# Patient Record
Sex: MALE | Race: WHITE | NOT HISPANIC OR LATINO | Employment: UNEMPLOYED | URBAN - METROPOLITAN AREA
[De-identification: names, ages, dates, MRNs, and addresses within clinical notes are randomized per-mention and may not be internally consistent; named-entity substitution may affect disease eponyms.]

---

## 2018-08-15 ENCOUNTER — OFFICE VISIT (OUTPATIENT)
Dept: FAMILY MEDICINE | Facility: CLINIC | Age: 38
End: 2018-08-15
Payer: MEDICAID

## 2018-08-15 VITALS
RESPIRATION RATE: 16 BRPM | SYSTOLIC BLOOD PRESSURE: 110 MMHG | BODY MASS INDEX: 21.13 KG/M2 | DIASTOLIC BLOOD PRESSURE: 78 MMHG | OXYGEN SATURATION: 97 % | HEIGHT: 72 IN | WEIGHT: 156 LBS | HEART RATE: 88 BPM | TEMPERATURE: 98 F

## 2018-08-15 DIAGNOSIS — Z79.4 TYPE 2 DIABETES MELLITUS WITH DIABETIC POLYNEUROPATHY, WITH LONG-TERM CURRENT USE OF INSULIN: ICD-10-CM

## 2018-08-15 DIAGNOSIS — K21.9 GASTROESOPHAGEAL REFLUX DISEASE, ESOPHAGITIS PRESENCE NOT SPECIFIED: ICD-10-CM

## 2018-08-15 DIAGNOSIS — E11.42 TYPE 2 DIABETES MELLITUS WITH DIABETIC POLYNEUROPATHY, WITH LONG-TERM CURRENT USE OF INSULIN: ICD-10-CM

## 2018-08-15 PROBLEM — E11.9 DIABETES MELLITUS, TYPE 2: Status: ACTIVE | Noted: 2018-08-15

## 2018-08-15 PROCEDURE — 99214 OFFICE O/P EST MOD 30 MIN: CPT | Mod: ,,, | Performed by: FAMILY MEDICINE

## 2018-08-15 RX ORDER — HYDROGEN PEROXIDE 3 %
20 SOLUTION, NON-ORAL MISCELLANEOUS
COMMUNITY
End: 2018-09-26

## 2018-08-15 RX ORDER — PANTOPRAZOLE SODIUM 40 MG/1
40 TABLET, DELAYED RELEASE ORAL DAILY
Qty: 30 TABLET | Refills: 11 | Status: SHIPPED | OUTPATIENT
Start: 2018-08-15 | End: 2019-09-16 | Stop reason: SDUPTHER

## 2018-08-15 RX ORDER — INSULIN PUMP SYRINGE, 3 ML
EACH MISCELLANEOUS
Qty: 1 EACH | Refills: 0 | Status: SHIPPED | OUTPATIENT
Start: 2018-08-15 | End: 2021-10-04

## 2018-08-15 RX ORDER — LANCETS
EACH MISCELLANEOUS
Qty: 200 EACH | Refills: 3 | Status: SHIPPED | OUTPATIENT
Start: 2018-08-15 | End: 2020-12-09 | Stop reason: SDUPTHER

## 2018-08-15 RX ORDER — INSULIN ASPART 100 [IU]/ML
10 INJECTION, SOLUTION INTRAVENOUS; SUBCUTANEOUS 4 TIMES DAILY
Qty: 12 ML | Refills: 11 | Status: SHIPPED | OUTPATIENT
Start: 2018-08-15 | End: 2023-02-13 | Stop reason: SDUPTHER

## 2018-08-15 RX ORDER — GABAPENTIN 600 MG/1
600 TABLET ORAL 3 TIMES DAILY
Qty: 90 TABLET | Refills: 11 | Status: SHIPPED | OUTPATIENT
Start: 2018-08-15 | End: 2019-09-16

## 2018-08-15 NOTE — PATIENT INSTRUCTIONS
Nathaly sliding scale:  Check blood sugar 4 times a day.  If blood sugar is 0-80 take sugar or glucose tablets.  If blood sugar  take 0 units.  If blood sugar 161-200 take 2 units.  If blood sugar 201-250 take 4 units.  If blood sugar 251-300 take 6 units.  If blood sugar greater than 300 take 8 units.

## 2018-08-15 NOTE — PROGRESS NOTES
Subjective:       Patient ID: Philip Alberts is a 37 y.o. male.    Chief Complaint: Diabetes      Patient is here to get established. Has a seven-year history of diabetes and needs follow-up on it. As been on insulin since 2016      Diabetes   He presents for his follow-up diabetic visit. He has type 2 diabetes mellitus. No MedicAlert identification noted. The initial diagnosis of diabetes was made 7 years ago. Hypoglycemia symptoms include dizziness and nervousness/anxiousness. Pertinent negatives for hypoglycemia include no hunger. Associated symptoms include blurred vision, foot paresthesias, polydipsia, polyuria and weight loss. Pertinent negatives for diabetes include no foot ulcerations, no polyphagia and no visual change. Hypoglycemia complications include hospitalization (at dx.). Pertinent negatives for hypoglycemia complications include no blackouts and no nocturnal hypoglycemia. Risk factors for coronary artery disease include diabetes mellitus. Current diabetic treatment includes diet, insulin injections and intensive insulin program. He is compliant with treatment some of the time. His weight is stable. He is following a diabetic diet. Meal planning includes avoidance of concentrated sweets. He participates in exercise intermittently. His breakfast blood glucose range is generally >200 mg/dl. His lunch blood glucose range is generally >200 mg/dl. His dinner blood glucose range is generally >200 mg/dl. His bedtime blood glucose range is generally >200 mg/dl. His overall blood glucose range is >200 mg/dl. He does not see a podiatrist.Eye exam is not current (2 years).       Allergies and Medications:   Review of patient's allergies indicates:   Allergen Reactions    Pcn [penicillins] Anaphylaxis     Current Outpatient Medications   Medication Sig Dispense Refill    esomeprazole (NEXIUM) 20 MG capsule Take 20 mg by mouth before breakfast.      insulin detemir U-100 (LEVEMIR) 100 unit/mL injection  Inject 35 Units into the skin once daily. 1050 Units 11    blood sugar diagnostic Strp To check BG qid times daily, to use with insurance preferred meter 200 strip 3    blood-glucose meter kit To check BG qid times daily, to use with insurance preferred meter 1 each 0    gabapentin (NEURONTIN) 600 MG tablet Take 1 tablet (600 mg total) by mouth 3 (three) times daily. 90 tablet 11    insulin aspart U-100 (NOVOLOG FLEXPEN U-100 INSULIN) 100 unit/mL InPn pen Inject 10 Units into the skin 4 (four) times daily. Per sliding scale as instructed 12 mL 11    lancets Misc To check BGqid times daily, to use with insurance preferred meter 200 each 3    pantoprazole (PROTONIX) 40 MG tablet Take 1 tablet (40 mg total) by mouth once daily. 30 tablet 11     No current facility-administered medications for this visit.        Family History:   Family History   Problem Relation Age of Onset    Diabetes Mother     Cancer Father        Social History:   Social History     Socioeconomic History    Marital status: Single     Spouse name: Not on file    Number of children: Not on file    Years of education: Not on file    Highest education level: Not on file   Social Needs    Financial resource strain: Not on file    Food insecurity - worry: Not on file    Food insecurity - inability: Not on file    Transportation needs - medical: Not on file    Transportation needs - non-medical: Not on file   Occupational History    Not on file   Tobacco Use    Smoking status: Current Every Day Smoker    Smokeless tobacco: Never Used   Substance and Sexual Activity    Alcohol use: No     Frequency: Never    Drug use: No    Sexual activity: Not on file   Other Topics Concern    Not on file   Social History Narrative    Not on file       Review of Systems   Constitutional: Positive for weight loss.   Eyes: Positive for blurred vision.   Endocrine: Positive for polydipsia and polyuria. Negative for polyphagia.   Neurological:  Positive for dizziness.   Psychiatric/Behavioral: The patient is nervous/anxious.        Objective:     Vitals:    08/15/18 1057   BP: 110/78   Pulse: 88   Resp: 16   Temp: 97.7 °F (36.5 °C)        Physical Exam   Constitutional: He appears well-developed and well-nourished.   HENT:   Head: Normocephalic.   Cardiovascular: Normal rate, regular rhythm, normal heart sounds and intact distal pulses. Exam reveals no gallop and no friction rub.   No murmur heard.  Nursing note and vitals reviewed.      Assessment:       1. Type 2 diabetes mellitus with diabetic polyneuropathy, with long-term current use of insulin    2. Gastroesophageal reflux disease, esophagitis presence not specified        Plan:       Philip was seen today for diabetes.    Diagnoses and all orders for this visit:    Type 2 diabetes mellitus with diabetic polyneuropathy, with long-term current use of insulin  -     Ambulatory consult to Ophthalmology  -     insulin detemir U-100 (LEVEMIR) 100 unit/mL injection; Inject 35 Units into the skin once daily.  -     insulin aspart U-100 (NOVOLOG FLEXPEN U-100 INSULIN) 100 unit/mL InPn pen; Inject 10 Units into the skin 4 (four) times daily. Per sliding scale as instructed  -     blood-glucose meter kit; To check BG qid times daily, to use with insurance preferred meter  -     lancets Misc; To check BGqid times daily, to use with insurance preferred meter  -     blood sugar diagnostic Strp; To check BG qid times daily, to use with insurance preferred meter  -     gabapentin (NEURONTIN) 600 MG tablet; Take 1 tablet (600 mg total) by mouth 3 (three) times daily.    Gastroesophageal reflux disease, esophagitis presence not specified  -     pantoprazole (PROTONIX) 40 MG tablet; Take 1 tablet (40 mg total) by mouth once daily.         Follow-up in about 2 weeks (around 8/29/2018), or For follow-up on blood sugars, for annual.

## 2018-08-29 ENCOUNTER — OFFICE VISIT (OUTPATIENT)
Dept: FAMILY MEDICINE | Facility: CLINIC | Age: 38
End: 2018-08-29
Payer: MEDICAID

## 2018-08-29 VITALS
HEART RATE: 88 BPM | WEIGHT: 171.5 LBS | TEMPERATURE: 99 F | OXYGEN SATURATION: 97 % | RESPIRATION RATE: 20 BRPM | BODY MASS INDEX: 23.23 KG/M2 | DIASTOLIC BLOOD PRESSURE: 60 MMHG | SYSTOLIC BLOOD PRESSURE: 110 MMHG | HEIGHT: 72 IN

## 2018-08-29 DIAGNOSIS — Z79.4 TYPE 2 DIABETES MELLITUS WITH DIABETIC POLYNEUROPATHY, WITH LONG-TERM CURRENT USE OF INSULIN: ICD-10-CM

## 2018-08-29 DIAGNOSIS — K59.1 FUNCTIONAL DIARRHEA: Primary | ICD-10-CM

## 2018-08-29 DIAGNOSIS — E11.42 TYPE 2 DIABETES MELLITUS WITH DIABETIC POLYNEUROPATHY, WITH LONG-TERM CURRENT USE OF INSULIN: ICD-10-CM

## 2018-08-29 LAB
GLUCOSE SERPL-MCNC: ABNORMAL MG/DL (ref 70–110)
HBA1C MFR BLD: 13.9 %
POC CHOLESTEROL, HDL: 71
POC CHOLESTEROL, LDL: 128
POC CHOLESTEROL, TOTAL: 215 MG/DL
POC GLUCOSE FASTING: 384
POC TOTAL CHOLESTEROL / HDL RATIO: 3
POC TRIGLYCERIDES: 84

## 2018-08-29 PROCEDURE — 80061 LIPID PANEL: CPT | Mod: QW,,, | Performed by: FAMILY MEDICINE

## 2018-08-29 PROCEDURE — 99213 OFFICE O/P EST LOW 20 MIN: CPT | Mod: ,,, | Performed by: FAMILY MEDICINE

## 2018-08-29 PROCEDURE — 83036 HEMOGLOBIN GLYCOSYLATED A1C: CPT | Mod: QW,,, | Performed by: FAMILY MEDICINE

## 2018-08-29 PROCEDURE — 82947 ASSAY GLUCOSE BLOOD QUANT: CPT | Mod: QW,,, | Performed by: FAMILY MEDICINE

## 2018-08-29 RX ORDER — CHOLESTYRAMINE 4 G/9G
4 POWDER, FOR SUSPENSION ORAL
Qty: 270 PACKET | Refills: 3 | Status: SHIPPED | OUTPATIENT
Start: 2018-08-29 | End: 2023-02-13

## 2018-08-29 RX ORDER — PRAVASTATIN SODIUM 20 MG/1
20 TABLET ORAL DAILY
Qty: 90 TABLET | Refills: 3 | Status: SHIPPED | OUTPATIENT
Start: 2018-08-29 | End: 2019-09-16 | Stop reason: SDUPTHER

## 2018-08-29 RX ORDER — INSULIN GLARGINE 100 [IU]/ML
60 INJECTION, SOLUTION SUBCUTANEOUS DAILY
Qty: 18 ML | Refills: 11 | Status: SHIPPED | OUTPATIENT
Start: 2018-08-29 | End: 2019-08-21 | Stop reason: CLARIF

## 2018-08-29 NOTE — PROGRESS NOTES
Subjective:       Patient ID: Philip Alberts is a 38 y.o. male.    Chief Complaint: Diabetes (follow up )      Patient is here today as a follow-up on his diabetes.  The blood work done on last visit .  On Levimir 35/d skips , Novalog 30u/day avg.    Also c/o daily diarrhea      Diabetes   He presents for his follow-up diabetic visit. He has type 2 diabetes mellitus. There are no hypoglycemic associated symptoms. Pertinent negatives for diabetes include no blurred vision, no chest pain, no fatigue, no foot paresthesias, no foot ulcerations, no polydipsia, no polyphagia, no polyuria, no visual change, no weakness and no weight loss. There are no hypoglycemic complications. Diabetic complications include autonomic neuropathy. Pertinent negatives for diabetic complications include no retinopathy ( appt pending.). (No blood work. Not completely none.) There are no known risk factors for coronary artery disease. Current diabetic treatment includes diet, insulin injections and intensive insulin program. His breakfast blood glucose range is generally 130-140 mg/dl. His lunch blood glucose range is generally >200 mg/dl. His dinner blood glucose range is generally >200 mg/dl. His bedtime blood glucose range is generally >200 mg/dl. Sees podiatrist: pending.Eye exam current: pending.       Allergies and Medications:   Review of patient's allergies indicates:   Allergen Reactions    Pcn [penicillins] Anaphylaxis     Current Outpatient Medications   Medication Sig Dispense Refill    blood sugar diagnostic Strp To check BG qid times daily, to use with insurance preferred meter 200 strip 3    blood-glucose meter kit To check BG qid times daily, to use with insurance preferred meter 1 each 0    esomeprazole (NEXIUM) 20 MG capsule Take 20 mg by mouth before breakfast.      gabapentin (NEURONTIN) 600 MG tablet Take 1 tablet (600 mg total) by mouth 3 (three) times daily. 90 tablet 11    insulin aspart U-100 (NOVOLOG FLEXPEN  U-100 INSULIN) 100 unit/mL InPn pen Inject 10 Units into the skin 4 (four) times daily. Per sliding scale as instructed 12 mL 11    lancets Misc To check BGqid times daily, to use with insurance preferred meter 200 each 3    pantoprazole (PROTONIX) 40 MG tablet Take 1 tablet (40 mg total) by mouth once daily. 30 tablet 11    cholestyramine (QUESTRAN) 4 gram packet Take 1 packet (4 g total) by mouth 3 (three) times daily with meals. 270 packet 3    insulin glargine (BASAGLAR KWIKPEN U-100 INSULIN) 100 unit/mL (3 mL) InPn pen Inject 60 Units into the skin once daily. 18 mL 11    pravastatin (PRAVACHOL) 20 MG tablet Take 1 tablet (20 mg total) by mouth once daily. 90 tablet 3     No current facility-administered medications for this visit.        Family History:   Family History   Problem Relation Age of Onset    Diabetes Mother     Cancer Father        Social History:   Social History     Socioeconomic History    Marital status: Single     Spouse name: Not on file    Number of children: Not on file    Years of education: Not on file    Highest education level: Not on file   Social Needs    Financial resource strain: Not on file    Food insecurity - worry: Not on file    Food insecurity - inability: Not on file    Transportation needs - medical: Not on file    Transportation needs - non-medical: Not on file   Occupational History    Not on file   Tobacco Use    Smoking status: Current Every Day Smoker    Smokeless tobacco: Never Used   Substance and Sexual Activity    Alcohol use: No     Frequency: Never    Drug use: No    Sexual activity: Not on file   Other Topics Concern    Not on file   Social History Narrative    Not on file       Review of Systems   Constitutional: Negative for fatigue and weight loss.   Eyes: Negative for blurred vision.   Cardiovascular: Negative for chest pain.   Endocrine: Negative for polydipsia, polyphagia and polyuria.   Neurological: Negative for weakness.        Objective:     Vitals:    08/29/18 0856   BP: 110/60   Pulse: 88   Resp: 20   Temp: 98.7 °F (37.1 °C)        Physical Exam    Assessment:       1. Functional diarrhea    2. Type 2 diabetes mellitus with diabetic polyneuropathy, with long-term current use of insulin        Plan:       Philip was seen today for diabetes.    Diagnoses and all orders for this visit:    Functional diarrhea  -     cholestyramine (QUESTRAN) 4 gram packet; Take 1 packet (4 g total) by mouth 3 (three) times daily with meals.    Type 2 diabetes mellitus with diabetic polyneuropathy, with long-term current use of insulin  -     Ambulatory consult to Podiatry  -     insulin glargine (BASAGLAR KWIKPEN U-100 INSULIN) 100 unit/mL (3 mL) InPn pen; Inject 60 Units into the skin once daily.  -     cholestyramine (QUESTRAN) 4 gram packet; Take 1 packet (4 g total) by mouth 3 (three) times daily with meals.  -     pravastatin (PRAVACHOL) 20 MG tablet; Take 1 tablet (20 mg total) by mouth once daily.  -     Hemoglobin A1C, POCT  -     POCT Lipid Profile with Glucose         Follow-up in about 4 weeks (around 9/26/2018), or if symptoms worsen or fail to improve.

## 2018-09-26 ENCOUNTER — TELEPHONE (OUTPATIENT)
Dept: FAMILY MEDICINE | Facility: CLINIC | Age: 38
End: 2018-09-26

## 2018-09-26 ENCOUNTER — OFFICE VISIT (OUTPATIENT)
Dept: FAMILY MEDICINE | Facility: CLINIC | Age: 38
End: 2018-09-26
Payer: MEDICAID

## 2018-09-26 VITALS
OXYGEN SATURATION: 98 % | HEIGHT: 72 IN | SYSTOLIC BLOOD PRESSURE: 134 MMHG | WEIGHT: 169 LBS | DIASTOLIC BLOOD PRESSURE: 70 MMHG | HEART RATE: 97 BPM | BODY MASS INDEX: 22.89 KG/M2

## 2018-09-26 DIAGNOSIS — M54.50 CHRONIC MIDLINE LOW BACK PAIN WITHOUT SCIATICA: ICD-10-CM

## 2018-09-26 DIAGNOSIS — G89.29 CHRONIC MIDLINE LOW BACK PAIN WITHOUT SCIATICA: ICD-10-CM

## 2018-09-26 DIAGNOSIS — F17.200 SMOKING: ICD-10-CM

## 2018-09-26 DIAGNOSIS — E11.42 TYPE 2 DIABETES MELLITUS WITH DIABETIC POLYNEUROPATHY, WITH LONG-TERM CURRENT USE OF INSULIN: Primary | ICD-10-CM

## 2018-09-26 DIAGNOSIS — Z79.4 TYPE 2 DIABETES MELLITUS WITH DIABETIC POLYNEUROPATHY, WITH LONG-TERM CURRENT USE OF INSULIN: Primary | ICD-10-CM

## 2018-09-26 DIAGNOSIS — M15.3 POST-TRAUMATIC OSTEOARTHRITIS OF MULTIPLE JOINTS: ICD-10-CM

## 2018-09-26 PROCEDURE — 99214 OFFICE O/P EST MOD 30 MIN: CPT | Mod: ,,, | Performed by: FAMILY MEDICINE

## 2018-09-26 RX ORDER — DULOXETIN HYDROCHLORIDE 30 MG/1
30 CAPSULE, DELAYED RELEASE ORAL 2 TIMES DAILY
Qty: 60 CAPSULE | Refills: 11 | Status: SHIPPED | OUTPATIENT
Start: 2018-09-26 | End: 2018-12-26 | Stop reason: ALTCHOICE

## 2018-09-26 NOTE — TELEPHONE ENCOUNTER
Spoke with the patient about the prescription and how to get it filled. He said that he was not going to fill the medication.

## 2018-09-26 NOTE — TELEPHONE ENCOUNTER
----- Message from Santos Hua MD sent at 9/26/2018  1:20 PM CDT -----  I don't do prior authorizations for meds not covered on the formulary, however if we can find one that is covered is a substitute, I'll be glad to write for it.    ----- Message -----  From: Dinah Thurston  Sent: 9/26/2018  10:11 AM  To: [facility] Staff    PRIOR AUTHORIZATION, 9/26/18

## 2018-09-26 NOTE — PATIENT INSTRUCTIONS
Patient will increase his to 8 units per day hopefully to decrease his need for supplemental daytime insulin.

## 2018-09-26 NOTE — PROGRESS NOTES
Subjective:       Patient ID: Philip Alberts is a 38 y.o. male.    Chief Complaint: Diabetes and Generalized Body Aches      She is on 60 units of basilar per day and is having to take 10 units of the 4 times a day on sliding scale most days.      Diabetes   He presents for his follow-up diabetic visit. He has type 2 diabetes mellitus. Associated symptoms include fatigue. His breakfast blood glucose range is generally <70 mg/dl.   Muscle Pain   This is a chronic (Since January of this year) problem. The current episode started more than 1 month ago. The problem occurs daily. The problem has been gradually worsening. Associated symptoms include fatigue, myalgias and neck pain. Pertinent negatives include no anorexia, arthralgias, change in bowel habit, chills, congestion, coughing, diaphoresis, fever, nausea, numbness, swollen glands, urinary symptoms, vertigo or vomiting. Associated symptoms comments: Gets occasional sharp shooting pains in the right temple. He has tried NSAIDs and acetaminophen for the symptoms. The treatment provided mild relief.       Allergies and Medications:   Review of patient's allergies indicates:   Allergen Reactions    Pcn [penicillins] Anaphylaxis     Current Outpatient Medications   Medication Sig Dispense Refill    blood sugar diagnostic Strp To check BG qid times daily, to use with insurance preferred meter 200 strip 3    blood-glucose meter kit To check BG qid times daily, to use with insurance preferred meter 1 each 0    cholestyramine (QUESTRAN) 4 gram packet Take 1 packet (4 g total) by mouth 3 (three) times daily with meals. 270 packet 3    gabapentin (NEURONTIN) 600 MG tablet Take 1 tablet (600 mg total) by mouth 3 (three) times daily. 90 tablet 11    insulin aspart U-100 (NOVOLOG FLEXPEN U-100 INSULIN) 100 unit/mL InPn pen Inject 10 Units into the skin 4 (four) times daily. Per sliding scale as instructed 12 mL 11    insulin glargine (BASAGLAR KWIKPEN U-100 INSULIN)  100 unit/mL (3 mL) InPn pen Inject 60 Units into the skin once daily. 18 mL 11    lancets Misc To check BGqid times daily, to use with insurance preferred meter 200 each 3    pantoprazole (PROTONIX) 40 MG tablet Take 1 tablet (40 mg total) by mouth once daily. 30 tablet 11    pravastatin (PRAVACHOL) 20 MG tablet Take 1 tablet (20 mg total) by mouth once daily. 90 tablet 3    DULoxetine (CYMBALTA) 30 MG capsule Take 1 capsule (30 mg total) by mouth 2 (two) times daily. 60 capsule 11     No current facility-administered medications for this visit.        Family History:   Family History   Problem Relation Age of Onset    Diabetes Mother     Cancer Father        Social History:   Social History     Socioeconomic History    Marital status: Single     Spouse name: Not on file    Number of children: Not on file    Years of education: Not on file    Highest education level: Not on file   Social Needs    Financial resource strain: Not on file    Food insecurity - worry: Not on file    Food insecurity - inability: Not on file    Transportation needs - medical: Not on file    Transportation needs - non-medical: Not on file   Occupational History    Not on file   Tobacco Use    Smoking status: Current Every Day Smoker    Smokeless tobacco: Never Used   Substance and Sexual Activity    Alcohol use: No     Frequency: Never    Drug use: No    Sexual activity: Yes   Other Topics Concern    Not on file   Social History Narrative    Not on file       Review of Systems   Constitutional: Positive for fatigue. Negative for chills, diaphoresis and fever.   HENT: Negative for congestion.    Respiratory: Negative for cough.    Gastrointestinal: Negative for anorexia, change in bowel habit, nausea and vomiting.   Musculoskeletal: Positive for myalgias and neck pain. Negative for arthralgias.   Neurological: Negative for vertigo and numbness.       Objective:     Vitals:    09/26/18 0857   BP: 134/70   Pulse: 97         Physical Exam   Musculoskeletal:        Back:        Assessment:       1. Type 2 diabetes mellitus with diabetic polyneuropathy, with long-term current use of insulin patient better control now but still running over 300 at times    2. 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    3. Chronic midline low back pain without sciatica as above.      smoking-patient is not ready to quit at this time.  Plan:       Philip was seen today for diabetes and generalized body aches.    Diagnoses and all orders for this visit:    Type 2 diabetes mellitus with diabetic polyneuropathy, with long-term current use of insulin  -     Hemoglobin A1C, POCT  -     C-peptide; Future  -     C-peptide    Post-traumatic osteoarthritis of multiple joints  -     X-Ray Lumbar Spine Ap And Lateral; Future  -     C-reactive protein; Future  -     CBC auto differential; Future  -     Rheumatoid factor; Future  -     C-reactive protein  -     CBC auto differential  -     Rheumatoid factor    Chronic midline low back pain without sciatica  -     DULoxetine (CYMBALTA) 30 MG capsule; Take 1 capsule (30 mg total) by mouth 2 (two) times daily.  -     C-reactive protein; Future  -     MARINE; Future  -     C-reactive protein  -     MARINE    Smoking         Follow-up in about 3 months (around 12/26/2018) for Follow-up on chronic pain.

## 2018-10-05 ENCOUNTER — TELEPHONE (OUTPATIENT)
Dept: FAMILY MEDICINE | Facility: CLINIC | Age: 38
End: 2018-10-05

## 2018-10-05 LAB
BASOPHILS NFR BLD: 0.1 K/UL (ref 0–0.2)
BASOPHILS NFR BLD: 1.1 %
CRP SERPL-MCNC: 0.14 MG/DL (ref 0–1.4)
EOSINOPHIL NFR BLD: 0.2 K/UL (ref 0–0.7)
EOSINOPHIL NFR BLD: 1.5 %
ERYTHROCYTE [DISTWIDTH] IN BLOOD BY AUTOMATED COUNT: 12.2 % (ref 11.7–14.9)
GRAN #: 6.2 K/UL (ref 1.4–6.5)
GRAN%: 57.4 %
HBA1C MFR BLD: 10.5 % (ref 3.1–6.5)
HCT VFR BLD AUTO: 47.7 % (ref 39–55)
HGB BLD-MCNC: 16.5 G/DL (ref 14–16)
IMMATURE GRANS (ABS): 0 K/UL (ref 0–1)
IMMATURE GRANULOCYTES: 0.3 %
LYMPH #: 3.7 K/UL (ref 1.2–3.4)
LYMPH%: 34.3 %
MCH RBC QN AUTO: 30.8 PG (ref 25–35)
MCHC RBC AUTO-ENTMCNC: 34.6 G/DL (ref 31–36)
MCV RBC AUTO: 89.2 FL (ref 80–100)
MONO #: 0.6 K/UL (ref 0.1–0.6)
MONO%: 5.4 %
NUCLEATED RBCS: 0 %
PLATELET # BLD AUTO: 161 K/UL (ref 140–440)
PMV BLD AUTO: 15.4 FL (ref 8.8–12.7)
RBC # BLD AUTO: 5.35 M/UL (ref 4.3–5.9)
WBC # BLD AUTO: 10.8 K/UL (ref 5–10)

## 2018-10-05 NOTE — TELEPHONE ENCOUNTER
----- Message from Santos Hua MD sent at 10/5/2018 11:40 AM CDT -----  X-rays of the low back reveals some mild degenerative changes and osteophytes suggesting degenerative arthritis continue present medicines and recheck in 3 months.

## 2018-10-05 NOTE — PROGRESS NOTES
X-rays of the low back reveals some mild degenerative changes and osteophytes suggesting degenerative arthritis continue present medicines and recheck in 3 months.

## 2018-10-05 NOTE — TELEPHONE ENCOUNTER
This is the wrong chart the patient the patient has two charts and the correct chart is David Alberts MRN [id number]. Please us only the above listed chart this has his all of his information in it. Patient has been seen in the office since Epic his last office visit was on 9/26/2018.

## 2018-10-05 NOTE — TELEPHONE ENCOUNTER
----- Message from Santos Hua MD sent at 10/5/2018 11:33 AM CDT -----  Slightly high hemoglobin patient has not been seen since [facility]. Return to clinic

## 2018-10-05 NOTE — PROGRESS NOTES
Slightly high hemoglobin patient has not been seen since [facility]. Return to clinic

## 2018-10-06 LAB — RHEUMATOID FACT SERPL-ACNC: <10 IU/ML (ref 0–13.9)

## 2018-10-08 ENCOUNTER — TELEPHONE (OUTPATIENT)
Dept: FAMILY MEDICINE | Facility: CLINIC | Age: 38
End: 2018-10-08

## 2018-10-08 ENCOUNTER — DOCUMENTATION ONLY (OUTPATIENT)
Dept: FAMILY MEDICINE | Facility: CLINIC | Age: 38
End: 2018-10-08

## 2018-10-08 LAB — ANA SER-ACNC: NEGATIVE

## 2018-10-08 NOTE — PROGRESS NOTES
Slightly high hemoglobin. This is apparently a new problem.. Return to clinic with blood sugar diary.for follow-up,as his last A1c was 13.9.

## 2018-10-08 NOTE — TELEPHONE ENCOUNTER
----- Message from Santos Hua MD sent at 10/8/2018 11:47 AM CDT -----  Results Ok, notify patient.

## 2018-12-03 ENCOUNTER — INITIAL CONSULT (OUTPATIENT)
Dept: OPHTHALMOLOGY | Facility: CLINIC | Age: 38
End: 2018-12-03
Payer: MEDICAID

## 2018-12-03 DIAGNOSIS — E11.3293 TYPE 2 DIABETES MELLITUS WITH BOTH EYES AFFECTED BY MILD NONPROLIFERATIVE RETINOPATHY WITHOUT MACULAR EDEMA, WITH LONG-TERM CURRENT USE OF INSULIN: ICD-10-CM

## 2018-12-03 DIAGNOSIS — E11.3293 MILD NONPROLIFERATIVE DIABETIC RETINOPATHY OF BOTH EYES WITHOUT MACULAR EDEMA ASSOCIATED WITH TYPE 2 DIABETES MELLITUS: Primary | ICD-10-CM

## 2018-12-03 DIAGNOSIS — Z79.4 TYPE 2 DIABETES MELLITUS WITH BOTH EYES AFFECTED BY MILD NONPROLIFERATIVE RETINOPATHY WITHOUT MACULAR EDEMA, WITH LONG-TERM CURRENT USE OF INSULIN: ICD-10-CM

## 2018-12-03 PROBLEM — E11.3299 TYPE 2 DIABETES MELLITUS WITH MILD NONPROLIFERATIVE RETINOPATHY: Status: ACTIVE | Noted: 2018-12-03

## 2018-12-03 PROCEDURE — 99203 OFFICE O/P NEW LOW 30 MIN: CPT | Mod: PBBFAC,PO | Performed by: OPHTHALMOLOGY

## 2018-12-03 PROCEDURE — 92004 COMPRE OPH EXAM NEW PT 1/>: CPT | Mod: S$PBB,,, | Performed by: OPHTHALMOLOGY

## 2018-12-03 PROCEDURE — 99999 PR PBB SHADOW E&M-NEW PATIENT-LVL III: CPT | Mod: PBBFAC,,, | Performed by: OPHTHALMOLOGY

## 2018-12-03 NOTE — LETTER
December 3, 2018      Santos Hua MD  [address]  [address]  [facility] [address]           [address] [facility]hthal[facility]  [address] [place] [address] Drive [address]  [facility] [address]  Phone: [phone]          Patient: Philip Alberts   MR Number: [id number]   YOB: 1980   Date of Visit: 12/3/2018       Dear Dr. Santos Hua:    Thank you for referring Philip Alberts to me for evaluation. Attached you will find relevant portions of my assessment and plan of care.    If you have questions, please do not hesitate to call me. I look forward to following Philip Alberts along with you.    Sincerely,    Macy Fischer MD    Enclosure  CC:  No Recipients    If you would like to receive this communication electronically, please contact [email or web address] or [phone] to request more information on [facility] access.    For providers and/or their staff who would like to refer a patient to [facility], please contact us through our one-stop-shop provider referral line, [facility], at [phone].    If you feel you have received this communication in error or would no longer like to receive these types of communications, please e-mail [email or web address]

## 2018-12-03 NOTE — Clinical Note
Good morning - Mr. Alberts presents today for his eye exam. I am reviewing his medications. He states he was previously diagnosed with hypertension, but I do not see him on an ace inhibitor or any other meds. He reports today that his BP tends to be ok at the doctor's office, but he says it's higher at home. I asked him to bring his log and cuff with him next visit.

## 2018-12-03 NOTE — PROGRESS NOTES
"HPI     PCP Dr Hua  Seen at [facility] a few years ago for last eye exam    Here for Diabetic Eye Exam, DX: 7 years IDDM  Last 2 years. Had some   trouble with insurance covering insulin so he actively working on getting   sugars better controlled now that he is on the insulin regularly. No real   problems with eyes vision blurry when BG is running high. Denies eye pain   or eye issues no injury or surgery.      Lab Results       Component                Value               Date                       HGBA1C                   13.9                08/29/2018            No results found for: LABA1C    Last edited by Nuria Cintron on 12/3/2018 10:24 AM. 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   (History)        Assessment /Plan     For exam results, see Encounter Report.    Mild nonproliferative diabetic retinopathy of both eyes without macular edema associated with type 2 diabetes mellitus          Discussed glucose control. F/u 1 year diabetic eye exam.                 "

## 2018-12-26 ENCOUNTER — OFFICE VISIT (OUTPATIENT)
Dept: FAMILY MEDICINE | Facility: CLINIC | Age: 38
End: 2018-12-26
Payer: MEDICAID

## 2018-12-26 VITALS
BODY MASS INDEX: 22.48 KG/M2 | DIASTOLIC BLOOD PRESSURE: 78 MMHG | OXYGEN SATURATION: 98 % | HEIGHT: 72 IN | HEART RATE: 95 BPM | TEMPERATURE: 98 F | WEIGHT: 166 LBS | SYSTOLIC BLOOD PRESSURE: 120 MMHG

## 2018-12-26 DIAGNOSIS — F17.200 SMOKING: ICD-10-CM

## 2018-12-26 DIAGNOSIS — M15.3 POST-TRAUMATIC OSTEOARTHRITIS OF MULTIPLE JOINTS: ICD-10-CM

## 2018-12-26 DIAGNOSIS — E11.65 UNCONTROLLED TYPE 2 DIABETES MELLITUS WITH HYPERGLYCEMIA: ICD-10-CM

## 2018-12-26 DIAGNOSIS — M54.31 SCIATICA OF RIGHT SIDE: ICD-10-CM

## 2018-12-26 DIAGNOSIS — G89.4 CHRONIC PAIN SYNDROME: ICD-10-CM

## 2018-12-26 DIAGNOSIS — E11.42 DIABETIC POLYNEUROPATHY ASSOCIATED WITH TYPE 2 DIABETES MELLITUS: ICD-10-CM

## 2018-12-26 DIAGNOSIS — T78.2XXA ANAPHYLAXIS, INITIAL ENCOUNTER: ICD-10-CM

## 2018-12-26 DIAGNOSIS — N52.9 ERECTILE DYSFUNCTION, UNSPECIFIED ERECTILE DYSFUNCTION TYPE: ICD-10-CM

## 2018-12-26 DIAGNOSIS — Z00.00 WELL ADULT EXAM: Primary | ICD-10-CM

## 2018-12-26 DIAGNOSIS — E78.2 MIXED HYPERLIPIDEMIA: ICD-10-CM

## 2018-12-26 DIAGNOSIS — I10 ESSENTIAL HYPERTENSION: ICD-10-CM

## 2018-12-26 DIAGNOSIS — K21.9 GASTROESOPHAGEAL REFLUX DISEASE, ESOPHAGITIS PRESENCE NOT SPECIFIED: ICD-10-CM

## 2018-12-26 PROBLEM — E78.5 HYPERLIPIDEMIA: Status: ACTIVE | Noted: 2018-12-26

## 2018-12-26 LAB
BILIRUB SERPL-MCNC: NEGATIVE MG/DL
BLOOD, POC UA: NEGATIVE
GLUCOSE UR QL STRIP: ABNORMAL
KETONES UR QL STRIP: NEGATIVE
LEUKOCYTE ESTERASE URINE, POC: ABNORMAL
NITRITE, POC UA: NEGATIVE
PH, POC UA: 5
PROTEIN, POC: NEGATIVE
SPECIFIC GRAVITY, POC UA: 1.01
UROBILINOGEN, POC UA: NORMAL

## 2018-12-26 PROCEDURE — 81003 URINALYSIS AUTO W/O SCOPE: CPT | Mod: QW,,, | Performed by: FAMILY MEDICINE

## 2018-12-26 PROCEDURE — 99395 PREV VISIT EST AGE 18-39: CPT | Mod: 25,,, | Performed by: FAMILY MEDICINE

## 2018-12-26 RX ORDER — SILDENAFIL 100 MG/1
100 TABLET, FILM COATED ORAL DAILY PRN
Qty: 20 TABLET | Refills: 5 | Status: SHIPPED | OUTPATIENT
Start: 2018-12-26 | End: 2019-09-16 | Stop reason: SDUPTHER

## 2018-12-26 RX ORDER — EPINEPHRINE 0.3 MG/.3ML
1 INJECTION SUBCUTANEOUS ONCE
Qty: 0.3 ML | Refills: 2 | Status: SHIPPED | OUTPATIENT
Start: 2018-12-26 | End: 2021-07-06 | Stop reason: SDUPTHER

## 2018-12-26 RX ORDER — PAROXETINE HYDROCHLORIDE 20 MG/1
20 TABLET, FILM COATED ORAL EVERY MORNING
Qty: 30 TABLET | Refills: 11 | Status: SHIPPED | OUTPATIENT
Start: 2018-12-26 | End: 2019-01-28

## 2018-12-26 RX ORDER — METFORMIN HYDROCHLORIDE 1000 MG/1
1000 TABLET ORAL 2 TIMES DAILY WITH MEALS
Qty: 180 TABLET | Refills: 3 | Status: SHIPPED | OUTPATIENT
Start: 2018-12-26 | End: 2019-09-16 | Stop reason: SINTOL

## 2018-12-26 RX ORDER — IBUPROFEN 200 MG
1 TABLET ORAL DAILY
Refills: 0 | COMMUNITY
Start: 2018-12-26 | End: 2020-12-09

## 2018-12-26 NOTE — PROGRESS NOTES
Subjective:       Patient ID: Philip Alberts is a 38 y.o. male.    Chief Complaint: Diabetes      The patient is here for his annual physical today he had labs done in September but the results have not been imported into the apical or in the media we are checking in this at this time. He was also started on Cymbalta last visit because of neuropathy and anxiety.  Lab Results       Component                Value               Date                       CHOL                     215                 08/29/2018                 HGBA1C                   13.9                08/29/2018                  Diabetes   He presents for his follow-up diabetic visit. He has type 2 diabetes mellitus. MedicAlert identification noted. Pertinent negatives for hypoglycemia include no confusion, dizziness, headaches, nervousness/anxiousness, pallor, seizures, speech difficulty or tremors. Associated symptoms include fatigue. Pertinent negatives for diabetes include no chest pain, no polydipsia, no polyphagia, no polyuria and no weakness. His breakfast blood glucose range is generally 130-140 mg/dl. His dinner blood glucose range is generally >200 mg/dl.       Allergies and Medications:   Review of patient's allergies indicates:   Allergen Reactions    Pcn [penicillins] Anaphylaxis     Current Outpatient Medications   Medication Sig Dispense Refill    blood sugar diagnostic Strp To check BG qid times daily, to use with insurance preferred meter 200 strip 3    blood-glucose meter kit To check BG qid times daily, to use with insurance preferred meter 1 each 0    cholestyramine (QUESTRAN) 4 gram packet Take 1 packet (4 g total) by mouth 3 (three) times daily with meals. 270 packet 3    gabapentin (NEURONTIN) 600 MG tablet Take 1 tablet (600 mg total) by mouth 3 (three) times daily. 90 tablet 11    insulin aspart U-100 (NOVOLOG FLEXPEN U-100 INSULIN) 100 unit/mL InPn pen Inject 10 Units into the skin 4 (four) times daily. Per  sliding scale as instructed 12 mL 11    insulin glargine (BASAGLAR KWIKPEN U-100 INSULIN) 100 unit/mL (3 mL) InPn pen Inject 60 Units into the skin once daily. 18 mL 11    lancets Misc To check BGqid times daily, to use with insurance preferred meter 200 each 3    pantoprazole (PROTONIX) 40 MG tablet Take 1 tablet (40 mg total) by mouth once daily. 30 tablet 11    pravastatin (PRAVACHOL) 20 MG tablet Take 1 tablet (20 mg total) by mouth once daily. 90 tablet 3    EPINEPHrine (EPIPEN) 0.3 mg/0.3 mL AtIn Inject 0.3 mLs (0.3 mg total) into the muscle once. for 1 dose 0.3 mL 2    metFORMIN (GLUCOPHAGE) 1000 MG tablet Take 1 tablet (1,000 mg total) by mouth 2 (two) times daily with meals. 180 tablet 3    nicotine (NICODERM CQ) 21 mg/24 hr Place 1 patch onto the skin once daily.  0    paroxetine (PAXIL) 20 MG tablet Take 1 tablet (20 mg total) by mouth every morning. 30 tablet 11    sildenafil (VIAGRA) 100 MG tablet Take 1 tablet (100 mg total) by mouth daily as needed for Erectile Dysfunction. 20 tablet 5     No current facility-administered medications for this visit.        Family History:   Family History   Problem Relation Age of Onset    Diabetes Mother     Cancer Father        Social History:   Social History     Socioeconomic History    Marital status: Single     Spouse name: Not on file    Number of children: Not on file    Years of education: Not on file    Highest education level: Not on file   Social Needs    Financial resource strain: Not on file    Food insecurity - worry: Not on file    Food insecurity - inability: Not on file    Transportation needs - medical: Not on file    Transportation needs - non-medical: Not on file   Occupational History    Not on file   Tobacco Use    Smoking status: Current Every Day Smoker    Smokeless tobacco: Never Used   Substance and Sexual Activity    Alcohol use: No     Frequency: Never    Drug use: No    Sexual activity: Yes   Other Topics Concern     Not on file   Social History Narrative    Not on file       Review of Systems   Constitutional: Positive for fatigue and unexpected weight change ( Lots of weight loss earlier 2018 but has stabilized since being on insulin.). Negative for activity change, appetite change, chills, diaphoresis and fever.   HENT: Positive for congestion, dental problem ( lots of tooth loss), sinus pain and tinnitus. Negative for drooling, ear discharge, ear pain, facial swelling, hearing loss, mouth sores, nosebleeds, postnasal drip, rhinorrhea, sinus pressure, sneezing, sore throat, trouble swallowing and voice change.    Eyes: Positive for photophobia ( sun glare). Negative for pain, discharge, redness, itching and visual disturbance.        Has seen ophthalmologist at [facility] 2 weeks ago. Some retinopathy.   Respiratory: Negative for apnea, cough, choking, chest tightness, shortness of breath, wheezing and stridor.         Still smoking 2 packs per day.   Cardiovascular: Positive for palpitations. Negative for chest pain and leg swelling.   Gastrointestinal: Negative for abdominal distention, abdominal pain, anal bleeding, blood in stool, constipation, diarrhea, nausea, rectal pain and vomiting.        Early satiety, frequent stools   Endocrine: Positive for cold intolerance. Negative for heat intolerance, polydipsia, polyphagia and polyuria.   Genitourinary: Positive for frequency. Negative for decreased urine volume, difficulty urinating, discharge, dysuria, enuresis, flank pain, genital sores, hematuria, penile pain, penile swelling, scrotal swelling, testicular pain and urgency.        Hesitancy positive   Musculoskeletal: Positive for arthralgias, back pain, myalgias and neck stiffness. Negative for gait problem, joint swelling and neck pain.        History fibromyalgia   Skin: Negative for color change, pallor, rash and wound.   Allergic/Immunologic: Positive for environmental allergies ( Anaphylaxis from be and wasp stain  is). Negative for food allergies and immunocompromised state.   Neurological: Positive for numbness ( down right leg x 15 years.). Negative for dizziness, tremors, seizures, syncope, facial asymmetry, speech difficulty, weakness, light-headedness and headaches.   Hematological: Negative for adenopathy. Does not bruise/bleed easily.   Psychiatric/Behavioral: Positive for sleep disturbance. Negative for agitation, behavioral problems, confusion, decreased concentration, dysphoric mood, hallucinations (doesn't sleep well.), self-injury and suicidal ideas. The patient is not nervous/anxious and is not hyperactive.        Objective:     Vitals:    12/26/18 0855   BP: 120/78   Pulse: 95   Temp: 98.2 °F (36.8 °C)        Physical Exam   Constitutional: He is oriented to person, place, and time. He appears well-developed and well-nourished.  Non-toxic appearance. He does not have a sickly appearance. He does not appear ill. No distress.   HENT:   Head: Normocephalic and atraumatic.   Right Ear: External ear normal.   Left Ear: External ear normal.   Nose: Nose normal.   Mouth/Throat: Oropharynx is clear and moist. No oropharyngeal exudate.   Eyes: Conjunctivae and EOM are normal. Pupils are equal, round, and reactive to light. Right eye exhibits no discharge. Left eye exhibits no discharge. No scleral icterus.   Neck: Normal range of motion. Neck supple. No JVD present. No tracheal deviation present. No thyromegaly present.   Cardiovascular: Normal rate, regular rhythm, normal heart sounds and intact distal pulses. Exam reveals no gallop and no friction rub.   No murmur heard.  Pulses:       Dorsalis pedis pulses are 1+ on the right side, and 1+ on the left side.        Posterior tibial pulses are 1+ on the right side, and 1+ on the left side.   Pulmonary/Chest: Effort normal and breath sounds normal. No stridor. No respiratory distress. He has no wheezes. He has no rales. He exhibits no tenderness.   Abdominal: Soft. Bowel  sounds are normal. He exhibits no distension and no mass. There is no tenderness. There is no rebound and no guarding. No hernia.   Genitourinary: Rectum normal, prostate normal, testes normal and penis normal. Rectal exam shows guaiac negative stool. Cremasteric reflex is present. Right testis shows no mass, no swelling and no tenderness. Right testis is descended. Cremasteric reflex is not absent on the right side. Left testis shows no mass, no swelling and no tenderness. Left testis is descended. Cremasteric reflex is not absent on the left side. Circumcised. No phimosis, paraphimosis, hypospadias, penile erythema or penile tenderness. No discharge found.   Musculoskeletal: Normal range of motion. He exhibits no edema, tenderness or deformity.        Right foot: There is normal range of motion and no deformity.        Left foot: There is normal range of motion and no deformity.   Feet:   Right Foot:   Protective Sensation: 4 sites tested. 0 sites sensed.   Skin Integrity: Negative for ulcer, blister, skin breakdown, erythema, warmth, callus or dry skin.   Left Foot:   Protective Sensation: 4 sites tested. 2 sites sensed.   Skin Integrity: Negative for ulcer, blister, skin breakdown, erythema, warmth, callus or dry skin.   Lymphadenopathy:     He has no cervical adenopathy.   Neurological: He is alert and oriented to person, place, and time. He has normal reflexes. He displays normal reflexes. A sensory deficit is present. No cranial nerve deficit. He exhibits normal muscle tone. Coordination normal.   DTRs +1 symmetrical with distraction only.   Skin: Skin is warm and dry. No rash noted. He is not diaphoretic. No erythema. No pallor.   Psychiatric: He has a normal mood and affect. His behavior is normal. Judgment and thought content normal.   Nursing note and vitals reviewed.      Assessment:       1. Well adult exam    2. Uncontrolled type 2 diabetes mellitus with hyperglycemia    3. Gastroesophageal reflux  disease, esophagitis presence not specified    4. Mixed hyperlipidemia Needs to be rechecked    5. Essential hypertension -stable    6. Smoking up to 2 packs a day    7. Post-traumatic osteoarthritis of multiple joints    8. Sciatica of right side - this is subjective and masked by his diffuse lower extremity neuropathy from diabetes.        Plan:       Philip was seen today for diabetes.    Diagnoses and all orders for this visit:    Well adult exam  -     POCT Urinalysis  -     Comprehensive metabolic panel; Future  -     CBC auto differential; Future  -     Lipid panel; Future  -     TSH; Future  -     Comprehensive metabolic panel  -     CBC auto differential  -     Lipid panel  -     TSH    Uncontrolled type 2 diabetes mellitus with hyperglycemia  -     POCT Urinalysis  -     Comprehensive metabolic panel; Future  -     Lipid panel; Future  -     TSH; Future  -     Ambulatory referral to Endocrinology  -     Microalbumin/creatinine urine ratio; Future  -     EPINEPHrine (EPIPEN) 0.3 mg/0.3 mL AtIn; Inject 0.3 mLs (0.3 mg total) into the muscle once. for 1 dose  -     Ambulatory consult to Neurology  -     Hemoglobin A1c; Future  -     metFORMIN (GLUCOPHAGE) 1000 MG tablet; Take 1 tablet (1,000 mg total) by mouth 2 (two) times daily with meals.  -     Comprehensive metabolic panel  -     Lipid panel  -     TSH  -     Microalbumin/creatinine urine ratio  -     Hemoglobin A1c    Gastroesophageal reflux disease, esophagitis presence not specified    Mixed hyperlipidemia    Essential hypertension    Smoking  -     nicotine (NICODERM CQ) 21 mg/24 hr; Place 1 patch onto the skin once daily.    Post-traumatic osteoarthritis of multiple joints    Sciatica of right side  -     Ambulatory consult to Neurology    Chronic pain syndrome  -     paroxetine (PAXIL) 20 MG tablet; Take 1 tablet (20 mg total) by mouth every morning.    Diabetic polyneuropathy associated with type 2 diabetes mellitus  -     Hemoglobin A1c;  Future  -     metFORMIN (GLUCOPHAGE) 1000 MG tablet; Take 1 tablet (1,000 mg total) by mouth 2 (two) times daily with meals.  -     Hemoglobin A1c    Erectile dysfunction, unspecified erectile dysfunction type  -     sildenafil (VIAGRA) 100 MG tablet; Take 1 tablet (100 mg total) by mouth daily as needed for Erectile Dysfunction.    Anaphylaxis, initial encounter  -     EPINEPHrine (EPIPEN) 0.3 mg/0.3 mL AtIn; Inject 0.3 mLs (0.3 mg total) into the muscle once. for 1 dose         Follow-up in about 1 month (around 1/26/2019) for Recheck diabetes..

## 2019-01-21 LAB
ALBUMIN SERPL-MCNC: 4.2 G/DL (ref 3.1–4.7)
ALP SERPL-CCNC: 83 IU/L (ref 40–104)
ALT (SGPT): 27 IU/L (ref 3–33)
AST SERPL-CCNC: 24 IU/L (ref 10–40)
BASOPHILS NFR BLD: 0.1 K/UL (ref 0–0.2)
BASOPHILS NFR BLD: 1 %
BILIRUB SERPL-MCNC: 0.6 MG/DL (ref 0.3–1)
BUN SERPL-MCNC: 15 MG/DL (ref 8–20)
CALCIUM SERPL-MCNC: 9.5 MG/DL (ref 7.7–10.4)
CHLORIDE: 98 MMOL/L (ref 98–110)
CO2 SERPL-SCNC: 29.6 MMOL/L (ref 22.8–31.6)
CREATININE RANDOM URINE: 92 MG/DL
CREATININE: 0.84 MG/DL (ref 0.6–1.4)
EOSINOPHIL NFR BLD: 0.2 K/UL (ref 0–0.7)
EOSINOPHIL NFR BLD: 1.6 %
ERYTHROCYTE [DISTWIDTH] IN BLOOD BY AUTOMATED COUNT: 12.7 % (ref 11.7–14.9)
FOLATE SERPL-MCNC: 8.2 NG/ML (ref 2.2–11.2)
GLUCOSE: 331 MG/DL (ref 70–99)
GRAN #: 8.4 K/UL (ref 1.4–6.5)
GRAN%: 69.5 %
HBA1C MFR BLD: 11.7 % (ref 3.1–6.5)
HCT VFR BLD AUTO: 47.9 % (ref 39–55)
HGB BLD-MCNC: 16 G/DL (ref 14–16)
IMMATURE GRANS (ABS): 0 K/UL (ref 0–1)
IMMATURE GRANULOCYTES: 0.3 %
LYMPH #: 2.7 K/UL (ref 1.2–3.4)
LYMPH%: 22.6 %
MCH RBC QN AUTO: 30.1 PG (ref 25–35)
MCHC RBC AUTO-ENTMCNC: 33.4 G/DL (ref 31–36)
MCV RBC AUTO: 90.2 FL (ref 80–100)
MICROALBUM.,U,RANDOM: <2 MCG/ML (ref 0–19.9)
MICROALBUMIN/CREATININE RATIO: NORMAL (ref 0–30)
MONO #: 0.6 K/UL (ref 0.1–0.6)
MONO%: 5 %
NUCLEATED RBCS: 0 %
PLATELET # BLD AUTO: 174 K/UL (ref 140–440)
PMV BLD AUTO: 14.9 FL (ref 8.8–12.7)
POTASSIUM SERPL-SCNC: 4.5 MMOL/L (ref 3.5–5)
PROT SERPL-MCNC: 7.9 G/DL (ref 6–8.2)
RBC # BLD AUTO: 5.31 M/UL (ref 4.3–5.9)
SODIUM: 137 MMOL/L (ref 134–144)
TSH SERPL DL<=0.005 MIU/L-ACNC: 0.47 ULU/ML (ref 0.3–5.6)
VITAMIN B12: 578 PG/ML (ref 62–940)
WBC # BLD AUTO: 12.1 K/UL (ref 5–10)

## 2019-01-21 NOTE — PROGRESS NOTES
Patient labs show that he is acutely and chronically hyperglycemic needs to return to clinic with his blood sugar diary.

## 2019-01-28 ENCOUNTER — OFFICE VISIT (OUTPATIENT)
Dept: FAMILY MEDICINE | Facility: CLINIC | Age: 39
End: 2019-01-28
Payer: MEDICAID

## 2019-01-28 VITALS
TEMPERATURE: 98 F | WEIGHT: 166 LBS | DIASTOLIC BLOOD PRESSURE: 88 MMHG | HEART RATE: 90 BPM | HEIGHT: 72 IN | SYSTOLIC BLOOD PRESSURE: 136 MMHG | OXYGEN SATURATION: 97 % | BODY MASS INDEX: 22.48 KG/M2

## 2019-01-28 DIAGNOSIS — E11.3293 TYPE 2 DIABETES MELLITUS WITH BOTH EYES AFFECTED BY MILD NONPROLIFERATIVE RETINOPATHY WITHOUT MACULAR EDEMA, WITH LONG-TERM CURRENT USE OF INSULIN: Primary | ICD-10-CM

## 2019-01-28 DIAGNOSIS — Z79.4 TYPE 2 DIABETES MELLITUS WITH BOTH EYES AFFECTED BY MILD NONPROLIFERATIVE RETINOPATHY WITHOUT MACULAR EDEMA, WITH LONG-TERM CURRENT USE OF INSULIN: Primary | ICD-10-CM

## 2019-01-28 DIAGNOSIS — J31.0 CHRONIC RHINITIS: ICD-10-CM

## 2019-01-28 DIAGNOSIS — L72.3 SEBACEOUS CYST: ICD-10-CM

## 2019-01-28 PROCEDURE — 99213 PR OFFICE/OUTPT VISIT, EST, LEVL III, 20-29 MIN: ICD-10-PCS | Mod: ,,, | Performed by: FAMILY MEDICINE

## 2019-01-28 PROCEDURE — 99213 OFFICE O/P EST LOW 20 MIN: CPT | Mod: ,,, | Performed by: FAMILY MEDICINE

## 2019-01-28 RX ORDER — FLUTICASONE PROPIONATE 50 MCG
1 SPRAY, SUSPENSION (ML) NASAL DAILY
Qty: 1 BOTTLE | Refills: 3 | Status: SHIPPED | OUTPATIENT
Start: 2019-01-28 | End: 2019-09-16 | Stop reason: SDUPTHER

## 2019-01-28 RX ORDER — INSULIN GLARGINE 100 [IU]/ML
INJECTION, SOLUTION SUBCUTANEOUS
Qty: 27 ML | Refills: 11 | Status: SHIPPED | OUTPATIENT
Start: 2019-01-28 | End: 2019-08-21 | Stop reason: CLARIF

## 2019-01-28 NOTE — PROGRESS NOTES
Subjective:       Patient ID: Philip Alberts is a 38 y.o. male.    Chief Complaint: Diabetes      Issues here as a follow-up on his diabetes. He is on 60 units of basiglar night sugars are still jumping up above 300. Takes 10 units of NovoLog 4 times a day as is always a sugar or high as the day progresses. Patient reports if he takes too much basic large his sugars condition below 100 and he feels very bad in fact he feels bad if his sugars are below 140.    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 Has not gotten infected in the past  Chronic nasal constriction, for up to 2 years in duration.      Diabetes   He presents for his follow-up diabetic visit. He has type 2 diabetes mellitus. There are no hypoglycemic associated symptoms. There are no hypoglycemic complications. Pertinent negatives for hypoglycemia complications include no blackouts, no hospitalization, no nocturnal hypoglycemia, no required assistance and no required glucagon injection. Symptoms are improving. Risk factors for coronary artery disease include diabetes mellitus. His lunch blood glucose range is generally >200 mg/dl. His dinner blood glucose range is generally >200 mg/dl.   Sinus Problem   This is a chronic problem. The current episode started more than 1 year ago. The problem has been gradually worsening since onset. There has been no fever. His pain is at a severity of 0/10. He is experiencing no pain. Associated symptoms include congestion, sinus pressure and sneezing. Past treatments include oral decongestants (Uses sinex spray constantly). The treatment provided mild relief.       Allergies and Medications:   Review of patient's allergies indicates:   Allergen Reactions    Pcn [penicillins] Anaphylaxis     Current Outpatient Medications   Medication Sig Dispense Refill    blood sugar diagnostic Strp To check BG qid times daily, to use with insurance preferred  meter 200 strip 3    blood-glucose meter kit To check BG qid times daily, to use with insurance preferred meter 1 each 0    cholestyramine (QUESTRAN) 4 gram packet Take 1 packet (4 g total) by mouth 3 (three) times daily with meals. 270 packet 3    EPINEPHrine (EPIPEN) 0.3 mg/0.3 mL AtIn Inject 0.3 mLs (0.3 mg total) into the muscle once. for 1 dose 0.3 mL 2    gabapentin (NEURONTIN) 600 MG tablet Take 1 tablet (600 mg total) by mouth 3 (three) times daily. 90 tablet 11    insulin aspart U-100 (NOVOLOG FLEXPEN U-100 INSULIN) 100 unit/mL InPn pen Inject 10 Units into the skin 4 (four) times daily. Per sliding scale as instructed 12 mL 11    insulin glargine (BASAGLAR KWIKPEN U-100 INSULIN) 100 unit/mL (3 mL) InPn pen Inject 60 Units into the skin once daily. 18 mL 11    lancets Misc To check BGqid times daily, to use with insurance preferred meter 200 each 3    metFORMIN (GLUCOPHAGE) 1000 MG tablet Take 1 tablet (1,000 mg total) by mouth 2 (two) times daily with meals. 180 tablet 3    nicotine (NICODERM CQ) 21 mg/24 hr Place 1 patch onto the skin once daily.  0    pantoprazole (PROTONIX) 40 MG tablet Take 1 tablet (40 mg total) by mouth once daily. 30 tablet 11    pravastatin (PRAVACHOL) 20 MG tablet Take 1 tablet (20 mg total) by mouth once daily. 90 tablet 3    sildenafil (VIAGRA) 100 MG tablet Take 1 tablet (100 mg total) by mouth daily as needed for Erectile Dysfunction. 20 tablet 5    fluticasone (FLONASE) 50 mcg/actuation nasal spray 1 spray (50 mcg total) by Each Nare route once daily. 1 Bottle 3    insulin (BASAGLAR KWIKPEN U-100 INSULIN) glargine 100 units/mL (3mL) SubQ pen Inject 60 Units into the skin every evening AND 30 Units every morning. 27 mL 11     No current facility-administered medications for this visit.        Family History:   Family History   Problem Relation Age of Onset    Diabetes Mother     Cancer Father        Social History:   Social History     Socioeconomic History     Marital status: Single     Spouse name: Not on file    Number of children: Not on file    Years of education: Not on file    Highest education level: Not on file   Social Needs    Financial resource strain: Not on file    Food insecurity - worry: Not on file    Food insecurity - inability: Not on file    Transportation needs - medical: Not on file    Transportation needs - non-medical: Not on file   Occupational History    Not on file   Tobacco Use    Smoking status: Current Every Day Smoker    Smokeless tobacco: Never Used   Substance and Sexual Activity    Alcohol use: No     Frequency: Never    Drug use: No    Sexual activity: Yes   Other Topics Concern    Not on file   Social History Narrative    Not on file       Review of Systems   HENT: Positive for congestion, sinus pressure and sneezing.        Objective:     Vitals:    01/28/19 0920   BP: 136/88   Pulse: 90   Temp: 97.8 °F (36.6 °C)        Physical Exam   Constitutional: He appears well-developed and well-nourished. No distress.   HENT:   Head: Normocephalic and atraumatic.       Right Ear: Hearing, tympanic membrane, external ear and ear canal normal. No drainage, swelling or tenderness. No foreign bodies. Tympanic membrane is not injected, not scarred, not perforated, not erythematous, not retracted and not bulging. Tympanic membrane mobility is normal. No middle ear effusion. No hemotympanum. No decreased hearing is noted.   Left Ear: Hearing, tympanic membrane, external ear and ear canal normal. No drainage, swelling or tenderness. No foreign bodies. Tympanic membrane is not injected, not scarred, not perforated, not erythematous, not retracted and not bulging. Tympanic membrane mobility is normal.  No middle ear effusion. No hemotympanum. No decreased hearing is noted.   Nose: Nose normal. No mucosal edema, rhinorrhea, nose lacerations, sinus tenderness, nasal deformity, septal deviation or nasal septal hematoma. No epistaxis.  No  foreign bodies. Right sinus exhibits no maxillary sinus tenderness and no frontal sinus tenderness. Left sinus exhibits no maxillary sinus tenderness and no frontal sinus tenderness.   Mouth/Throat: Uvula is midline and oropharynx is clear and moist. Normal dentition. No oropharyngeal exudate, posterior oropharyngeal edema, posterior oropharyngeal erythema or tonsillar abscesses.   Eyes: Conjunctivae and EOM are normal. Pupils are equal, round, and reactive to light. Right eye exhibits no discharge. Left eye exhibits no discharge. No scleral icterus.   Neck: Normal range of motion. Neck supple. No thyromegaly present.   Cardiovascular: Normal rate, regular rhythm, normal heart sounds and intact distal pulses. Exam reveals no gallop and no friction rub.   No murmur heard.  Pulmonary/Chest: Effort normal and breath sounds normal. No stridor. No respiratory distress. He has no wheezes. He has no rales. He exhibits no tenderness.   Lymphadenopathy:     He has no cervical adenopathy.   Skin: He is not diaphoretic.   Nursing note and vitals reviewed.      Assessment:       1. Type 2 diabetes mellitus with both eyes affected by mild nonproliferative retinopathy without macular edema, with long-term current use of insulin      he continues to exhibit poor control on single daily dose basically are the only long-acting insulin his insurance will cover. Patient is to switch to a split dose regimen.  Chronic allergic rhinitis- patient has been using a over-the-counter decongestant nasal spray on a regular basis and cannot stop was advised to switch to the Flonase not to use a decongestant spray again.  Plan:       Philip was seen today for diabetes.    Diagnoses and all orders for this visit:    Type 2 diabetes mellitus with both eyes affected by mild nonproliferative retinopathy without macular edema, with long-term current use of insulin  -     Ambulatory referral to Nutrition Services  -     insulin (BASAGLAR KWIKPEN U-100  INSULIN) glargine 100 units/mL (3mL) SubQ pen; Inject 60 Units into the skin every evening AND 30 Units every morning.    Sebaceous cyst  -     Ambulatory referral to Dermatology    Chronic rhinitis  -     Ambulatory referral to ENT  -     fluticasone (FLONASE) 50 mcg/actuation nasal spray; 1 spray (50 mcg total) by Each Nare route once daily.         Follow-up in about 1 month (around 2/28/2019).

## 2019-02-04 ENCOUNTER — TELEPHONE (OUTPATIENT)
Dept: FAMILY MEDICINE | Facility: CLINIC | Age: 39
End: 2019-02-04

## 2019-02-04 DIAGNOSIS — E08.65 DIABETES MELLITUS DUE TO UNDERLYING CONDITION, UNCONTROLLED, WITH HYPERGLYCEMIA: Primary | ICD-10-CM

## 2019-02-04 NOTE — TELEPHONE ENCOUNTER
Nutritionist no sig knowledge to order a STEVO antibody to determine if type 1 diabetes follow-up with me 2 weeks.

## 2019-02-04 NOTE — TELEPHONE ENCOUNTER
----- Message from Debbi Davis LPN sent at 2/4/2019  7:54 AM CST -----  Regarding: [facility] nutrition clinic report      ----- Message -----  From: Candace Orozco  Sent: 2/1/2019   2:24 PM  To: [facility] Staff    Nutrition clinic notes 2/1/19

## 2019-04-01 PROBLEM — Z00.00 WELL ADULT EXAM: Status: RESOLVED | Noted: 2018-12-26 | Resolved: 2019-04-01

## 2019-08-21 ENCOUNTER — TELEPHONE (OUTPATIENT)
Dept: FAMILY MEDICINE | Facility: CLINIC | Age: 39
End: 2019-08-21

## 2019-08-21 DIAGNOSIS — E11.3293 TYPE 2 DIABETES MELLITUS WITH BOTH EYES AFFECTED BY MILD NONPROLIFERATIVE RETINOPATHY WITHOUT MACULAR EDEMA, WITH LONG-TERM CURRENT USE OF INSULIN: Primary | ICD-10-CM

## 2019-08-21 DIAGNOSIS — Z79.4 TYPE 2 DIABETES MELLITUS WITH BOTH EYES AFFECTED BY MILD NONPROLIFERATIVE RETINOPATHY WITHOUT MACULAR EDEMA, WITH LONG-TERM CURRENT USE OF INSULIN: Primary | ICD-10-CM

## 2019-08-21 RX ORDER — INSULIN GLARGINE 100 [IU]/ML
60 INJECTION, SOLUTION SUBCUTANEOUS NIGHTLY
Qty: 2 VIAL | Refills: 0 | Status: SHIPPED | OUTPATIENT
Start: 2019-08-21 | End: 2019-09-16

## 2019-09-16 ENCOUNTER — TELEPHONE (OUTPATIENT)
Dept: FAMILY MEDICINE | Facility: CLINIC | Age: 39
End: 2019-09-16

## 2019-09-16 ENCOUNTER — OFFICE VISIT (OUTPATIENT)
Dept: FAMILY MEDICINE | Facility: CLINIC | Age: 39
End: 2019-09-16
Payer: MEDICAID

## 2019-09-16 VITALS
DIASTOLIC BLOOD PRESSURE: 86 MMHG | OXYGEN SATURATION: 95 % | TEMPERATURE: 99 F | HEART RATE: 85 BPM | BODY MASS INDEX: 21.13 KG/M2 | SYSTOLIC BLOOD PRESSURE: 134 MMHG | WEIGHT: 156 LBS | HEIGHT: 72 IN

## 2019-09-16 DIAGNOSIS — J30.9 CHRONIC ALLERGIC RHINITIS: ICD-10-CM

## 2019-09-16 DIAGNOSIS — Z79.4 TYPE 2 DIABETES MELLITUS WITH DIABETIC POLYNEUROPATHY, WITH LONG-TERM CURRENT USE OF INSULIN: ICD-10-CM

## 2019-09-16 DIAGNOSIS — E08.42: ICD-10-CM

## 2019-09-16 DIAGNOSIS — E11.65 UNCONTROLLED TYPE 2 DIABETES MELLITUS WITH HYPERGLYCEMIA: ICD-10-CM

## 2019-09-16 DIAGNOSIS — Z79.4 TYPE 2 DIABETES MELLITUS WITH BOTH EYES AFFECTED BY MILD NONPROLIFERATIVE RETINOPATHY WITHOUT MACULAR EDEMA, WITH LONG-TERM CURRENT USE OF INSULIN: Primary | ICD-10-CM

## 2019-09-16 DIAGNOSIS — N52.9 ERECTILE DYSFUNCTION, UNSPECIFIED ERECTILE DYSFUNCTION TYPE: ICD-10-CM

## 2019-09-16 DIAGNOSIS — J31.0 CHRONIC RHINITIS: ICD-10-CM

## 2019-09-16 DIAGNOSIS — E11.42 TYPE 2 DIABETES MELLITUS WITH DIABETIC POLYNEUROPATHY, WITH LONG-TERM CURRENT USE OF INSULIN: ICD-10-CM

## 2019-09-16 DIAGNOSIS — Z23 IMMUNIZATION DUE: ICD-10-CM

## 2019-09-16 DIAGNOSIS — E11.3293 TYPE 2 DIABETES MELLITUS WITH BOTH EYES AFFECTED BY MILD NONPROLIFERATIVE RETINOPATHY WITHOUT MACULAR EDEMA, WITH LONG-TERM CURRENT USE OF INSULIN: Primary | ICD-10-CM

## 2019-09-16 DIAGNOSIS — K21.9 GASTROESOPHAGEAL REFLUX DISEASE, ESOPHAGITIS PRESENCE NOT SPECIFIED: ICD-10-CM

## 2019-09-16 PROCEDURE — 99213 PR OFFICE/OUTPT VISIT, EST, LEVL III, 20-29 MIN: ICD-10-PCS | Mod: S$PBB,,, | Performed by: FAMILY MEDICINE

## 2019-09-16 PROCEDURE — 99213 OFFICE O/P EST LOW 20 MIN: CPT | Mod: S$PBB,,, | Performed by: FAMILY MEDICINE

## 2019-09-16 PROCEDURE — 90471 IMMUNIZATION ADMIN: CPT | Mod: PBBFAC | Performed by: FAMILY MEDICINE

## 2019-09-16 PROCEDURE — 99999 PR PBB SHADOW E&M-EST. PATIENT-LVL V: CPT | Mod: PBBFAC,,, | Performed by: FAMILY MEDICINE

## 2019-09-16 PROCEDURE — 99215 OFFICE O/P EST HI 40 MIN: CPT | Mod: PBBFAC | Performed by: FAMILY MEDICINE

## 2019-09-16 PROCEDURE — 99999 PR PBB SHADOW E&M-EST. PATIENT-LVL V: ICD-10-PCS | Mod: PBBFAC,,, | Performed by: FAMILY MEDICINE

## 2019-09-16 RX ORDER — HYDROCODONE BITARTRATE AND ACETAMINOPHEN 5; 325 MG/1; MG/1
TABLET ORAL
COMMUNITY
Start: 2019-09-16 | End: 2020-12-09 | Stop reason: ALTCHOICE

## 2019-09-16 RX ORDER — FLUTICASONE PROPIONATE 50 MCG
1 SPRAY, SUSPENSION (ML) NASAL DAILY
Qty: 1 BOTTLE | Refills: 3 | Status: SHIPPED | OUTPATIENT
Start: 2019-09-16 | End: 2023-02-13

## 2019-09-16 RX ORDER — CLINDAMYCIN HYDROCHLORIDE 300 MG/1
CAPSULE ORAL
COMMUNITY
Start: 2019-09-16 | End: 2020-12-09 | Stop reason: ALTCHOICE

## 2019-09-16 RX ORDER — PANTOPRAZOLE SODIUM 40 MG/1
40 TABLET, DELAYED RELEASE ORAL DAILY
Qty: 30 TABLET | Refills: 11 | Status: SHIPPED | OUTPATIENT
Start: 2019-09-16 | End: 2019-11-21

## 2019-09-16 RX ORDER — INSULIN GLARGINE 100 [IU]/ML
INJECTION, SOLUTION SUBCUTANEOUS
COMMUNITY
End: 2019-09-20 | Stop reason: SDUPTHER

## 2019-09-16 RX ORDER — PRAVASTATIN SODIUM 20 MG/1
20 TABLET ORAL DAILY
Qty: 90 TABLET | Refills: 3 | Status: SHIPPED | OUTPATIENT
Start: 2019-09-16 | End: 2020-12-09 | Stop reason: SDUPTHER

## 2019-09-16 RX ORDER — LANCETS 28 GAUGE
1 EACH MISCELLANEOUS DAILY
Qty: 100 EACH | Refills: 3 | Status: SHIPPED | OUTPATIENT
Start: 2019-09-16 | End: 2019-12-15

## 2019-09-16 RX ORDER — IBUPROFEN 800 MG/1
TABLET ORAL
COMMUNITY
Start: 2019-09-16 | End: 2020-12-09 | Stop reason: ALTCHOICE

## 2019-09-16 RX ORDER — SILDENAFIL 100 MG/1
100 TABLET, FILM COATED ORAL DAILY PRN
Qty: 20 TABLET | Refills: 5 | Status: SHIPPED | OUTPATIENT
Start: 2019-09-16 | End: 2021-07-06 | Stop reason: SDUPTHER

## 2019-09-16 NOTE — PROGRESS NOTES
Subjective:       Patient ID: Philip Alberts is a 39 y.o. male.    Chief Complaint: Diabetes (follow up ) and Hyperlipidemia (follow up )      Patient is here for follow-up on his diabetes needs refills on his meds as well. C/O can't tolerate metformin- GI upset.  Patient is on Lantus and NovoLog for sliding scale. Typical takes 5-6 novolog/day. Watching his diet closely.  Had several teeth pulled and now he is on a liquid diet.  Lab Results       Component                Value               Date                       HGBA1C                   11.7 (H)            01/21/2019            Has any was started Lantus yet because he was given a vial previously.    Diabetes   He presents for his follow-up diabetic visit. He has type 2 diabetes mellitus. No MedicAlert identification noted. His disease course has been stable. Pertinent negatives for diabetes include no blurred vision, no chest pain, no fatigue, no foot paresthesias, no foot ulcerations, no polydipsia, no polyphagia, no polyuria, no visual change, no weakness and no weight loss.   Hyperlipidemia   Pertinent negatives include no chest pain.       Allergies and Medications:   Review of patient's allergies indicates:   Allergen Reactions    Pcn [penicillins] Anaphylaxis     Current Outpatient Medications   Medication Sig Dispense Refill    blood sugar diagnostic Strp To check BG qid times daily, to use with insurance preferred meter 200 strip 3    blood-glucose meter kit To check BG qid times daily, to use with insurance preferred meter 1 each 0    cholestyramine (QUESTRAN) 4 gram packet Take 1 packet (4 g total) by mouth 3 (three) times daily with meals. 270 packet 3    clindamycin (CLEOCIN) 300 MG capsule       fluticasone propionate (FLONASE) 50 mcg/actuation nasal spray 1 spray (50 mcg total) by Each Nostril route once daily. 1 Bottle 3    HYDROcodone-acetaminophen (NORCO) 5-325 mg per tablet       ibuprofen (ADVIL,MOTRIN) 800 MG tablet        insulin (LANTUS SOLOSTAR U-100 INSULIN) glargine 100 units/mL (3mL) SubQ pen Inject into the skin.      lancets Misc To check BGqid times daily, to use with insurance preferred meter 200 each 3    pantoprazole (PROTONIX) 40 MG tablet Take 1 tablet (40 mg total) by mouth once daily. 30 tablet 11    pravastatin (PRAVACHOL) 20 MG tablet Take 1 tablet (20 mg total) by mouth once daily. 90 tablet 3    sildenafil (VIAGRA) 100 MG tablet Take 1 tablet (100 mg total) by mouth daily as needed for Erectile Dysfunction. 20 tablet 5    EPINEPHrine (EPIPEN) 0.3 mg/0.3 mL AtIn Inject 0.3 mLs (0.3 mg total) into the muscle once. for 1 dose 0.3 mL 2    insulin aspart U-100 (NOVOLOG FLEXPEN U-100 INSULIN) 100 unit/mL InPn pen Inject 10 Units into the skin 4 (four) times daily. Per sliding scale as instructed 12 mL 11    lancets (LANCETS,THIN) 28 gauge Misc 1 lancet by Misc.(Non-Drug; Combo Route) route once daily. 100 each 3    nicotine (NICODERM CQ) 21 mg/24 hr Place 1 patch onto the skin once daily.  0     No current facility-administered medications for this visit.        Family History:   Family History   Problem Relation Age of Onset    Diabetes Mother     Cancer Father        Social History:   Social History     Socioeconomic History    Marital status: Single     Spouse name: Not on file    Number of children: Not on file    Years of education: Not on file    Highest education level: Not on file   Occupational History    Not on file   Social Needs    Financial resource strain: Not on file    Food insecurity:     Worry: Not on file     Inability: Not on file    Transportation needs:     Medical: Not on file     Non-medical: Not on file   Tobacco Use    Smoking status: Current Every Day Smoker    Smokeless tobacco: Never Used   Substance and Sexual Activity    Alcohol use: No     Frequency: Never    Drug use: No    Sexual activity: Yes   Lifestyle    Physical activity:     Days per week: Not on file      Minutes per session: Not on file    Stress: Not at all   Relationships    Social connections:     Talks on phone: Not on file     Gets together: Not on file     Attends [employer] service: Not on file     Active member of club or organization: Not on file     Attends meetings of clubs or organizations: Not on file     Relationship status: Not on file   Other Topics Concern    Not on file   Social History Narrative    Not on file       Review of Systems   Constitutional: Negative for fatigue and weight loss.   Eyes: Negative for blurred vision.   Cardiovascular: Negative for chest pain.   Endocrine: Negative for polydipsia, polyphagia and polyuria.   Neurological: Negative for weakness.        Patient has  bilateral diabetic polyneuropathy both feet complicated by some sciatica with radiculopathy.  Followed by Dr. Tashi Borges       Objective:     Vitals:    19 1439   BP: 134/86   Pulse: 85   Temp: 98.5 °F (36.9 °C)        Physical Exam    Assessment:       1. Type 2 diabetes mellitus with both eyes affected by mild nonproliferative retinopathy without macular edema, with long-term current use of insulin    2. Uncontrolled type 2 diabetes mellitus with hyperglycemia    3. Diabetic polyneuropathy associated with secondary diabetes mellitus    4. Chronic allergic rhinitis    5. Type 2 diabetes mellitus with diabetic polyneuropathy, with long-term current use of insulin    6. Chronic rhinitis    7. Erectile dysfunction, unspecified erectile dysfunction type    8. Gastroesophageal reflux disease, esophagitis presence not specified    9. Immunization due        Plan:       Philip was seen today for diabetes and hyperlipidemia.    Diagnoses and all orders for this visit:    Type 2 diabetes mellitus with both eyes affected by mild nonproliferative retinopathy without macular edema, with long-term current use of insulin  -     blood sugar diagnostic Strp; To check BG qid times daily, to use with insurance preferred  meter  -     lancets (LANCETS,THIN) 28 gauge Misc; 1 lancet by Misc.(Non-Drug; Combo Route) route once daily.  -     Hemoglobin A1c; Future  -     Comprehensive metabolic panel; Future  -     Lipid panel; Future    Uncontrolled type 2 diabetes mellitus with hyperglycemia    Diabetic polyneuropathy associated with secondary diabetes mellitus  -     Hemoglobin A1c; Future  -     Comprehensive metabolic panel; Future  -     Lipid panel; Future    Chronic allergic rhinitis  -     Ambulatory consult to ENT    Type 2 diabetes mellitus with diabetic polyneuropathy, with long-term current use of insulin  -     pravastatin (PRAVACHOL) 20 MG tablet; Take 1 tablet (20 mg total) by mouth once daily.  -     blood sugar diagnostic Strp; To check BG qid times daily, to use with insurance preferred meter  -     Hemoglobin A1c; Future  -     Comprehensive metabolic panel; Future  -     Lipid panel; Future    Chronic rhinitis  -     fluticasone propionate (FLONASE) 50 mcg/actuation nasal spray; 1 spray (50 mcg total) by Each Nostril route once daily.    Erectile dysfunction, unspecified erectile dysfunction type  -     sildenafil (VIAGRA) 100 MG tablet; Take 1 tablet (100 mg total) by mouth daily as needed for Erectile Dysfunction.    Gastroesophageal reflux disease, esophagitis presence not specified  -     pantoprazole (PROTONIX) 40 MG tablet; Take 1 tablet (40 mg total) by mouth once daily.    Immunization due  -     Pneumococcal Polysaccharide Vaccine (23 Valent) (SQ/IM)         Follow up in about 3 months (around 12/16/2019) for follow up, with Temi Haynes.

## 2019-09-18 RX ORDER — INSULIN GLARGINE 100 [IU]/ML
INJECTION, SOLUTION SUBCUTANEOUS
OUTPATIENT
Start: 2019-09-18

## 2019-09-18 NOTE — TELEPHONE ENCOUNTER
Please send refill of Lantus solostar to [facility]  Patient was in office yesterday and you called in the vial of lantus last month but patient would like the pen.  Patient does not know his dosage because that was discussed at visit although he was here for Diabletes.

## 2019-09-20 DIAGNOSIS — Z79.4 TYPE 2 DIABETES MELLITUS WITH BOTH EYES AFFECTED BY MILD NONPROLIFERATIVE RETINOPATHY WITHOUT MACULAR EDEMA, WITH LONG-TERM CURRENT USE OF INSULIN: Primary | ICD-10-CM

## 2019-09-20 DIAGNOSIS — E11.3293 TYPE 2 DIABETES MELLITUS WITH BOTH EYES AFFECTED BY MILD NONPROLIFERATIVE RETINOPATHY WITHOUT MACULAR EDEMA, WITH LONG-TERM CURRENT USE OF INSULIN: Primary | ICD-10-CM

## 2019-09-20 RX ORDER — INSULIN GLARGINE 100 [IU]/ML
INJECTION, SOLUTION SUBCUTANEOUS
Qty: 9 SYRINGE | Refills: 2 | Status: SHIPPED | OUTPATIENT
Start: 2019-09-20 | End: 2020-12-09

## 2019-11-21 ENCOUNTER — TELEPHONE (OUTPATIENT)
Dept: FAMILY MEDICINE | Facility: CLINIC | Age: 39
End: 2019-11-21

## 2019-11-21 DIAGNOSIS — K21.9 GASTROESOPHAGEAL REFLUX DISEASE, ESOPHAGITIS PRESENCE NOT SPECIFIED: Primary | ICD-10-CM

## 2019-11-21 RX ORDER — OMEPRAZOLE 40 MG/1
40 CAPSULE, DELAYED RELEASE ORAL DAILY
Qty: 30 CAPSULE | Refills: 11 | Status: SHIPPED | OUTPATIENT
Start: 2019-11-21 | End: 2023-02-13

## 2019-11-21 NOTE — TELEPHONE ENCOUNTER
Insurance denied Pantoprazole for patient.  See denial letter in Media.  Listed on preferred are Lansoprazole and Omeprazole.

## 2020-10-15 ENCOUNTER — TELEPHONE (OUTPATIENT)
Dept: FAMILY MEDICINE | Facility: CLINIC | Age: 40
End: 2020-10-15

## 2020-10-15 NOTE — TELEPHONE ENCOUNTER
Left msg with pt regarding needing an appt and labs with A1C. Encouraged to call back to schedule.

## 2020-10-30 ENCOUNTER — TELEPHONE (OUTPATIENT)
Dept: FAMILY MEDICINE | Facility: CLINIC | Age: 40
End: 2020-10-30

## 2020-10-30 DIAGNOSIS — E11.65 UNCONTROLLED TYPE 2 DIABETES MELLITUS WITH HYPERGLYCEMIA: Primary | ICD-10-CM

## 2020-12-09 ENCOUNTER — OFFICE VISIT (OUTPATIENT)
Dept: FAMILY MEDICINE | Facility: CLINIC | Age: 40
End: 2020-12-09
Payer: MEDICAID

## 2020-12-09 VITALS
HEART RATE: 97 BPM | DIASTOLIC BLOOD PRESSURE: 84 MMHG | SYSTOLIC BLOOD PRESSURE: 130 MMHG | HEIGHT: 72 IN | OXYGEN SATURATION: 98 % | WEIGHT: 151.13 LBS | TEMPERATURE: 98 F | BODY MASS INDEX: 20.47 KG/M2

## 2020-12-09 DIAGNOSIS — E08.42: ICD-10-CM

## 2020-12-09 DIAGNOSIS — E78.2 MIXED HYPERLIPIDEMIA: ICD-10-CM

## 2020-12-09 DIAGNOSIS — Z00.00 ENCOUNTER FOR PREVENTIVE HEALTH EXAMINATION: ICD-10-CM

## 2020-12-09 DIAGNOSIS — E11.42 TYPE 2 DIABETES MELLITUS WITH DIABETIC POLYNEUROPATHY, WITH LONG-TERM CURRENT USE OF INSULIN: Primary | ICD-10-CM

## 2020-12-09 DIAGNOSIS — Z12.5 PROSTATE CANCER SCREENING: ICD-10-CM

## 2020-12-09 DIAGNOSIS — E11.3293 TYPE 2 DIABETES MELLITUS WITH BOTH EYES AFFECTED BY MILD NONPROLIFERATIVE RETINOPATHY WITHOUT MACULAR EDEMA, WITH LONG-TERM CURRENT USE OF INSULIN: ICD-10-CM

## 2020-12-09 DIAGNOSIS — Z00.00 PREVENTATIVE HEALTH CARE: ICD-10-CM

## 2020-12-09 DIAGNOSIS — I10 ESSENTIAL HYPERTENSION: ICD-10-CM

## 2020-12-09 DIAGNOSIS — Z79.4 TYPE 2 DIABETES MELLITUS WITH BOTH EYES AFFECTED BY MILD NONPROLIFERATIVE RETINOPATHY WITHOUT MACULAR EDEMA, WITH LONG-TERM CURRENT USE OF INSULIN: ICD-10-CM

## 2020-12-09 DIAGNOSIS — Z79.4 TYPE 2 DIABETES MELLITUS WITH DIABETIC POLYNEUROPATHY, WITH LONG-TERM CURRENT USE OF INSULIN: Primary | ICD-10-CM

## 2020-12-09 DIAGNOSIS — E11.42 DIABETIC POLYNEUROPATHY ASSOCIATED WITH TYPE 2 DIABETES MELLITUS: ICD-10-CM

## 2020-12-09 PROCEDURE — 99214 OFFICE O/P EST MOD 30 MIN: CPT | Mod: S$PBB,,, | Performed by: FAMILY MEDICINE

## 2020-12-09 PROCEDURE — 99214 OFFICE O/P EST MOD 30 MIN: CPT | Performed by: FAMILY MEDICINE

## 2020-12-09 PROCEDURE — 99214 PR OFFICE/OUTPT VISIT, EST, LEVL IV, 30-39 MIN: ICD-10-PCS | Mod: S$PBB,,, | Performed by: FAMILY MEDICINE

## 2020-12-09 RX ORDER — INSULIN GLARGINE 100 [IU]/ML
50 INJECTION, SOLUTION SUBCUTANEOUS NIGHTLY
Qty: 15 ML | Refills: 5 | Status: SHIPPED | OUTPATIENT
Start: 2020-12-09 | End: 2020-12-14 | Stop reason: SDUPTHER

## 2020-12-09 RX ORDER — GABAPENTIN 300 MG/1
600 CAPSULE ORAL NIGHTLY
Qty: 60 CAPSULE | Refills: 11 | Status: SHIPPED | OUTPATIENT
Start: 2020-12-09 | End: 2021-10-04 | Stop reason: SDUPTHER

## 2020-12-09 RX ORDER — SEMAGLUTIDE 1.34 MG/ML
0.5 INJECTION, SOLUTION SUBCUTANEOUS
Qty: 2 SYRINGE | Refills: 11 | Status: SHIPPED | OUTPATIENT
Start: 2020-12-09 | End: 2021-01-06

## 2020-12-09 RX ORDER — LANCETS
EACH MISCELLANEOUS
Qty: 200 EACH | Refills: 3 | Status: SHIPPED | OUTPATIENT
Start: 2020-12-09

## 2020-12-09 RX ORDER — PRAVASTATIN SODIUM 20 MG/1
20 TABLET ORAL DAILY
Qty: 90 TABLET | Refills: 3 | Status: SHIPPED | OUTPATIENT
Start: 2020-12-09 | End: 2021-10-04 | Stop reason: SDUPTHER

## 2020-12-09 RX ORDER — PEN NEEDLE, DIABETIC 30 GX3/16"
1 NEEDLE, DISPOSABLE MISCELLANEOUS DAILY
Qty: 100 EACH | Refills: 3 | Status: SHIPPED | OUTPATIENT
Start: 2020-12-09 | End: 2021-10-04 | Stop reason: SDUPTHER

## 2020-12-09 NOTE — PROGRESS NOTES
Subjective:       Patient ID: Philip Alberts is a 40 y.o. male.    Chief Complaint: Diabetes (refill medications )      Right patient is here for follow-up on diabetes relates that he has not been in and some time because of a stressful 2020 personal family did illnesses etc..  He did have a knee injury and has had chronic problems since then with that.  Lab Results       Component                Value               Date                       WBC                      12.1 (H)            01/21/2019                 HGB                      16.0                01/21/2019                 HCT                      47.9                01/21/2019                 PLT                      174                 01/21/2019                 CHOL                     215                 08/29/2018                 AST                      24                  01/21/2019                 NA                       137                 01/21/2019                 K                        4.5                 01/21/2019                 CL                       98                  01/21/2019                 CREATININE               0.84                01/21/2019                 BUN                      15                  01/21/2019                 CO2                      29.6                01/21/2019                 TSH                      0.47                01/21/2019                 HGBA1C                   11.7 (H)            01/21/2019                Diabetes  He presents for his follow-up diabetic visit. He has type 2 diabetes mellitus. His disease course has been stable (not known). There are no hypoglycemic associated symptoms. Pertinent negatives for hypoglycemia include no confusion, dizziness, headaches or hunger. There are no diabetic associated symptoms. Pertinent negatives for diabetes include no blurred vision, no chest pain, no fatigue and no foot paresthesias. There are no hypoglycemic complications. Pertinent negatives  for hypoglycemia complications include no blackouts and no hospitalization. Symptoms are stable. Diabetic complications include peripheral neuropathy. His breakfast blood glucose range is generally >200 mg/dl. His dinner blood glucose range is generally >200 mg/dl. (Has been irregular on his insulin dosing.)       Allergies and Medications:   Review of patient's allergies indicates:   Allergen Reactions    Pcn [penicillins] Anaphylaxis     Current Outpatient Medications   Medication Sig Dispense Refill    blood sugar diagnostic Strp To check BG qid times daily, to use with insurance preferred meter 200 strip 3    cholestyramine (QUESTRAN) 4 gram packet Take 1 packet (4 g total) by mouth 3 (three) times daily with meals. 270 packet 3    EPINEPHrine (EPIPEN) 0.3 mg/0.3 mL AtIn Inject 0.3 mLs (0.3 mg total) into the muscle once. for 1 dose 0.3 mL 2    fluticasone propionate (FLONASE) 50 mcg/actuation nasal spray 1 spray (50 mcg total) by Each Nostril route once daily. 1 Bottle 3    insulin (LANTUS SOLOSTAR U-100 INSULIN) glargine 100 units/mL (3mL) SubQ pen Inject 30 units SQ in am daily, then Inject 60 units SQ at bedtime daily 9 Syringe 2    insulin aspart U-100 (NOVOLOG FLEXPEN U-100 INSULIN) 100 unit/mL InPn pen Inject 10 Units into the skin 4 (four) times daily. Per sliding scale as instructed 12 mL 11    lancets Misc To check BGqid times daily, to use with insurance preferred meter 200 each 3    omeprazole (PRILOSEC) 40 MG capsule Take 1 capsule (40 mg total) by mouth once daily. 30 capsule 11    sildenafil (VIAGRA) 100 MG tablet Take 1 tablet (100 mg total) by mouth daily as needed for Erectile Dysfunction. 20 tablet 5    blood-glucose meter kit To check BG qid times daily, to use with insurance preferred meter 1 each 0    pravastatin (PRAVACHOL) 20 MG tablet Take 1 tablet (20 mg total) by mouth once daily. (Patient not taking: Reported on 12/9/2020) 90 tablet 3     No current facility-administered  medications for this visit.        Family History:   Family History   Problem Relation Age of Onset    Diabetes Mother     Cancer Father        Social History:   Social History     Socioeconomic History    Marital status: Single     Spouse name: Not on file    Number of children: Not on file    Years of education: Not on file    Highest education level: Not on file   Occupational History    Not on file   Social Needs    Financial resource strain: Not on file    Food insecurity     Worry: Not on file     Inability: Not on file    Transportation needs     Medical: Not on file     Non-medical: Not on file   Tobacco Use    Smoking status: Current Every Day Smoker    Smokeless tobacco: Never Used   Substance and Sexual Activity    Alcohol use: No     Frequency: Never    Drug use: No    Sexual activity: Yes   Lifestyle    Physical activity     Days per week: Not on file     Minutes per session: Not on file    Stress: Not at all   Relationships    Social connections     Talks on phone: Not on file     Gets together: Not on file     Attends [employer]ism service: Not on file     Active member of club or organization: Not on file     Attends meetings of clubs or organizations: Not on file     Relationship status: Not on file   Other Topics Concern    Not on file   Social History Narrative    Not on file       Review of Systems   Constitutional: Negative for fatigue.   Eyes: Negative for blurred vision.   Cardiovascular: Negative for chest pain.   Neurological: Negative for dizziness and headaches.   Psychiatric/Behavioral: Negative for confusion.       Objective:     Vitals:    12/09/20 0911   BP: 130/84   Pulse: 97   Temp: 98.1 °F (36.7 °C)        Physical Exam  Vitals signs and nursing note reviewed.   Constitutional:       General: He is not in acute distress.     Appearance: He is well-developed. He is not diaphoretic.   HENT:      Head: Normocephalic.      Right Ear: External ear normal.      Left Ear:  External ear normal.      Nose: Nose normal.      Mouth/Throat:      Pharynx: No oropharyngeal exudate.   Eyes:      General: No scleral icterus.        Left eye: No discharge.      Conjunctiva/sclera: Conjunctivae normal.      Pupils: Pupils are equal, round, and reactive to light.   Neck:      Musculoskeletal: Normal range of motion and neck supple.      Thyroid: No thyromegaly.      Vascular: No JVD.      Trachea: No tracheal deviation.   Cardiovascular:      Rate and Rhythm: Normal rate and regular rhythm.      Pulses:           Dorsalis pedis pulses are 1+ on the right side and 1+ on the left side.        Posterior tibial pulses are 1+ on the right side and 1+ on the left side.      Heart sounds: Normal heart sounds. No murmur. No friction rub. No gallop.    Pulmonary:      Effort: Pulmonary effort is normal. No respiratory distress.      Breath sounds: Normal breath sounds. No stridor. No wheezing or rales.   Chest:      Chest wall: No tenderness.   Abdominal:      General: Bowel sounds are normal. There is no distension.      Palpations: Abdomen is soft. There is no mass.      Tenderness: There is no abdominal tenderness. There is no guarding or rebound.      Hernia: No hernia is present.   Genitourinary:     Penis: Normal. No tenderness.       Prostate: Normal.      Rectum: Normal. Guaiac result negative.   Musculoskeletal: Normal range of motion.         General: No tenderness.      Right foot: Normal range of motion. Deformity present. No bunion, Charcot foot, foot drop or prominent metatarsal heads.      Left foot: Normal range of motion. Deformity ( Nails both feet are thickened and deformed.) present. No bunion, Charcot foot, foot drop or prominent metatarsal heads.   Feet:      Right foot:      Protective Sensation: 4 sites tested. 0 sites sensed.      Skin integrity: Skin integrity normal. No ulcer, blister, skin breakdown, erythema, warmth, callus, dry skin or fissure.      Toenail Condition: Right  toenails are abnormally thick and long.      Left foot:      Protective Sensation: 4 sites tested. 0 sites sensed.      Skin integrity: Skin integrity normal. No ulcer, blister, skin breakdown, erythema, warmth, callus, dry skin or fissure.      Toenail Condition: Left toenails are abnormally thick and long.   Lymphadenopathy:      Cervical: No cervical adenopathy.   Skin:     General: Skin is warm and dry.      Coloration: Skin is not pale.      Findings: No erythema or rash.   Neurological:      Mental Status: He is alert and oriented to person, place, and time.      Cranial Nerves: No cranial nerve deficit.      Motor: No abnormal muscle tone.      Coordination: Coordination normal.      Deep Tendon Reflexes: Reflexes are normal and symmetric. Reflexes normal.   Psychiatric:         Behavior: Behavior normal.         Thought Content: Thought content normal.         Judgment: Judgment normal.         Assessment:       No diagnosis found.    Plan:       There are no diagnoses linked to this encounter.     No follow-ups on file.

## 2020-12-14 ENCOUNTER — TELEPHONE (OUTPATIENT)
Dept: FAMILY MEDICINE | Facility: CLINIC | Age: 40
End: 2020-12-14

## 2020-12-14 DIAGNOSIS — E11.3293 TYPE 2 DIABETES MELLITUS WITH BOTH EYES AFFECTED BY MILD NONPROLIFERATIVE RETINOPATHY WITHOUT MACULAR EDEMA, WITH LONG-TERM CURRENT USE OF INSULIN: ICD-10-CM

## 2020-12-14 DIAGNOSIS — Z79.4 TYPE 2 DIABETES MELLITUS WITH BOTH EYES AFFECTED BY MILD NONPROLIFERATIVE RETINOPATHY WITHOUT MACULAR EDEMA, WITH LONG-TERM CURRENT USE OF INSULIN: ICD-10-CM

## 2020-12-14 RX ORDER — INSULIN GLARGINE 100 [IU]/ML
INJECTION, SOLUTION SUBCUTANEOUS
Qty: 15 ML | Refills: 5 | Status: SHIPPED | OUTPATIENT
Start: 2020-12-14 | End: 2021-10-04 | Stop reason: SDUPTHER

## 2020-12-18 ENCOUNTER — TELEPHONE (OUTPATIENT)
Dept: FAMILY MEDICINE | Facility: CLINIC | Age: 40
End: 2020-12-18

## 2020-12-18 NOTE — TELEPHONE ENCOUNTER
----- Message from Kristy Tineo MA sent at 12/15/2020  9:02 AM CST -----  PRIOR AUTHORIZATION REQUEST 12/15/20

## 2021-01-06 ENCOUNTER — LAB VISIT (OUTPATIENT)
Dept: LAB | Facility: HOSPITAL | Age: 41
End: 2021-01-06
Attending: FAMILY MEDICINE
Payer: MEDICAID

## 2021-01-06 DIAGNOSIS — E78.2 MIXED HYPERLIPIDEMIA: ICD-10-CM

## 2021-01-06 DIAGNOSIS — Z12.5 PROSTATE CANCER SCREENING: ICD-10-CM

## 2021-01-06 DIAGNOSIS — Z00.00 ENCOUNTER FOR PREVENTIVE HEALTH EXAMINATION: ICD-10-CM

## 2021-01-06 DIAGNOSIS — E08.42: ICD-10-CM

## 2021-01-06 LAB
ALBUMIN SERPL BCP-MCNC: 3.9 G/DL (ref 3.5–5.2)
ALP SERPL-CCNC: 90 U/L (ref 55–135)
ALT SERPL W/O P-5'-P-CCNC: 18 U/L (ref 10–44)
ANION GAP SERPL CALC-SCNC: 8 MMOL/L (ref 8–16)
AST SERPL-CCNC: 19 U/L (ref 10–40)
BILIRUB SERPL-MCNC: 0.9 MG/DL (ref 0.1–1)
BUN SERPL-MCNC: 12 MG/DL (ref 6–20)
CALCIUM SERPL-MCNC: 9.1 MG/DL (ref 8.7–10.5)
CHLORIDE SERPL-SCNC: 97 MMOL/L (ref 95–110)
CHOLEST SERPL-MCNC: 195 MG/DL (ref 120–199)
CHOLEST/HDLC SERPL: 3.1 {RATIO} (ref 2–5)
CO2 SERPL-SCNC: 29 MMOL/L (ref 23–29)
COMPLEXED PSA SERPL-MCNC: 0.35 NG/ML (ref 0–4)
CREAT SERPL-MCNC: 0.8 MG/DL (ref 0.5–1.4)
EST. GFR  (AFRICAN AMERICAN): >60 ML/MIN/1.73 M^2
EST. GFR  (NON AFRICAN AMERICAN): >60 ML/MIN/1.73 M^2
ESTIMATED AVG GLUCOSE: 329 MG/DL (ref 68–131)
GLUCOSE SERPL-MCNC: 396 MG/DL (ref 70–110)
HBA1C MFR BLD HPLC: 13.1 % (ref 4.5–6.2)
HDLC SERPL-MCNC: 62 MG/DL (ref 40–75)
HDLC SERPL: 31.8 % (ref 20–50)
LDLC SERPL CALC-MCNC: 119.4 MG/DL (ref 63–159)
NONHDLC SERPL-MCNC: 133 MG/DL
POTASSIUM SERPL-SCNC: 4.4 MMOL/L (ref 3.5–5.1)
PROT SERPL-MCNC: 7.5 G/DL (ref 6–8.4)
SODIUM SERPL-SCNC: 134 MMOL/L (ref 136–145)
TRIGL SERPL-MCNC: 68 MG/DL (ref 30–150)

## 2021-01-06 PROCEDURE — 83036 HEMOGLOBIN GLYCOSYLATED A1C: CPT

## 2021-01-06 PROCEDURE — 87389 HIV-1 AG W/HIV-1&-2 AB AG IA: CPT

## 2021-01-06 PROCEDURE — 36415 COLL VENOUS BLD VENIPUNCTURE: CPT

## 2021-01-06 PROCEDURE — 84153 ASSAY OF PSA TOTAL: CPT

## 2021-01-06 PROCEDURE — 80061 LIPID PANEL: CPT

## 2021-01-06 PROCEDURE — 80053 COMPREHEN METABOLIC PANEL: CPT

## 2021-01-06 RX ORDER — DAPAGLIFLOZIN 5 MG/1
5 TABLET, FILM COATED ORAL DAILY
Qty: 30 TABLET | Refills: 11 | Status: SHIPPED | OUTPATIENT
Start: 2021-01-06 | End: 2022-01-06

## 2021-01-06 RX ORDER — SEMAGLUTIDE 1.34 MG/ML
0.75 INJECTION, SOLUTION SUBCUTANEOUS
Qty: 2 SYRINGE | Refills: 11 | Status: SHIPPED | OUTPATIENT
Start: 2021-01-06 | End: 2021-10-04

## 2021-01-07 LAB — HIV 1+2 AB+HIV1 P24 AG SERPL QL IA: NON REACTIVE

## 2021-01-08 ENCOUNTER — TELEPHONE (OUTPATIENT)
Dept: FAMILY MEDICINE | Facility: CLINIC | Age: 41
End: 2021-01-08

## 2021-01-14 ENCOUNTER — OFFICE VISIT (OUTPATIENT)
Dept: FAMILY MEDICINE | Facility: CLINIC | Age: 41
End: 2021-01-14
Payer: MEDICAID

## 2021-01-14 ENCOUNTER — CLINICAL SUPPORT (OUTPATIENT)
Dept: FAMILY MEDICINE | Facility: CLINIC | Age: 41
End: 2021-01-14
Attending: FAMILY MEDICINE
Payer: MEDICAID

## 2021-01-14 VITALS
OXYGEN SATURATION: 98 % | RESPIRATION RATE: 20 BRPM | BODY MASS INDEX: 20.99 KG/M2 | SYSTOLIC BLOOD PRESSURE: 132 MMHG | TEMPERATURE: 98 F | HEIGHT: 72 IN | WEIGHT: 155 LBS | DIASTOLIC BLOOD PRESSURE: 86 MMHG | HEART RATE: 88 BPM

## 2021-01-14 DIAGNOSIS — E11.42 DIABETIC POLYNEUROPATHY ASSOCIATED WITH TYPE 2 DIABETES MELLITUS: Primary | ICD-10-CM

## 2021-01-14 DIAGNOSIS — E11.3293 TYPE 2 DIABETES MELLITUS WITH BOTH EYES AFFECTED BY MILD NONPROLIFERATIVE RETINOPATHY WITHOUT MACULAR EDEMA, WITH LONG-TERM CURRENT USE OF INSULIN: ICD-10-CM

## 2021-01-14 DIAGNOSIS — Z79.4 TYPE 2 DIABETES MELLITUS WITH BOTH EYES AFFECTED BY MILD NONPROLIFERATIVE RETINOPATHY WITHOUT MACULAR EDEMA, WITH LONG-TERM CURRENT USE OF INSULIN: ICD-10-CM

## 2021-01-14 DIAGNOSIS — E11.65 UNCONTROLLED TYPE 2 DIABETES MELLITUS WITH HYPERGLYCEMIA: ICD-10-CM

## 2021-01-14 DIAGNOSIS — E11.3293 MILD NONPROLIFERATIVE DIABETIC RETINOPATHY OF BOTH EYES WITHOUT MACULAR EDEMA ASSOCIATED WITH TYPE 2 DIABETES MELLITUS: Primary | ICD-10-CM

## 2021-01-14 DIAGNOSIS — E11.42 DIABETIC POLYNEUROPATHY ASSOCIATED WITH TYPE 2 DIABETES MELLITUS: ICD-10-CM

## 2021-01-14 PROCEDURE — 99215 OFFICE O/P EST HI 40 MIN: CPT | Performed by: FAMILY MEDICINE

## 2021-01-14 PROCEDURE — 99214 PR OFFICE/OUTPT VISIT, EST, LEVL IV, 30-39 MIN: ICD-10-PCS | Mod: S$PBB,,, | Performed by: FAMILY MEDICINE

## 2021-01-14 PROCEDURE — 92228 IMG RTA DETC/MNTR DS PHY/QHP: CPT | Mod: PBBFAC,59 | Performed by: FAMILY MEDICINE

## 2021-01-14 PROCEDURE — 92228 IMG RTA DETC/MNTR DS PHY/QHP: CPT | Mod: 26,S$PBB,, | Performed by: OPHTHALMOLOGY

## 2021-01-14 PROCEDURE — 99214 OFFICE O/P EST MOD 30 MIN: CPT | Mod: S$PBB,,, | Performed by: FAMILY MEDICINE

## 2021-01-14 PROCEDURE — 92228 DIABETIC EYE SCREENING PHOTO: ICD-10-PCS | Mod: 26,S$PBB,, | Performed by: OPHTHALMOLOGY

## 2021-01-14 RX ORDER — LANCETS 33 GAUGE
EACH MISCELLANEOUS
COMMUNITY
Start: 2020-12-09 | End: 2023-02-13 | Stop reason: SDUPTHER

## 2021-01-15 ENCOUNTER — TELEPHONE (OUTPATIENT)
Dept: FAMILY MEDICINE | Facility: CLINIC | Age: 41
End: 2021-01-15

## 2021-06-07 ENCOUNTER — TELEPHONE (OUTPATIENT)
Dept: FAMILY MEDICINE | Facility: CLINIC | Age: 41
End: 2021-06-07

## 2021-06-28 ENCOUNTER — LAB VISIT (OUTPATIENT)
Dept: LAB | Facility: HOSPITAL | Age: 41
End: 2021-06-28
Attending: FAMILY MEDICINE
Payer: MEDICAID

## 2021-06-28 ENCOUNTER — TELEPHONE (OUTPATIENT)
Dept: FAMILY MEDICINE | Facility: CLINIC | Age: 41
End: 2021-06-28

## 2021-06-28 DIAGNOSIS — E08.42: ICD-10-CM

## 2021-06-28 LAB
ALBUMIN SERPL BCP-MCNC: 3.9 G/DL (ref 3.5–5.2)
ALP SERPL-CCNC: 100 U/L (ref 55–135)
ALT SERPL W/O P-5'-P-CCNC: 27 U/L (ref 10–44)
ANION GAP SERPL CALC-SCNC: 9 MMOL/L (ref 8–16)
AST SERPL-CCNC: 21 U/L (ref 10–40)
BILIRUB SERPL-MCNC: 0.8 MG/DL (ref 0.1–1)
BUN SERPL-MCNC: 13 MG/DL (ref 6–20)
CALCIUM SERPL-MCNC: 9 MG/DL (ref 8.7–10.5)
CHLORIDE SERPL-SCNC: 95 MMOL/L (ref 95–110)
CO2 SERPL-SCNC: 29 MMOL/L (ref 23–29)
CREAT SERPL-MCNC: 0.7 MG/DL (ref 0.5–1.4)
EST. GFR  (AFRICAN AMERICAN): >60 ML/MIN/1.73 M^2
EST. GFR  (NON AFRICAN AMERICAN): >60 ML/MIN/1.73 M^2
ESTIMATED AVG GLUCOSE: 212 MG/DL (ref 68–131)
GLUCOSE SERPL-MCNC: 297 MG/DL (ref 70–110)
HBA1C MFR BLD: 9 % (ref 4.5–6.2)
POTASSIUM SERPL-SCNC: 4.4 MMOL/L (ref 3.5–5.1)
PROT SERPL-MCNC: 7.5 G/DL (ref 6–8.4)
SODIUM SERPL-SCNC: 133 MMOL/L (ref 136–145)

## 2021-06-28 PROCEDURE — 36415 COLL VENOUS BLD VENIPUNCTURE: CPT | Performed by: FAMILY MEDICINE

## 2021-06-28 PROCEDURE — 83036 HEMOGLOBIN GLYCOSYLATED A1C: CPT | Performed by: FAMILY MEDICINE

## 2021-06-28 PROCEDURE — 80053 COMPREHEN METABOLIC PANEL: CPT | Performed by: FAMILY MEDICINE

## 2021-07-06 ENCOUNTER — OFFICE VISIT (OUTPATIENT)
Dept: FAMILY MEDICINE | Facility: CLINIC | Age: 41
End: 2021-07-06
Payer: MEDICAID

## 2021-07-06 VITALS
HEIGHT: 72 IN | SYSTOLIC BLOOD PRESSURE: 112 MMHG | HEART RATE: 92 BPM | RESPIRATION RATE: 16 BRPM | OXYGEN SATURATION: 97 % | TEMPERATURE: 98 F | DIASTOLIC BLOOD PRESSURE: 80 MMHG | BODY MASS INDEX: 21.08 KG/M2 | WEIGHT: 155.63 LBS

## 2021-07-06 DIAGNOSIS — R09.81 NASAL CONGESTION: ICD-10-CM

## 2021-07-06 DIAGNOSIS — E11.3293 MILD NONPROLIFERATIVE DIABETIC RETINOPATHY OF BOTH EYES WITHOUT MACULAR EDEMA ASSOCIATED WITH TYPE 2 DIABETES MELLITUS: ICD-10-CM

## 2021-07-06 DIAGNOSIS — T78.2XXA ANAPHYLAXIS, INITIAL ENCOUNTER: ICD-10-CM

## 2021-07-06 DIAGNOSIS — E11.65 UNCONTROLLED TYPE 2 DIABETES MELLITUS WITH HYPERGLYCEMIA: ICD-10-CM

## 2021-07-06 DIAGNOSIS — E08.42: Primary | ICD-10-CM

## 2021-07-06 DIAGNOSIS — N52.9 ERECTILE DYSFUNCTION, UNSPECIFIED ERECTILE DYSFUNCTION TYPE: ICD-10-CM

## 2021-07-06 PROCEDURE — 99214 OFFICE O/P EST MOD 30 MIN: CPT | Mod: S$PBB,,, | Performed by: FAMILY MEDICINE

## 2021-07-06 PROCEDURE — 99214 PR OFFICE/OUTPT VISIT, EST, LEVL IV, 30-39 MIN: ICD-10-PCS | Mod: S$PBB,,, | Performed by: FAMILY MEDICINE

## 2021-07-06 PROCEDURE — 99214 OFFICE O/P EST MOD 30 MIN: CPT | Performed by: FAMILY MEDICINE

## 2021-07-06 RX ORDER — EPINEPHRINE 0.3 MG/.3ML
1 INJECTION SUBCUTANEOUS ONCE
Qty: 0.3 ML | Refills: 2 | Status: ON HOLD | OUTPATIENT
Start: 2021-07-06 | End: 2023-10-11 | Stop reason: HOSPADM

## 2021-07-06 RX ORDER — SILDENAFIL 100 MG/1
100 TABLET, FILM COATED ORAL DAILY PRN
Qty: 20 TABLET | Refills: 5 | Status: SHIPPED | OUTPATIENT
Start: 2021-07-06 | End: 2024-01-22

## 2021-08-01 ENCOUNTER — HOSPITAL ENCOUNTER (EMERGENCY)
Facility: HOSPITAL | Age: 41
Discharge: HOME | End: 2021-08-01
Attending: EMERGENCY MEDICINE
Payer: MEDICAID

## 2021-08-01 VITALS
HEART RATE: 66 BPM | SYSTOLIC BLOOD PRESSURE: 189 MMHG | TEMPERATURE: 99 F | DIASTOLIC BLOOD PRESSURE: 107 MMHG | WEIGHT: 155 LBS | RESPIRATION RATE: 18 BRPM | HEIGHT: 72 IN | OXYGEN SATURATION: 98 % | BODY MASS INDEX: 20.99 KG/M2

## 2021-08-01 DIAGNOSIS — R10.9 ABDOMINAL PAIN, UNSPECIFIED ABDOMINAL LOCATION: Primary | ICD-10-CM

## 2021-08-01 DIAGNOSIS — M54.9 BACK PAIN, UNSPECIFIED BACK LOCATION, UNSPECIFIED BACK PAIN LATERALITY, UNSPECIFIED CHRONICITY: ICD-10-CM

## 2021-08-01 DIAGNOSIS — E86.0 DEHYDRATION: ICD-10-CM

## 2021-08-01 LAB
ALBUMIN SERPL BCP-MCNC: 4.1 G/DL (ref 3.5–5.2)
ALP SERPL-CCNC: 84 U/L (ref 55–135)
ALT SERPL W/O P-5'-P-CCNC: 16 U/L (ref 10–44)
ANION GAP SERPL CALC-SCNC: 12 MMOL/L (ref 8–16)
AST SERPL-CCNC: 16 U/L (ref 10–40)
BACTERIA #/AREA URNS HPF: NEGATIVE /HPF
BASOPHILS # BLD AUTO: 0.07 K/UL (ref 0–0.2)
BASOPHILS NFR BLD: 1.1 % (ref 0–1.9)
BILIRUB SERPL-MCNC: 0.9 MG/DL (ref 0.1–1)
BILIRUB UR QL STRIP: NEGATIVE
BUN SERPL-MCNC: 18 MG/DL (ref 6–20)
CALCIUM SERPL-MCNC: 9.2 MG/DL (ref 8.7–10.5)
CHLORIDE SERPL-SCNC: 91 MMOL/L (ref 95–110)
CLARITY UR: CLEAR
CO2 SERPL-SCNC: 29 MMOL/L (ref 23–29)
COLOR UR: YELLOW
CREAT SERPL-MCNC: 0.7 MG/DL (ref 0.5–1.4)
DIFFERENTIAL METHOD: ABNORMAL
EOSINOPHIL # BLD AUTO: 0 K/UL (ref 0–0.5)
EOSINOPHIL NFR BLD: 0.3 % (ref 0–8)
ERYTHROCYTE [DISTWIDTH] IN BLOOD BY AUTOMATED COUNT: 12.1 % (ref 11.5–14.5)
EST. GFR  (AFRICAN AMERICAN): >60 ML/MIN/1.73 M^2
EST. GFR  (NON AFRICAN AMERICAN): >60 ML/MIN/1.73 M^2
GLUCOSE SERPL-MCNC: 329 MG/DL (ref 70–110)
GLUCOSE SERPL-MCNC: 355 MG/DL (ref 70–110)
GLUCOSE UR QL STRIP: ABNORMAL
HCT VFR BLD AUTO: 47.2 % (ref 40–54)
HGB BLD-MCNC: 16 G/DL (ref 14–18)
HGB UR QL STRIP: NEGATIVE
HYALINE CASTS #/AREA URNS LPF: 0 /LPF
IMM GRANULOCYTES # BLD AUTO: 0.01 K/UL (ref 0–0.04)
IMM GRANULOCYTES NFR BLD AUTO: 0.2 % (ref 0–0.5)
KETONES UR QL STRIP: ABNORMAL
LEUKOCYTE ESTERASE UR QL STRIP: NEGATIVE
LIPASE SERPL-CCNC: 18 U/L (ref 4–60)
LYMPHOCYTES # BLD AUTO: 2 K/UL (ref 1–4.8)
LYMPHOCYTES NFR BLD: 32.6 % (ref 18–48)
MCH RBC QN AUTO: 29.7 PG (ref 27–31)
MCHC RBC AUTO-ENTMCNC: 33.9 G/DL (ref 32–36)
MCV RBC AUTO: 88 FL (ref 82–98)
MICROSCOPIC COMMENT: NORMAL
MONOCYTES # BLD AUTO: 0.5 K/UL (ref 0.3–1)
MONOCYTES NFR BLD: 8.5 % (ref 4–15)
NEUTROPHILS # BLD AUTO: 3.6 K/UL (ref 1.8–7.7)
NEUTROPHILS NFR BLD: 57.3 % (ref 38–73)
NITRITE UR QL STRIP: NEGATIVE
NRBC BLD-RTO: 0 /100 WBC
PH UR STRIP: 7 [PH] (ref 5–8)
PLATELET # BLD AUTO: 173 K/UL (ref 150–450)
PMV BLD AUTO: 14.1 FL (ref 9.2–12.9)
POTASSIUM SERPL-SCNC: 4.6 MMOL/L (ref 3.5–5.1)
PROT SERPL-MCNC: 8.2 G/DL (ref 6–8.4)
PROT UR QL STRIP: NEGATIVE
RBC # BLD AUTO: 5.39 M/UL (ref 4.6–6.2)
RBC #/AREA URNS HPF: 0 /HPF (ref 0–4)
SARS-COV-2 RDRP RESP QL NAA+PROBE: NEGATIVE
SODIUM SERPL-SCNC: 132 MMOL/L (ref 136–145)
SP GR UR STRIP: 1.02 (ref 1–1.03)
SQUAMOUS #/AREA URNS HPF: 0 /HPF
URN SPEC COLLECT METH UR: ABNORMAL
UROBILINOGEN UR STRIP-ACNC: NEGATIVE EU/DL
WBC # BLD AUTO: 6.23 K/UL (ref 3.9–12.7)
WBC #/AREA URNS HPF: 0 /HPF (ref 0–5)
YEAST URNS QL MICRO: NORMAL

## 2021-08-01 PROCEDURE — 81001 URINALYSIS AUTO W/SCOPE: CPT | Performed by: NURSE PRACTITIONER

## 2021-08-01 PROCEDURE — 96375 TX/PRO/DX INJ NEW DRUG ADDON: CPT

## 2021-08-01 PROCEDURE — 96374 THER/PROPH/DIAG INJ IV PUSH: CPT | Mod: 59

## 2021-08-01 PROCEDURE — 80053 COMPREHEN METABOLIC PANEL: CPT | Performed by: NURSE PRACTITIONER

## 2021-08-01 PROCEDURE — 25000003 PHARM REV CODE 250: Performed by: EMERGENCY MEDICINE

## 2021-08-01 PROCEDURE — 63600175 PHARM REV CODE 636 W HCPCS: Performed by: EMERGENCY MEDICINE

## 2021-08-01 PROCEDURE — 99285 EMERGENCY DEPT VISIT HI MDM: CPT | Mod: 25

## 2021-08-01 PROCEDURE — 82962 GLUCOSE BLOOD TEST: CPT

## 2021-08-01 PROCEDURE — U0002 COVID-19 LAB TEST NON-CDC: HCPCS | Performed by: NURSE PRACTITIONER

## 2021-08-01 PROCEDURE — 85025 COMPLETE CBC W/AUTO DIFF WBC: CPT | Performed by: NURSE PRACTITIONER

## 2021-08-01 PROCEDURE — 25500020 PHARM REV CODE 255: Performed by: EMERGENCY MEDICINE

## 2021-08-01 PROCEDURE — 83690 ASSAY OF LIPASE: CPT | Performed by: NURSE PRACTITIONER

## 2021-08-01 RX ORDER — MORPHINE SULFATE 4 MG/ML
4 INJECTION, SOLUTION INTRAMUSCULAR; INTRAVENOUS
Status: COMPLETED | OUTPATIENT
Start: 2021-08-01 | End: 2021-08-01

## 2021-08-01 RX ORDER — ESOMEPRAZOLE MAGNESIUM 40 MG/1
40 CAPSULE, DELAYED RELEASE ORAL
Status: ON HOLD | COMMUNITY
End: 2021-09-29 | Stop reason: HOSPADM

## 2021-08-01 RX ORDER — ONDANSETRON 2 MG/ML
4 INJECTION INTRAMUSCULAR; INTRAVENOUS
Status: COMPLETED | OUTPATIENT
Start: 2021-08-01 | End: 2021-08-01

## 2021-08-01 RX ADMIN — ONDANSETRON 4 MG: 2 INJECTION INTRAMUSCULAR; INTRAVENOUS at 07:08

## 2021-08-01 RX ADMIN — MORPHINE SULFATE 4 MG: 4 INJECTION, SOLUTION INTRAMUSCULAR; INTRAVENOUS at 07:08

## 2021-08-01 RX ADMIN — IOHEXOL 100 ML: 350 INJECTION, SOLUTION INTRAVENOUS at 07:08

## 2021-08-01 RX ADMIN — SODIUM CHLORIDE 1000 ML: 9 INJECTION, SOLUTION INTRAVENOUS at 09:08

## 2021-09-27 ENCOUNTER — HOSPITAL ENCOUNTER (INPATIENT)
Facility: HOSPITAL | Age: 41
LOS: 2 days | Discharge: HOME | DRG: 603 | End: 2021-09-29
Attending: EMERGENCY MEDICINE | Admitting: INTERNAL MEDICINE
Payer: MEDICAID

## 2021-09-27 ENCOUNTER — OFFICE VISIT (OUTPATIENT)
Dept: URGENT CARE | Facility: CLINIC | Age: 41
End: 2021-09-27
Payer: MEDICAID

## 2021-09-27 VITALS
WEIGHT: 152 LBS | BODY MASS INDEX: 20.59 KG/M2 | TEMPERATURE: 101 F | SYSTOLIC BLOOD PRESSURE: 149 MMHG | HEART RATE: 118 BPM | OXYGEN SATURATION: 99 % | RESPIRATION RATE: 16 BRPM | HEIGHT: 72 IN | DIASTOLIC BLOOD PRESSURE: 98 MMHG

## 2021-09-27 DIAGNOSIS — T14.8XXA INFECTED WOUND: ICD-10-CM

## 2021-09-27 DIAGNOSIS — L03.90 CELLULITIS: Primary | ICD-10-CM

## 2021-09-27 DIAGNOSIS — R07.9 CHEST PAIN: ICD-10-CM

## 2021-09-27 DIAGNOSIS — R50.9 FEVER: ICD-10-CM

## 2021-09-27 DIAGNOSIS — R60.9 SWELLING: ICD-10-CM

## 2021-09-27 DIAGNOSIS — R50.9 FEVER, UNSPECIFIED FEVER CAUSE: Primary | ICD-10-CM

## 2021-09-27 DIAGNOSIS — E78.2 MIXED HYPERLIPIDEMIA: ICD-10-CM

## 2021-09-27 DIAGNOSIS — E08.42: ICD-10-CM

## 2021-09-27 DIAGNOSIS — L08.9 INFECTED WOUND: ICD-10-CM

## 2021-09-27 PROCEDURE — 12000002 HC ACUTE/MED SURGE SEMI-PRIVATE ROOM

## 2021-09-27 PROCEDURE — 96365 THER/PROPH/DIAG IV INF INIT: CPT

## 2021-09-27 PROCEDURE — 80053 COMPREHEN METABOLIC PANEL: CPT | Performed by: NURSE PRACTITIONER

## 2021-09-27 PROCEDURE — 99285 EMERGENCY DEPT VISIT HI MDM: CPT | Mod: 25

## 2021-09-27 PROCEDURE — 87147 CULTURE TYPE IMMUNOLOGIC: CPT | Performed by: NURSE PRACTITIONER

## 2021-09-27 PROCEDURE — 87077 CULTURE AEROBIC IDENTIFY: CPT | Performed by: NURSE PRACTITIONER

## 2021-09-27 PROCEDURE — 85025 COMPLETE CBC W/AUTO DIFF WBC: CPT | Performed by: NURSE PRACTITIONER

## 2021-09-27 PROCEDURE — 85651 RBC SED RATE NONAUTOMATED: CPT | Performed by: NURSE PRACTITIONER

## 2021-09-27 PROCEDURE — 82962 GLUCOSE BLOOD TEST: CPT

## 2021-09-27 PROCEDURE — 93005 ELECTROCARDIOGRAM TRACING: CPT | Performed by: SPECIALIST

## 2021-09-27 PROCEDURE — 83605 ASSAY OF LACTIC ACID: CPT | Performed by: NURSE PRACTITIONER

## 2021-09-27 PROCEDURE — 93010 ELECTROCARDIOGRAM REPORT: CPT | Mod: ,,, | Performed by: SPECIALIST

## 2021-09-27 PROCEDURE — 93010 EKG 12-LEAD: ICD-10-PCS | Mod: ,,, | Performed by: SPECIALIST

## 2021-09-27 PROCEDURE — 86140 C-REACTIVE PROTEIN: CPT | Performed by: NURSE PRACTITIONER

## 2021-09-27 PROCEDURE — 87186 SC STD MICRODIL/AGAR DIL: CPT | Performed by: NURSE PRACTITIONER

## 2021-09-27 PROCEDURE — 25000003 PHARM REV CODE 250: Performed by: EMERGENCY MEDICINE

## 2021-09-27 PROCEDURE — 85610 PROTHROMBIN TIME: CPT | Performed by: NURSE PRACTITIONER

## 2021-09-27 PROCEDURE — 99203 PR OFFICE/OUTPT VISIT, NEW, LEVL III, 30-44 MIN: ICD-10-PCS | Mod: S$GLB,,, | Performed by: NURSE PRACTITIONER

## 2021-09-27 PROCEDURE — 99203 OFFICE O/P NEW LOW 30 MIN: CPT | Mod: S$GLB,,, | Performed by: NURSE PRACTITIONER

## 2021-09-27 PROCEDURE — 87040 BLOOD CULTURE FOR BACTERIA: CPT | Performed by: NURSE PRACTITIONER

## 2021-09-27 RX ORDER — CLINDAMYCIN PHOSPHATE 900 MG/50ML
900 INJECTION, SOLUTION INTRAVENOUS
Status: COMPLETED | OUTPATIENT
Start: 2021-09-27 | End: 2021-09-28

## 2021-09-27 RX ADMIN — CLINDAMYCIN IN 5 PERCENT DEXTROSE 900 MG: 18 INJECTION, SOLUTION INTRAVENOUS at 11:09

## 2021-09-28 PROBLEM — L03.90 CELLULITIS: Status: ACTIVE | Noted: 2021-09-28

## 2021-09-28 LAB
ALBUMIN SERPL BCP-MCNC: 3.4 G/DL (ref 3.5–5.2)
ALBUMIN SERPL BCP-MCNC: 3.6 G/DL (ref 3.5–5.2)
ALP SERPL-CCNC: 74 U/L (ref 55–135)
ALP SERPL-CCNC: 80 U/L (ref 55–135)
ALT SERPL W/O P-5'-P-CCNC: 17 U/L (ref 10–44)
ALT SERPL W/O P-5'-P-CCNC: 17 U/L (ref 10–44)
ANION GAP SERPL CALC-SCNC: 10 MMOL/L (ref 8–16)
ANION GAP SERPL CALC-SCNC: 10 MMOL/L (ref 8–16)
AST SERPL-CCNC: 17 U/L (ref 10–40)
AST SERPL-CCNC: 18 U/L (ref 10–40)
BASOPHILS # BLD AUTO: 0.09 K/UL (ref 0–0.2)
BASOPHILS # BLD AUTO: 0.11 K/UL (ref 0–0.2)
BASOPHILS NFR BLD: 1.2 % (ref 0–1.9)
BASOPHILS NFR BLD: 1.2 % (ref 0–1.9)
BILIRUB SERPL-MCNC: 0.6 MG/DL (ref 0.1–1)
BILIRUB SERPL-MCNC: 0.8 MG/DL (ref 0.1–1)
BUN SERPL-MCNC: 12 MG/DL (ref 6–20)
BUN SERPL-MCNC: 13 MG/DL (ref 6–20)
CALCIUM SERPL-MCNC: 8.8 MG/DL (ref 8.7–10.5)
CALCIUM SERPL-MCNC: 8.8 MG/DL (ref 8.7–10.5)
CHLORIDE SERPL-SCNC: 96 MMOL/L (ref 95–110)
CHLORIDE SERPL-SCNC: 98 MMOL/L (ref 95–110)
CO2 SERPL-SCNC: 26 MMOL/L (ref 23–29)
CO2 SERPL-SCNC: 26 MMOL/L (ref 23–29)
CREAT SERPL-MCNC: 0.7 MG/DL (ref 0.5–1.4)
CREAT SERPL-MCNC: 0.8 MG/DL (ref 0.5–1.4)
CRP SERPL-MCNC: 9.06 MG/DL
DIFFERENTIAL METHOD: ABNORMAL
DIFFERENTIAL METHOD: ABNORMAL
EOSINOPHIL # BLD AUTO: 0.1 K/UL (ref 0–0.5)
EOSINOPHIL # BLD AUTO: 0.1 K/UL (ref 0–0.5)
EOSINOPHIL NFR BLD: 0.5 % (ref 0–8)
EOSINOPHIL NFR BLD: 1.1 % (ref 0–8)
ERYTHROCYTE [DISTWIDTH] IN BLOOD BY AUTOMATED COUNT: 12.6 % (ref 11.5–14.5)
ERYTHROCYTE [DISTWIDTH] IN BLOOD BY AUTOMATED COUNT: 12.7 % (ref 11.5–14.5)
ERYTHROCYTE [SEDIMENTATION RATE] IN BLOOD BY WESTERGREN METHOD: 66 MM/HR (ref 0–10)
EST. GFR  (AFRICAN AMERICAN): >60 ML/MIN/1.73 M^2
EST. GFR  (AFRICAN AMERICAN): >60 ML/MIN/1.73 M^2
EST. GFR  (NON AFRICAN AMERICAN): >60 ML/MIN/1.73 M^2
EST. GFR  (NON AFRICAN AMERICAN): >60 ML/MIN/1.73 M^2
GLUCOSE SERPL-MCNC: 216 MG/DL (ref 70–110)
GLUCOSE SERPL-MCNC: 263 MG/DL (ref 70–110)
GLUCOSE SERPL-MCNC: 268 MG/DL (ref 70–110)
GLUCOSE SERPL-MCNC: 281 MG/DL (ref 70–110)
GLUCOSE SERPL-MCNC: 296 MG/DL (ref 70–110)
GLUCOSE SERPL-MCNC: 298 MG/DL (ref 70–110)
GLUCOSE SERPL-MCNC: 440 MG/DL (ref 70–110)
HCT VFR BLD AUTO: 38.6 % (ref 40–54)
HCT VFR BLD AUTO: 39.5 % (ref 40–54)
HGB BLD-MCNC: 12.8 G/DL (ref 14–18)
HGB BLD-MCNC: 13.2 G/DL (ref 14–18)
IMM GRANULOCYTES # BLD AUTO: 0.02 K/UL (ref 0–0.04)
IMM GRANULOCYTES # BLD AUTO: 0.03 K/UL (ref 0–0.04)
IMM GRANULOCYTES NFR BLD AUTO: 0.3 % (ref 0–0.5)
IMM GRANULOCYTES NFR BLD AUTO: 0.3 % (ref 0–0.5)
INR PPP: 1
LACTATE SERPL-SCNC: 1.1 MMOL/L (ref 0.5–1.9)
LYMPHOCYTES # BLD AUTO: 2.2 K/UL (ref 1–4.8)
LYMPHOCYTES # BLD AUTO: 2.3 K/UL (ref 1–4.8)
LYMPHOCYTES NFR BLD: 25.6 % (ref 18–48)
LYMPHOCYTES NFR BLD: 30 % (ref 18–48)
MAGNESIUM SERPL-MCNC: 1.5 MG/DL (ref 1.6–2.6)
MCH RBC QN AUTO: 29.7 PG (ref 27–31)
MCH RBC QN AUTO: 29.8 PG (ref 27–31)
MCHC RBC AUTO-ENTMCNC: 33.2 G/DL (ref 32–36)
MCHC RBC AUTO-ENTMCNC: 33.4 G/DL (ref 32–36)
MCV RBC AUTO: 89 FL (ref 82–98)
MCV RBC AUTO: 90 FL (ref 82–98)
MONOCYTES # BLD AUTO: 0.9 K/UL (ref 0.3–1)
MONOCYTES # BLD AUTO: 0.9 K/UL (ref 0.3–1)
MONOCYTES NFR BLD: 11.7 % (ref 4–15)
MONOCYTES NFR BLD: 9.3 % (ref 4–15)
NEUTROPHILS # BLD AUTO: 4.1 K/UL (ref 1.8–7.7)
NEUTROPHILS # BLD AUTO: 5.7 K/UL (ref 1.8–7.7)
NEUTROPHILS NFR BLD: 55.7 % (ref 38–73)
NEUTROPHILS NFR BLD: 63.1 % (ref 38–73)
NRBC BLD-RTO: 0 /100 WBC
NRBC BLD-RTO: 0 /100 WBC
PHOSPHATE SERPL-MCNC: 2.4 MG/DL (ref 2.7–4.5)
PLATELET # BLD AUTO: 141 K/UL (ref 150–450)
PLATELET # BLD AUTO: 147 K/UL (ref 150–450)
PMV BLD AUTO: 15 FL (ref 9.2–12.9)
PMV BLD AUTO: 15.1 FL (ref 9.2–12.9)
POTASSIUM SERPL-SCNC: 4 MMOL/L (ref 3.5–5.1)
POTASSIUM SERPL-SCNC: 4.1 MMOL/L (ref 3.5–5.1)
PROT SERPL-MCNC: 7.4 G/DL (ref 6–8.4)
PROT SERPL-MCNC: 7.9 G/DL (ref 6–8.4)
PROTHROMBIN TIME: 13 SEC (ref 11.8–14.3)
RBC # BLD AUTO: 4.31 M/UL (ref 4.6–6.2)
RBC # BLD AUTO: 4.43 M/UL (ref 4.6–6.2)
SARS-COV-2 RDRP RESP QL NAA+PROBE: NEGATIVE
SODIUM SERPL-SCNC: 132 MMOL/L (ref 136–145)
SODIUM SERPL-SCNC: 134 MMOL/L (ref 136–145)
WBC # BLD AUTO: 7.43 K/UL (ref 3.9–12.7)
WBC # BLD AUTO: 9.1 K/UL (ref 3.9–12.7)

## 2021-09-28 PROCEDURE — 96372 THER/PROPH/DIAG INJ SC/IM: CPT | Mod: 59

## 2021-09-28 PROCEDURE — 87147 CULTURE TYPE IMMUNOLOGIC: CPT | Mod: 59 | Performed by: EMERGENCY MEDICINE

## 2021-09-28 PROCEDURE — 96376 TX/PRO/DX INJ SAME DRUG ADON: CPT

## 2021-09-28 PROCEDURE — 83735 ASSAY OF MAGNESIUM: CPT | Performed by: INTERNAL MEDICINE

## 2021-09-28 PROCEDURE — 25000003 PHARM REV CODE 250: Performed by: INTERNAL MEDICINE

## 2021-09-28 PROCEDURE — 85025 COMPLETE CBC W/AUTO DIFF WBC: CPT | Performed by: INTERNAL MEDICINE

## 2021-09-28 PROCEDURE — 80053 COMPREHEN METABOLIC PANEL: CPT | Performed by: INTERNAL MEDICINE

## 2021-09-28 PROCEDURE — 96375 TX/PRO/DX INJ NEW DRUG ADDON: CPT

## 2021-09-28 PROCEDURE — 87077 CULTURE AEROBIC IDENTIFY: CPT | Mod: 59 | Performed by: EMERGENCY MEDICINE

## 2021-09-28 PROCEDURE — 63600175 PHARM REV CODE 636 W HCPCS: Performed by: EMERGENCY MEDICINE

## 2021-09-28 PROCEDURE — 84100 ASSAY OF PHOSPHORUS: CPT | Performed by: INTERNAL MEDICINE

## 2021-09-28 PROCEDURE — 12000002 HC ACUTE/MED SURGE SEMI-PRIVATE ROOM

## 2021-09-28 PROCEDURE — 36415 COLL VENOUS BLD VENIPUNCTURE: CPT | Performed by: INTERNAL MEDICINE

## 2021-09-28 PROCEDURE — 87070 CULTURE OTHR SPECIMN AEROBIC: CPT | Performed by: EMERGENCY MEDICINE

## 2021-09-28 PROCEDURE — 87186 SC STD MICRODIL/AGAR DIL: CPT | Mod: 59 | Performed by: EMERGENCY MEDICINE

## 2021-09-28 PROCEDURE — 63600175 PHARM REV CODE 636 W HCPCS: Performed by: INTERNAL MEDICINE

## 2021-09-28 PROCEDURE — U0002 COVID-19 LAB TEST NON-CDC: HCPCS | Performed by: INTERNAL MEDICINE

## 2021-09-28 RX ORDER — PRAVASTATIN SODIUM 20 MG/1
20 TABLET ORAL DAILY
Status: CANCELLED | OUTPATIENT
Start: 2021-09-28

## 2021-09-28 RX ORDER — IBUPROFEN 200 MG
24 TABLET ORAL
Status: DISCONTINUED | OUTPATIENT
Start: 2021-09-28 | End: 2021-09-29 | Stop reason: HOSPADM

## 2021-09-28 RX ORDER — ACETAMINOPHEN 500 MG
1000 TABLET ORAL EVERY 8 HOURS PRN
Status: DISCONTINUED | OUTPATIENT
Start: 2021-09-28 | End: 2021-09-29 | Stop reason: HOSPADM

## 2021-09-28 RX ORDER — IBUPROFEN 200 MG
16 TABLET ORAL
Status: DISCONTINUED | OUTPATIENT
Start: 2021-09-28 | End: 2021-09-29 | Stop reason: HOSPADM

## 2021-09-28 RX ORDER — GABAPENTIN 300 MG/1
600 CAPSULE ORAL NIGHTLY
Status: DISCONTINUED | OUTPATIENT
Start: 2021-09-28 | End: 2021-09-29 | Stop reason: HOSPADM

## 2021-09-28 RX ORDER — POLYETHYLENE GLYCOL 3350 17 G/17G
17 POWDER, FOR SOLUTION ORAL DAILY
Status: CANCELLED | OUTPATIENT
Start: 2021-09-28

## 2021-09-28 RX ORDER — TALC
8 POWDER (GRAM) TOPICAL NIGHTLY PRN
Status: CANCELLED | OUTPATIENT
Start: 2021-09-28

## 2021-09-28 RX ORDER — MORPHINE SULFATE 4 MG/ML
4 INJECTION, SOLUTION INTRAMUSCULAR; INTRAVENOUS EVERY 4 HOURS PRN
Status: DISCONTINUED | OUTPATIENT
Start: 2021-09-28 | End: 2021-09-29 | Stop reason: HOSPADM

## 2021-09-28 RX ORDER — NALOXONE HCL 0.4 MG/ML
0.02 VIAL (ML) INJECTION
Status: CANCELLED | OUTPATIENT
Start: 2021-09-28

## 2021-09-28 RX ORDER — MORPHINE SULFATE 2 MG/ML
2 INJECTION, SOLUTION INTRAMUSCULAR; INTRAVENOUS
Status: COMPLETED | OUTPATIENT
Start: 2021-09-28 | End: 2021-09-28

## 2021-09-28 RX ORDER — PANTOPRAZOLE SODIUM 40 MG/1
40 TABLET, DELAYED RELEASE ORAL
Status: DISCONTINUED | OUTPATIENT
Start: 2021-09-28 | End: 2021-09-29 | Stop reason: HOSPADM

## 2021-09-28 RX ORDER — PRAVASTATIN SODIUM 20 MG/1
20 TABLET ORAL NIGHTLY
Status: DISCONTINUED | OUTPATIENT
Start: 2021-09-28 | End: 2021-09-29 | Stop reason: HOSPADM

## 2021-09-28 RX ORDER — ACETAMINOPHEN 325 MG/1
650 TABLET ORAL EVERY 4 HOURS PRN
Status: DISCONTINUED | OUTPATIENT
Start: 2021-09-28 | End: 2021-09-29 | Stop reason: HOSPADM

## 2021-09-28 RX ORDER — MORPHINE SULFATE 4 MG/ML
4 INJECTION, SOLUTION INTRAMUSCULAR; INTRAVENOUS EVERY 4 HOURS PRN
Status: CANCELLED | OUTPATIENT
Start: 2021-09-28

## 2021-09-28 RX ORDER — SODIUM CHLORIDE 0.9 % (FLUSH) 0.9 %
10 SYRINGE (ML) INJECTION EVERY 6 HOURS PRN
Status: DISCONTINUED | OUTPATIENT
Start: 2021-09-28 | End: 2021-09-29 | Stop reason: HOSPADM

## 2021-09-28 RX ORDER — SODIUM CHLORIDE 0.9 % (FLUSH) 0.9 %
SYRINGE (ML) INJECTION
Status: CANCELLED | OUTPATIENT
Start: 2021-09-28

## 2021-09-28 RX ORDER — ACETAMINOPHEN 325 MG/1
650 TABLET ORAL EVERY 4 HOURS PRN
Status: CANCELLED | OUTPATIENT
Start: 2021-09-28

## 2021-09-28 RX ORDER — CEFEPIME HYDROCHLORIDE 1 G/50ML
1 INJECTION, SOLUTION INTRAVENOUS
Status: DISCONTINUED | OUTPATIENT
Start: 2021-09-28 | End: 2021-09-29 | Stop reason: HOSPADM

## 2021-09-28 RX ORDER — POLYETHYLENE GLYCOL 3350 17 G/17G
17 POWDER, FOR SOLUTION ORAL DAILY
Status: DISCONTINUED | OUTPATIENT
Start: 2021-09-28 | End: 2021-09-29 | Stop reason: HOSPADM

## 2021-09-28 RX ORDER — ENOXAPARIN SODIUM 100 MG/ML
40 INJECTION SUBCUTANEOUS EVERY 24 HOURS
Status: DISCONTINUED | OUTPATIENT
Start: 2021-09-28 | End: 2021-09-29 | Stop reason: HOSPADM

## 2021-09-28 RX ORDER — GABAPENTIN 300 MG/1
600 CAPSULE ORAL NIGHTLY
Status: CANCELLED | OUTPATIENT
Start: 2021-09-28

## 2021-09-28 RX ORDER — IBUPROFEN 200 MG
16 TABLET ORAL
Status: CANCELLED | OUTPATIENT
Start: 2021-09-28

## 2021-09-28 RX ORDER — HYDROCODONE BITARTRATE AND ACETAMINOPHEN 5; 325 MG/1; MG/1
1 TABLET ORAL EVERY 6 HOURS PRN
Status: CANCELLED | OUTPATIENT
Start: 2021-09-28

## 2021-09-28 RX ORDER — FLUTICASONE PROPIONATE 50 MCG
1 SPRAY, SUSPENSION (ML) NASAL DAILY
Status: CANCELLED | OUTPATIENT
Start: 2021-09-28

## 2021-09-28 RX ORDER — PANTOPRAZOLE SODIUM 40 MG/1
40 TABLET, DELAYED RELEASE ORAL DAILY
Status: CANCELLED | OUTPATIENT
Start: 2021-09-28

## 2021-09-28 RX ORDER — NALOXONE HCL 0.4 MG/ML
0.02 VIAL (ML) INJECTION
Status: DISCONTINUED | OUTPATIENT
Start: 2021-09-28 | End: 2021-09-29 | Stop reason: HOSPADM

## 2021-09-28 RX ORDER — GLUCAGON 1 MG
1 KIT INJECTION
Status: DISCONTINUED | OUTPATIENT
Start: 2021-09-28 | End: 2021-09-29 | Stop reason: HOSPADM

## 2021-09-28 RX ORDER — TALC
9 POWDER (GRAM) TOPICAL NIGHTLY PRN
Status: DISCONTINUED | OUTPATIENT
Start: 2021-09-28 | End: 2021-09-29 | Stop reason: HOSPADM

## 2021-09-28 RX ORDER — IBUPROFEN 200 MG
24 TABLET ORAL
Status: CANCELLED | OUTPATIENT
Start: 2021-09-28

## 2021-09-28 RX ORDER — HYDROCODONE BITARTRATE AND ACETAMINOPHEN 5; 325 MG/1; MG/1
1 TABLET ORAL EVERY 6 HOURS PRN
Status: DISCONTINUED | OUTPATIENT
Start: 2021-09-28 | End: 2021-09-29 | Stop reason: HOSPADM

## 2021-09-28 RX ORDER — ACETAMINOPHEN 500 MG
1000 TABLET ORAL EVERY 8 HOURS PRN
Status: CANCELLED | OUTPATIENT
Start: 2021-09-28

## 2021-09-28 RX ORDER — GLUCAGON 1 MG
1 KIT INJECTION
Status: CANCELLED | OUTPATIENT
Start: 2021-09-28

## 2021-09-28 RX ADMIN — CEFEPIME HYDROCHLORIDE 1 G: 1 INJECTION, SOLUTION INTRAVENOUS at 08:09

## 2021-09-28 RX ADMIN — PANTOPRAZOLE SODIUM 40 MG: 40 TABLET, DELAYED RELEASE ORAL at 01:09

## 2021-09-28 RX ADMIN — CEFEPIME HYDROCHLORIDE 1 G: 1 INJECTION, SOLUTION INTRAVENOUS at 12:09

## 2021-09-28 RX ADMIN — GABAPENTIN 600 MG: 300 CAPSULE ORAL at 08:09

## 2021-09-28 RX ADMIN — HUMAN INSULIN 4 UNITS: 100 INJECTION, SOLUTION SUBCUTANEOUS at 06:09

## 2021-09-28 RX ADMIN — MORPHINE SULFATE 2 MG: 2 INJECTION, SOLUTION INTRAMUSCULAR; INTRAVENOUS at 02:09

## 2021-09-28 RX ADMIN — PRAVASTATIN SODIUM 20 MG: 20 TABLET ORAL at 08:09

## 2021-09-28 RX ADMIN — VANCOMYCIN HYDROCHLORIDE 1500 MG: 1.5 INJECTION, POWDER, LYOPHILIZED, FOR SOLUTION INTRAVENOUS at 04:09

## 2021-09-28 RX ADMIN — MORPHINE SULFATE 2 MG: 2 INJECTION, SOLUTION INTRAMUSCULAR; INTRAVENOUS at 01:09

## 2021-09-28 RX ADMIN — ENOXAPARIN SODIUM 40 MG: 40 INJECTION SUBCUTANEOUS at 06:09

## 2021-09-28 RX ADMIN — ACETAMINOPHEN 650 MG: 325 TABLET, FILM COATED ORAL at 04:09

## 2021-09-28 RX ADMIN — HUMAN INSULIN 12 UNITS: 100 INJECTION, SOLUTION SUBCUTANEOUS at 09:09

## 2021-09-28 RX ADMIN — HUMAN INSULIN 6 UNITS: 100 INJECTION, SOLUTION SUBCUTANEOUS at 09:09

## 2021-09-28 RX ADMIN — VANCOMYCIN HYDROCHLORIDE 1250 MG: 1.25 INJECTION, POWDER, LYOPHILIZED, FOR SOLUTION INTRAVENOUS at 06:09

## 2021-09-28 RX ADMIN — CEFEPIME HYDROCHLORIDE 1 G: 1 INJECTION, SOLUTION INTRAVENOUS at 03:09

## 2021-09-28 RX ADMIN — HUMAN INSULIN 6 UNITS: 100 INJECTION, SOLUTION SUBCUTANEOUS at 12:09

## 2021-09-28 RX ADMIN — GABAPENTIN 600 MG: 300 CAPSULE ORAL at 04:09

## 2021-09-29 VITALS
WEIGHT: 152 LBS | HEIGHT: 72 IN | TEMPERATURE: 98 F | HEART RATE: 97 BPM | OXYGEN SATURATION: 97 % | SYSTOLIC BLOOD PRESSURE: 148 MMHG | BODY MASS INDEX: 20.59 KG/M2 | DIASTOLIC BLOOD PRESSURE: 85 MMHG | RESPIRATION RATE: 18 BRPM

## 2021-09-29 LAB
ALBUMIN SERPL BCP-MCNC: 3.1 G/DL (ref 3.5–5.2)
ALP SERPL-CCNC: 90 U/L (ref 55–135)
ALT SERPL W/O P-5'-P-CCNC: 23 U/L (ref 10–44)
ANION GAP SERPL CALC-SCNC: 11 MMOL/L (ref 8–16)
AST SERPL-CCNC: 27 U/L (ref 10–40)
BASOPHILS # BLD AUTO: 0.09 K/UL (ref 0–0.2)
BASOPHILS NFR BLD: 1 % (ref 0–1.9)
BILIRUB SERPL-MCNC: 0.8 MG/DL (ref 0.1–1)
BUN SERPL-MCNC: 14 MG/DL (ref 6–20)
CALCIUM SERPL-MCNC: 8.9 MG/DL (ref 8.7–10.5)
CHLORIDE SERPL-SCNC: 98 MMOL/L (ref 95–110)
CO2 SERPL-SCNC: 27 MMOL/L (ref 23–29)
CREAT SERPL-MCNC: 0.7 MG/DL (ref 0.5–1.4)
DIFFERENTIAL METHOD: ABNORMAL
EOSINOPHIL # BLD AUTO: 0.3 K/UL (ref 0–0.5)
EOSINOPHIL NFR BLD: 3 % (ref 0–8)
ERYTHROCYTE [DISTWIDTH] IN BLOOD BY AUTOMATED COUNT: 12.6 % (ref 11.5–14.5)
EST. GFR  (AFRICAN AMERICAN): >60 ML/MIN/1.73 M^2
EST. GFR  (NON AFRICAN AMERICAN): >60 ML/MIN/1.73 M^2
GLUCOSE SERPL-MCNC: 208 MG/DL (ref 70–110)
GLUCOSE SERPL-MCNC: 315 MG/DL (ref 70–110)
HCT VFR BLD AUTO: 38 % (ref 40–54)
HGB BLD-MCNC: 12.4 G/DL (ref 14–18)
IMM GRANULOCYTES # BLD AUTO: 0.03 K/UL (ref 0–0.04)
IMM GRANULOCYTES NFR BLD AUTO: 0.3 % (ref 0–0.5)
LYMPHOCYTES # BLD AUTO: 2.7 K/UL (ref 1–4.8)
LYMPHOCYTES NFR BLD: 30.4 % (ref 18–48)
MAGNESIUM SERPL-MCNC: 1.5 MG/DL (ref 1.6–2.6)
MCH RBC QN AUTO: 29.3 PG (ref 27–31)
MCHC RBC AUTO-ENTMCNC: 32.6 G/DL (ref 32–36)
MCV RBC AUTO: 90 FL (ref 82–98)
MONOCYTES # BLD AUTO: 0.9 K/UL (ref 0.3–1)
MONOCYTES NFR BLD: 10 % (ref 4–15)
NEUTROPHILS # BLD AUTO: 5 K/UL (ref 1.8–7.7)
NEUTROPHILS NFR BLD: 55.3 % (ref 38–73)
NRBC BLD-RTO: 0 /100 WBC
PHOSPHATE SERPL-MCNC: 3.4 MG/DL (ref 2.7–4.5)
PLATELET # BLD AUTO: 143 K/UL (ref 150–450)
PMV BLD AUTO: 14.8 FL (ref 9.2–12.9)
POTASSIUM SERPL-SCNC: 4.4 MMOL/L (ref 3.5–5.1)
PROT SERPL-MCNC: 6.8 G/DL (ref 6–8.4)
RBC # BLD AUTO: 4.23 M/UL (ref 4.6–6.2)
SODIUM SERPL-SCNC: 136 MMOL/L (ref 136–145)
WBC # BLD AUTO: 8.98 K/UL (ref 3.9–12.7)

## 2021-09-29 PROCEDURE — 63600175 PHARM REV CODE 636 W HCPCS: Performed by: INTERNAL MEDICINE

## 2021-09-29 PROCEDURE — 84100 ASSAY OF PHOSPHORUS: CPT | Performed by: INTERNAL MEDICINE

## 2021-09-29 PROCEDURE — 25000003 PHARM REV CODE 250: Performed by: INTERNAL MEDICINE

## 2021-09-29 PROCEDURE — 85025 COMPLETE CBC W/AUTO DIFF WBC: CPT | Performed by: INTERNAL MEDICINE

## 2021-09-29 PROCEDURE — 83735 ASSAY OF MAGNESIUM: CPT | Performed by: INTERNAL MEDICINE

## 2021-09-29 PROCEDURE — 80053 COMPREHEN METABOLIC PANEL: CPT | Performed by: INTERNAL MEDICINE

## 2021-09-29 PROCEDURE — 36415 COLL VENOUS BLD VENIPUNCTURE: CPT | Performed by: INTERNAL MEDICINE

## 2021-09-29 RX ORDER — HYDROCODONE BITARTRATE AND ACETAMINOPHEN 7.5; 325 MG/1; MG/1
1 TABLET ORAL EVERY 4 HOURS PRN
Qty: 30 TABLET | Refills: 0 | Status: SHIPPED | OUTPATIENT
Start: 2021-09-29 | End: 2023-02-13

## 2021-09-29 RX ORDER — SULFAMETHOXAZOLE AND TRIMETHOPRIM 800; 160 MG/1; MG/1
1 TABLET ORAL 2 TIMES DAILY
Qty: 30 TABLET | Refills: 0 | Status: SHIPPED | OUTPATIENT
Start: 2021-09-29 | End: 2023-02-13

## 2021-09-29 RX ADMIN — PANTOPRAZOLE SODIUM 40 MG: 40 TABLET, DELAYED RELEASE ORAL at 05:09

## 2021-09-29 RX ADMIN — HUMAN INSULIN 8 UNITS: 100 INJECTION, SOLUTION SUBCUTANEOUS at 08:09

## 2021-09-29 RX ADMIN — VANCOMYCIN HYDROCHLORIDE 1250 MG: 1.25 INJECTION, POWDER, LYOPHILIZED, FOR SOLUTION INTRAVENOUS at 05:09

## 2021-09-29 RX ADMIN — CEFEPIME HYDROCHLORIDE 1 G: 1 INJECTION, SOLUTION INTRAVENOUS at 04:09

## 2021-10-01 LAB
BACTERIA SPEC AEROBE CULT: ABNORMAL

## 2021-10-02 LAB
BACTERIA BLD CULT: ABNORMAL

## 2021-10-03 LAB — BACTERIA BLD CULT: NORMAL

## 2021-10-04 ENCOUNTER — OFFICE VISIT (OUTPATIENT)
Dept: FAMILY MEDICINE | Facility: CLINIC | Age: 41
End: 2021-10-04
Payer: MEDICAID

## 2021-10-04 VITALS
HEART RATE: 95 BPM | TEMPERATURE: 98 F | HEIGHT: 72 IN | OXYGEN SATURATION: 98 % | DIASTOLIC BLOOD PRESSURE: 62 MMHG | WEIGHT: 149.69 LBS | SYSTOLIC BLOOD PRESSURE: 126 MMHG | BODY MASS INDEX: 20.28 KG/M2

## 2021-10-04 DIAGNOSIS — E11.42 TYPE 2 DIABETES MELLITUS WITH DIABETIC POLYNEUROPATHY, WITH LONG-TERM CURRENT USE OF INSULIN: ICD-10-CM

## 2021-10-04 DIAGNOSIS — E11.3293 TYPE 2 DIABETES MELLITUS WITH BOTH EYES AFFECTED BY MILD NONPROLIFERATIVE RETINOPATHY WITHOUT MACULAR EDEMA, WITH LONG-TERM CURRENT USE OF INSULIN: Primary | ICD-10-CM

## 2021-10-04 DIAGNOSIS — Z79.4 TYPE 2 DIABETES MELLITUS WITH DIABETIC POLYNEUROPATHY, WITH LONG-TERM CURRENT USE OF INSULIN: ICD-10-CM

## 2021-10-04 DIAGNOSIS — L03.90 CELLULITIS, UNSPECIFIED CELLULITIS SITE: ICD-10-CM

## 2021-10-04 DIAGNOSIS — E11.65 UNCONTROLLED TYPE 2 DIABETES MELLITUS WITH HYPERGLYCEMIA: ICD-10-CM

## 2021-10-04 DIAGNOSIS — E11.42 DIABETIC POLYNEUROPATHY ASSOCIATED WITH TYPE 2 DIABETES MELLITUS: ICD-10-CM

## 2021-10-04 DIAGNOSIS — E08.42: ICD-10-CM

## 2021-10-04 DIAGNOSIS — Z79.4 TYPE 2 DIABETES MELLITUS WITH BOTH EYES AFFECTED BY MILD NONPROLIFERATIVE RETINOPATHY WITHOUT MACULAR EDEMA, WITH LONG-TERM CURRENT USE OF INSULIN: Primary | ICD-10-CM

## 2021-10-04 PROCEDURE — 99214 OFFICE O/P EST MOD 30 MIN: CPT | Mod: S$PBB,,, | Performed by: FAMILY MEDICINE

## 2021-10-04 PROCEDURE — 99215 OFFICE O/P EST HI 40 MIN: CPT | Performed by: FAMILY MEDICINE

## 2021-10-04 PROCEDURE — 99214 PR OFFICE/OUTPT VISIT, EST, LEVL IV, 30-39 MIN: ICD-10-PCS | Mod: S$PBB,,, | Performed by: FAMILY MEDICINE

## 2021-10-04 RX ORDER — PRAVASTATIN SODIUM 20 MG/1
20 TABLET ORAL DAILY
Qty: 90 TABLET | Refills: 3 | Status: SHIPPED | OUTPATIENT
Start: 2021-10-04 | End: 2023-02-13 | Stop reason: SDUPTHER

## 2021-10-04 RX ORDER — SEMAGLUTIDE 1.34 MG/ML
INJECTION, SOLUTION SUBCUTANEOUS
COMMUNITY
Start: 2021-10-03 | End: 2021-10-04

## 2021-10-04 RX ORDER — INSULIN GLARGINE 100 [IU]/ML
INJECTION, SOLUTION SUBCUTANEOUS
Qty: 15 ML | Refills: 5 | Status: SHIPPED | OUTPATIENT
Start: 2021-10-04 | End: 2023-02-13 | Stop reason: SDUPTHER

## 2021-10-04 RX ORDER — SEMAGLUTIDE 1.34 MG/ML
1 INJECTION, SOLUTION SUBCUTANEOUS
Qty: 2 PEN | Refills: 11 | Status: SHIPPED | OUTPATIENT
Start: 2021-10-04 | End: 2022-10-04

## 2021-10-04 RX ORDER — GABAPENTIN 300 MG/1
600 CAPSULE ORAL NIGHTLY
Qty: 60 CAPSULE | Refills: 11 | Status: SHIPPED | OUTPATIENT
Start: 2021-10-04 | End: 2023-02-13 | Stop reason: SDUPTHER

## 2021-10-04 RX ORDER — PEN NEEDLE, DIABETIC 30 GX3/16"
1 NEEDLE, DISPOSABLE MISCELLANEOUS DAILY
Qty: 100 EACH | Refills: 3 | Status: SHIPPED | OUTPATIENT
Start: 2021-10-04 | End: 2022-10-04

## 2021-10-08 ENCOUNTER — TELEPHONE (OUTPATIENT)
Dept: FAMILY MEDICINE | Facility: CLINIC | Age: 41
End: 2021-10-08
Payer: MEDICAID

## 2023-02-02 ENCOUNTER — PATIENT MESSAGE (OUTPATIENT)
Dept: FAMILY MEDICINE | Facility: CLINIC | Age: 43
End: 2023-02-02

## 2023-02-02 DIAGNOSIS — Z79.4 TYPE 2 DIABETES MELLITUS WITH BOTH EYES AFFECTED BY MILD NONPROLIFERATIVE RETINOPATHY WITHOUT MACULAR EDEMA, WITH LONG-TERM CURRENT USE OF INSULIN: Primary | ICD-10-CM

## 2023-02-02 DIAGNOSIS — Z00.00 PREVENTATIVE HEALTH CARE: ICD-10-CM

## 2023-02-02 DIAGNOSIS — Z12.5 PROSTATE CANCER SCREENING: ICD-10-CM

## 2023-02-02 DIAGNOSIS — E11.3293 TYPE 2 DIABETES MELLITUS WITH BOTH EYES AFFECTED BY MILD NONPROLIFERATIVE RETINOPATHY WITHOUT MACULAR EDEMA, WITH LONG-TERM CURRENT USE OF INSULIN: Primary | ICD-10-CM

## 2023-02-02 DIAGNOSIS — E78.2 MIXED HYPERLIPIDEMIA: ICD-10-CM

## 2023-02-10 ENCOUNTER — TELEPHONE (OUTPATIENT)
Dept: FAMILY MEDICINE | Facility: CLINIC | Age: 43
End: 2023-02-10

## 2023-02-10 ENCOUNTER — LAB VISIT (OUTPATIENT)
Dept: LAB | Facility: HOSPITAL | Age: 43
End: 2023-02-10
Attending: FAMILY MEDICINE
Payer: MEDICAID

## 2023-02-10 DIAGNOSIS — E78.2 MIXED HYPERLIPIDEMIA: ICD-10-CM

## 2023-02-10 DIAGNOSIS — E11.3293 TYPE 2 DIABETES MELLITUS WITH BOTH EYES AFFECTED BY MILD NONPROLIFERATIVE RETINOPATHY WITHOUT MACULAR EDEMA, WITH LONG-TERM CURRENT USE OF INSULIN: ICD-10-CM

## 2023-02-10 DIAGNOSIS — Z12.5 PROSTATE CANCER SCREENING: ICD-10-CM

## 2023-02-10 DIAGNOSIS — Z00.00 PREVENTATIVE HEALTH CARE: ICD-10-CM

## 2023-02-10 DIAGNOSIS — Z79.4 TYPE 2 DIABETES MELLITUS WITH BOTH EYES AFFECTED BY MILD NONPROLIFERATIVE RETINOPATHY WITHOUT MACULAR EDEMA, WITH LONG-TERM CURRENT USE OF INSULIN: ICD-10-CM

## 2023-02-10 LAB
ALBUMIN SERPL BCP-MCNC: 4 G/DL (ref 3.5–5.2)
ALP SERPL-CCNC: 87 U/L (ref 55–135)
ALT SERPL W/O P-5'-P-CCNC: 26 U/L (ref 10–44)
ANION GAP SERPL CALC-SCNC: 9 MMOL/L (ref 8–16)
AST SERPL-CCNC: 25 U/L (ref 10–40)
BASOPHILS # BLD AUTO: 0.13 K/UL (ref 0–0.2)
BASOPHILS NFR BLD: 1.4 % (ref 0–1.9)
BILIRUB SERPL-MCNC: 0.6 MG/DL (ref 0.1–1)
BUN SERPL-MCNC: 14 MG/DL (ref 6–20)
CALCIUM SERPL-MCNC: 9.2 MG/DL (ref 8.7–10.5)
CHLORIDE SERPL-SCNC: 97 MMOL/L (ref 95–110)
CHOLEST SERPL-MCNC: 224 MG/DL (ref 120–199)
CHOLEST/HDLC SERPL: 2.4 {RATIO} (ref 2–5)
CO2 SERPL-SCNC: 29 MMOL/L (ref 23–29)
COMPLEXED PSA SERPL-MCNC: 0.32 NG/ML (ref 0–4)
CREAT SERPL-MCNC: 0.7 MG/DL (ref 0.5–1.4)
DIFFERENTIAL METHOD: ABNORMAL
EOSINOPHIL # BLD AUTO: 0.4 K/UL (ref 0–0.5)
EOSINOPHIL NFR BLD: 3.7 % (ref 0–8)
ERYTHROCYTE [DISTWIDTH] IN BLOOD BY AUTOMATED COUNT: 13.1 % (ref 11.5–14.5)
EST. GFR  (NO RACE VARIABLE): >60 ML/MIN/1.73 M^2
ESTIMATED AVG GLUCOSE: 303 MG/DL (ref 68–131)
GLUCOSE SERPL-MCNC: 377 MG/DL (ref 70–110)
HBA1C MFR BLD: 12.2 % (ref 4.5–6.2)
HCT VFR BLD AUTO: 42.2 % (ref 40–54)
HDLC SERPL-MCNC: 95 MG/DL (ref 40–75)
HDLC SERPL: 42.4 % (ref 20–50)
HGB BLD-MCNC: 13.8 G/DL (ref 14–18)
IMM GRANULOCYTES # BLD AUTO: 0.03 K/UL (ref 0–0.04)
IMM GRANULOCYTES NFR BLD AUTO: 0.3 % (ref 0–0.5)
LDLC SERPL CALC-MCNC: 122 MG/DL (ref 63–159)
LYMPHOCYTES # BLD AUTO: 3.2 K/UL (ref 1–4.8)
LYMPHOCYTES NFR BLD: 33.8 % (ref 18–48)
MCH RBC QN AUTO: 30.4 PG (ref 27–31)
MCHC RBC AUTO-ENTMCNC: 32.7 G/DL (ref 32–36)
MCV RBC AUTO: 93 FL (ref 82–98)
MONOCYTES # BLD AUTO: 0.8 K/UL (ref 0.3–1)
MONOCYTES NFR BLD: 8.2 % (ref 4–15)
NEUTROPHILS # BLD AUTO: 4.9 K/UL (ref 1.8–7.7)
NEUTROPHILS NFR BLD: 52.6 % (ref 38–73)
NONHDLC SERPL-MCNC: 129 MG/DL
NRBC BLD-RTO: 0 /100 WBC
PLATELET # BLD AUTO: 166 K/UL (ref 150–450)
PMV BLD AUTO: 14 FL (ref 9.2–12.9)
POTASSIUM SERPL-SCNC: 4 MMOL/L (ref 3.5–5.1)
PROT SERPL-MCNC: 7.3 G/DL (ref 6–8.4)
RBC # BLD AUTO: 4.54 M/UL (ref 4.6–6.2)
SODIUM SERPL-SCNC: 135 MMOL/L (ref 136–145)
TRIGL SERPL-MCNC: 35 MG/DL (ref 30–150)
WBC # BLD AUTO: 9.38 K/UL (ref 3.9–12.7)

## 2023-02-10 PROCEDURE — 85025 COMPLETE CBC W/AUTO DIFF WBC: CPT | Performed by: FAMILY MEDICINE

## 2023-02-10 PROCEDURE — 36415 COLL VENOUS BLD VENIPUNCTURE: CPT | Performed by: FAMILY MEDICINE

## 2023-02-10 PROCEDURE — 84153 ASSAY OF PSA TOTAL: CPT | Performed by: FAMILY MEDICINE

## 2023-02-10 PROCEDURE — 80053 COMPREHEN METABOLIC PANEL: CPT | Performed by: FAMILY MEDICINE

## 2023-02-10 PROCEDURE — 80061 LIPID PANEL: CPT | Performed by: FAMILY MEDICINE

## 2023-02-10 PROCEDURE — 83036 HEMOGLOBIN GLYCOSYLATED A1C: CPT | Performed by: FAMILY MEDICINE

## 2023-02-10 NOTE — PROGRESS NOTES
Glucose is very high and uncontrolled.  Need to come in in 2 weeks if with the blood sugar diary and review of medications.

## 2023-02-10 NOTE — TELEPHONE ENCOUNTER
----- Message from Santos Hua MD sent at 2/10/2023  8:17 AM CST -----  Glucose is very high and uncontrolled.  Need to come in in 2 weeks if with the blood sugar diary and review of medications.

## 2023-02-10 NOTE — TELEPHONE ENCOUNTER
----- Message from Santos Hua MD sent at 2/10/2023  8:06 AM CST -----  Lab reviewed: all OK. Please notify pt. Continue pres meds.

## 2023-02-13 ENCOUNTER — OFFICE VISIT (OUTPATIENT)
Dept: FAMILY MEDICINE | Facility: CLINIC | Age: 43
End: 2023-02-13
Payer: MEDICAID

## 2023-02-13 VITALS
HEIGHT: 72 IN | SYSTOLIC BLOOD PRESSURE: 162 MMHG | WEIGHT: 156 LBS | HEART RATE: 87 BPM | BODY MASS INDEX: 21.13 KG/M2 | DIASTOLIC BLOOD PRESSURE: 92 MMHG

## 2023-02-13 DIAGNOSIS — Z79.4 TYPE 2 DIABETES MELLITUS WITH DIABETIC POLYNEUROPATHY, WITH LONG-TERM CURRENT USE OF INSULIN: Primary | ICD-10-CM

## 2023-02-13 DIAGNOSIS — E11.42 TYPE 2 DIABETES MELLITUS WITH DIABETIC POLYNEUROPATHY, WITH LONG-TERM CURRENT USE OF INSULIN: Primary | ICD-10-CM

## 2023-02-13 DIAGNOSIS — Z23 NEED FOR PNEUMOCOCCAL VACCINE: ICD-10-CM

## 2023-02-13 DIAGNOSIS — E78.2 MIXED HYPERLIPIDEMIA: ICD-10-CM

## 2023-02-13 DIAGNOSIS — I10 PRIMARY HYPERTENSION: ICD-10-CM

## 2023-02-13 DIAGNOSIS — Z90.81 HISTORY OF SPLENECTOMY: ICD-10-CM

## 2023-02-13 PROCEDURE — 1160F RVW MEDS BY RX/DR IN RCRD: CPT | Mod: CPTII,,, | Performed by: INTERNAL MEDICINE

## 2023-02-13 PROCEDURE — 3080F PR MOST RECENT DIASTOLIC BLOOD PRESSURE >= 90 MM HG: ICD-10-PCS | Mod: CPTII,,, | Performed by: INTERNAL MEDICINE

## 2023-02-13 PROCEDURE — 99214 OFFICE O/P EST MOD 30 MIN: CPT | Performed by: INTERNAL MEDICINE

## 2023-02-13 PROCEDURE — 3077F SYST BP >= 140 MM HG: CPT | Mod: CPTII,,, | Performed by: INTERNAL MEDICINE

## 2023-02-13 PROCEDURE — 3008F BODY MASS INDEX DOCD: CPT | Mod: CPTII,,, | Performed by: INTERNAL MEDICINE

## 2023-02-13 PROCEDURE — 99215 PR OFFICE/OUTPT VISIT, EST, LEVL V, 40-54 MIN: ICD-10-PCS | Mod: S$PBB,,, | Performed by: INTERNAL MEDICINE

## 2023-02-13 PROCEDURE — 1160F PR REVIEW ALL MEDS BY PRESCRIBER/CLIN PHARMACIST DOCUMENTED: ICD-10-PCS | Mod: CPTII,,, | Performed by: INTERNAL MEDICINE

## 2023-02-13 PROCEDURE — 3008F PR BODY MASS INDEX (BMI) DOCUMENTED: ICD-10-PCS | Mod: CPTII,,, | Performed by: INTERNAL MEDICINE

## 2023-02-13 PROCEDURE — 3080F DIAST BP >= 90 MM HG: CPT | Mod: CPTII,,, | Performed by: INTERNAL MEDICINE

## 2023-02-13 PROCEDURE — 4010F ACE/ARB THERAPY RXD/TAKEN: CPT | Mod: CPTII,,, | Performed by: INTERNAL MEDICINE

## 2023-02-13 PROCEDURE — 1159F MED LIST DOCD IN RCRD: CPT | Mod: CPTII,,, | Performed by: INTERNAL MEDICINE

## 2023-02-13 PROCEDURE — 3046F HEMOGLOBIN A1C LEVEL >9.0%: CPT | Mod: CPTII,,, | Performed by: INTERNAL MEDICINE

## 2023-02-13 PROCEDURE — 90677 PCV20 VACCINE IM: CPT | Mod: PBBFAC | Performed by: INTERNAL MEDICINE

## 2023-02-13 PROCEDURE — 1159F PR MEDICATION LIST DOCUMENTED IN MEDICAL RECORD: ICD-10-PCS | Mod: CPTII,,, | Performed by: INTERNAL MEDICINE

## 2023-02-13 PROCEDURE — 3077F PR MOST RECENT SYSTOLIC BLOOD PRESSURE >= 140 MM HG: ICD-10-PCS | Mod: CPTII,,, | Performed by: INTERNAL MEDICINE

## 2023-02-13 PROCEDURE — 3046F PR MOST RECENT HEMOGLOBIN A1C LEVEL > 9.0%: ICD-10-PCS | Mod: CPTII,,, | Performed by: INTERNAL MEDICINE

## 2023-02-13 PROCEDURE — 4010F PR ACE/ARB THEARPY RXD/TAKEN: ICD-10-PCS | Mod: CPTII,,, | Performed by: INTERNAL MEDICINE

## 2023-02-13 PROCEDURE — 99215 OFFICE O/P EST HI 40 MIN: CPT | Mod: S$PBB,,, | Performed by: INTERNAL MEDICINE

## 2023-02-13 RX ORDER — INSULIN ASPART 100 [IU]/ML
10 INJECTION, SOLUTION INTRAVENOUS; SUBCUTANEOUS 4 TIMES DAILY
Qty: 12 ML | Refills: 5 | Status: SHIPPED | OUTPATIENT
Start: 2023-02-13 | End: 2023-10-18 | Stop reason: SDUPTHER

## 2023-02-13 RX ORDER — PEN NEEDLE, DIABETIC 30 GX3/16"
1 NEEDLE, DISPOSABLE MISCELLANEOUS
Qty: 150 EACH | Refills: 4 | Status: SHIPPED | OUTPATIENT
Start: 2023-02-13 | End: 2023-07-17

## 2023-02-13 RX ORDER — INSULIN GLARGINE 100 [IU]/ML
INJECTION, SOLUTION SUBCUTANEOUS
Qty: 9 ML | Refills: 5 | Status: SHIPPED | OUTPATIENT
Start: 2023-02-13 | End: 2023-05-01 | Stop reason: SDUPTHER

## 2023-02-13 RX ORDER — LANCETS 33 GAUGE
1 EACH MISCELLANEOUS
Qty: 150 EACH | Refills: 5 | Status: SHIPPED | OUTPATIENT
Start: 2023-02-13 | End: 2023-10-18

## 2023-02-13 RX ORDER — LISINOPRIL 5 MG/1
5 TABLET ORAL DAILY
Qty: 30 TABLET | Refills: 5 | Status: SHIPPED | OUTPATIENT
Start: 2023-02-13 | End: 2023-08-17 | Stop reason: SDUPTHER

## 2023-02-13 RX ORDER — GABAPENTIN 300 MG/1
600 CAPSULE ORAL 3 TIMES DAILY
Qty: 90 CAPSULE | Refills: 5 | Status: SHIPPED | OUTPATIENT
Start: 2023-02-13 | End: 2023-05-01 | Stop reason: SDUPTHER

## 2023-02-13 RX ORDER — PRAVASTATIN SODIUM 20 MG/1
20 TABLET ORAL NIGHTLY
Qty: 30 TABLET | Refills: 5 | Status: SHIPPED | OUTPATIENT
Start: 2023-02-13 | End: 2023-08-17 | Stop reason: SDUPTHER

## 2023-02-13 NOTE — PATIENT INSTRUCTIONS
"Asplenia and Adult Vaccination  Print   \l "  Vaccines are especially critical for people with chronic health conditions such as asplenia.  If you do not have a spleen or your spleen does not work well, talk with your doctor about:  Influenza vaccine each year to protect against seasonal flu   Tdap vaccine to protect against tetanus, diphtheria, and whooping cough   Hib vaccine to protect against Haemophilus influenzae type b (Hib) if you were not previously vaccinated with the vaccine   Pneumococcal vaccine(s) to protect against pneumonia and other serious pneumococcal diseases   Meningococcal vaccines (both types) to protect against meningitis and other meningococcal disease   Zoster vaccine to protect against shingles if you are 50 years and older   HPV vaccine to protect against cancers and genital warts caused by human papillomavirus if you are an adult through age 26 years (HPV vaccine is not recommended for everyone older than age 26 years, but some adults age 27 through 45 years who are not already vaccinated may decide to get HPV vaccine after speaking with their doctor about their risk for new HPV infections and the possible benefits of vaccination. HPV vaccination in this age range provides less benefit, as more people have already been exposed to HPV.)   MMR vaccine to protect against measles, mumps, and rubella if you were born in 1958 or after and have not gotten this vaccine or do not have immunity to these diseases   Varicella vaccine to protect against chickenpox if you were born in 1980 or after and have not gotten two doses of this vaccine or do not have immunity to this disease  "

## 2023-02-20 ENCOUNTER — OFFICE VISIT (OUTPATIENT)
Dept: PODIATRY | Facility: CLINIC | Age: 43
End: 2023-02-20
Payer: MEDICAID

## 2023-02-20 VITALS — HEIGHT: 72 IN | OXYGEN SATURATION: 97 % | RESPIRATION RATE: 18 BRPM | WEIGHT: 160 LBS | BODY MASS INDEX: 21.67 KG/M2

## 2023-02-20 DIAGNOSIS — L97.911 SKIN ULCER OF RIGHT LOWER LEG, LIMITED TO BREAKDOWN OF SKIN: ICD-10-CM

## 2023-02-20 DIAGNOSIS — L60.2 OG (ONYCHOGRYPHOSIS): ICD-10-CM

## 2023-02-20 DIAGNOSIS — L97.521 SKIN ULCER OF LEFT GREAT TOE, LIMITED TO BREAKDOWN OF SKIN: Primary | ICD-10-CM

## 2023-02-20 DIAGNOSIS — E11.65 UNCONTROLLED TYPE 2 DIABETES MELLITUS WITH HYPERGLYCEMIA: ICD-10-CM

## 2023-02-20 DIAGNOSIS — E11.42 TYPE 2 DIABETES MELLITUS WITH DIABETIC POLYNEUROPATHY, WITH LONG-TERM CURRENT USE OF INSULIN: ICD-10-CM

## 2023-02-20 DIAGNOSIS — E11.9 ENCOUNTER FOR DIABETIC FOOT EXAM: ICD-10-CM

## 2023-02-20 DIAGNOSIS — Z79.4 TYPE 2 DIABETES MELLITUS WITH DIABETIC POLYNEUROPATHY, WITH LONG-TERM CURRENT USE OF INSULIN: ICD-10-CM

## 2023-02-20 PROCEDURE — 99203 PR OFFICE/OUTPT VISIT, NEW, LEVL III, 30-44 MIN: ICD-10-PCS | Mod: S$GLB,,, | Performed by: PODIATRIST

## 2023-02-20 PROCEDURE — 1160F RVW MEDS BY RX/DR IN RCRD: CPT | Mod: CPTII,S$GLB,, | Performed by: PODIATRIST

## 2023-02-20 PROCEDURE — 1160F PR REVIEW ALL MEDS BY PRESCRIBER/CLIN PHARMACIST DOCUMENTED: ICD-10-PCS | Mod: CPTII,S$GLB,, | Performed by: PODIATRIST

## 2023-02-20 PROCEDURE — 3008F BODY MASS INDEX DOCD: CPT | Mod: CPTII,S$GLB,, | Performed by: PODIATRIST

## 2023-02-20 PROCEDURE — 3046F HEMOGLOBIN A1C LEVEL >9.0%: CPT | Mod: CPTII,S$GLB,, | Performed by: PODIATRIST

## 2023-02-20 PROCEDURE — 4010F ACE/ARB THERAPY RXD/TAKEN: CPT | Mod: CPTII,S$GLB,, | Performed by: PODIATRIST

## 2023-02-20 PROCEDURE — 3046F PR MOST RECENT HEMOGLOBIN A1C LEVEL > 9.0%: ICD-10-PCS | Mod: CPTII,S$GLB,, | Performed by: PODIATRIST

## 2023-02-20 PROCEDURE — 1159F PR MEDICATION LIST DOCUMENTED IN MEDICAL RECORD: ICD-10-PCS | Mod: CPTII,S$GLB,, | Performed by: PODIATRIST

## 2023-02-20 PROCEDURE — 3008F PR BODY MASS INDEX (BMI) DOCUMENTED: ICD-10-PCS | Mod: CPTII,S$GLB,, | Performed by: PODIATRIST

## 2023-02-20 PROCEDURE — 4010F PR ACE/ARB THEARPY RXD/TAKEN: ICD-10-PCS | Mod: CPTII,S$GLB,, | Performed by: PODIATRIST

## 2023-02-20 PROCEDURE — 99203 OFFICE O/P NEW LOW 30 MIN: CPT | Mod: S$GLB,,, | Performed by: PODIATRIST

## 2023-02-20 PROCEDURE — 1159F MED LIST DOCD IN RCRD: CPT | Mod: CPTII,S$GLB,, | Performed by: PODIATRIST

## 2023-02-20 RX ORDER — DOXYCYCLINE 100 MG/1
100 CAPSULE ORAL 2 TIMES DAILY
Qty: 20 CAPSULE | Refills: 1 | Status: SHIPPED | OUTPATIENT
Start: 2023-02-20 | End: 2023-03-02

## 2023-02-20 NOTE — PROGRESS NOTES
[address] [facility] [address]  [place] [address]  Phone: [phone]   [phone]    Patient's PCP:Alfie Lancaster MD  Referring Provider: Dr. Alfie Lancaster    Subjective:      Chief Complaint:: Diabetic Foot Exam (Yearly exam) and Foot Ulcer (Left great toe and right lower leg)    HPI  Philip Alberts is a 42 y.o. male who presents today with a complaint of left great toe ulcer and right lower leg ulceration lasting for some time. Onset of symptoms open sore and reports no trauma.  Current symptoms include open sore with drainage and peeling skin.  Aggravating factors are none. Symptoms have decreased. Treatment to date have included triple antibiotics and dressing with bandage. Also presents to the clinic for a diabetic foot exam.   Pt has seen Alfie Lancaster MD on 2/13/2023 who treats them for their diabetes.  Pt has been a diabetic for eleven years.  Taking lantus and novolog to treat diabetes.    Blood sugar: 104  Hemoglobin A1C: 12.2       Vitals:    02/20/23 1014   Resp: 18   SpO2: 97%   Weight: 72.6 kg (160 lb)   Height: 6' (1.829 m)   PainSc: 0-No pain      Shoe Size: 12-13    Past Surgical History:   Procedure Laterality Date    MANDIBLE FRACTURE SURGERY  07/17/2000    MANDIBLE FRACTURE SURGERY      SPLENECTOMY, TOTAL  07/17/2000     Past Medical History:   Diagnosis Date    Diabetes mellitus, type 2     Encounter for blood transfusion     GERD (gastroesophageal reflux disease)     Hyperlipidemia     Hypertension     Neuropathy     Osteoarthritis      Family History   Problem Relation Age of Onset    Diabetes Mother     Cancer Father         Social History:   Marital Status: Single  Alcohol History:  reports no history of alcohol use.  Tobacco History:  reports that he has been smoking. He has never used smokeless tobacco.  Drug History:  reports no history of drug use.    Review of patient's allergies indicates:   Allergen Reactions    Pcn [penicillins] Anaphylaxis       Current Outpatient  "Medications   Medication Sig Dispense Refill    blood sugar diagnostic Strp To check BG qid times daily, to use with insurance preferred meter 150 strip 3    blood-glucose meter kit To check BG qid times daily, to use with insurance preferred meter 1 each 0    EPINEPHrine (EPIPEN) 0.3 mg/0.3 mL AtIn Inject 0.3 mLs (0.3 mg total) into the muscle once. for 1 dose 0.3 mL 2    gabapentin (NEURONTIN) 300 MG capsule Take 2 capsules (600 mg total) by mouth 3 (three) times daily. 90 capsule 5    insulin (LANTUS SOLOSTAR U-100 INSULIN) glargine 100 units/mL SubQ pen Inject 30 units SQ  at bedtime daily 9 mL 5    insulin aspart U-100 (NOVOLOG FLEXPEN U-100 INSULIN) 100 unit/mL (3 mL) InPn pen Inject 10 Units into the skin 4 (four) times daily. Per sliding scale as instructed 12 mL 5    lancets Misc To check BGqid times daily, to use with insurance preferred meter (Patient taking differently: 1 lancet by Misc.(Non-Drug; Combo Route) route 4 (four) times daily. To check BGqid times daily, to use with insurance preferred meter) 200 each 3    lisinopriL (PRINIVIL,ZESTRIL) 5 MG tablet Take 1 tablet (5 mg total) by mouth once daily. 30 tablet 5    pen needle, diabetic (BD DENIS 2ND GEN PEN NEEDLE) 32 gauge x 5/32" Ndle 1 Device by Misc.(Non-Drug; Combo Route) route 4 (four) times daily before meals and nightly. 150 each 4    pravastatin (PRAVACHOL) 20 MG tablet Take 1 tablet (20 mg total) by mouth every evening. 30 tablet 5    sildenafiL (VIAGRA) 100 MG tablet Take 1 tablet (100 mg total) by mouth daily as needed for Erectile Dysfunction. 20 tablet 5    TRUEPLUS LANCETS 33 gauge Misc Inject 1 lancet into the skin 4 (four) times daily before meals and nightly. 150 each 5     No current facility-administered medications for this visit.       Review of Systems      Objective:        Physical Exam:   Foot Exam    General  General Appearance: appears stated age and healthy   Orientation: alert and oriented to person, place, and time "   Affect: appropriate   Gait: antalgic       Right Foot/Ankle     Inspection and Palpation  Ecchymosis: none  Tenderness: none (Patient has neuropathy)  Swelling: (+1 pitting edema lower extremity)  Skin Exam: ulcer (Superficial venous ulcer medial distal right lower leg with venous stasis dermatitis with no signs of infection.  5 cm long 1 cm wide 1 mm deep);   Fungus Toenails: present    Neurovascular  Dorsalis pedis: 2+  Posterior tibial: 2+  Capillary Refill: 2+  Varicose veins: present  Saphenous nerve sensation: absent  Tibial nerve sensation: absent  Superficial peroneal nerve sensation: absent  Deep peroneal nerve sensation: absent  Sural nerve sensation: absent    Muscle Strength  Ankle dorsiflexion: 5  Ankle plantar flexion: 5  Ankle inversion: 5  Ankle eversion: 5  Great toe extension: 5  Great toe flexion: 5      Left Foot/Ankle      Inspection and Palpation  Ecchymosis: none  Tenderness: none (Patient has neuropathy)  Swelling: (Mild swelling distal tip 1st toe with superficial grade 2 ulcer with minimal redness no fluctuance no exposed bone no lymphangitis.  Plus one pitting edema lower extremity)  Skin Exam: ulcer (Superficial grade 2 ulcer distal tip 1st toe proximally 2 cm x 1 cm x 1 mm deep with pink granulation tissue perimeter of cellulitis no fluctuance no purulent drainage no lymphangitis);   Neurovascular  Dorsalis pedis: 2+  Posterior tibial: 2+  Capillary refill: 2+  Varicose veins: present  Saphenous nerve sensation: absent  Tibial nerve sensation: absent  Superficial peroneal nerve sensation: absent  Deep peroneal nerve sensation: absent  Sural nerve sensation: absent    Muscle Strength  Ankle dorsiflexion: 5  Ankle plantar flexion: 5  Ankle inversion: 5  Ankle eversion: 5  Great toe extension: 5  Great toe flexion: 5      Physical Exam  Cardiovascular:      Pulses:           Dorsalis pedis pulses are 2+ on the right side and 2+ on the left side.        Posterior tibial pulses are 2+ on  the right side and 2+ on the left side.   Feet:      Right foot:      Skin integrity: Ulcer (Superficial venous ulcer medial distal right lower leg with venous stasis dermatitis with no signs of infection.  5 cm long 1 cm wide 1 mm deep) present.      Toenail Condition: Fungal disease present.     Left foot:      Skin integrity: Ulcer (Superficial grade 2 ulcer distal tip 1st toe proximally 2 cm x 1 cm x 1 mm deep with pink granulation tissue perimeter of cellulitis no fluctuance no purulent drainage no lymphangitis) present.             Left Ankle/Foot Exam     Muscle Strength   The patient has normal left ankle strength.      Muscle Strength   Right Lower Extremity   Ankle Dorsiflexion:  5   Plantar flexion:  5/5  Left Lower Extremity   Ankle Dorsiflexion:  5   Plantar flexion:  5/5     Vascular Exam     Right Pulses  Dorsalis Pedis:      2+  Posterior Tibial:      2+        Left Pulses  Dorsalis Pedis:      2+  Posterior Tibial:      2+         Imaging:            Assessment:       1. Skin ulcer of left great toe, limited to breakdown of skin    2. Type 2 diabetes mellitus with diabetic polyneuropathy, with long-term current use of insulin    3. Encounter for diabetic foot exam    4. Skin ulcer of right lower leg, limited to breakdown of skin    5. OG (onychogryphosis)    6. Uncontrolled type 2 diabetes mellitus with hyperglycemia      Plan:   Skin ulcer of left great toe, limited to breakdown of skin  -     Ambulatory referral/consult to Wound Clinic; Future; Expected date: 02/27/2023    Type 2 diabetes mellitus with diabetic polyneuropathy, with long-term current use of insulin  -     Ambulatory referral/consult to Podiatry  -     Ambulatory referral/consult to Wound Clinic; Future; Expected date: 02/27/2023    Encounter for diabetic foot exam    Skin ulcer of right lower leg, limited to breakdown of skin  -     Ambulatory referral/consult to Wound Clinic; Future; Expected date: 02/27/2023    OG  (onychogryphosis)    Uncontrolled type 2 diabetes mellitus with hyperglycemia    Evaluated patient long discussion with him about clinical findings of him being moderate risk of loss of limb because of loss of sensation to feet and ulcerations being on left foot right lower leg.  He does have good vascular supply which at least makes it easier for him to try to heal this type problem.  I explained to him that he is got get his blood sugars under control hemoglobin A1c running around 9 is not going to be beneficial to him.  He states he is working on that with primary care.  No procedures performed today since he has little bit of cellulitis left 1st toe I am placing him on antibiotics and [facility] am [facility]. Santiago.  Also explained to he is in need get diabetic shoes try to protect his feet he may need to consider doing lymphedema compression program and then compression hose the help control the lymphedema and varicosities of his lower extremities.      Procedures          Counseling:     I provided patient education verbally regarding:   Patient diagnosis, treatment options, as well as alternatives, risks, and benefits.     Counseling/Education:  I provided patient education verbally regarding:   The aspects of diabetes and how it pertains to the feet. I explained the importance of proper diabetic foot care and how it is essential for the health of their feet.    I discussed the importance of knowing their Hemoglobin A1c and that the level needs to be as close to 6 as possible. I discussed the increase complications of high blood sugar including stroke, blindness, heart attack, kidney failure and loss of limb secondary to neuropathy and PVD.     With neuropathy, beware of any breaks in the skin or redness. These areas are not recognized early due to the numbness.    I discussed Diabetes, lower back issues, metabolic disorders, systemic causes, chemotherapy, vitamin deficiency, heavy  metal exposure, as some of the causes. I also explained that as much as 40% of the time we can not find a cause. I discussed different treatments available to control the symptoms but which may not cure the problem.     proper ulcer care and the possible need for serial debridements, topical medications, specific dressings and biological engineered skin substitutes if indicated.   This note was created using Dragon voice recognition software that occasionally misinterpreted phrases or words.

## 2023-02-20 NOTE — PATIENT INSTRUCTIONS
Your Diabetes Foot Care Program    Every day you depend on your feet to keep you moving. But when you have diabetes, your feet need special care. Even a small foot problem can become very serious. So dont take your feet for granted. By working with your diabetes healthcare team, you can learn how to protect your feet and keep them healthy.  Evaluating your feet  An evaluation helps your healthcare provider check the condition of your feet. The evaluation includes a review of your diabetes history and overall health. It may also include a foot exam, X-rays, or other tests. These can help show problems beneath the skin that you cant see or feel.  Medical history  You will be asked about your overall health and any history of foot problems. Youll also discuss your diabetes history, such as whether your blood sugar level has changed over time. It also includes questions about sensations of pain, tingling, pins and needles, or numbness. Your healthcare provider will also want to know if you have high blood pressure and heart disease, or if you smoke. Be sure to mention any medicines (including over-the-counter), supplements, or herbal remedies you take.  Foot exam  A foot exam checks the condition of different parts of your foot. First, your skin and nails are examined for any signs of infection. Blood flow is checked by feeling for the pulses in each foot. You may also have tests to study the nerves in the foot. These include using a small filament (wire) to see how sensitive your feet are. In certain cases, you will be asked to walk a short distance to check for bone, joint, and muscle problems.  Diagnostic tests  If needed, your healthcare provider will suggest certain tests to learn more about your feet. These include:  Doppler tests to measure blood flow in the feet and lower leg.  X-rays, which can show bone or joint problems.  Other imaging tests, such as an MRI (magnetic resonance imaging), bone scan, and CT  (computed tomography) scan. These can help show bone infections.  Other tests, such as vascular tests, which study the blood flow in your feet and legs. You may also have nerve studies to learn how sensitive your feet are.  Creating a foot care program  Based on the evaluation, your healthcare provider will create a foot care program for you. Your program may be as simple as starting a daily self-care routine and changing the types of shoes your wear. It may also involve treating minor foot problems, such as a corn or blister. In some cases, surgery will be needed to treat an infection or mechanical problems, such as hammer toes.  Preventing problems  When you have diabetes, its easier to prevent problems than to treat them later on. So see your healthcare team for regular checkups and foot care. Your healthcare team can also help you learn more about caring for your feet at home. For example, you may be told to avoid walking barefoot. Or you may be told that special footwear is needed to protect your feet.  Have regular checkups  Foot problems can develop quickly. So be sure to follow your healthcare teams schedule for regular checkups. During office visits, take off your shoes and socks as soon as you get in the exam room. Ask your healthcare provider to examine your feet for problems. This will make it easier to find and treat small skin irritations before they get worse. Regular checkups can also help keep track of the blood flow and feeling in your feet. If you have neuropathy (lack of feeling in your feet), you will need to have checkups more often.  Learn about self-care  The more you know about diabetes and your feet, the easier it will be to prevent problems. Members of your healthcare team can teach you how to inspect your feet and teach you to look for warning signs. They can also give you other foot care tips. During office visits, be sure to ask any questions you have.  Date Last Reviewed: 7/1/2016  ©  1797-0126 [employer]. [address] [place] [address], [place] [address]. All rights reserved. This information is not intended as a substitute for professional medical care. Always follow your healthcare professional's instructions.       Peripheral Neuropathy  Peripheral neuropathy is a condition that affects the nerves of the arms or legs. It causes a change in physical feeling. Sometimes it causes weakness in the muscles. You may feel tingling, numbness or shooting pains. Symptoms may be more common at night. Skin may be extra sensitive to light touch or temperature changes.  Neuropathy may be a complication of a chronic disease such as diabetes. A ruptured disk with pressure on the spinal nerve may also lead to the problem. Certain vitamin deficiencies may lead to it. It may also be caused by exposure to certain drugs or chemicals.    Home care  Tell the healthcare provider about all medicines you take. This includes prescription and over-the-counter medicines, vitamins, and herbs. Ask if any of the medicines may be causing your problems. Do not make any changes to prescription medicines without talking to your healthcare provider first.  You may be prescribed medicines to help relieve the tingling feeling or for pain. Take all medicines as directed.  A numb hand or foot may be more prone to injury. To help protect it:  Always use oven mitts.  Test water with an unaffected hand or foot.  Use caution when trimming nails. File sharp areas.  Wear shoes that fit well to avoid pressure points, blisters, and ulcers.  Inspect your hands and feet carefully (including the soles of your feet and between your toes) at least once a week. If you see red areas, sores, or other problems, tell your healthcare provider.    Follow-up care  Follow up with your doctor or as advised by our staff. You may need further testing or evaluation.    When to seek medical advice  Call your healthcare provider right away if any of the  following occur:  Redness, swelling, cracking, or ulcer on any numb area, especially the feet  New symptoms of numbness or muscle weakness numbness  Loss of bowel or bladder control  Slurred speech, confusion, or trouble speaking, walking, or seeing    Date Last Reviewed: 9/26/2015  © 5875-5808 [employer]. [address] [place] [address]. All rights reserved. This information is not intended as a substitute for professional medical care. Always follow your healthcare professional's instructions.       Simple Skin Ulcer  A skin ulcer is a sore on the skin. Skin ulcers often form when blood circulation is impaired. Being bed- or wheelchair-bound can cause pressure that may lead to skin ulcers. Ulcers are generally round areas of red, swollen, thickened skin around a crater-like depression. They are often very slow to heal. If a skin ulcer isn't properly treated, it may become infected. If the infection spreads, it can cause serious health issues.    Symptoms of a skin ulcer include:  Reddish area on the skin  Skin color and texture changes  Swelling  Wound that isn't healing  Crater in the skin  Pain  Drainage or pus    Causes  There are many causes of skin ulcers. Some of these include:  Decreased blood flow to a part of the skin, vascular insufficiency  Trauma  Lack of movement of a part of the body for long periods of time  Infection  Poor hygiene  Varicose veins  Vitamin deficiency    Pressure ulcers  Pressure ulcers are a type of ulcer most commonly seen in people who are confined to bed or a wheelchair. They are caused by prolonged pressure to a spot on the skin. Pressure ulcers usually occur on the back, buttocks, or backs or sides of the legs, arms, or feet (especially the heels).    Home care  You may be prescribed antibiotics to prevent infection. If this is the case, be sure to take all of the medicine, even if your symptoms get better. You may also be given medicines to help  relieve pain. Follow the healthcare providers instructions when using these medicines.    General care  Care for the skin ulcer as instructed. Always wash your hands with soap and warm water before and after caring for your wound.  Cover the ulcer with a clean, dry bandage. Remove and change the bandage as instructed. If the bandage becomes wet or dirty, change it as soon as possible.  Follow the doctors instructions about washing. You can shower, but do not soak the healing ulcer until the doctor says its OK.  Do not scratch, rub, or pick at the healing skin.  Check the area every day for signs of infection, such as increasing pain, redness, warmth, red streaking, swelling, or pus draining from the ulcer.  When resting, raise the area where the ulcer is above the level of the heart.  Avoid smoking or drinking alcohol, as these can delay wound healing.  If you are able, try to walk regularly. This can help with circulation.  Avoid prolonged standing or sitting in one position.  The following tips can help prevent future skin ulcers:  Know your risks for skin ulcers.  Keep the skin clean and dry.  Reposition frequently.  Use protective devices such as pillows, foam wedges, and heel protectors for the knees, ankles, and heels.  Avoid immobilization.    Follow-up care  Follow up with your healthcare provider, or as advised.    When to seek medical advice  Call your healthcare provider right away if any of these occur:  Fever of 100.4°F (38°C) or higher, or as advised by your healthcare provider  Signs of infection. These include increasing pain, warmth, redness, or pus draining from the skin ulcer.  Bleeding from the skin ulcer  Pain in or around the ulcer that doesn't get better even with medicines  Increased swelling  Changes in skin color    Date Last Reviewed: 9/1/2016  © 7344-8749 [employer]. [address] [place] [address], [place] [address]. All rights reserved. This information is not intended as a  substitute for professional medical care. Always follow your healthcare professional's instructions.

## 2023-02-27 ENCOUNTER — TELEPHONE (OUTPATIENT)
Dept: PODIATRY | Facility: CLINIC | Age: 43
End: 2023-02-27
Payer: MEDICAID

## 2023-02-27 NOTE — TELEPHONE ENCOUNTER
----- Message from Kayla Ballesteros sent at 2/27/2023  3:14 PM CST -----  Regarding: pt call  Wound care called and said that pt is refusing wound care. They just wanted us to know.    Thank you,  Kayla

## 2023-04-24 ENCOUNTER — PATIENT MESSAGE (OUTPATIENT)
Dept: FAMILY MEDICINE | Facility: CLINIC | Age: 43
End: 2023-04-24

## 2023-04-26 ENCOUNTER — LAB VISIT (OUTPATIENT)
Dept: LAB | Facility: HOSPITAL | Age: 43
End: 2023-04-26
Attending: INTERNAL MEDICINE
Payer: MEDICAID

## 2023-04-26 DIAGNOSIS — Z79.4 TYPE 2 DIABETES MELLITUS WITH DIABETIC POLYNEUROPATHY, WITH LONG-TERM CURRENT USE OF INSULIN: ICD-10-CM

## 2023-04-26 DIAGNOSIS — E11.42 TYPE 2 DIABETES MELLITUS WITH DIABETIC POLYNEUROPATHY, WITH LONG-TERM CURRENT USE OF INSULIN: ICD-10-CM

## 2023-04-26 LAB
ALBUMIN/CREAT UR: 1.6 UG/MG (ref 0–30)
CREAT UR-MCNC: 267 MG/DL (ref 23–375)
ESTIMATED AVG GLUCOSE: 240 MG/DL (ref 68–131)
HBA1C MFR BLD: 10 % (ref 4.5–6.2)
MICROALBUMIN UR DL<=1MG/L-MCNC: 4.2 UG/ML

## 2023-04-26 PROCEDURE — 82570 ASSAY OF URINE CREATININE: CPT | Performed by: INTERNAL MEDICINE

## 2023-04-26 PROCEDURE — 83036 HEMOGLOBIN GLYCOSYLATED A1C: CPT | Performed by: INTERNAL MEDICINE

## 2023-04-26 PROCEDURE — 36415 COLL VENOUS BLD VENIPUNCTURE: CPT | Performed by: INTERNAL MEDICINE

## 2023-04-30 NOTE — PROGRESS NOTES
Subjective:       Patient ID: Philip Alberts is a 42 y.o. male.    Chief Complaint: Diabetes and Follow-up    This is patient's 2nd visit with me after his initial establishment visit in past.  Is already established in our system and he had change the PCP.    Last visit we had noted the following:-    1.-poorly-controlled diabetes mellitus on long and short-acting insulin.  He was educated on the mechanism of action of short-acting insulin and long-acting insulin at that point.  He was under the impression that he has to take the long-acting insulin 3 times a day.    2.-chronic tobacco dependency was noted also and he was advised to quit smoking.  At that point he had stated that he has no other pleasures left in life and not even eating given lack of teeth and that smoking is his only pleasure.  3.-gastroesophageal reflux   4.-hyperlipidemia  5.-neuropathy.    Recent hemoglobin A1c has shown only a marginal improvement to 10.  Previously it was greater than 12.      Past Medical History:   Diagnosis Date    Diabetes mellitus, type 2     Encounter for blood transfusion     GERD (gastroesophageal reflux disease)     Hyperlipidemia     Hypertension     Neuropathy     Osteoarthritis      Social History     Socioeconomic History    Marital status: Single   Tobacco Use    Smoking status: Every Day    Smokeless tobacco: Never   Substance and Sexual Activity    Alcohol use: No    Drug use: No    Sexual activity: Yes     Past Surgical History:   Procedure Laterality Date    MANDIBLE FRACTURE SURGERY  07/17/2000    MANDIBLE FRACTURE SURGERY      SPLENECTOMY, TOTAL  07/17/2000     Family History   Problem Relation Age of Onset    Diabetes Mother     Cancer Father        Review of Systems      Objective:      Blood pressure 129/78, pulse 85, height 6' (1.829 m), weight 73.5 kg (162 lb). Body mass index is 21.97 kg/m².  Physical Exam  Vitals and nursing note reviewed.   Constitutional:       General: He is not in acute  "distress.     Appearance: He is well-developed. He is not ill-appearing, toxic-appearing or diaphoretic.      Comments: BMI is 21.97   HENT:      Head: Normocephalic and atraumatic.      Mouth/Throat:      Pharynx: No oropharyngeal exudate or posterior oropharyngeal erythema.      Comments: Edentulous.  Eyes:      Conjunctiva/sclera: Conjunctivae normal.   Neck:      Thyroid: No thyromegaly.      Vascular: No JVD.      Trachea: No tracheal deviation.   Cardiovascular:      Rate and Rhythm: Normal rate and regular rhythm.      Heart sounds: Normal heart sounds. No murmur heard.    No friction rub. No gallop.   Pulmonary:      Effort: Pulmonary effort is normal.      Breath sounds: Normal breath sounds.   Abdominal:      General: There is no distension.      Palpations: Abdomen is soft.      Tenderness: There is no abdominal tenderness.   Musculoskeletal:         General: No swelling.      Cervical back: Neck supple. No rigidity.      Right lower leg: No edema.      Left lower leg: No edema.   Feet:      Right foot:      Toenail Condition: Right toenails are abnormally thick. Fungal disease present.  Lymphadenopathy:      Cervical: No cervical adenopathy.   Skin:     General: Skin is warm and dry.   Neurological:      Mental Status: He is alert. Mental status is at baseline.      Gait: Gait normal.      Deep Tendon Reflexes: Reflexes are normal and symmetric.   Psychiatric:         Mood and Affect: Mood is anxious.         Assessment:               Type 2 diabetes mellitus with diabetic polyneuropathy, with long-term current use of insulin  -     gabapentin (NEURONTIN) 300 MG capsule; Take 2 capsules (600 mg total) by mouth 3 (three) times daily.  Dispense: 180 capsule; Refill: 5  -     insulin syringe-needle U-100 0.3 mL 31 gauge x 5/16" Syrg; 1 Device by Misc.(Non-Drug; Combo Route) route 4 (four) times daily.  Dispense: 200 each; Refill: 3  -     Hemoglobin A1C; Future; Expected date: 05/01/2023  -     Basic " Metabolic Panel; Future; Expected date: 05/01/2023  -     Fructosamine; Future; Expected date: 05/01/2023  -     insulin (LANTUS SOLOSTAR U-100 INSULIN) glargine 100 units/mL SubQ pen; 17 units twice a day  Dispense: 9 mL; Refill: 5       Lab Visit on 04/26/2023   Component Date Value Ref Range Status    Microalbumin, Urine 04/26/2023 4.2  <19.9 ug/mL Final    Creatinine, Urine 04/26/2023 267.0  23.0 - 375.0 mg/dL Final    Microalb/Creat Ratio 04/26/2023 1.6  0.0 - 30.0 ug/mg Final    Hemoglobin A1C 04/26/2023 10.0 (H)  4.5 - 6.2 % Final    Estimated Avg Glucose 04/26/2023 240 (H)  68 - 131 mg/dL Final   Lab Visit on 02/10/2023   Component Date Value Ref Range Status    Cholesterol 02/10/2023 224 (H)  120 - 199 mg/dL Final    Triglycerides 02/10/2023 35  30 - 150 mg/dL Final    HDL 02/10/2023 95 (H)  40 - 75 mg/dL Final    LDL Cholesterol 02/10/2023 122.0  63.0 - 159.0 mg/dL Final    HDL/Cholesterol Ratio 02/10/2023 42.4  20.0 - 50.0 % Final    Total Cholesterol/HDL Ratio 02/10/2023 2.4  2.0 - 5.0 Final    Non-HDL Cholesterol 02/10/2023 129  mg/dL Final    Sodium 02/10/2023 135 (L)  136 - 145 mmol/L Final    Potassium 02/10/2023 4.0  3.5 - 5.1 mmol/L Final    Chloride 02/10/2023 97  95 - 110 mmol/L Final    CO2 02/10/2023 29  23 - 29 mmol/L Final    Glucose 02/10/2023 377 (H)  70 - 110 mg/dL Final    BUN 02/10/2023 14  6 - 20 mg/dL Final    Creatinine 02/10/2023 0.7  0.5 - 1.4 mg/dL Final    Calcium 02/10/2023 9.2  8.7 - 10.5 mg/dL Final    Total Protein 02/10/2023 7.3  6.0 - 8.4 g/dL Final    Albumin 02/10/2023 4.0  3.5 - 5.2 g/dL Final    Total Bilirubin 02/10/2023 0.6  0.1 - 1.0 mg/dL Final    Alkaline Phosphatase 02/10/2023 87  55 - 135 U/L Final    AST 02/10/2023 25  10 - 40 U/L Final    ALT 02/10/2023 26  10 - 44 U/L Final    Anion Gap 02/10/2023 9  8 - 16 mmol/L Final    eGFR 02/10/2023 >60.0  >60 mL/min/1.73 m^2 Final    WBC 02/10/2023 9.38  3.90 - 12.70 K/uL Final    RBC 02/10/2023 4.54 (L)  4.60 - 6.20  "M/uL Final    Hemoglobin 02/10/2023 13.8 (L)  14.0 - 18.0 g/dL Final    Hematocrit 02/10/2023 42.2  40.0 - 54.0 % Final    MCV 02/10/2023 93  82 - 98 fL Final    MCH 02/10/2023 30.4  27.0 - 31.0 pg Final    MCHC 02/10/2023 32.7  32.0 - 36.0 g/dL Final    RDW 02/10/2023 13.1  11.5 - 14.5 % Final    Platelets 02/10/2023 166  150 - 450 K/uL Final    MPV 02/10/2023 14.0 (H)  9.2 - 12.9 fL Final    Immature Granulocytes 02/10/2023 0.3  0.0 - 0.5 % Final    Gran # (ANC) 02/10/2023 4.9  1.8 - 7.7 K/uL Final    Immature Grans (Abs) 02/10/2023 0.03  0.00 - 0.04 K/uL Final    Lymph # 02/10/2023 3.2  1.0 - 4.8 K/uL Final    Mono # 02/10/2023 0.8  0.3 - 1.0 K/uL Final    Eos # 02/10/2023 0.4  0.0 - 0.5 K/uL Final    Baso # 02/10/2023 0.13  0.00 - 0.20 K/uL Final    nRBC 02/10/2023 0  0 /100 WBC Final    Gran % 02/10/2023 52.6  38.0 - 73.0 % Final    Lymph % 02/10/2023 33.8  18.0 - 48.0 % Final    Mono % 02/10/2023 8.2  4.0 - 15.0 % Final    Eosinophil % 02/10/2023 3.7  0.0 - 8.0 % Final    Basophil % 02/10/2023 1.4  0.0 - 1.9 % Final    Differential Method 02/10/2023 Automated   Final    PSA, Screen 02/10/2023 0.32  0.00 - 4.00 ng/mL Final    Hemoglobin A1C 02/10/2023 12.2 (H)  4.5 - 6.2 % Final    Estimated Avg Glucose 02/10/2023 303 (H)  68 - 131 mg/dL Final     Component Ref Range & Units 3 d ago  (4/26/23) 2 mo ago  (2/10/23) 1 yr ago  (6/28/21) 2 yr ago  (1/6/21) 4 yr ago  (1/21/19) 4 yr ago  (10/5/18)   Hemoglobin A1C 4.5 - 6.2 % 10.0 High   12.2 High  CM  9.0 High  CM  13.1 High  CM  11.7 High  R  10.5 High  R        Plan:           Type 2 diabetes mellitus with diabetic polyneuropathy, with long-term current use of insulin  -     gabapentin (NEURONTIN) 300 MG capsule; Take 2 capsules (600 mg total) by mouth 3 (three) times daily.  Dispense: 180 capsule; Refill: 5  -     insulin syringe-needle U-100 0.3 mL 31 gauge x 5/16" Syrg; 1 Device by Misc.(Non-Drug; Combo Route) route 4 (four) times daily.  Dispense: 200 each; " Refill: 3  -     Hemoglobin A1C; Future; Expected date: 05/01/2023  -     Basic Metabolic Panel; Future; Expected date: 05/01/2023  -     Fructosamine; Future; Expected date: 05/01/2023  -     insulin (LANTUS SOLOSTAR U-100 INSULIN) glargine 100 units/mL SubQ pen; 17 units twice a day  Dispense: 9 mL; Refill: 5      This is a moderate complexity visit visit with low sugar reactions.  Patient's management of diabetes continues to be challenging and confusing.    He has only 1 full meal and probably few snacks here and there and it still remains unclear as to how he is using his insulins.      He takes his long-acting insulin at 6:00 p.m. and probably this speaks and action around 4:00 a.m..  That is when he has the hypoglycemic episode.    He is thin built though not completely emaciated.      He recalls that initially he was type 2 was on metformin which did not work eventually and he was graduated over to insulins.    Lot of his social circumstances are not clarified at this point and remain challenging to Dig.    I have advised him to take at least 1 reasonable breakfast in the morning to have some continuity of coverage of food with multiple insulin injections per day.      I will split the insulin to 2 shots per day of 17 units before breakfast and 17 units at 6:00 p.m..  Take some snack at bedtime.  This will prevent any early hypoglycemic episode and potential somogyi affect.    He is taking gabapentin for neuropathy.  New prescription has been given to allow 600 mg 3 times a day at a maximal dose.    Overall prognosis uncertain.    Check labs at three-month follow-up.  Current Outpatient Medications:     blood sugar diagnostic Strp, To check BG qid times daily, to use with insurance preferred meter, Disp: 150 strip, Rfl: 3    insulin aspart U-100 (NOVOLOG FLEXPEN U-100 INSULIN) 100 unit/mL (3 mL) InPn pen, Inject 10 Units into the skin 4 (four) times daily. Per sliding scale as instructed, Disp: 12 mL, Rfl:  "5    lancets [facility] [place], To check BGqid times daily, to use with insurance preferred meter (Patient taking differently: 1 lancet by Misc.(Non-Drug; Combo Route) route 4 (four) times daily. To check BGqid times daily, to use with insurance preferred meter), Disp: 200 each, Rfl: 3    lisinopriL (PRINIVIL,ZESTRIL) 5 MG tablet, Take 1 tablet (5 mg total) by mouth once daily., Disp: 30 tablet, Rfl: 5    pen needle, diabetic (BD DENIS 2ND GEN PEN NEEDLE) 32 gauge x 5/32" Ndle, 1 Device by Misc.(Non-Drug; Combo Route) route 4 (four) times daily before meals and nightly., Disp: 150 each, Rfl: 4    pravastatin (PRAVACHOL) 20 MG tablet, Take 1 tablet (20 mg total) by mouth every evening., Disp: 30 tablet, Rfl: 5    sildenafiL (VIAGRA) 100 MG tablet, Take 1 tablet (100 mg total) by mouth daily as needed for Erectile Dysfunction., Disp: 20 tablet, Rfl: 5    TRUEPLUS LANCETS 33 gauge Misc, Inject 1 lancet into the skin 4 (four) times daily before meals and nightly., Disp: 150 each, Rfl: 5    blood-glucose meter kit, To check BG qid times daily, to use with insurance preferred meter, Disp: 1 each, Rfl: 0    EPINEPHrine (EPIPEN) 0.3 mg/0.3 mL AtIn, Inject 0.3 mLs (0.3 mg total) into the muscle once. for 1 dose, Disp: 0.3 mL, Rfl: 2    gabapentin (NEURONTIN) 300 MG capsule, Take 2 capsules (600 mg total) by mouth 3 (three) times daily., Disp: 180 capsule, Rfl: 5    insulin (LANTUS SOLOSTAR U-100 INSULIN) glargine 100 units/mL SubQ pen, 17 units twice a day, Disp: 9 mL, Rfl: 5    insulin syringe-needle U-100 0.3 mL 31 gauge x 5/16" Syrg, 1 Device by Misc.(Non-Drug; Combo Route) route 4 (four) times daily., Disp: 200 each, Rfl: 3    "

## 2023-05-01 ENCOUNTER — OFFICE VISIT (OUTPATIENT)
Dept: FAMILY MEDICINE | Facility: CLINIC | Age: 43
End: 2023-05-01
Payer: MEDICAID

## 2023-05-01 VITALS
SYSTOLIC BLOOD PRESSURE: 129 MMHG | DIASTOLIC BLOOD PRESSURE: 78 MMHG | BODY MASS INDEX: 21.94 KG/M2 | WEIGHT: 162 LBS | HEIGHT: 72 IN | HEART RATE: 85 BPM

## 2023-05-01 DIAGNOSIS — Z79.4 TYPE 2 DIABETES MELLITUS WITH DIABETIC POLYNEUROPATHY, WITH LONG-TERM CURRENT USE OF INSULIN: ICD-10-CM

## 2023-05-01 DIAGNOSIS — E11.42 TYPE 2 DIABETES MELLITUS WITH DIABETIC POLYNEUROPATHY, WITH LONG-TERM CURRENT USE OF INSULIN: ICD-10-CM

## 2023-05-01 PROCEDURE — 3008F BODY MASS INDEX DOCD: CPT | Mod: CPTII,,, | Performed by: INTERNAL MEDICINE

## 2023-05-01 PROCEDURE — 3078F DIAST BP <80 MM HG: CPT | Mod: CPTII,,, | Performed by: INTERNAL MEDICINE

## 2023-05-01 PROCEDURE — 1160F RVW MEDS BY RX/DR IN RCRD: CPT | Mod: CPTII,,, | Performed by: INTERNAL MEDICINE

## 2023-05-01 PROCEDURE — 99213 OFFICE O/P EST LOW 20 MIN: CPT | Mod: S$PBB,,, | Performed by: INTERNAL MEDICINE

## 2023-05-01 PROCEDURE — 1160F PR REVIEW ALL MEDS BY PRESCRIBER/CLIN PHARMACIST DOCUMENTED: ICD-10-PCS | Mod: CPTII,,, | Performed by: INTERNAL MEDICINE

## 2023-05-01 PROCEDURE — 3046F HEMOGLOBIN A1C LEVEL >9.0%: CPT | Mod: CPTII,,, | Performed by: INTERNAL MEDICINE

## 2023-05-01 PROCEDURE — 3066F PR DOCUMENTATION OF TREATMENT FOR NEPHROPATHY: ICD-10-PCS | Mod: CPTII,,, | Performed by: INTERNAL MEDICINE

## 2023-05-01 PROCEDURE — 4010F PR ACE/ARB THEARPY RXD/TAKEN: ICD-10-PCS | Mod: CPTII,,, | Performed by: INTERNAL MEDICINE

## 2023-05-01 PROCEDURE — 3061F NEG MICROALBUMINURIA REV: CPT | Mod: CPTII,,, | Performed by: INTERNAL MEDICINE

## 2023-05-01 PROCEDURE — 99213 OFFICE O/P EST LOW 20 MIN: CPT | Performed by: INTERNAL MEDICINE

## 2023-05-01 PROCEDURE — 3078F PR MOST RECENT DIASTOLIC BLOOD PRESSURE < 80 MM HG: ICD-10-PCS | Mod: CPTII,,, | Performed by: INTERNAL MEDICINE

## 2023-05-01 PROCEDURE — 3061F PR NEG MICROALBUMINURIA RESULT DOCUMENTED/REVIEW: ICD-10-PCS | Mod: CPTII,,, | Performed by: INTERNAL MEDICINE

## 2023-05-01 PROCEDURE — 3008F PR BODY MASS INDEX (BMI) DOCUMENTED: ICD-10-PCS | Mod: CPTII,,, | Performed by: INTERNAL MEDICINE

## 2023-05-01 PROCEDURE — 4010F ACE/ARB THERAPY RXD/TAKEN: CPT | Mod: CPTII,,, | Performed by: INTERNAL MEDICINE

## 2023-05-01 PROCEDURE — 1159F PR MEDICATION LIST DOCUMENTED IN MEDICAL RECORD: ICD-10-PCS | Mod: CPTII,,, | Performed by: INTERNAL MEDICINE

## 2023-05-01 PROCEDURE — 3074F SYST BP LT 130 MM HG: CPT | Mod: CPTII,,, | Performed by: INTERNAL MEDICINE

## 2023-05-01 PROCEDURE — 3074F PR MOST RECENT SYSTOLIC BLOOD PRESSURE < 130 MM HG: ICD-10-PCS | Mod: CPTII,,, | Performed by: INTERNAL MEDICINE

## 2023-05-01 PROCEDURE — 3046F PR MOST RECENT HEMOGLOBIN A1C LEVEL > 9.0%: ICD-10-PCS | Mod: CPTII,,, | Performed by: INTERNAL MEDICINE

## 2023-05-01 PROCEDURE — 99213 PR OFFICE/OUTPT VISIT, EST, LEVL III, 20-29 MIN: ICD-10-PCS | Mod: S$PBB,,, | Performed by: INTERNAL MEDICINE

## 2023-05-01 PROCEDURE — 3066F NEPHROPATHY DOC TX: CPT | Mod: CPTII,,, | Performed by: INTERNAL MEDICINE

## 2023-05-01 PROCEDURE — 1159F MED LIST DOCD IN RCRD: CPT | Mod: CPTII,,, | Performed by: INTERNAL MEDICINE

## 2023-05-01 RX ORDER — CALCIUM CARB/VITAMIN D3/VIT K1 500-100-40
1 TABLET,CHEWABLE ORAL 4 TIMES DAILY
Qty: 200 EACH | Refills: 3 | Status: SHIPPED | OUTPATIENT
Start: 2023-05-01

## 2023-05-01 RX ORDER — INSULIN GLARGINE 100 [IU]/ML
INJECTION, SOLUTION SUBCUTANEOUS
Qty: 9 ML | Refills: 5 | Status: SHIPPED | OUTPATIENT
Start: 2023-05-01 | End: 2023-10-18 | Stop reason: SDUPTHER

## 2023-05-01 RX ORDER — GABAPENTIN 300 MG/1
600 CAPSULE ORAL 3 TIMES DAILY
Qty: 180 CAPSULE | Refills: 5 | Status: SHIPPED | OUTPATIENT
Start: 2023-05-01 | End: 2023-10-18 | Stop reason: SDUPTHER

## 2023-07-12 DIAGNOSIS — E11.42 TYPE 2 DIABETES MELLITUS WITH DIABETIC POLYNEUROPATHY, WITH LONG-TERM CURRENT USE OF INSULIN: ICD-10-CM

## 2023-07-12 DIAGNOSIS — Z79.4 TYPE 2 DIABETES MELLITUS WITH DIABETIC POLYNEUROPATHY, WITH LONG-TERM CURRENT USE OF INSULIN: ICD-10-CM

## 2023-07-17 DIAGNOSIS — Z79.4 TYPE 2 DIABETES MELLITUS WITH DIABETIC POLYNEUROPATHY, WITH LONG-TERM CURRENT USE OF INSULIN: ICD-10-CM

## 2023-07-17 DIAGNOSIS — E11.42 TYPE 2 DIABETES MELLITUS WITH DIABETIC POLYNEUROPATHY, WITH LONG-TERM CURRENT USE OF INSULIN: ICD-10-CM

## 2023-07-17 RX ORDER — PEN NEEDLE, DIABETIC 32GX 5/32"
NEEDLE, DISPOSABLE MISCELLANEOUS
Qty: 100 EACH | Refills: 1 | Status: SHIPPED | OUTPATIENT
Start: 2023-07-17 | End: 2023-08-31

## 2023-08-07 ENCOUNTER — LAB VISIT (OUTPATIENT)
Dept: LAB | Facility: HOSPITAL | Age: 43
End: 2023-08-07
Attending: INTERNAL MEDICINE
Payer: MEDICAID

## 2023-08-07 DIAGNOSIS — E11.42 TYPE 2 DIABETES MELLITUS WITH DIABETIC POLYNEUROPATHY, WITH LONG-TERM CURRENT USE OF INSULIN: ICD-10-CM

## 2023-08-07 DIAGNOSIS — Z79.4 TYPE 2 DIABETES MELLITUS WITH DIABETIC POLYNEUROPATHY, WITH LONG-TERM CURRENT USE OF INSULIN: ICD-10-CM

## 2023-08-07 LAB
ANION GAP SERPL CALC-SCNC: 5 MMOL/L (ref 8–16)
BUN SERPL-MCNC: 12 MG/DL (ref 6–20)
CALCIUM SERPL-MCNC: 8.9 MG/DL (ref 8.7–10.5)
CHLORIDE SERPL-SCNC: 104 MMOL/L (ref 95–110)
CO2 SERPL-SCNC: 31 MMOL/L (ref 23–29)
CREAT SERPL-MCNC: 0.7 MG/DL (ref 0.5–1.4)
EST. GFR  (NO RACE VARIABLE): >60 ML/MIN/1.73 M^2
GLUCOSE SERPL-MCNC: 164 MG/DL (ref 70–110)
POTASSIUM SERPL-SCNC: 4.5 MMOL/L (ref 3.5–5.1)
SODIUM SERPL-SCNC: 140 MMOL/L (ref 136–145)

## 2023-08-07 PROCEDURE — 36415 COLL VENOUS BLD VENIPUNCTURE: CPT | Performed by: INTERNAL MEDICINE

## 2023-08-07 PROCEDURE — 80048 BASIC METABOLIC PNL TOTAL CA: CPT | Performed by: INTERNAL MEDICINE

## 2023-08-07 PROCEDURE — 83036 HEMOGLOBIN GLYCOSYLATED A1C: CPT | Performed by: INTERNAL MEDICINE

## 2023-08-08 LAB
ESTIMATED AVG GLUCOSE: 223 MG/DL (ref 68–131)
HBA1C MFR BLD: 9.4 % (ref 4.5–6.2)

## 2023-08-17 ENCOUNTER — OFFICE VISIT (OUTPATIENT)
Dept: FAMILY MEDICINE | Facility: CLINIC | Age: 43
End: 2023-08-17
Payer: MEDICAID

## 2023-08-17 ENCOUNTER — PATIENT MESSAGE (OUTPATIENT)
Dept: FAMILY MEDICINE | Facility: CLINIC | Age: 43
End: 2023-08-17

## 2023-08-17 VITALS
HEIGHT: 72 IN | BODY MASS INDEX: 22.21 KG/M2 | SYSTOLIC BLOOD PRESSURE: 109 MMHG | WEIGHT: 164 LBS | HEART RATE: 63 BPM | DIASTOLIC BLOOD PRESSURE: 55 MMHG

## 2023-08-17 DIAGNOSIS — E78.2 MIXED HYPERLIPIDEMIA: ICD-10-CM

## 2023-08-17 DIAGNOSIS — I10 PRIMARY HYPERTENSION: ICD-10-CM

## 2023-08-17 DIAGNOSIS — Z60.4 SOCIAL ISOLATION: ICD-10-CM

## 2023-08-17 DIAGNOSIS — E10.3293 TYPE 1 DIABETES MELLITUS WITH MILD NONPROLIFERATIVE RETINOPATHY OF BOTH EYES WITHOUT MACULAR EDEMA: Primary | ICD-10-CM

## 2023-08-17 PROCEDURE — 3074F PR MOST RECENT SYSTOLIC BLOOD PRESSURE < 130 MM HG: ICD-10-PCS | Mod: CPTII,,, | Performed by: INTERNAL MEDICINE

## 2023-08-17 PROCEDURE — 99213 OFFICE O/P EST LOW 20 MIN: CPT | Performed by: INTERNAL MEDICINE

## 2023-08-17 PROCEDURE — 3078F DIAST BP <80 MM HG: CPT | Mod: CPTII,,, | Performed by: INTERNAL MEDICINE

## 2023-08-17 PROCEDURE — 3008F PR BODY MASS INDEX (BMI) DOCUMENTED: ICD-10-PCS | Mod: CPTII,,, | Performed by: INTERNAL MEDICINE

## 2023-08-17 PROCEDURE — 1159F PR MEDICATION LIST DOCUMENTED IN MEDICAL RECORD: ICD-10-PCS | Mod: CPTII,,, | Performed by: INTERNAL MEDICINE

## 2023-08-17 PROCEDURE — 3046F HEMOGLOBIN A1C LEVEL >9.0%: CPT | Mod: CPTII,,, | Performed by: INTERNAL MEDICINE

## 2023-08-17 PROCEDURE — 3066F PR DOCUMENTATION OF TREATMENT FOR NEPHROPATHY: ICD-10-PCS | Mod: CPTII,,, | Performed by: INTERNAL MEDICINE

## 2023-08-17 PROCEDURE — 1160F PR REVIEW ALL MEDS BY PRESCRIBER/CLIN PHARMACIST DOCUMENTED: ICD-10-PCS | Mod: CPTII,,, | Performed by: INTERNAL MEDICINE

## 2023-08-17 PROCEDURE — 3074F SYST BP LT 130 MM HG: CPT | Mod: CPTII,,, | Performed by: INTERNAL MEDICINE

## 2023-08-17 PROCEDURE — 99213 OFFICE O/P EST LOW 20 MIN: CPT | Mod: S$PBB,,, | Performed by: INTERNAL MEDICINE

## 2023-08-17 PROCEDURE — 3008F BODY MASS INDEX DOCD: CPT | Mod: CPTII,,, | Performed by: INTERNAL MEDICINE

## 2023-08-17 PROCEDURE — 1160F RVW MEDS BY RX/DR IN RCRD: CPT | Mod: CPTII,,, | Performed by: INTERNAL MEDICINE

## 2023-08-17 PROCEDURE — 3066F NEPHROPATHY DOC TX: CPT | Mod: CPTII,,, | Performed by: INTERNAL MEDICINE

## 2023-08-17 PROCEDURE — 3061F NEG MICROALBUMINURIA REV: CPT | Mod: CPTII,,, | Performed by: INTERNAL MEDICINE

## 2023-08-17 PROCEDURE — 3078F PR MOST RECENT DIASTOLIC BLOOD PRESSURE < 80 MM HG: ICD-10-PCS | Mod: CPTII,,, | Performed by: INTERNAL MEDICINE

## 2023-08-17 PROCEDURE — 1159F MED LIST DOCD IN RCRD: CPT | Mod: CPTII,,, | Performed by: INTERNAL MEDICINE

## 2023-08-17 PROCEDURE — 4010F ACE/ARB THERAPY RXD/TAKEN: CPT | Mod: CPTII,,, | Performed by: INTERNAL MEDICINE

## 2023-08-17 PROCEDURE — 99213 PR OFFICE/OUTPT VISIT, EST, LEVL III, 20-29 MIN: ICD-10-PCS | Mod: S$PBB,,, | Performed by: INTERNAL MEDICINE

## 2023-08-17 PROCEDURE — 4010F PR ACE/ARB THEARPY RXD/TAKEN: ICD-10-PCS | Mod: CPTII,,, | Performed by: INTERNAL MEDICINE

## 2023-08-17 PROCEDURE — 3061F PR NEG MICROALBUMINURIA RESULT DOCUMENTED/REVIEW: ICD-10-PCS | Mod: CPTII,,, | Performed by: INTERNAL MEDICINE

## 2023-08-17 PROCEDURE — 3046F PR MOST RECENT HEMOGLOBIN A1C LEVEL > 9.0%: ICD-10-PCS | Mod: CPTII,,, | Performed by: INTERNAL MEDICINE

## 2023-08-17 RX ORDER — LISINOPRIL 5 MG/1
5 TABLET ORAL DAILY
Qty: 90 TABLET | Refills: 3 | Status: SHIPPED | OUTPATIENT
Start: 2023-08-17 | End: 2024-08-16

## 2023-08-17 RX ORDER — PRAVASTATIN SODIUM 20 MG/1
20 TABLET ORAL NIGHTLY
Qty: 90 TABLET | Refills: 3 | Status: SHIPPED | OUTPATIENT
Start: 2023-08-17 | End: 2024-08-16

## 2023-08-17 SDOH — SOCIAL DETERMINANTS OF HEALTH (SDOH): SOCIAL EXCLUSION AND REJECTION: Z60.4

## 2023-08-17 NOTE — PROGRESS NOTES
Subjective:       Patient ID: Philip Alberts is a 42 y.o. male.    Chief Complaint: Diabetes, Follow-up, and Hyperlipidemia    Patient is a 42-year-old male who comes for interim follow-up on diabetes.      He is insulin dependent requiring long-acting Lantus and short-acting NovoLog.  He has come down from a hemoglobin A1c of 12 to approximately 9.  Blood sugar control is becoming increasingly difficult.    He wonders whether has type 2 or type 1 diabetes.  He started with diabetes about 10-15 years about 200 lb it is unclear as to he was treated with oral medications at that point.    His mother's also insulin-dependent but might have started with being treated on oral medications.      He is thin built with a BMI of 22.  Does have polyuria and polydipsia.  Does smoke cigarettes.  Denies any substance use or abuse    He is disabled at this point    We did discuss the possibility of putting him on a insulin pump with Dexcom Roseline subject to insulin or insurance coverage which might be difficult there is no endocrinologist in this town who accepts Medicaid at this time.  He is aware of the limitations but may not be willing to travel far away out of the [place] to see an endocrinologist.          Past Medical History:   Diagnosis Date    Diabetes mellitus, type 2     Encounter for blood transfusion     GERD (gastroesophageal reflux disease)     Hyperlipidemia     Hypertension     Neuropathy     Osteoarthritis      Social History     Socioeconomic History    Marital status: Single   Tobacco Use    Smoking status: Every Day     Current packs/day: 0.00    Smokeless tobacco: Never   Substance and Sexual Activity    Alcohol use: No    Drug use: No    Sexual activity: Yes     Social Determinants of Health     Stress: No Stress Concern Present (12/9/2020)    [employer] - Occupational Stress Questionnaire     Feeling of Stress : Not at all     Past Surgical History:   Procedure Laterality Date     MANDIBLE FRACTURE SURGERY  07/17/2000    MANDIBLE FRACTURE SURGERY      SPLENECTOMY, TOTAL  07/17/2000     Family History   Problem Relation Age of Onset    Diabetes Mother     Cancer Father        Review of Systems      Objective:      Blood pressure (!) 109/55, pulse 63, height 6' (1.829 m), weight 74.4 kg (164 lb). Body mass index is 22.24 kg/m².  Physical Exam  Vitals and nursing note reviewed.   Constitutional:       General: He is not in acute distress.     Appearance: He is well-developed. He is ill-appearing (Somewhat chronically ill). He is not toxic-appearing or diaphoretic.      Comments: BMI is 22.24   HENT:      Head: Normocephalic and atraumatic.      Mouth/Throat:      Pharynx: No oropharyngeal exudate or posterior oropharyngeal erythema.      Comments: Edentulous.  Eyes:      Conjunctiva/sclera: Conjunctivae normal.   Neck:      Thyroid: No thyromegaly.      Vascular: No JVD.      Trachea: No tracheal deviation.   Cardiovascular:      Rate and Rhythm: Normal rate and regular rhythm.      Heart sounds: Normal heart sounds. No murmur heard.     No friction rub. No gallop.   Pulmonary:      Effort: Pulmonary effort is normal.      Breath sounds: Normal breath sounds.   Abdominal:      General: There is no distension.      Palpations: Abdomen is soft.      Tenderness: There is no abdominal tenderness.   Musculoskeletal:         General: No swelling.      Cervical back: Neck supple. No rigidity.      Right lower leg: No edema.      Left lower leg: No edema.   Feet:      Right foot:      Toenail Condition: Right toenails are abnormally thick. Fungal disease present.  Lymphadenopathy:      Cervical: No cervical adenopathy.   Skin:     General: Skin is warm and dry.   Neurological:      Mental Status: He is alert. Mental status is at baseline.      Gait: Gait normal.   Psychiatric:         Mood and Affect: Mood is anxious.      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  Dispense: 90 tablet; Refill: 3  -     Glutamic acid decarboxylase; Future; Expected date: 12/18/2023  -     Hemoglobin A1C; Future; Expected date: 12/18/2023  -     Microalbumin/Creatinine Ratio, Urine; Future; Expected date: 12/18/2023  -     Basic Metabolic Panel; Future; Expected date: 12/18/2023    Primary hypertension  -     lisinopriL (PRINIVIL,ZESTRIL) 5 MG tablet; Take 1 tablet (5 mg total) by mouth once daily.  Dispense: 90 tablet; Refill: 3  -     Basic Metabolic Panel; Future; Expected date: 12/18/2023    Mixed hyperlipidemia  -     pravastatin (PRAVACHOL) 20 MG tablet; Take 1 tablet (20 mg total) by mouth every evening.  Dispense: 90 tablet; Refill: 3  -     Lipid Panel; Future; Expected date: 12/18/2023       Lab Visit on 08/07/2023   Component Date Value Ref Range Status    Hemoglobin A1C 08/07/2023 9.4 (H)  4.5 - 6.2 % Final    Estimated Avg Glucose 08/07/2023 223 (H)  68 - 131 mg/dL Final    Sodium 08/07/2023 140  136 - 145 mmol/L Final    Potassium 08/07/2023 4.5  3.5 - 5.1 mmol/L Final    Chloride 08/07/2023 104  95 - 110 mmol/L Final    CO2 08/07/2023 31 (H)  23 - 29 mmol/L Final    Glucose 08/07/2023 164 (H)  70 - 110 mg/dL Final    BUN 08/07/2023 12  6 - 20 mg/dL Final    Creatinine 08/07/2023 0.7  0.5 - 1.4 mg/dL Final    Calcium 08/07/2023 8.9  8.7 - 10.5 mg/dL Final    Anion Gap 08/07/2023 5 (L)  8 - 16 mmol/L Final    eGFR 08/07/2023 >60.0  >60 mL/min/1.73 m^2 Final         Plan:           Type 1 diabetes mellitus with 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  Dispense: 90 tablet; Refill: 3  -     Glutamic acid decarboxylase; Future; Expected date: 12/18/2023  -     Hemoglobin A1C; Future; Expected  date: 12/18/2023  -     Microalbumin/Creatinine Ratio, Urine; Future; Expected date: 12/18/2023  -     Basic Metabolic Panel; Future; Expected date: 12/18/2023    Primary hypertension  -     lisinopriL (PRINIVIL,ZESTRIL) 5 MG tablet; Take 1 tablet (5 mg total) by mouth once daily.  Dispense: 90 tablet; Refill: 3  -     Basic Metabolic Panel; Future; Expected date: 12/18/2023    Mixed hyperlipidemia  -     pravastatin (PRAVACHOL) 20 MG tablet; Take 1 tablet (20 mg total) by mouth every evening.  Dispense: 90 tablet; Refill: 3  -     Lipid Panel; Future; Expected date: 12/18/2023      Patient's diabetes did not show any significant better control as he continues with long and short-acting insulin.    He states that morning sugars are somewhat low.    His insulin administration is somewhat streamlined and there seems to be uncertain coverage on his Lantus insulin.  He takes it 17 units twice a day.  I may consider a longer acting insulin like Toujeo or Tresiba subject to coverage.    He is also taking NovoLog FlexPen as per sliding scale.    It is possible that he might benefit from an insulin pump given his diabetes with the Dexcom or Roseline device.  But again insurance coverage will be restricted.      Diabetic teaching is also restricted at this point and speciality coverage is also very limited in this side of the [place] and traveling beyond might be difficult.      He does have retinopathy but no nephropathy.    Blood pressure control is reasonable and renal protection is provided by his taking lisinopril at 5 mg.    He is taking pravastatin 20 mg.      Today he did not mentioned anything about Viagra and he is not currently in relationship with anybody.    Social history indicates that he is disabled and with limited social connections except for 16 or 17-year-old daughter whom he is raising up    After review of his chart I have advised the patient to perhaps cut down his evening dose of Lantus to 15 units so as  "to avoid hypoglycemic reaction in the morning and he can consider increasing the morning dose of Lantus to 20 units from 17 units.    Appropriate seasonal and age related immunizations discussed including COVID precautions and flu vaccination.  Should get an eye examination done.    Will notify him about the labs for insulin antibodies.  And also potential diagnosis of type 1 diabetes but essentially it will not change the management.    Follow-up in 4-6 months time.  Message sent to patient concerning adjustment of dosage of insulin.      Current Outpatient Medications:     blood sugar diagnostic Strp, To check BG qid times daily, to use with insurance preferred meter, Disp: 150 strip, Rfl: 3    gabapentin (NEURONTIN) 300 MG capsule, Take 2 capsules (600 mg total) by mouth 3 (three) times daily., Disp: 180 capsule, Rfl: 5    insulin (LANTUS SOLOSTAR U-100 INSULIN) glargine 100 units/mL SubQ pen, 17 units twice a day, Disp: 9 mL, Rfl: 5    insulin aspart U-100 (NOVOLOG FLEXPEN U-100 INSULIN) 100 unit/mL (3 mL) InPn pen, Inject 10 Units into the skin 4 (four) times daily. Per sliding scale as instructed, Disp: 12 mL, Rfl: 5    insulin syringe-needle U-100 0.3 mL 31 gauge x 5/16" Syrg, 1 Device by Misc.(Non-Drug; Combo Route) route 4 (four) times daily., Disp: 200 each, Rfl: 3    lancets Misc, To check BGqid times daily, to use with insurance preferred meter (Patient taking differently: 1 lancet  by Misc.(Non-Drug; Combo Route) route 4 (four) times daily. To check BGqid times daily, to use with insurance preferred meter), Disp: 200 each, Rfl: 3    pen needle, diabetic (BD DENIS 2ND GEN PEN NEEDLE) 32 gauge x 5/32" Ndle, USE 1 PEN NEEDLE 4 TIMES DAILY BEFORE  MEALS  AND  NIGHTLY, Disp: 100 each, Rfl: 1    TRUEPLUS LANCETS 33 gauge Misc, Inject 1 lancet into the skin 4 (four) times daily before meals and nightly., Disp: 150 each, Rfl: 5    blood-glucose meter kit, To check BG qid times daily, to use with insurance " preferred meter, Disp: 1 each, Rfl: 0    EPINEPHrine (EPIPEN) 0.3 mg/0.3 mL AtIn, Inject 0.3 mLs (0.3 mg total) into the muscle once. for 1 dose, Disp: 0.3 mL, Rfl: 2    lisinopriL (PRINIVIL,ZESTRIL) 5 MG tablet, Take 1 tablet (5 mg total) by mouth once daily., Disp: 90 tablet, Rfl: 3    pravastatin (PRAVACHOL) 20 MG tablet, Take 1 tablet (20 mg total) by mouth every evening., Disp: 90 tablet, Rfl: 3    sildenafiL (VIAGRA) 100 MG tablet, Take 1 tablet (100 mg total) by mouth daily as needed for Erectile Dysfunction., Disp: 20 tablet, Rfl: 5

## 2023-08-31 DIAGNOSIS — E11.42 TYPE 2 DIABETES MELLITUS WITH DIABETIC POLYNEUROPATHY, WITH LONG-TERM CURRENT USE OF INSULIN: ICD-10-CM

## 2023-08-31 DIAGNOSIS — Z79.4 TYPE 2 DIABETES MELLITUS WITH DIABETIC POLYNEUROPATHY, WITH LONG-TERM CURRENT USE OF INSULIN: ICD-10-CM

## 2023-08-31 RX ORDER — PEN NEEDLE, DIABETIC 32GX 5/32"
NEEDLE, DISPOSABLE MISCELLANEOUS
Qty: 100 EACH | Refills: 2 | Status: SHIPPED | OUTPATIENT
Start: 2023-08-31 | End: 2023-09-06

## 2023-09-11 ENCOUNTER — PATIENT MESSAGE (OUTPATIENT)
Dept: FAMILY MEDICINE | Facility: CLINIC | Age: 43
End: 2023-09-11

## 2023-10-08 ENCOUNTER — OFFICE VISIT (OUTPATIENT)
Dept: URGENT CARE | Facility: CLINIC | Age: 43
End: 2023-10-08
Payer: MEDICAID

## 2023-10-08 ENCOUNTER — HOSPITAL ENCOUNTER (INPATIENT)
Facility: HOSPITAL | Age: 43
LOS: 3 days | Discharge: HOME | DRG: 617 | End: 2023-10-11
Attending: EMERGENCY MEDICINE | Admitting: FAMILY MEDICINE
Payer: MEDICAID

## 2023-10-08 VITALS
SYSTOLIC BLOOD PRESSURE: 163 MMHG | RESPIRATION RATE: 18 BRPM | WEIGHT: 164 LBS | TEMPERATURE: 98 F | HEART RATE: 73 BPM | BODY MASS INDEX: 22.21 KG/M2 | HEIGHT: 72 IN | DIASTOLIC BLOOD PRESSURE: 99 MMHG | OXYGEN SATURATION: 98 %

## 2023-10-08 DIAGNOSIS — L97.514: ICD-10-CM

## 2023-10-08 DIAGNOSIS — M86.171 OTHER ACUTE OSTEOMYELITIS OF RIGHT FOOT: ICD-10-CM

## 2023-10-08 DIAGNOSIS — M25.471 RIGHT ANKLE SWELLING: ICD-10-CM

## 2023-10-08 DIAGNOSIS — Z86.79 HISTORY OF HYPERTENSION: ICD-10-CM

## 2023-10-08 DIAGNOSIS — Z79.4 DIABETES MELLITUS DUE TO UNDERLYING CONDITION WITH HYPERGLYCEMIA, WITH LONG-TERM CURRENT USE OF INSULIN: ICD-10-CM

## 2023-10-08 DIAGNOSIS — S99.921A INJURY OF RIGHT FOOT, INITIAL ENCOUNTER: Primary | ICD-10-CM

## 2023-10-08 DIAGNOSIS — F17.200 SMOKER: ICD-10-CM

## 2023-10-08 DIAGNOSIS — M86.9 OSTEOMYELITIS: Primary | ICD-10-CM

## 2023-10-08 DIAGNOSIS — M79.89 LEG SWELLING: ICD-10-CM

## 2023-10-08 DIAGNOSIS — E08.65 DIABETES MELLITUS DUE TO UNDERLYING CONDITION WITH HYPERGLYCEMIA, WITH LONG-TERM CURRENT USE OF INSULIN: ICD-10-CM

## 2023-10-08 DIAGNOSIS — Z86.39 HISTORY OF TYPE 2 DIABETES MELLITUS: ICD-10-CM

## 2023-10-08 DIAGNOSIS — I96 GANGRENE OF TOE: ICD-10-CM

## 2023-10-08 DIAGNOSIS — I96 GANGRENE OF TOE OF RIGHT FOOT: ICD-10-CM

## 2023-10-08 DIAGNOSIS — E11.42 DIABETIC PERIPHERAL NEUROPATHY: ICD-10-CM

## 2023-10-08 PROBLEM — L03.119 CELLULITIS OF EXTREMITY: Status: ACTIVE | Noted: 2023-10-08

## 2023-10-08 LAB
ALBUMIN SERPL BCP-MCNC: 3.3 G/DL (ref 3.5–5.2)
ALBUMIN SERPL BCP-MCNC: 3.6 G/DL (ref 3.5–5.2)
ALLENS TEST: ABNORMAL
ALP SERPL-CCNC: 80 U/L (ref 55–135)
ALP SERPL-CCNC: 98 U/L (ref 55–135)
ALT SERPL W/O P-5'-P-CCNC: 14 U/L (ref 10–44)
ALT SERPL W/O P-5'-P-CCNC: 19 U/L (ref 10–44)
ANION GAP SERPL CALC-SCNC: 10 MMOL/L (ref 8–16)
ANION GAP SERPL CALC-SCNC: 12 MMOL/L (ref 8–16)
AST SERPL-CCNC: 18 U/L (ref 10–40)
AST SERPL-CCNC: 26 U/L (ref 10–40)
BASOPHILS # BLD AUTO: 0.09 K/UL (ref 0–0.2)
BASOPHILS NFR BLD: 1.1 % (ref 0–1.9)
BILIRUB SERPL-MCNC: 0.2 MG/DL (ref 0.1–1)
BILIRUB SERPL-MCNC: 0.3 MG/DL (ref 0.1–1)
BUN SERPL-MCNC: 10 MG/DL (ref 6–20)
BUN SERPL-MCNC: 12 MG/DL (ref 6–20)
CALCIUM SERPL-MCNC: 9.1 MG/DL (ref 8.7–10.5)
CALCIUM SERPL-MCNC: 9.3 MG/DL (ref 8.7–10.5)
CHLORIDE SERPL-SCNC: 101 MMOL/L (ref 95–110)
CHLORIDE SERPL-SCNC: 99 MMOL/L (ref 95–110)
CO2 SERPL-SCNC: 25 MMOL/L (ref 23–29)
CO2 SERPL-SCNC: 26 MMOL/L (ref 23–29)
CREAT SERPL-MCNC: 0.6 MG/DL (ref 0.5–1.4)
CREAT SERPL-MCNC: 0.9 MG/DL (ref 0.5–1.4)
CRP SERPL-MCNC: 51.1 MG/L (ref 0–8.2)
DIFFERENTIAL METHOD: ABNORMAL
EOSINOPHIL # BLD AUTO: 0.2 K/UL (ref 0–0.5)
EOSINOPHIL NFR BLD: 2 % (ref 0–8)
ERYTHROCYTE [DISTWIDTH] IN BLOOD BY AUTOMATED COUNT: 12.9 % (ref 11.5–14.5)
ERYTHROCYTE [SEDIMENTATION RATE] IN BLOOD BY WESTERGREN METHOD: >90 MM/HR (ref 0–10)
EST. GFR  (NO RACE VARIABLE): >60 ML/MIN/1.73 M^2
EST. GFR  (NO RACE VARIABLE): >60 ML/MIN/1.73 M^2
GLUCOSE SERPL-MCNC: 205 MG/DL (ref 70–110)
GLUCOSE SERPL-MCNC: 237 MG/DL (ref 70–110)
GLUCOSE SERPL-MCNC: 246 MG/DL (ref 70–110)
GLUCOSE SERPL-MCNC: 327 MG/DL (ref 70–110)
HCO3 UR-SCNC: 31.6 MMOL/L (ref 24–28)
HCT VFR BLD AUTO: 38.1 % (ref 40–54)
HCT VFR BLD CALC: 36 %PCV (ref 36–54)
HGB BLD-MCNC: 12.5 G/DL (ref 14–18)
IMM GRANULOCYTES # BLD AUTO: 0.01 K/UL (ref 0–0.04)
IMM GRANULOCYTES NFR BLD AUTO: 0.1 % (ref 0–0.5)
LACTATE SERPL-SCNC: 1.2 MMOL/L (ref 0.5–1.9)
LYMPHOCYTES # BLD AUTO: 2.8 K/UL (ref 1–4.8)
LYMPHOCYTES NFR BLD: 35.2 % (ref 18–48)
MCH RBC QN AUTO: 30.4 PG (ref 27–31)
MCHC RBC AUTO-ENTMCNC: 32.8 G/DL (ref 32–36)
MCV RBC AUTO: 93 FL (ref 82–98)
MONOCYTES # BLD AUTO: 0.7 K/UL (ref 0.3–1)
MONOCYTES NFR BLD: 9.1 % (ref 4–15)
NEUTROPHILS # BLD AUTO: 4.2 K/UL (ref 1.8–7.7)
NEUTROPHILS NFR BLD: 52.5 % (ref 38–73)
NRBC BLD-RTO: 0 /100 WBC
PCO2 BLDA: 51.1 MMHG (ref 35–45)
PH SMN: 7.4 [PH] (ref 7.35–7.45)
PLATELET # BLD AUTO: 232 K/UL (ref 150–450)
PMV BLD AUTO: 13.7 FL (ref 9.2–12.9)
PO2 BLDA: 52 MMHG (ref 40–60)
POC BE: 7 MMOL/L
POC IONIZED CALCIUM: 1.22 MMOL/L (ref 1.06–1.42)
POC SATURATED O2: 86 % (ref 95–100)
POC TCO2: 33 MMOL/L (ref 24–29)
POTASSIUM BLD-SCNC: 4 MMOL/L (ref 3.5–5.1)
POTASSIUM SERPL-SCNC: 4.1 MMOL/L (ref 3.5–5.1)
POTASSIUM SERPL-SCNC: 4.5 MMOL/L (ref 3.5–5.1)
PROCALCITONIN SERPL IA-MCNC: <0.05 NG/ML (ref 0–0.5)
PROT SERPL-MCNC: 7.3 G/DL (ref 6–8.4)
PROT SERPL-MCNC: 8.1 G/DL (ref 6–8.4)
RBC # BLD AUTO: 4.11 M/UL (ref 4.6–6.2)
SAMPLE: ABNORMAL
SITE: ABNORMAL
SODIUM BLD-SCNC: 139 MMOL/L (ref 136–145)
SODIUM SERPL-SCNC: 136 MMOL/L (ref 136–145)
SODIUM SERPL-SCNC: 137 MMOL/L (ref 136–145)
TROPONIN I SERPL HS-MCNC: 3.1 PG/ML (ref 0–14.9)
WBC # BLD AUTO: 8.05 K/UL (ref 3.9–12.7)

## 2023-10-08 PROCEDURE — 25000003 PHARM REV CODE 250: Performed by: NURSE PRACTITIONER

## 2023-10-08 PROCEDURE — 85651 RBC SED RATE NONAUTOMATED: CPT

## 2023-10-08 PROCEDURE — 86140 C-REACTIVE PROTEIN: CPT | Performed by: NURSE PRACTITIONER

## 2023-10-08 PROCEDURE — 99900035 HC TECH TIME PER 15 MIN (STAT)

## 2023-10-08 PROCEDURE — 12000002 HC ACUTE/MED SURGE SEMI-PRIVATE ROOM

## 2023-10-08 PROCEDURE — 85025 COMPLETE CBC W/AUTO DIFF WBC: CPT

## 2023-10-08 PROCEDURE — 86140 C-REACTIVE PROTEIN: CPT | Mod: 91

## 2023-10-08 PROCEDURE — 63600175 PHARM REV CODE 636 W HCPCS: Performed by: FAMILY MEDICINE

## 2023-10-08 PROCEDURE — 36415 COLL VENOUS BLD VENIPUNCTURE: CPT

## 2023-10-08 PROCEDURE — 87040 BLOOD CULTURE FOR BACTERIA: CPT

## 2023-10-08 PROCEDURE — 85610 PROTHROMBIN TIME: CPT | Performed by: NURSE PRACTITIONER

## 2023-10-08 PROCEDURE — 99499 NO LOS: ICD-10-PCS | Mod: S$GLB,,, | Performed by: NURSE PRACTITIONER

## 2023-10-08 PROCEDURE — 99285 EMERGENCY DEPT VISIT HI MDM: CPT | Mod: 25

## 2023-10-08 PROCEDURE — 80053 COMPREHEN METABOLIC PANEL: CPT | Mod: 91

## 2023-10-08 PROCEDURE — 25000003 PHARM REV CODE 250: Performed by: FAMILY MEDICINE

## 2023-10-08 PROCEDURE — 99499 UNLISTED E&M SERVICE: CPT | Mod: S$GLB,,, | Performed by: NURSE PRACTITIONER

## 2023-10-08 PROCEDURE — 80053 COMPREHEN METABOLIC PANEL: CPT | Performed by: NURSE PRACTITIONER

## 2023-10-08 PROCEDURE — 83605 ASSAY OF LACTIC ACID: CPT

## 2023-10-08 PROCEDURE — 84484 ASSAY OF TROPONIN QUANT: CPT | Mod: 91 | Performed by: NURSE PRACTITIONER

## 2023-10-08 PROCEDURE — 84145 PROCALCITONIN (PCT): CPT | Performed by: NURSE PRACTITIONER

## 2023-10-08 RX ORDER — SODIUM CHLORIDE 0.9 % (FLUSH) 0.9 %
10 SYRINGE (ML) INJECTION EVERY 12 HOURS PRN
Status: DISCONTINUED | OUTPATIENT
Start: 2023-10-08 | End: 2023-10-11 | Stop reason: HOSPADM

## 2023-10-08 RX ORDER — SODIUM CHLORIDE 9 MG/ML
INJECTION, SOLUTION INTRAVENOUS CONTINUOUS
Status: DISCONTINUED | OUTPATIENT
Start: 2023-10-08 | End: 2023-10-09

## 2023-10-08 RX ORDER — MEROPENEM AND SODIUM CHLORIDE 1 G/50ML
1 INJECTION, SOLUTION INTRAVENOUS
Status: DISCONTINUED | OUTPATIENT
Start: 2023-10-09 | End: 2023-10-08

## 2023-10-08 RX ORDER — TALC
6 POWDER (GRAM) TOPICAL NIGHTLY PRN
Status: DISCONTINUED | OUTPATIENT
Start: 2023-10-08 | End: 2023-10-11 | Stop reason: HOSPADM

## 2023-10-08 RX ORDER — HYDROCODONE BITARTRATE AND ACETAMINOPHEN 5; 325 MG/1; MG/1
1 TABLET ORAL EVERY 6 HOURS PRN
Status: DISCONTINUED | OUTPATIENT
Start: 2023-10-08 | End: 2023-10-09

## 2023-10-08 RX ORDER — LISINOPRIL 5 MG/1
5 TABLET ORAL DAILY
Status: DISCONTINUED | OUTPATIENT
Start: 2023-10-09 | End: 2023-10-11 | Stop reason: HOSPADM

## 2023-10-08 RX ORDER — ACETAMINOPHEN 325 MG/1
650 TABLET ORAL EVERY 8 HOURS PRN
Status: DISCONTINUED | OUTPATIENT
Start: 2023-10-08 | End: 2023-10-11 | Stop reason: HOSPADM

## 2023-10-08 RX ORDER — GABAPENTIN 300 MG/1
600 CAPSULE ORAL 3 TIMES DAILY
Status: DISCONTINUED | OUTPATIENT
Start: 2023-10-09 | End: 2023-10-09

## 2023-10-08 RX ORDER — ONDANSETRON 2 MG/ML
4 INJECTION INTRAMUSCULAR; INTRAVENOUS EVERY 8 HOURS PRN
Status: DISCONTINUED | OUTPATIENT
Start: 2023-10-08 | End: 2023-10-11 | Stop reason: HOSPADM

## 2023-10-08 RX ORDER — PRAVASTATIN SODIUM 10 MG/1
20 TABLET ORAL NIGHTLY
Status: DISCONTINUED | OUTPATIENT
Start: 2023-10-08 | End: 2023-10-11 | Stop reason: HOSPADM

## 2023-10-08 RX ADMIN — HYDROCODONE BITARTRATE AND ACETAMINOPHEN 1 TABLET: 5; 325 TABLET ORAL at 09:10

## 2023-10-08 RX ADMIN — VANCOMYCIN HYDROCHLORIDE 2000 MG: 500 INJECTION, POWDER, LYOPHILIZED, FOR SOLUTION INTRAVENOUS at 11:10

## 2023-10-08 RX ADMIN — PRAVASTATIN SODIUM 20 MG: 10 TABLET ORAL at 11:10

## 2023-10-08 RX ADMIN — INSULIN DETEMIR 17 UNITS: 100 INJECTION, SOLUTION SUBCUTANEOUS at 11:10

## 2023-10-08 RX ADMIN — SODIUM CHLORIDE: 0.9 INJECTION, SOLUTION INTRAVENOUS at 11:10

## 2023-10-08 NOTE — ED NOTES
"R FOOT WOUND WORSENING WITH SWELLING AND PAIN. R 2,3,4, 5 TOE DIGITS SWOLLEN, RED WITH PATCHES OF BLACK.  MACERATED APPEARANCE OF 3RD DIGIT. ODOROUS FOR 2 DAYS. AMBULATORY. 3" CAPILLARY REFILL AT FOOT. UNABLE TO ASSESS AT 3RD DIGIT..  "

## 2023-10-08 NOTE — ED PROVIDER NOTES
Encounter Date: 10/8/2023       History     Chief Complaint   Patient presents with    Wound Infection     Toe injury several weeks ago that was treated by Urgent Care with abx that has now become infected. Urgent sent to ED. Pt has DM.      Patient is a 43 y.o. male with past medical history of diabetes, neuropathy, hyperlipidemia, and hypertension who presents to ED via self for concern for toe wound which began 2 week(s) ago.  Patient reports 2 weeks ago he stepped on a nail that went through his shoe into his right 3rd toe.  Patient reports 2 days later he went to the doctor and they gave him his antibiotic shot, put him on oral Bactrim, and topical mupirocin.  Patient reports 2 days later he went back for recheck and they put him on clindamycin to take with the Bactrim.  Patient reports he has been soaking it in Epsom salt.  Patient reports the last week the 3rd toe has been looking black and oozing.  Patient reports the redness around his toes has been there for the last 2 weeks.  Patient reports his tetanus is up-to-date.  Patient denies fever.  Patient denies chest pain or shortness of breath.  Patient denies abdominal pain vomiting or diarrhea.  Patient is awake and alert in no acute distress.                          Review of patient's allergies indicates:   Allergen Reactions    Pcn [penicillins] Anaphylaxis     Past Medical History:   Diagnosis Date    Diabetes mellitus, type 2     Encounter for blood transfusion     GERD (gastroesophageal reflux disease)     Hyperlipidemia     Hypertension     Neuropathy     Osteoarthritis      Past Surgical History:   Procedure Laterality Date    MANDIBLE FRACTURE SURGERY  07/17/2000    MANDIBLE FRACTURE SURGERY      SPLENECTOMY, TOTAL  07/17/2000     Family History   Problem Relation Age of Onset    Diabetes Mother     Cancer Father      Social History     Tobacco Use    Smoking status: Every Day    Smokeless tobacco: Never   Substance Use Topics    Alcohol use: No     Drug use: No     Review of Systems   Constitutional: Negative.  Negative for fever.   HENT: Negative.     Respiratory: Negative.  Negative for cough and shortness of breath.    Cardiovascular:  Positive for leg swelling. Negative for chest pain.   Gastrointestinal: Negative.    Genitourinary: Negative.    Musculoskeletal: Negative.  Negative for back pain.   Skin:  Positive for wound. Negative for color change, pallor and rash.   Neurological: Negative.  Negative for weakness.   Hematological:  Does not bruise/bleed easily.   Psychiatric/Behavioral: Negative.         Physical Exam     Initial Vitals [10/08/23 1554]   BP Pulse Resp Temp SpO2   (!) 184/103 89 20 98.6 °F (37 °C) 97 %      MAP       --         Physical Exam    Nursing note and vitals reviewed.  Constitutional: He appears well-developed and well-nourished. He is not diaphoretic. No distress.   HENT:   Head: Normocephalic and atraumatic.   Right Ear: External ear normal.   Left Ear: External ear normal.   Eyes: EOM are normal.   Neck:   Normal range of motion.  Cardiovascular:  Normal rate, regular rhythm, normal heart sounds and intact distal pulses.     Exam reveals no gallop and no friction rub.       No murmur heard.  Pulmonary/Chest: Breath sounds normal. No respiratory distress. He has no wheezes. He has no rhonchi. He has no rales. He exhibits no tenderness.   Musculoskeletal:         General: Normal range of motion.      Cervical back: Normal range of motion.      Right lower leg: Swelling present. No deformity, lacerations, tenderness or bony tenderness. 1+ Edema present.      Right ankle: Swelling present. No tenderness. Normal range of motion. Normal pulse.      Right foot: Normal capillary refill. Swelling present. No foot drop, tenderness or bony tenderness. Normal pulse.     Neurological: He is alert and oriented to person, place, and time. He has normal strength. GCS score is 15. GCS eye subscore is 4. GCS verbal subscore is 5. GCS motor  subscore is 6.   Skin: Skin is warm and dry. Capillary refill takes less than 2 seconds.   Psychiatric: He has a normal mood and affect. His behavior is normal. Judgment and thought content normal.         ED Course   Procedures  Labs Reviewed   CBC W/ AUTO DIFFERENTIAL - Abnormal; Notable for the following components:       Result Value    RBC 4.11 (*)     Hemoglobin 12.5 (*)     Hematocrit 38.1 (*)     MPV 13.7 (*)     All other components within normal limits   SEDIMENTATION RATE - Abnormal; Notable for the following components:    Sed Rate >90 (*)     All other components within normal limits   C-REACTIVE PROTEIN - Abnormal; Notable for the following components:    CRP 51.1 (*)     All other components within normal limits   ISTAT PROCEDURE - Abnormal; Notable for the following components:    POC PCO2 51.1 (*)     POC HCO3 31.6 (*)     POC Glucose 246 (*)     POC TCO2 33 (*)     All other components within normal limits   CULTURE, BLOOD   C-REACTIVE PROTEIN   LACTIC ACID, PLASMA   COMPREHENSIVE METABOLIC PANEL          Imaging Results              US Lower Extremity Veins Right (Final result)  Result time 10/08/23 20:21:03      Final result by Lele Griffith MD (10/08/23 20:21:03)                   Narrative:    US LOWER EXTREMITY VEINS LIMITED FOLLOW UP RIGHT    ADDITIONAL PERTINENT HISTORY:  Pain.    COMPARISON STUDIES:   None.    FINDINGS:  Grayscale compression, duplex and color Doppler interrogation of the right lower extremity deep veins from common femoral vein to proximal calf was performed. The greater saphenous vein in the ipsilateral proximal thigh was evaluated using similar technique.    Right lower extremity:  Common femoral vein:  Negative.  Femoral vein:  Negative.  Deep femoral vein:  Negative.  Popliteal vein:  Negative.  Visualized deep calf veins  Negative.    Greater saphenous vein in the proximal thigh:  Negative.  Popliteal fossa: negative    Surrounding soft tissues: Benign-appearing  enlarged lymph nodes in the right inguinal region compatible with reactive lymph nodes.    IMPRESSION:  1. No evidence of deep venous thrombosis involving the right lower extremity.    Electronically signed by:  Lele Griffith MD  10/08/2023 08:21 PM CDT Workstation: [id number]                                     X-Ray Toe 2 or More Views Right (Final result)  Result time 10/08/23 17:36:27      Final result by Naun Davis MD (10/08/23 17:36:27)                   Narrative:      EXAM: XR TOE 2 OR MORE VIEWS RIGHT    HISTORY: toe pain. Infection and third toe.    COMPARISON:None    FINDINGS: 3 views of the toes were obtained. The middle and distal phalanges of the third toe are disconnected from the head of the proximal phalanx of the third toe. This is presumably due to osteomyelitis in the current clinical setting. However acute fracture could also have this appearance. The toes are otherwise unremarkable. The remainder of the joints are well-maintained. There are no fractures and there is no evidence osteomyelitis elsewhere.    IMPRESSION:   Fracture or acute bony destruction involving the distal aspect of the proximal phalanx of the third toe resulting in complete disruption of the middle and distal phalanges from the proximal phalanx. Otherwise unremarkable x-rays of the toes..    Electronically signed by:  Naun Davis MD  10/08/2023 05:36 PM CDT Workstation: [id number]                                     Medications - No data to display  Medical Decision Making  Amount and/or Complexity of Data Reviewed  Labs: ordered.  Radiology: ordered.    Risk  Decision regarding hospitalization.         APC / Resident Notes:   I assumed care of this patient at the end of Ms. Contreras shift.  I performed an independent evaluation of the patient.  In short, 43-year-old diabetic male who was lost to follow-up with podiatry presents for worsening right 3rd toe wound.  He did have acute trauma 2 weeks ago and has  progressively worsened since.  Patient had outpatient trial of clindamycin and Bactrim.  He has a necrotic right 3rd toe with some surrounding cellulitic change.  No leukocytosis but elevated ESR/CRP.  I did discussed with Dr. Ashok Santiago who agrees to evaluate patient in the hospital.  Discussed patient case with Hospital Medicine who agrees to admit.        ED Course as of 10/08/23 2049   Sun Oct 08, 2023   1817 Patient hand off given to SHARITA Byrnes for further evaluation of lab work and final disposition [MP]   1822 Discussed with Dr. Ashok Santiago, who is happy to evaluate patient in the hospital [AN]      ED Course User Index  [AN] Tony Villagomez PA-C  [MP] Jena Pope NP                    Clinical Impression:   Final diagnoses:  [M79.89] Leg swelling  [I96] Gangrene of toe (Primary)        ED Disposition Condition    Admit Stable                Tony Villagomez PA-C  10/08/23 2049

## 2023-10-08 NOTE — PROGRESS NOTES
"Subjective:      Patient ID: Philip Alberts is a 43 y.o. male.    Vitals:  height is 6' (1.829 m) and weight is 74.4 kg (164 lb). His oral temperature is 97.6 °F (36.4 °C). His blood pressure is 163/99 (abnormal) and his pulse is 73. His respiration is 18 and oxygen saturation is 98%.     Chief Complaint: Foot Swelling    Pt states "he has been having R foot and ankle pain/swelling. This has been going on for the past 7 days. He tripped in his yard, but didn't fall."    Patient reports that he had a nail puncture the bottom of his foot a few weeks back and [facility] he has returned back for re-evaluation and more added biotics were added.  Chart review appears that he has completed both clindamycin and Bactrim prescriptions.  He has not followed up with wound care or podiatry stating that he was unable to get in with his podiatrist.  He is worried that his right ankle is broken due to the swelling and pain that is coming from his foot in now causing pain in his ankle.  Removing his shoe and dressing it is apparent that he has a severe wound infection to the right 4th lesser toe with very foul-smelling excoriated tissue and eschar most consistent with gangrenous digit.  ROS   Objective:     Physical Exam    Assessment:     1. Injury of right foot, initial encounter    2. Right ankle swelling        Plan:       Injury of right foot, initial encounter    Right ankle swelling      To ER for further evaluation and management with high concern for gangrenous toe and evolving infection spreading up his leg with pain and swelling now mid calf.  Exam ended, care of this patient is well outside the scope of care that can be provided in an urgent care setting              "

## 2023-10-09 ENCOUNTER — ANESTHESIA (OUTPATIENT)
Dept: SURGERY | Facility: HOSPITAL | Age: 43
DRG: 617 | End: 2023-10-09
Payer: MEDICAID

## 2023-10-09 ENCOUNTER — ANESTHESIA EVENT (OUTPATIENT)
Dept: SURGERY | Facility: HOSPITAL | Age: 43
DRG: 617 | End: 2023-10-09
Payer: MEDICAID

## 2023-10-09 PROBLEM — M86.9 OSTEOMYELITIS: Status: ACTIVE | Noted: 2023-10-09

## 2023-10-09 PROBLEM — Z79.4 DIABETES MELLITUS DUE TO UNDERLYING CONDITION WITH HYPERGLYCEMIA, WITH LONG-TERM CURRENT USE OF INSULIN: Status: ACTIVE | Noted: 2023-10-09

## 2023-10-09 PROBLEM — M00.9 SEPTIC ARTHRITIS: Status: ACTIVE | Noted: 2023-10-09

## 2023-10-09 PROBLEM — E11.42 DIABETIC PERIPHERAL NEUROPATHY: Status: ACTIVE | Noted: 2019-09-16

## 2023-10-09 PROBLEM — E08.65 DIABETES MELLITUS DUE TO UNDERLYING CONDITION WITH HYPERGLYCEMIA, WITH LONG-TERM CURRENT USE OF INSULIN: Status: ACTIVE | Noted: 2023-10-09

## 2023-10-09 LAB
ALBUMIN SERPL BCP-MCNC: 3.3 G/DL (ref 3.5–5.2)
ALP SERPL-CCNC: 77 U/L (ref 55–135)
ALT SERPL W/O P-5'-P-CCNC: 12 U/L (ref 10–44)
ANION GAP SERPL CALC-SCNC: 2 MMOL/L (ref 8–16)
AST SERPL-CCNC: 17 U/L (ref 10–40)
BASOPHILS # BLD AUTO: 0.07 K/UL (ref 0–0.2)
BASOPHILS NFR BLD: 1.1 % (ref 0–1.9)
BILIRUB SERPL-MCNC: 0.2 MG/DL (ref 0.1–1)
BUN SERPL-MCNC: 9 MG/DL (ref 6–20)
CALCIUM SERPL-MCNC: 8.7 MG/DL (ref 8.7–10.5)
CHLORIDE SERPL-SCNC: 102 MMOL/L (ref 95–110)
CO2 SERPL-SCNC: 34 MMOL/L (ref 23–29)
CREAT SERPL-MCNC: 0.6 MG/DL (ref 0.5–1.4)
CRP SERPL-MCNC: 43.1 MG/L (ref 0–8.2)
DIFFERENTIAL METHOD: ABNORMAL
EOSINOPHIL # BLD AUTO: 0.3 K/UL (ref 0–0.5)
EOSINOPHIL NFR BLD: 4.7 % (ref 0–8)
ERYTHROCYTE [DISTWIDTH] IN BLOOD BY AUTOMATED COUNT: 12.8 % (ref 11.5–14.5)
EST. GFR  (NO RACE VARIABLE): >60 ML/MIN/1.73 M^2
GLUCOSE SERPL-MCNC: 147 MG/DL (ref 70–110)
GLUCOSE SERPL-MCNC: 189 MG/DL (ref 70–110)
GLUCOSE SERPL-MCNC: 212 MG/DL (ref 70–110)
GLUCOSE SERPL-MCNC: 375 MG/DL (ref 70–110)
HCT VFR BLD AUTO: 35 % (ref 40–54)
HGB BLD-MCNC: 11.2 G/DL (ref 14–18)
IMM GRANULOCYTES # BLD AUTO: 0.01 K/UL (ref 0–0.04)
IMM GRANULOCYTES NFR BLD AUTO: 0.2 % (ref 0–0.5)
INR PPP: 1 (ref 0.8–1.2)
LYMPHOCYTES # BLD AUTO: 3 K/UL (ref 1–4.8)
LYMPHOCYTES NFR BLD: 46.5 % (ref 18–48)
MAGNESIUM SERPL-MCNC: 1.8 MG/DL (ref 1.6–2.6)
MCH RBC QN AUTO: 30.1 PG (ref 27–31)
MCHC RBC AUTO-ENTMCNC: 32 G/DL (ref 32–36)
MCV RBC AUTO: 94 FL (ref 82–98)
MONOCYTES # BLD AUTO: 0.6 K/UL (ref 0.3–1)
MONOCYTES NFR BLD: 9 % (ref 4–15)
NEUTROPHILS # BLD AUTO: 2.5 K/UL (ref 1.8–7.7)
NEUTROPHILS NFR BLD: 38.5 % (ref 38–73)
NRBC BLD-RTO: 0 /100 WBC
PLATELET # BLD AUTO: 228 K/UL (ref 150–450)
PMV BLD AUTO: 13.5 FL (ref 9.2–12.9)
POTASSIUM SERPL-SCNC: 4.1 MMOL/L (ref 3.5–5.1)
PROT SERPL-MCNC: 6.7 G/DL (ref 6–8.4)
PROTHROMBIN TIME: 10.9 SEC (ref 9–12.5)
RBC # BLD AUTO: 3.72 M/UL (ref 4.6–6.2)
SODIUM SERPL-SCNC: 138 MMOL/L (ref 136–145)
TROPONIN I SERPL HS-MCNC: 2.4 PG/ML (ref 0–14.9)
TROPONIN I SERPL HS-MCNC: 2.8 PG/ML (ref 0–14.9)
WBC # BLD AUTO: 6.45 K/UL (ref 3.9–12.7)

## 2023-10-09 PROCEDURE — 80053 COMPREHEN METABOLIC PANEL: CPT | Performed by: NURSE PRACTITIONER

## 2023-10-09 PROCEDURE — 11043 DBRDMT MUSC&/FSCA 1ST 20/<: CPT | Mod: 51,,, | Performed by: PODIATRIST

## 2023-10-09 PROCEDURE — 25000003 PHARM REV CODE 250: Performed by: PODIATRIST

## 2023-10-09 PROCEDURE — D9220A PRA ANESTHESIA: ICD-10-PCS | Mod: CRNA,,, | Performed by: NURSE ANESTHETIST, CERTIFIED REGISTERED

## 2023-10-09 PROCEDURE — 28820 PR AMPUTATION TOE,MT-P JT: ICD-10-PCS | Mod: T7,,, | Performed by: PODIATRIST

## 2023-10-09 PROCEDURE — D9220A PRA ANESTHESIA: ICD-10-PCS | Mod: ANES,,, | Performed by: ANESTHESIOLOGY

## 2023-10-09 PROCEDURE — 25500020 PHARM REV CODE 255: Performed by: INTERNAL MEDICINE

## 2023-10-09 PROCEDURE — 36415 COLL VENOUS BLD VENIPUNCTURE: CPT | Performed by: NURSE PRACTITIONER

## 2023-10-09 PROCEDURE — 63600175 PHARM REV CODE 636 W HCPCS: Performed by: NURSE ANESTHETIST, CERTIFIED REGISTERED

## 2023-10-09 PROCEDURE — 25000003 PHARM REV CODE 250: Performed by: NURSE PRACTITIONER

## 2023-10-09 PROCEDURE — 99233 SBSQ HOSP IP/OBS HIGH 50: CPT | Mod: ,,, | Performed by: PODIATRIST

## 2023-10-09 PROCEDURE — 25000003 PHARM REV CODE 250: Performed by: FAMILY MEDICINE

## 2023-10-09 PROCEDURE — 83735 ASSAY OF MAGNESIUM: CPT | Performed by: NURSE PRACTITIONER

## 2023-10-09 PROCEDURE — 37000009 HC ANESTHESIA EA ADD 15 MINS: Performed by: PODIATRIST

## 2023-10-09 PROCEDURE — 87205 SMEAR GRAM STAIN: CPT | Mod: 59 | Performed by: INTERNAL MEDICINE

## 2023-10-09 PROCEDURE — 28820 AMPUTATION OF TOE: CPT | Mod: T7,,, | Performed by: PODIATRIST

## 2023-10-09 PROCEDURE — D9220A PRA ANESTHESIA: Mod: ANES,,, | Performed by: ANESTHESIOLOGY

## 2023-10-09 PROCEDURE — 63600175 PHARM REV CODE 636 W HCPCS: Performed by: PODIATRIST

## 2023-10-09 PROCEDURE — 99223 1ST HOSP IP/OBS HIGH 75: CPT | Mod: ,,, | Performed by: INTERNAL MEDICINE

## 2023-10-09 PROCEDURE — 87116 MYCOBACTERIA CULTURE: CPT | Mod: 59 | Performed by: PODIATRIST

## 2023-10-09 PROCEDURE — 37000008 HC ANESTHESIA 1ST 15 MINUTES: Performed by: PODIATRIST

## 2023-10-09 PROCEDURE — D9220A PRA ANESTHESIA: Mod: CRNA,,, | Performed by: NURSE ANESTHETIST, CERTIFIED REGISTERED

## 2023-10-09 PROCEDURE — 87075 CULTR BACTERIA EXCEPT BLOOD: CPT | Performed by: PODIATRIST

## 2023-10-09 PROCEDURE — 36000706: Performed by: PODIATRIST

## 2023-10-09 PROCEDURE — 12000002 HC ACUTE/MED SURGE SEMI-PRIVATE ROOM

## 2023-10-09 PROCEDURE — 63600175 PHARM REV CODE 636 W HCPCS: Performed by: FAMILY MEDICINE

## 2023-10-09 PROCEDURE — 87102 FUNGUS ISOLATION CULTURE: CPT | Performed by: PODIATRIST

## 2023-10-09 PROCEDURE — 87205 SMEAR GRAM STAIN: CPT | Performed by: PODIATRIST

## 2023-10-09 PROCEDURE — 36000707: Performed by: PODIATRIST

## 2023-10-09 PROCEDURE — C9290 INJ, BUPIVACAINE LIPOSOME: HCPCS | Performed by: PODIATRIST

## 2023-10-09 PROCEDURE — 99223 PR INITIAL HOSPITAL CARE,LEVL III: ICD-10-PCS | Mod: ,,, | Performed by: INTERNAL MEDICINE

## 2023-10-09 PROCEDURE — 87070 CULTURE OTHR SPECIMN AEROBIC: CPT | Mod: 59 | Performed by: PODIATRIST

## 2023-10-09 PROCEDURE — 99233 PR SUBSEQUENT HOSPITAL CARE,LEVL III: ICD-10-PCS | Mod: ,,, | Performed by: PODIATRIST

## 2023-10-09 PROCEDURE — 11043 PR DEBRIDEMENT, SKIN, SUB-Q TISSUE,MUSCLE,=<20 SQ CM: ICD-10-PCS | Mod: 51,,, | Performed by: PODIATRIST

## 2023-10-09 PROCEDURE — 87070 CULTURE OTHR SPECIMN AEROBIC: CPT | Performed by: INTERNAL MEDICINE

## 2023-10-09 PROCEDURE — 84484 ASSAY OF TROPONIN QUANT: CPT | Performed by: NURSE PRACTITIONER

## 2023-10-09 PROCEDURE — 87206 SMEAR FLUORESCENT/ACID STAI: CPT | Performed by: PODIATRIST

## 2023-10-09 PROCEDURE — 87075 CULTR BACTERIA EXCEPT BLOOD: CPT | Mod: 59 | Performed by: INTERNAL MEDICINE

## 2023-10-09 PROCEDURE — A9585 GADOBUTROL INJECTION: HCPCS | Performed by: INTERNAL MEDICINE

## 2023-10-09 PROCEDURE — 87116 MYCOBACTERIA CULTURE: CPT | Performed by: INTERNAL MEDICINE

## 2023-10-09 PROCEDURE — 27000080 OPTIME MED/SURG SUP & DEVICES GENERAL CLASSIFICATION: Performed by: PODIATRIST

## 2023-10-09 PROCEDURE — 25000003 PHARM REV CODE 250: Performed by: INTERNAL MEDICINE

## 2023-10-09 PROCEDURE — 63600175 PHARM REV CODE 636 W HCPCS: Performed by: NURSE PRACTITIONER

## 2023-10-09 PROCEDURE — 99900035 HC TECH TIME PER 15 MIN (STAT)

## 2023-10-09 PROCEDURE — 85025 COMPLETE CBC W/AUTO DIFF WBC: CPT | Performed by: NURSE PRACTITIONER

## 2023-10-09 PROCEDURE — 87206 SMEAR FLUORESCENT/ACID STAI: CPT | Mod: 91 | Performed by: INTERNAL MEDICINE

## 2023-10-09 RX ORDER — INSULIN ASPART 100 [IU]/ML
1-10 INJECTION, SOLUTION INTRAVENOUS; SUBCUTANEOUS
Status: DISCONTINUED | OUTPATIENT
Start: 2023-10-09 | End: 2023-10-09

## 2023-10-09 RX ORDER — BUPIVACAINE HYDROCHLORIDE 5 MG/ML
INJECTION, SOLUTION EPIDURAL; INTRACAUDAL
Status: DISCONTINUED | OUTPATIENT
Start: 2023-10-09 | End: 2023-10-09 | Stop reason: HOSPADM

## 2023-10-09 RX ORDER — GADOBUTROL 604.72 MG/ML
7.5 INJECTION INTRAVENOUS
Status: COMPLETED | OUTPATIENT
Start: 2023-10-09 | End: 2023-10-09

## 2023-10-09 RX ORDER — MIDAZOLAM HYDROCHLORIDE 1 MG/ML
INJECTION INTRAMUSCULAR; INTRAVENOUS
Status: DISCONTINUED | OUTPATIENT
Start: 2023-10-09 | End: 2023-10-09

## 2023-10-09 RX ORDER — FAMOTIDINE 20 MG/1
20 TABLET, FILM COATED ORAL 2 TIMES DAILY
Status: DISCONTINUED | OUTPATIENT
Start: 2023-10-09 | End: 2023-10-11 | Stop reason: HOSPADM

## 2023-10-09 RX ORDER — INSULIN ASPART 100 [IU]/ML
0-5 INJECTION, SOLUTION INTRAVENOUS; SUBCUTANEOUS EVERY 6 HOURS PRN
Status: DISCONTINUED | OUTPATIENT
Start: 2023-10-09 | End: 2023-10-11 | Stop reason: HOSPADM

## 2023-10-09 RX ORDER — IBUPROFEN 200 MG
24 TABLET ORAL
Status: DISCONTINUED | OUTPATIENT
Start: 2023-10-09 | End: 2023-10-11 | Stop reason: HOSPADM

## 2023-10-09 RX ORDER — FENTANYL CITRATE 50 UG/ML
INJECTION, SOLUTION INTRAMUSCULAR; INTRAVENOUS
Status: DISCONTINUED | OUTPATIENT
Start: 2023-10-09 | End: 2023-10-09

## 2023-10-09 RX ORDER — HYDROGEN PEROXIDE 3 %
20 SOLUTION, NON-ORAL MISCELLANEOUS
COMMUNITY

## 2023-10-09 RX ORDER — PROMETHAZINE HYDROCHLORIDE 25 MG/1
25 TABLET ORAL EVERY 6 HOURS PRN
Status: DISCONTINUED | OUTPATIENT
Start: 2023-10-09 | End: 2023-10-11 | Stop reason: HOSPADM

## 2023-10-09 RX ORDER — TRAMADOL HYDROCHLORIDE 50 MG/1
50 TABLET ORAL EVERY 4 HOURS PRN
Status: DISCONTINUED | OUTPATIENT
Start: 2023-10-09 | End: 2023-10-11 | Stop reason: HOSPADM

## 2023-10-09 RX ORDER — ACETAMINOPHEN 10 MG/ML
INJECTION, SOLUTION INTRAVENOUS
Status: DISCONTINUED | OUTPATIENT
Start: 2023-10-09 | End: 2023-10-09

## 2023-10-09 RX ORDER — GLUCAGON 1 MG
1 KIT INJECTION
Status: DISCONTINUED | OUTPATIENT
Start: 2023-10-09 | End: 2023-10-11 | Stop reason: HOSPADM

## 2023-10-09 RX ORDER — ONDANSETRON 2 MG/ML
INJECTION INTRAMUSCULAR; INTRAVENOUS
Status: DISCONTINUED | OUTPATIENT
Start: 2023-10-09 | End: 2023-10-09

## 2023-10-09 RX ORDER — HYDROCODONE BITARTRATE AND ACETAMINOPHEN 5; 325 MG/1; MG/1
1 TABLET ORAL EVERY 4 HOURS PRN
Status: DISCONTINUED | OUTPATIENT
Start: 2023-10-09 | End: 2023-10-11 | Stop reason: HOSPADM

## 2023-10-09 RX ORDER — SODIUM CHLORIDE 0.9 % (FLUSH) 0.9 %
10 SYRINGE (ML) INJECTION
Status: DISCONTINUED | OUTPATIENT
Start: 2023-10-09 | End: 2023-10-11 | Stop reason: HOSPADM

## 2023-10-09 RX ORDER — PROPOFOL 10 MG/ML
VIAL (ML) INTRAVENOUS
Status: DISCONTINUED | OUTPATIENT
Start: 2023-10-09 | End: 2023-10-09

## 2023-10-09 RX ORDER — HYDROCODONE BITARTRATE AND ACETAMINOPHEN 10; 325 MG/1; MG/1
1 TABLET ORAL EVERY 4 HOURS PRN
Status: DISCONTINUED | OUTPATIENT
Start: 2023-10-09 | End: 2023-10-10

## 2023-10-09 RX ORDER — ONDANSETRON 4 MG/1
8 TABLET, ORALLY DISINTEGRATING ORAL EVERY 8 HOURS PRN
Status: DISCONTINUED | OUTPATIENT
Start: 2023-10-09 | End: 2023-10-11 | Stop reason: HOSPADM

## 2023-10-09 RX ORDER — IBUPROFEN 200 MG
16 TABLET ORAL
Status: DISCONTINUED | OUTPATIENT
Start: 2023-10-09 | End: 2023-10-11 | Stop reason: HOSPADM

## 2023-10-09 RX ADMIN — PRAVASTATIN SODIUM 20 MG: 10 TABLET ORAL at 09:10

## 2023-10-09 RX ADMIN — GADOBUTROL 7.5 ML: 604.72 INJECTION INTRAVENOUS at 11:10

## 2023-10-09 RX ADMIN — FENTANYL CITRATE 50 MCG: 50 INJECTION, SOLUTION INTRAMUSCULAR; INTRAVENOUS at 12:10

## 2023-10-09 RX ADMIN — HYDROCODONE BITARTRATE AND ACETAMINOPHEN 1 TABLET: 10; 325 TABLET ORAL at 09:10

## 2023-10-09 RX ADMIN — PROPOFOL 50 MG: 10 INJECTION, EMULSION INTRAVENOUS at 12:10

## 2023-10-09 RX ADMIN — INSULIN ASPART 3 UNITS: 100 INJECTION, SOLUTION INTRAVENOUS; SUBCUTANEOUS at 09:10

## 2023-10-09 RX ADMIN — HYDROCODONE BITARTRATE AND ACETAMINOPHEN 1 TABLET: 5; 325 TABLET ORAL at 03:10

## 2023-10-09 RX ADMIN — MEROPENEM 1 G: 1 INJECTION, POWDER, FOR SOLUTION INTRAVENOUS at 05:10

## 2023-10-09 RX ADMIN — INSULIN DETEMIR 17 UNITS: 100 INJECTION, SOLUTION SUBCUTANEOUS at 09:10

## 2023-10-09 RX ADMIN — ACETAMINOPHEN 1000 MG: 10 INJECTION, SOLUTION INTRAVENOUS at 01:10

## 2023-10-09 RX ADMIN — FENTANYL CITRATE 25 MCG: 50 INJECTION, SOLUTION INTRAMUSCULAR; INTRAVENOUS at 01:10

## 2023-10-09 RX ADMIN — PROPOFOL 25 MG: 10 INJECTION, EMULSION INTRAVENOUS at 01:10

## 2023-10-09 RX ADMIN — FAMOTIDINE 20 MG: 20 TABLET ORAL at 09:10

## 2023-10-09 RX ADMIN — VANCOMYCIN HYDROCHLORIDE 1250 MG: 1.25 INJECTION, POWDER, LYOPHILIZED, FOR SOLUTION INTRAVENOUS at 12:10

## 2023-10-09 RX ADMIN — SODIUM CHLORIDE, SODIUM LACTATE, POTASSIUM CHLORIDE, AND CALCIUM CHLORIDE: .6; .31; .03; .02 INJECTION, SOLUTION INTRAVENOUS at 12:10

## 2023-10-09 RX ADMIN — VANCOMYCIN HYDROCHLORIDE 1250 MG: 1.25 INJECTION, POWDER, LYOPHILIZED, FOR SOLUTION INTRAVENOUS at 11:10

## 2023-10-09 RX ADMIN — ONDANSETRON 4 MG: 2 INJECTION INTRAMUSCULAR; INTRAVENOUS at 12:10

## 2023-10-09 RX ADMIN — MIDAZOLAM HYDROCHLORIDE 2 MG: 1 INJECTION, SOLUTION INTRAMUSCULAR; INTRAVENOUS at 12:10

## 2023-10-09 RX ADMIN — MEROPENEM 1 G: 1 INJECTION, POWDER, FOR SOLUTION INTRAVENOUS at 01:10

## 2023-10-09 RX ADMIN — FENTANYL CITRATE 25 MCG: 50 INJECTION, SOLUTION INTRAMUSCULAR; INTRAVENOUS at 12:10

## 2023-10-09 RX ADMIN — MEROPENEM 1 G: 1 INJECTION, POWDER, FOR SOLUTION INTRAVENOUS at 10:10

## 2023-10-09 NOTE — PROGRESS NOTES
"10/8/2023 Pharmacokinetic Assessment: IV Vancomycin  Vanco DAY 1 - Philip Alberts is a 43 y.o. male being treated for bone/joint infection. Goal 15 to 20 mcg/mL.     Dialysis Method (if applicable):N/A     Vancomycin serum concentration assessment(s) (last 3 results):  No results for input(s): "VANCOMYCINRA", "VANCOMYCINPE", "VANCOMYCINTR", "VANCOTROUGH" in the last 72 hours.    Vancomycin Regimen Plan: 2000mg x1 then 1250 mg  Q12H. Trough/Random before 4th dose on 10/10 @ 10:00.    Day of Thx Date Current Weight (kg) Laboratory  Doses    Vancomycin Level      Time SCr CrCl Scheduled Time Time Dose Dosage (mcg/ml) Peak,  Random,  Trough?         Vancomycin 2000mg x1 then 1250mg q12h   1 10/8 74.8     166.2 23:00  1 2000       2 10/9         11:00  2 1250             23:00   3 1250       3 10/10     10:00  - -  Trough       Rationale for Plan: per protocol (calculations copied from [facility])    Labs:  Estimated Creatinine Clearance: 166.2 mL/min (based on SCr of 0.6 mg/dL).  Recent Labs   Lab Result Units 10/08/23  1658 10/08/23  2128   WBC K/uL 8.05  --    Creatinine mg/dL 0.9 0.6       Cxs:   Microbiology Results (last 7 days)       Procedure Component Value Units Date/Time    Blood Culture #1 **CANNOT BE ORDERED STAT** [[id number]] Collected: 10/08/23 1616    Order Status: Sent Specimen: Blood from Antecubital, Right Updated: 10/08/23 1626            Pharmacy will continue to follow and monitor vancomycin.   Please contact pharmacy at extension --[phone] with any questions regarding this assessment.     Thank you for the consult,   Emil Doe    "

## 2023-10-09 NOTE — CONSULTS
Consult Note  Infectious Disease    Reason for Consult:  Gangrene/osteomyelitis    HPI: Philip Alberts is a 43 y.o. male With a history of diabetes and a toe injury (stepped on a nail) several weeks ago treated by urgent care with antibiotics (Bactrim followed by clindamycin and an unknown IM injection) that has subsequently become infected.  Tetanus is up-to-date.  He was seen at urgent care and referred to the emergency room yesterday.  In the ED was found to have gangrene of the right 3rd toe with cellulitis of the right foot of the base of the toes and purulence between the 2nd-4 toes.  In the emergency room blood sugar was 327, white blood cells were normal, most recent A1c is 10%.  X-ray of the toe showed dislocation of the middle and distal phalanges from the proximal phalanx due to infection versus fracture.  He was admitted to Hospital Medicine placed on vancomycin and meropenem.   He was seen by Dr. Santiago this morning and was taken for amputation of the right 3rd toe mid day with excisional debridement below the fascia of the right 2nd and 4th toes.  Multiple cultures were submitted.   MRI was performed prior to surgery and did show destruction of the head of the 3rd proximal phalanx with subluxation of the 3rd PIP joint there is also associated destruction of the collateral ligaments and plantar tendons of the 3rd digit there was also enhancement involving the 2nd 4th and 5th 5th digits worrisome for osteomyelitis (it is pertinent to note that there were no lesions of the 5th toe).     Review of patient's allergies indicates:   Allergen Reactions    Pcn [penicillins] Anaphylaxis     Past Medical History:   Diagnosis Date    Diabetes mellitus, type 2     Encounter for blood transfusion     GERD (gastroesophageal reflux disease)     Hyperlipidemia     Hypertension     Neuropathy     Osteoarthritis     Osteomyelitis 10/9/2023    Skin ulcer of third toe of right foot, with necrosis of bone 10/8/2023     Type 2 diabetes mellitus with mild nonproliferative retinopathy 12/3/2018     Past Surgical History:   Procedure Laterality Date    MANDIBLE FRACTURE SURGERY  07/17/2000    MANDIBLE FRACTURE SURGERY      SPLENECTOMY, TOTAL  07/17/2000     Social History     Socioeconomic History    Marital status: Single   Tobacco Use    Smoking status: Every Day    Smokeless tobacco: Never   Substance and Sexual Activity    Alcohol use: No    Drug use: No    Sexual activity: Yes     Social Determinants of Health     Financial Resource Strain: High Risk (8/17/2023)    Overall Financial Resource Strain (CARDIA)     Difficulty of Paying Living Expenses: Hard   Food Insecurity: No Food Insecurity (8/17/2023)    Hunger Vital Sign     Worried About Running Out of Food in the Last Year: Never true     Ran Out of Food in the Last Year: Never true   Transportation Needs: No Transportation Needs (8/17/2023)    PRAPARE - Transportation     Lack of Transportation (Medical): No     Lack of Transportation (Non-Medical): No   Physical Activity: Inactive (8/17/2023)    Exercise Vital Sign     Days of Exercise per Week: 0 days     Minutes of Exercise per Session: 0 min   Stress: Stress Concern Present (8/17/2023)    [employer] - Occupational Stress Questionnaire     Feeling of Stress : To some extent   Social Connections: Socially Isolated (8/17/2023)    Social Connection and Isolation Panel [NHANES]     Frequency of Communication with Friends and Family: Twice a week     Frequency of Social Gatherings with Friends and Family: Twice a week     Attends [employer] Services: Never     Active Member of Clubs or Organizations: No     Attends Club or Organization Meetings: Never     Marital Status:    Housing Stability: Low Risk  (8/17/2023)    Housing Stability Vital Sign     Unable to Pay for Housing in the Last Year: No     Number of Places Lived in the Last Year: 1     Unstable Housing in the Last Year: No      Family History   Problem Relation Age of Onset    Diabetes Mother     Cancer Father          Review of Systems:   No chills, fever, since the day after his injury  Diabetic retinopathy  Edentulous upper, carious teeth with periodontal disease lower  No chest pain,   No sputum production, shortness of breath, still smoking, a little less than 2 packs per day, vapes when he is not smoking.  No nausea, vomiting, diarrhea, or focal abd pain,  No dysphagia, odynophagia     Excluding right foot, No swelling of joints, redness of joints, injuries, or new focal pain  No unusual headaches, but has severe stocking peripheral neuropathy, bilateral lower extremities from the knee down  No anxiety, depression, substance abuse,    Type 2 insulin-dependent diabetes  He is bleeding from his surgical site, postop shoe in place  No new rashes,       Outdoor activities:  Disabled, lives with parents  Travel:   Implants:   Antibiotic History:  See HPI    EXAM & DIAGNOSTICS REVIEWED:   Vitals:     Temp:  [97.6 °F (36.4 °C)-98.5 °F (36.9 °C)]   Temp: 98.3 °F (36.8 °C) (10/09/23 1232)  Pulse: 66 (10/09/23 1232)  Resp: 16 (10/09/23 1232)  BP: 138/76 (10/09/23 1232)  SpO2: 98 % (10/09/23 1232)    Intake/Output Summary (Last 24 hours) at 10/9/2023 1619  Last data filed at 10/9/2023 1313  Gross per 24 hour   Intake 740 ml   Output --   Net 740 ml       General:  In NAD. Alert and attentive, cooperative, comfortable  Eyes:  Anicteric, PERRL, EOMI  ENT:  No ulcers, exudates, thrush, nares patent, dentition is very poor, edentulous upper, carious periodontal disease lower  Neck:  supple, no masses or adenopathy appreciated  Lungs: Clear, no consolidation, rales, wheezes, rub, smoker's cough  Heart:  RRR, no gallop/murmur/rub noted  Abd:  Soft, NT, ND, normal BS, no masses or organomegaly appreciated.  :  Voids   Musc:  Excluding right foot Joints without effusion, swelling, erythema, synovitis, with generalized poor muscle bulk  Skin:  No  rashes.  Scars on his lower legs from prior infections  Neuro:             Alert, attentive, speech fluent, face symmetric, moves all extremities, no focal weakness. Ambulatory prior to admission  Psych: Calm, cooperative  Lymphatic:      Extrem: Excluding right foot, No edema, erythema, phlebitis, cellulitis, warm and well perfused  VAD:       Isolation:  None  Wound: 10/8        10/9 postoperative bandages not disturbed    Lines/Tubes/Drains:    General Labs reviewed:  Recent Labs   Lab 10/08/23  1658 10/08/23  2019 10/09/23  0627   WBC 8.05  --  6.45   HGB 12.5*  --  11.2*   HCT 38.1* 36 35.0*     --  228       Recent Labs   Lab 10/08/23  1658 10/08/23  2128 10/09/23  0626    137 138   K 4.5 4.1 4.1   CL 99 101 102   CO2 25 26 34*   BUN 12 10 9   CREATININE 0.9 0.6 0.6   CALCIUM 9.3 9.1 8.7   PROT 8.1 7.3 6.7   BILITOT 0.2 0.3 0.2   ALKPHOS 98 80 77   ALT 19 14 12   AST 26 18 17     Recent Labs   Lab 10/08/23  1658 10/08/23  2128   CRP 51.1* 43.1*         Micro:  Microbiology Results (last 7 days)       Procedure Component Value Units Date/Time    Tissue culture [[id number]] Collected: 10/09/23 1321    Order Status: No result Specimen: Tissue Updated: 10/09/23 1518    Fungus culture [[id number]] Collected: 10/09/23 1321    Order Status: Sent Specimen: Bone from Toe, Right Foot Updated: 10/09/23 1359    AFB Culture & Smear [[id number]] Collected: 10/09/23 1321    Order Status: Sent Specimen: Bone from Toe, Right Foot Updated: 10/09/23 1359    Culture, Anaerobic [[id number]] Collected: 10/09/23 1321    Order Status: Sent Specimen: Bone from Toe, Right Foot Updated: 10/09/23 1358    Fungus culture [[id number]] Collected: 10/09/23 1323    Order Status: Sent Specimen: Wound from Toe, Right Foot Updated: 10/09/23 1354    Gram stain [[id number]] Collected: 10/09/23 1323    Order Status: Sent Specimen: Wound from Toe, Right Foot Updated: 10/09/23 1353    AFB Culture & Smear [[id number]] Collected: 10/09/23  1323    Order Status: Sent Specimen: Wound from Toe, Right Foot Updated: 10/09/23 1353    Culture, Anaerobic [[id number]] Collected: 10/09/23 1323    Order Status: Sent Specimen: Wound from Toe, Right Foot Updated: 10/09/23 1352    Aerobic culture [[id number]] Collected: 10/09/23 1323    Order Status: Sent Specimen: Wound from Toe, Right Foot Updated: 10/09/23 1352    Aerobic culture [[id number]] Collected: 10/09/23 1321    Order Status: Canceled Specimen: Tissue from Toe, Right Foot     Gram stain [[id number]] Collected: 10/09/23 1321    Order Status: Canceled Specimen: Wound from Toe, Right Foot     Blood Culture #1 **CANNOT BE ORDERED STAT** [[id number]] Collected: 10/08/23 1616    Order Status: Completed Specimen: Blood from Antecubital, Right Updated: 10/08/23 2317     Blood Culture, Routine No Growth to date            Imaging Reviewed:   Plain film of the foot  1.  Fluid collection with tract extending to the plantar foot at the third digit involves the third PIP joint. There is a destructive erosion of the head of the third proximal phalanx with subluxation of the third PIP joint. These findings are consistent with osteomyelitis and septic arthritis with fistulous tract extending from the PIP joint to the plantar tissues of the third digit. There is associated destruction of the collateral ligaments and plantar tendons of the third digit.  2.  Other areas of STIR bone marrow hyperintensity and enhancement involving the second, fourth and fifth digits is felt to reflect osteomyelitis.  3.  Stranding STIR hyperintensities with enhancement demonstrated of the forefoot, consistent with cellulitis.    MRI of the forefoot 10/9  1.  Fluid collection with tract extending to the plantar foot at the third digit involves the third PIP joint. There is a destructive erosion of the head of the third proximal phalanx with subluxation of the third PIP joint. These findings are consistent with osteomyelitis and septic  arthritis with fistulous tract extending from the PIP joint to the plantar tissues of the third digit. There is associated destruction of the collateral ligaments and plantar tendons of the third digit.  2.  Other areas of STIR bone marrow hyperintensity and enhancement involving the second, fourth and fifth digits is felt to reflect osteomyelitis.  3.  Stranding STIR hyperintensities with enhancement demonstrated of the forefoot, consistent with cellulitis.    Cardiology:    IMPRESSION & PLAN   1. Gangrene/osteomyelitis/septic arthritis right 3rd toe   S/p amputation and debridement 10/9    2.  Probable osteomyelitis adjacent 2nd and 4th toes    3. Diabetes ( 2?)with hyperglycemia, neuropathy, retinopathy    4. Surgical asplenia post trauma  5.  Smoker      Recommendations:  Await cultures  Continue vanc and meropenem  Will d/w Dr. Santiago  Aggressive blood sugar control  Arterial ultrasound  Evaluate immunization needs for asplenic state    Counseled with patient and mother that he will need IV antibiotics to treat the adjacent osteomyelitis and toes 2 and 4 as well as counseled about smoking cessation and diabetes.    Medical Decision Making during this encounter was  [_] Low Complexity  [_] Moderate Complexity  [ xxx ] High Complexity

## 2023-10-09 NOTE — ANESTHESIA POSTPROCEDURE EVALUATION
Anesthesia Post Evaluation    Patient: Philip Alberts    Procedure(s) Performed: Procedure(s) (LRB):  AMPUTATION, TOE (Right)    Final Anesthesia Type: general      Patient location: OR 1.  Patient participation: Yes- Able to Participate  Level of consciousness: awake and alert  Post-procedure vital signs: reviewed and stable  Pain management: adequate  Airway patency: patent    PONV status at discharge: No PONV  Anesthetic complications: no      Cardiovascular status: stable  Respiratory status: unassisted  Hydration status: euvolemic  Follow-up not needed.    VSS      Vitals Value Taken Time   BP  10/09/23 1349   Temp  10/09/23 1349   Pulse  10/09/23 1349   Resp  10/09/23 1349   SpO2  10/09/23 1349         No case tracking events are documented in the log.      Pain/Sahra Score: Pain Rating Prior to Med Admin: 6 (10/9/2023  3:30 AM)  Pain Rating Post Med Admin: 3 (10/9/2023  4:30 AM)

## 2023-10-09 NOTE — H&P
[facility] Medicine History & Physical Examination   Patient Name: Philip Alberts  MRN: [id number]  Patient Class: IP- Inpatient   Admission Date: 10/8/2023  3:48 PM  Length of Stay: 0  Attending Physician:   Primary Care Provider: Alfie Lancaster MD  Face-to-Face encounter date: 10/08/2023  Code Status:Full Code  MPOA:  Chief Complaint: Wound Infection (Toe injury several weeks ago that was treated by Urgent Care with abx that has now become infected. Urgent sent to ED. Pt has DM. )        Patient information was obtained from patient, past medical records and ER records.   HISTORY OF PRESENT ILLNESS:   Philip Alberts is a 43 y.o. old  male who  has a past medical history of Diabetes mellitus, type 2, Encounter for blood transfusion, GERD (gastroesophageal reflux disease), Hyperlipidemia, Hypertension, Neuropathy, and Osteoarthritis.. The patient presented to [facility] on 10/8/2023 with a primary complaint of Wound Infection (Toe injury several weeks ago that was treated by Urgent Care with abx that has now become infected. Urgent sent to ED. Pt has DM. )  .     43-year-old  male presents to emergency room with discoloration to his right 3rd toe.  The patient states he stepped on the nail several weeks ago he was treated with a course of oral Bactrim of which she completed about a week ago did he was placed on a course of clindamycin of which he completed 2 days ago the patient states he also got IM injection but he does not remember which antibiotic day with given him intramuscularly.      The patient states he was given a tetanus shot    Nonetheless the patient states he noted discoloration and increased redness to his right 3rd toe.  He came to emergency room today for further evaluation      In the emergency room the patient was found to have gangrene to the right 3rd toe with cellulitis to the right foot at the base of the toes and a pus-like drainage in  between the 2nd and 3rd and 4th toe the patient complaints of generalized weakness but denies fever chills no hematemesis hemoptysis chest pain black or bloody stools    Podiatry was consulted in the ED Dr. Martinez and agreed to see the patient in consult  REVIEW OF SYSTEMS:   10 Point Review of System was performed and was found to be negative except for that mentioned already in the HPI and   Review of Systems (Negative unless checked off)  Review of Systems   Constitutional:  Positive for malaise/fatigue.   HENT: Negative.     Eyes:  Positive for blurred vision.   Respiratory: Negative.     Cardiovascular: Negative.    Gastrointestinal: Negative.    Genitourinary: Negative.    Musculoskeletal: Negative.    Skin:         Infected and necrotic right 3rd toe present on arrival cellulitis to right foot present on arrival   Neurological: Negative.    Endo/Heme/Allergies: Negative.    Psychiatric/Behavioral: Negative.             PAST MEDICAL HISTORY:     Past Medical History:   Diagnosis Date    Diabetes mellitus, type 2     Encounter for blood transfusion     GERD (gastroesophageal reflux disease)     Hyperlipidemia     Hypertension     Neuropathy     Osteoarthritis        PAST SURGICAL HISTORY:     Past Surgical History:   Procedure Laterality Date    MANDIBLE FRACTURE SURGERY  07/17/2000    MANDIBLE FRACTURE SURGERY      SPLENECTOMY, TOTAL  07/17/2000       ALLERGIES:   Pcn [penicillins]    FAMILY HISTORY:     Family History   Problem Relation Age of Onset    Diabetes Mother     Cancer Father        SOCIAL HISTORY:     Social History     Tobacco Use    Smoking status: Every Day    Smokeless tobacco: Never   Substance Use Topics    Alcohol use: No        Social History     Substance and Sexual Activity   Sexual Activity Yes        HOME MEDICATIONS:     Prior to Admission medications    Medication Sig Start Date End Date Taking? Authorizing Provider   blood sugar diagnostic Strp To check BG qid times daily, to use  "with insurance preferred meter 7/12/23   Alfie Lancaster MD   blood-glucose meter kit To check BG qid times daily, to use with insurance preferred meter 8/15/18 10/4/21  Santos Hua MD   EPINEPHrine (EPIPEN) 0.3 mg/0.3 mL AtIn Inject 0.3 mLs (0.3 mg total) into the muscle once. for 1 dose 7/6/21 2/13/23  Santos Hua MD   gabapentin (NEURONTIN) 300 MG capsule Take 2 capsules (600 mg total) by mouth 3 (three) times daily. 5/1/23 4/30/24  Alfie Lancaster MD   insulin (LANTUS SOLOSTAR U-100 INSULIN) glargine 100 units/mL SubQ pen 17 units twice a day 5/1/23   Alfie Lancaster MD   insulin aspart U-100 (NOVOLOG FLEXPEN U-100 INSULIN) 100 unit/mL (3 mL) InPn pen Inject 10 Units into the skin 4 (four) times daily. Per sliding scale as instructed 2/13/23 2/13/24  Alfie Lancaster MD   insulin syringe-needle U-100 0.3 mL 31 gauge x 5/16" Syrg 1 Device by Misc.(Non-Drug; Combo Route) route 4 (four) times daily. 5/1/23   Alfie Lancaster MD   lancets [facility] To check BGqid times daily, to use with insurance preferred meter  Patient taking differently: 1 lancet  by Misc.(Non-Drug; Combo Route) route 4 (four) times daily. To check BGqid times daily, to use with insurance preferred meter 12/9/20   Santos Hua MD   lisinopriL (PRINIVIL,ZESTRIL) 5 MG tablet Take 1 tablet (5 mg total) by mouth once daily. 8/17/23 8/16/24  Alfie Lancaster MD   pen needle, diabetic (BD ULTRA-FINE DENIS PEN NEEDLE MISC) by Misc.(Non-Drug; Combo Route) route.    Provider, Historical   pen needle, diabetic (BD ULTRA-FINE DENIS PEN NEEDLE MISC) 1 Needle by Misc.(Non-Drug; Combo Route) route 4 (four) times daily.    Provider, Historical   pravastatin (PRAVACHOL) 20 MG tablet Take 1 tablet (20 mg total) by mouth every evening. 8/17/23 8/16/24  Alfie Lancaster MD   sildenafiL (VIAGRA) 100 MG tablet Take 1 tablet (100 mg total) by mouth daily as needed for Erectile Dysfunction. 7/6/21 5/1/23  Santos Hua MD   TRUEPLUS LANCETS 33 gauge Misc Inject 1 " lancet into the skin 4 (four) times daily before meals and nightly. 2/13/23   Alfie Lancaster MD         PHYSICAL EXAM:   BP (!) 156/89   Pulse 81   Temp 98.6 °F (37 °C) (Oral)   Resp (!) 31   Ht 6' (1.829 m)   Wt 74.8 kg (165 lb)   SpO2 98%   BMI 22.38 kg/m²   Vitals Reviewed  General appearance: Well-developed, well-nourished male in no apparent distress.  Skin: No Rash.  gangrene to the right 3rd toe with cellulitis to the right foot at the base of the toes and a pus-like drainage in between the 2nd and 3rd and 4th toe /as below  Neuro: Motor and sensory exams grossly intact. Good tone. Power in all 4 extremities 5/5.   HENT: Atraumatic head. Moist mucous membranes of oral cavity.  Eyes: Normal extraocular movements.   Neck: Supple. No evidence of lymphadenopathy. No thyroidomegaly.  Lungs: Clear to auscultation bilaterally. No wheezing present.   Heart: Regular rate and rhythm. S1 and S2 present with no murmurs/gallop/rub. No pedal edema. No JVD present.   Abdomen: Soft, non-distended, non-tender. No rebound tenderness/guarding. No masses or organomegaly. Bowel sounds are normal. Bladder is not palpable.   Extremities: No cyanosis, clubbing, or edema.  Psych/mental status: Alert and oriented. Cooperative. Responds appropriately to questions.                   EMERGENCY DEPARTMENT LABS AND IMAGING:   Following labs were Reviewed   Recent Labs   Lab 10/08/23  1658 10/08/23  2019   WBC 8.05  --    HGB 12.5*  --    HCT 38.1* 36     --    CALCIUM 9.3  --    ALBUMIN 3.3*  --    PROT 8.1  --      --    K 4.5  --    CO2 25  --    CL 99  --    BUN 12  --    CREATININE 0.9  --    ALKPHOS 98  --    ALT 19  --    AST 26  --    BILITOT 0.2  --          BMP:   Recent Labs   Lab 10/08/23  1658   *      K 4.5   CL 99   CO2 25   BUN 12   CREATININE 0.9   CALCIUM 9.3   , CMP   Recent Labs   Lab 10/08/23  1658      K 4.5   CL 99   CO2 25   *   BUN 12   CREATININE 0.9   CALCIUM 9.3  "  PROT 8.1   ALBUMIN 3.3*   BILITOT 0.2   ALKPHOS 98   AST 26   ALT 19   ANIONGAP 12   , CBC   Recent Labs   Lab 10/08/23  1658 10/08/23  2019   WBC 8.05  --    HGB 12.5*  --    HCT 38.1* 36     --    , INR   Lab Results   Component Value Date    INR 1.0 09/27/2021   , Lipid Panel   Lab Results   Component Value Date    CHOL 224 (H) 02/10/2023    HDL 95 (H) 02/10/2023    LDLCALC 122.0 02/10/2023    TRIG 35 02/10/2023    CHOLHDL 42.4 02/10/2023   , Troponin No results for input(s): "TROPONINI" in the last 168 hours., A1C:   Recent Labs   Lab 04/26/23  0659 08/07/23  0843   HGBA1C 10.0* 9.4*   , and All labs within the past 24 hours have been reviewed  Microbiology Results (last 7 days)       Procedure Component Value Units Date/Time    Blood Culture #1 **CANNOT BE ORDERED STAT** [[id number]] Collected: 10/08/23 1616    Order Status: Sent Specimen: Blood from Antecubital, Right Updated: 10/08/23 1626          US Lower Extremity Veins Right   Final Result      X-Ray Toe 2 or More Views Right   Final Result        US Lower Extremity Veins Right    Result Date: 10/8/2023  US LOWER EXTREMITY VEINS LIMITED FOLLOW UP RIGHT ADDITIONAL PERTINENT HISTORY:  Pain. COMPARISON STUDIES:   None. FINDINGS: Grayscale compression, duplex and color Doppler interrogation of the right lower extremity deep veins from common femoral vein to proximal calf was performed. The greater saphenous vein in the ipsilateral proximal thigh was evaluated using similar technique. Right lower extremity: Common femoral vein:  Negative. Femoral vein:  Negative. Deep femoral vein:  Negative. Popliteal vein:  Negative. Visualized deep calf veins  Negative. Greater saphenous vein in the proximal thigh:  Negative. Popliteal fossa: negative Surrounding soft tissues: Benign-appearing enlarged lymph nodes in the right inguinal region compatible with reactive lymph nodes. IMPRESSION: 1. No evidence of deep venous thrombosis involving the right lower " extremity. Electronically signed by:  Lele Griffith MD  10/08/2023 08:21 PM CDT Workstation: [id number]    X-Ray Toe 2 or More Views Right    Result Date: 10/8/2023  EXAM: XR TOE 2 OR MORE VIEWS RIGHT HISTORY: toe pain. Infection and third toe. COMPARISON:None FINDINGS: 3 views of the toes were obtained. The middle and distal phalanges of the third toe are disconnected from the head of the proximal phalanx of the third toe. This is presumably due to osteomyelitis in the current clinical setting. However acute fracture could also have this appearance. The toes are otherwise unremarkable. The remainder of the joints are well-maintained. There are no fractures and there is no evidence osteomyelitis elsewhere. IMPRESSION:   Fracture or acute bony destruction involving the distal aspect of the proximal phalanx of the third toe resulting in complete disruption of the middle and distal phalanges from the proximal phalanx. Otherwise unremarkable x-rays of the toes.. Electronically signed by:  Naun Davis MD  10/08/2023 05:36 PM CDT Workstation: [id number]        I personally reviewed and agree with the radiologist's findings      ASSESSMENT & PLAN:   Philip Alberts is a 43 y.o. male admitted for    Right 3rd toe gangrene/with fracture versus osteomyelitis  -podiatry consulted  -broad-spectrum IV antibiotics  -wound care consult  -blood cultures sent in ED  -pain management    2. Type 2 diabetes  -continue long-acting insulin Lantus/Levemir is formulary at this facility  -low dose NovoLog insulin sliding scale  -hypoglycemic protocol  -diabetic cardiac diet when eating    3. Essential hypertension  -continue lisinopril      DVT Prophylaxis: will be placed on Jose hose to left foot right lower extremity with cellulitis swelling and open wound for DVT prophylaxis and will be advised to be as mobile as possible and sit in a chair as tolerated.   ________________________________________________________________  Face-to-Face  encounter date: 10/08/2023  Encounter included review of the medical records, interviewing and examining the patient face-to-face, discussion with family and other health care providers including emergency medicine physician, admission orders, interpreting lab/test results and formulating a plan of care.   Medical Decision Making during this encounter was  [_] Low Complexity  [_] Moderate Complexity  [x] High Complexity  _________________________________________________________________________________    INPATIENT LIST OF MEDICATIONS     Current Facility-Administered Medications:     0.9%  NaCl infusion, , Intravenous, Continuous, Palmira Conteh NP    acetaminophen tablet 650 mg, 650 mg, Oral, Q8H PRN, Palmira Conteh NP    [START ON 10/9/2023] gabapentin capsule 600 mg, 600 mg, Oral, TID, Palmira Conteh NP    HYDROcodone-acetaminophen 5-325 mg per tablet 1 tablet, 1 tablet, Oral, Q6H PRN, Palmira Conteh NP    insulin detemir U-100 (Levemir) pen 17 Units, 17 Units, Subcutaneous, BID, Palmira Conteh NP    [START ON 10/9/2023] lisinopriL tablet 5 mg, 5 mg, Oral, Daily, Palmira Conteh NP    melatonin tablet 6 mg, 6 mg, Oral, Nightly PRN, Palmira Conteh NP    ondansetron injection 4 mg, 4 mg, Intravenous, Q8H PRN, Palmira Conteh NP    pravastatin tablet 20 mg, 20 mg, Oral, QHS, Palmira Conteh NP    sodium chloride 0.9% flush 10 mL, 10 mL, Intravenous, Q12H PRN, Palmira Conteh NP    Current Outpatient Medications:     blood sugar diagnostic Strp, To check BG qid times daily, to use with insurance preferred meter, Disp: 150 strip, Rfl: 3    blood-glucose meter kit, To check BG qid times daily, to use with insurance preferred meter, Disp: 1 each, Rfl: 0    EPINEPHrine (EPIPEN) 0.3 mg/0.3 mL AtIn, Inject 0.3 mLs (0.3 mg total) into the muscle once. for 1 dose, Disp: 0.3 mL, Rfl: 2    gabapentin (NEURONTIN) 300 MG capsule, Take 2 capsules (600 mg total) by mouth 3 (three) times daily., Disp: 180 capsule, Rfl: 5     "insulin (LANTUS SOLOSTAR U-100 INSULIN) glargine 100 units/mL SubQ pen, 17 units twice a day, Disp: 9 mL, Rfl: 5    insulin aspart U-100 (NOVOLOG FLEXPEN U-100 INSULIN) 100 unit/mL (3 mL) InPn pen, Inject 10 Units into the skin 4 (four) times daily. Per sliding scale as instructed, Disp: 12 mL, Rfl: 5    insulin syringe-needle U-100 0.3 mL 31 gauge x 5/16" Syrg, 1 Device by Misc.(Non-Drug; Combo Route) route 4 (four) times daily., Disp: 200 each, Rfl: 3    lancets Misc, To check BGqid times daily, to use with insurance preferred meter (Patient taking differently: 1 lancet  by Misc.(Non-Drug; Combo Route) route 4 (four) times daily. To check BGqid times daily, to use with insurance preferred meter), Disp: 200 each, Rfl: 3    lisinopriL (PRINIVIL,ZESTRIL) 5 MG tablet, Take 1 tablet (5 mg total) by mouth once daily., Disp: 90 tablet, Rfl: 3    pen needle, diabetic (BD ULTRA-FINE DENIS PEN NEEDLE MISC), by Misc.(Non-Drug; Combo Route) route., Disp: , Rfl:     pen needle, diabetic (BD ULTRA-FINE DENIS PEN NEEDLE MISC), 1 Needle by Misc.(Non-Drug; Combo Route) route 4 (four) times daily., Disp: , Rfl:     pravastatin (PRAVACHOL) 20 MG tablet, Take 1 tablet (20 mg total) by mouth every evening., Disp: 90 tablet, Rfl: 3    sildenafiL (VIAGRA) 100 MG tablet, Take 1 tablet (100 mg total) by mouth daily as needed for Erectile Dysfunction., Disp: 20 tablet, Rfl: 5    TRUEPLUS LANCETS 33 gauge Misc, Inject 1 lancet into the skin 4 (four) times daily before meals and nightly., Disp: 150 each, Rfl: 5      Scheduled Meds:  Continuous Infusions:  PRN Meds:.      Palmira Conteh  [facility] Hospitalist NP  10/08/2023   "

## 2023-10-09 NOTE — ASSESSMENT & PLAN NOTE
Imaging showed  Fluid collection with tract extending to the plantar foot at the third digit involves the third PIP joint.   There is a destructive erosion of the head of the third proximal phalanx with subluxation of the third PIP joint.   These findings are consistent with osteomyelitis and septic arthritis with fistulous tract extending from the PIP joint to the plantar tissues of the third digit  Started on iv abx  Podiatry on Board and pt will have OR visit on 10/09

## 2023-10-09 NOTE — PHARMACY MED REC
"        Admission Medication History     The home medication history was taken by Anayeli Andre.    You may go to "Admission" then "Reconcile Home Medications" tabs to review and/or act upon these items.     The home medication list has been updated by the Pharmacy department.   Please read ALL comments highlighted in yellow.   Please address this information as you see fit.    Feel free to contact us if you have any questions or require assistance.        Medications listed below were obtained from: Patient/family and Analytic software- [employer]  No current facility-administered medications on file prior to encounter.     Current Outpatient Medications on File Prior to Encounter   Medication Sig Dispense Refill    esomeprazole (NEXIUM) 20 MG capsule Take 20 mg by mouth before breakfast.      gabapentin (NEURONTIN) 300 MG capsule Take 2 capsules (600 mg total) by mouth 3 (three) times daily. 180 capsule 5    insulin (LANTUS SOLOSTAR U-100 INSULIN) glargine 100 units/mL SubQ pen 17 units twice a day (Patient taking differently: Inject 17 Units into the skin 2 (two) times a day. 17 units twice a day) 9 mL 5    insulin aspart U-100 (NOVOLOG FLEXPEN U-100 INSULIN) 100 unit/mL (3 mL) InPn pen Inject 10 Units into the skin 4 (four) times daily. Per sliding scale as instructed 12 mL 5    lisinopriL (PRINIVIL,ZESTRIL) 5 MG tablet Take 1 tablet (5 mg total) by mouth once daily. 90 tablet 3    pravastatin (PRAVACHOL) 20 MG tablet Take 1 tablet (20 mg total) by mouth every evening. 90 tablet 3    sildenafiL (VIAGRA) 100 MG tablet Take 1 tablet (100 mg total) by mouth daily as needed for Erectile Dysfunction. 20 tablet 5    blood sugar diagnostic Strp To check BG qid times daily, to use with insurance preferred meter 150 strip 3    blood-glucose meter kit To check BG qid times daily, to use with insurance preferred meter 1 each 0    EPINEPHrine (EPIPEN) 0.3 mg/0.3 mL AtIn Inject 0.3 mLs (0.3 mg total) into the muscle once. " "for 1 dose 0.3 mL 2    insulin syringe-needle U-100 0.3 mL 31 gauge x 5/16" Syrg 1 Device by Misc.(Non-Drug; Combo Route) route 4 (four) times daily. 200 each 3    lancets Misc To check BGqid times daily, to use with insurance preferred meter (Patient taking differently: 1 lancet  by Misc.(Non-Drug; Combo Route) route 4 (four) times daily. To check BGqid times daily, to use with insurance preferred meter) 200 each 3    pen needle, diabetic (BD ULTRA-FINE DENIS PEN NEEDLE MISC) by Misc.(Non-Drug; Combo Route) route.      pen needle, diabetic (BD ULTRA-FINE DENIS PEN NEEDLE MISC) 1 Needle by Misc.(Non-Drug; Combo Route) route 4 (four) times daily.      TRUEPLUS LANCETS 33 gauge Misc Inject 1 lancet into the skin 4 (four) times daily before meals and nightly. 150 each 5           Anayeli Andre  EXT 1924          .          "

## 2023-10-09 NOTE — SUBJECTIVE & OBJECTIVE
Interval History:     Review of Systems   Constitutional:  Negative for activity change and appetite change.   HENT:  Negative for congestion and dental problem.    Eyes:  Negative for discharge and itching.   Respiratory:  Negative for shortness of breath.    Cardiovascular:  Negative for chest pain.   Gastrointestinal:  Negative for abdominal distention and abdominal pain.   Endocrine: Negative for cold intolerance.   Genitourinary:  Negative for difficulty urinating and dysuria.   Musculoskeletal:  Negative for arthralgias and back pain.   Skin:  Positive for wound. Negative for color change.   Neurological:  Negative for dizziness and facial asymmetry.   Hematological:  Negative for adenopathy.   Psychiatric/Behavioral:  Negative for agitation and behavioral problems.      Objective:     Vital Signs (Most Recent):  Temp: 98.3 °F (36.8 °C) (10/09/23 1232)  Pulse: 66 (10/09/23 1232)  Resp: 16 (10/09/23 1232)  BP: 138/76 (10/09/23 1232)  SpO2: 98 % (10/09/23 1232) Vital Signs (24h Range):  Temp:  [97.6 °F (36.4 °C)-98.6 °F (37 °C)] 98.3 °F (36.8 °C)  Pulse:  [60-92] 66  Resp:  [13-31] 16  SpO2:  [96 %-100 %] 98 %  BP: (107-184)/() 138/76     Weight: 74.9 kg (165 lb 2 oz)  Body mass index is 22.39 kg/m².    Intake/Output Summary (Last 24 hours) at 10/9/2023 1324  Last data filed at 10/9/2023 1313  Gross per 24 hour   Intake 740 ml   Output --   Net 740 ml         Physical Exam  Vitals and nursing note reviewed.   Constitutional:       Appearance: He is well-developed.   HENT:      Head: Atraumatic.   Eyes:      Pupils: Pupils are equal, round, and reactive to light.   Cardiovascular:      Rate and Rhythm: Normal rate and regular rhythm.   Pulmonary:      Effort: Pulmonary effort is normal.      Breath sounds: Normal breath sounds.   Abdominal:      General: Bowel sounds are normal.      Palpations: Abdomen is soft.   Musculoskeletal:         General: Normal range of motion.      Cervical back: Full passive  range of motion without pain and normal range of motion.   Skin:     General: Skin is warm.      Comments: Bandage intact on foot   Neurological:      Mental Status: He is alert and oriented to person, place, and time.             Significant Labs: All pertinent labs within the past 24 hours have been reviewed.  CBC:   Recent Labs   Lab 10/08/23  1658 10/08/23  2019 10/09/23  0627   WBC 8.05  --  6.45   HGB 12.5*  --  11.2*   HCT 38.1* 36 35.0*     --  228     CMP:   Recent Labs   Lab 10/08/23  1658 10/08/23  2128 10/09/23  0626    137 138   K 4.5 4.1 4.1   CL 99 101 102   CO2 25 26 34*   * 237* 212*   BUN 12 10 9   CREATININE 0.9 0.6 0.6   CALCIUM 9.3 9.1 8.7   PROT 8.1 7.3 6.7   ALBUMIN 3.3* 3.6 3.3*   BILITOT 0.2 0.3 0.2   ALKPHOS 98 80 77   AST 26 18 17   ALT 19 14 12   ANIONGAP 12 10 2*       Significant Imaging: I have reviewed all pertinent imaging results/findings within the past 24 hours.

## 2023-10-09 NOTE — CONSULTS
[facility]  Podiatry  Consult Note    Patient Name: Philip Alberts  MRN: [id number]  Admission Date: 10/8/2023  Hospital Length of Stay: 1 days  Attending Physician: Kaden Sparks MD  Primary Care Provider: Alfie Lancaster MD     Inpatient consult to Podiatry  Consult performed by: Ashok Santiago DPM  Consult ordered by: Tony Villagomez, DEBORA        Subjective:     History of Present Illness:  43-year-old male with past medical history of DM to an associated neuropathy admitted due to worsening infection of the right foot.  Patient states that he stepped on a nail several weeks ago.  Due to his neuropathy he did not feel it and was weight-bearing on the nail most of the day.  He states that he began to develop swelling and redness in the foot.  He was seen at the urgent care twice for this.  He had both oral and IM antibiotics.  He states that several days ago the foot continue to worsen which prompted admission to the hospital    Scheduled Meds:   gabapentin  600 mg Oral TID    insulin detemir U-100  17 Units Subcutaneous BID    lisinopriL  5 mg Oral Daily    meropenem (MERREM) IVPB  1 g Intravenous Q8H    pravastatin  20 mg Oral QHS    vancomycin (VANCOCIN) IV (PEDS and ADULTS)  1,250 mg Intravenous Q12H     Continuous Infusions:   sodium chloride 0.9% 125 mL/hr at 10/08/23 2308     PRN Meds:acetaminophen, dextrose 50%, dextrose 50%, glucagon (human recombinant), glucose, glucose, HYDROcodone-acetaminophen, insulin aspart U-100, melatonin, ondansetron, sodium chloride 0.9%, sodium chloride 0.9%, Pharmacy to dose Vancomycin consult **AND** vancomycin - pharmacy to dose    Review of patient's allergies indicates:   Allergen Reactions    Pcn [penicillins] Anaphylaxis        Past Medical History:   Diagnosis Date    Diabetes mellitus, type 2     Encounter for blood transfusion     GERD (gastroesophageal reflux disease)     Hyperlipidemia     Hypertension     Neuropathy     Osteoarthritis      Past Surgical  History:   Procedure Laterality Date    MANDIBLE FRACTURE SURGERY  07/17/2000    MANDIBLE FRACTURE SURGERY      SPLENECTOMY, TOTAL  07/17/2000       Family History       Problem Relation (Age of Onset)    Cancer Father    Diabetes Mother          Tobacco Use    Smoking status: Every Day    Smokeless tobacco: Never   Substance and Sexual Activity    Alcohol use: No    Drug use: No    Sexual activity: Yes     Review of Systems   Constitutional:  Negative for chills, fatigue, fever and unexpected weight change.   HENT:  Negative for hearing loss and trouble swallowing.    Eyes:  Negative for photophobia and visual disturbance.   Respiratory:  Negative for cough, shortness of breath and wheezing.    Cardiovascular:  Negative for chest pain, palpitations and leg swelling.   Gastrointestinal:  Negative for abdominal pain and nausea.   Genitourinary:  Negative for dysuria and frequency.   Musculoskeletal:  Positive for joint swelling. Negative for arthralgias, back pain, gait problem and myalgias.   Skin:  Positive for color change and wound. Negative for rash.   Neurological:  Positive for numbness. Negative for tremors, seizures, speech difficulty, weakness and headaches.   Hematological:  Does not bruise/bleed easily.     Objective:     Vital Signs (Most Recent):  Temp: 97.6 °F (36.4 °C) (10/09/23 0740)  Pulse: 89 (10/09/23 0740)  Resp: 18 (10/09/23 0740)  BP: (!) 107/56 (10/09/23 0740)  SpO2: 98 % (10/09/23 0740) Vital Signs (24h Range):  Temp:  [97.6 °F (36.4 °C)-98.6 °F (37 °C)] 97.6 °F (36.4 °C)  Pulse:  [60-92] 89  Resp:  [13-31] 18  SpO2:  [96 %-100 %] 98 %  BP: (107-184)/() 107/56     Weight: 74.9 kg (165 lb 2 oz)  Body mass index is 22.39 kg/m².    Foot Exam    Laboratory:  A1C:   Recent Labs   Lab 04/26/23  0659 08/07/23  0843   HGBA1C 10.0* 9.4*     CBC:   Recent Labs   Lab 10/09/23  0627   WBC 6.45   RBC 3.72*   HGB 11.2*   HCT 35.0*      MCV 94   MCH 30.1   MCHC 32.0     CMP:   Recent Labs   Lab  10/09/23  0626   *   CALCIUM 8.7   ALBUMIN 3.3*   PROT 6.7      K 4.1   CO2 34*      BUN 9   CREATININE 0.6   ALKPHOS 77   ALT 12   AST 17   BILITOT 0.2     CRP:   Recent Labs   Lab 10/08/23  1658   CRP 51.1*     ESR:   Recent Labs   Lab 10/08/23  1658   SEDRATE >90*       Diagnostic Results:  I have reviewed all pertinent imaging results/findings within the past 24 hours.    US Lower Extremity Veins Right  US LOWER EXTREMITY VEINS LIMITED FOLLOW UP RIGHT    ADDITIONAL PERTINENT HISTORY:  Pain.    COMPARISON STUDIES:   None.    FINDINGS:  Grayscale compression, duplex and color Doppler interrogation of the right lower extremity deep veins from common femoral vein to proximal calf was performed. The greater saphenous vein in the ipsilateral proximal thigh was evaluated using similar technique.    Right lower extremity:  Common femoral vein:  Negative.  Femoral vein:  Negative.  Deep femoral vein:  Negative.  Popliteal vein:  Negative.  Visualized deep calf veins  Negative.    Greater saphenous vein in the proximal thigh:  Negative.  Popliteal fossa: negative    Surrounding soft tissues: Benign-appearing enlarged lymph nodes in the right inguinal region compatible with reactive lymph nodes.    IMPRESSION:  1. No evidence of deep venous thrombosis involving the right lower extremity.    Electronically signed by:  Lele Griffith MD  10/08/2023 08:21 PM CDT Workstation: [id number]  X-Ray Toe 2 or More Views Right  EXAM: XR TOE 2 OR MORE VIEWS RIGHT    HISTORY: toe pain. Infection and third toe.    COMPARISON:None    FINDINGS: 3 views of the toes were obtained. The middle and distal phalanges of the third toe are disconnected from the head of the proximal phalanx of the third toe. This is presumably due to osteomyelitis in the current clinical setting. However acute fracture could also have this appearance. The toes are otherwise unremarkable. The remainder of the joints are well-maintained. There are no  fractures and there is no evidence osteomyelitis elsewhere.    IMPRESSION:   Fracture or acute bony destruction involving the distal aspect of the proximal phalanx of the third toe resulting in complete disruption of the middle and distal phalanges from the proximal phalanx. Otherwise unremarkable x-rays of the toes..    Electronically signed by:  Naun Davis MD  10/08/2023 05:36 PM CDT Workstation: [id number]        Clinical Findings:                    Assessment/Plan:     Active Diagnoses:    Diagnosis Date Noted POA    PRINCIPAL PROBLEM:  Gangrene of toe of right foot [I96] 10/08/2023 Yes    Skin ulcer of third toe of right foot, with necrosis of bone [L97.514] 10/08/2023 Yes    Cellulitis of extremity [L03.119] 10/08/2023 Yes    Diabetes mellitus, type 2 [E11.9] 08/15/2018 Yes      Problems Resolved During this Admission:       I discussed with the patient findings of gangrenous changes of the 3rd toe.  I explained that unfortunately due to the degree of tissue necrosis the toe is not salvageable and will require amputation.  Patient states that he did expect this and is in agreement with this.  Patient also has wounds to the adjacent toes which will need continued wound care.  Plan will be for amputation of the 3rd toe and debridement of the other toe wounds today in the OR.  I am also ordering an MRI for further evaluation extent of bone infection.  Patient currently NPO.  All risk and benefits were discussed in detail with the patient    Thank you for your consult. I will follow-up with patient. Please contact us if you have any additional questions.    Ashok Santiago DPM  Podiatry  [facility]

## 2023-10-09 NOTE — NURSING
Nurses Note -- 4 Eyes      10/8/2023   11:56 PM      Skin assessed during: Admit      [] No Altered Skin Integrity Present    []Prevention Measures Documented      [x] Yes- Altered Skin Integrity Present or Discovered   [] LDA Added if Not in Epic (Describe Wound)   [x] New Altered Skin Integrity was Present on Admit and Documented in LDA   [x] Wound Image Taken    Wound Care Consulted? Yes    Attending Nurse:  Jeanine Sneed RN/Staff Member:   AR Lange

## 2023-10-09 NOTE — OP NOTE
Operative Report     Patient name: Philip Alberts   MRN: [id number]  Date of surgery: 10/9/2023    Surgeon: Ashok Santiago DPM   Assistant:  None    Preoperative diagnosis:  1.  Gangrene right 3rd toe 2.  Grade 3 ulceration right 2nd and 4th toes  Postoperative diagnosis:  Same as above  Procedure:  1.  Amputation right 3rd toe 2.  Excisional debridement below fascia of right 2nd and 4th toes   Anesthesia:  Mac with local  Hemostasis:  Pneumatic ankle tourniquet at 250 mmHg  Estimated blood loss:  2 mL   Specimen:  1.  Right 3rd toe 2.  Bone right 3rd toe for culture 3.  Swab culture from right 4th toe   Complications: None  Condition upon discharge: Stable    Procedure in detail:  The patient was brought the operating room and left on his hospital bed.  Following adequate IV sedation a local block of 10 cc 0.5% Marcaine plain was utilized a block the surgical site.  Well-padded pneumatic ankle tourniquet was then placed around the patient's right ankle and set at 250 mmHg.  The right foot was then prepped scrubbed and draped in normal aseptic manner.  A time-out was then called.  An Esmarch bandage was utilized to exsanguinate the right foot the right pneumatic ankle tourniquet was inflated to 250 mmHg.  At this time attention was directed the patient's right foot where the 3rd toe was noted to be necrotic and gangrenous was fully exposed bone and soupy surrounding tissue.  There also noted to be necrotic ulcerations on the lateral aspect of the 2nd toe continuous with the 3rd toe wound and on the lateral aspect of the 4th toe.  At this time utilizing a 15 blade a racquet type incision was made about the base of the 3rd toe.  Dissection was then carried sharply down to the 3rd metatarsophalangeal joint in the 3rd toe was sharply disarticulated and passed from the operating field.  A large specimen from the proximal phalanx was taken and sent for culture.  The remainder of the 3rd toe was placed in formalin to be sent  to pathology.  Inspection of the 3rd metatarsophalangeal joint also revealed a large amount of necrotic nonviable tissue surrounding.  This was excised with a 15 blade.  Attention was then directed to the lateral aspect of the 2nd toe where utilizing a metal curette excisional debridement of dermis epidermis subcutaneous tissue and fascia was performed.  The wound extended down to tendon and bone.  Area debrided measured proximally 1.5 cm x 2 cm x 0.4 cm in depth.  Attention was then directed to the lateral aspect of the 4th toe where once again utilizing a metal curette excisional debridement of dermis epidermis subcutaneous tissue and fascia was performed.  Area debrided measured approximately 1.5 cm x 1 cm x 0.4 cm in depth.  A swab culture was also taken of this wound.  At this time I was able to partially  close the 3rd toe amputation site.  Due to the amount of surrounding nonviable tissue I was unable to fully coapted wound.  The subcutaneous tissues were closed utilizing 3-0 Vicryl.  3-0 Prolene was utilized to partially coapted the skin layers.  An additional 10 cc of local anesthetic was injected into the surgical site.  The patient's foot was then dressed with Xeroform 4x4s Kerlix and an Ace wrap.  The pneumatic ankle tourniquet was deflated neurovascular status noted be intact to the right foot.  Patient's right foot was placed in a postoperative shoe.  Patient tolerated the procedure well.  He had anesthesia reversed and left the operating Room stable vitals.    Patient will return to his room on the floor.  I placed a consult to Wound Care to place a wound VAC for the remaining open wounds on the right foot.  Will follow intraoperative cultures.

## 2023-10-09 NOTE — TRANSFER OF CARE
Anesthesia Transfer of Care Note    Patient: Philip Alberts    Procedure(s) Performed: Procedure(s) (LRB):  AMPUTATION, TOE (Right)    Patient location: [facility] Surgical Floor    Anesthesia Type: MAC    Transport from OR: Transported from OR on room air with adequate spontaneous ventilation    Post pain: adequate analgesia    Post assessment: no apparent anesthetic complications    Post vital signs: stable    Level of consciousness: awake and alert    Nausea/Vomiting: no nausea/vomiting    Complications: none    Transfer of care protocol was followed      Last vitals:   Visit Vitals  /76   Pulse 66   Temp 36.8 °C (98.3 °F) (Oral)   Resp 16   Ht 6' (1.829 m)   Wt 74.9 kg (165 lb 2 oz)   SpO2 98%   BMI 22.39 kg/m²

## 2023-10-09 NOTE — HOSPITAL COURSE
Pt got admitted with Gangrene/Osteomyelitis of R foot toe & Septic arthritis   Imaging showed  Fluid collection with tract extending to the plantar foot at the third digit involves the third PIP joint.   There was a destructive erosion of the head of the third proximal phalanx with subluxation of the third PIP joint.   Pt underwent Amputation right 3rd toe and  Excisional debridement below fascia of right 2nd and 4th toes   Later pt was discharged to home with PO doxycycline  He will get iv Dalvance x 2 via infusion center

## 2023-10-09 NOTE — ANESTHESIA PREPROCEDURE EVALUATION
10/09/2023  Philip Alberts is a 43 y.o., male.         Patient Active Problem List   Diagnosis    Diabetes mellitus, type 2    GERD (gastroesophageal reflux disease)    Post-traumatic osteoarthritis of multiple joints    Type 2 diabetes mellitus with mild nonproliferative retinopathy    Uncontrolled type 2 diabetes mellitus with hyperglycemia    Hyperlipidemia    Hypertension    Smoking    Sciatica of right side    Chronic rhinitis    Sebaceous cyst    Diabetic polyneuropathy associated with secondary diabetes mellitus    Chronic allergic rhinitis    Mild nonproliferative diabetic retinopathy of both eyes without macular edema associated with type 2 diabetes mellitus    Cellulitis    Skin ulcer of third toe of right foot, with necrosis of bone    Cellulitis of extremity    Gangrene of toe of right foot       Past Surgical History:   Procedure Laterality Date    MANDIBLE FRACTURE SURGERY  07/17/2000    MANDIBLE FRACTURE SURGERY      SPLENECTOMY, TOTAL  07/17/2000        Tobacco Use:  The patient  reports that he has been smoking. He has never used smokeless tobacco.     Results for orders placed or performed during the hospital encounter of 09/27/21   EKG 12-lead    Collection Time: 09/27/21 11:29 PM    Narrative    Test Reason : R50.9,    Vent. Rate : 098 BPM     Atrial Rate : 098 BPM     P-R Int : 148 ms          QRS Dur : 076 ms      QT Int : 336 ms       P-R-T Axes : 071 092 068 degrees     QTc Int : 428 ms    Sinus rhythm with occasional Premature ventricular complexes  Rightward axis  Borderline Abnormal ECG  No previous ECGs available  Confirmed by Shar ODONNELL, Grady ALMANZAR ([id number]) on 9/30/2021 8:55:42 AM    Referred By: TOMI   SELF           Confirmed By:Grady Myles MD        Imaging Results          US Lower Extremity Veins Right (Final result)  Result time 10/08/23 20:21:03     Final result by Lele Griffith MD (10/08/23 20:21:03)                 Narrative:    US LOWER EXTREMITY VEINS LIMITED FOLLOW UP RIGHT    ADDITIONAL PERTINENT HISTORY:  Pain.    COMPARISON STUDIES:   None.    FINDINGS:  Grayscale compression, duplex and color Doppler interrogation of the right lower extremity deep veins from common femoral vein to proximal calf was performed. The greater saphenous vein in the ipsilateral proximal thigh was evaluated using similar technique.    Right lower extremity:  Common femoral vein:  Negative.  Femoral vein:  Negative.  Deep femoral vein:  Negative.  Popliteal vein:  Negative.  Visualized deep calf veins  Negative.    Greater saphenous vein in the proximal thigh:  Negative.  Popliteal fossa: negative    Surrounding soft tissues: Benign-appearing enlarged lymph nodes in the right inguinal region compatible with reactive lymph nodes.    IMPRESSION:  1. No evidence of deep venous thrombosis involving the right lower extremity.    Electronically signed by:  Lele Griffith MD  10/08/2023 08:21 PM CDT Workstation: [id number]                             X-Ray Toe 2 or More Views Right (Final result)  Result time 10/08/23 17:36:27    Final result by Naun Davis MD (10/08/23 17:36:27)                 Narrative:      EXAM: XR TOE 2 OR MORE VIEWS RIGHT    HISTORY: toe pain. Infection and third toe.    COMPARISON:None    FINDINGS: 3 views of the toes were obtained. The middle and distal phalanges of the third toe are disconnected from the head of the proximal phalanx of the third toe. This is presumably due to osteomyelitis in the current clinical setting. However acute fracture could also have this appearance. The toes are otherwise unremarkable. The remainder of the joints are well-maintained. There are no fractures and there is no evidence osteomyelitis elsewhere.    IMPRESSION:   Fracture or acute bony destruction involving the distal aspect of the proximal phalanx of the third  toe resulting in complete disruption of the middle and distal phalanges from the proximal phalanx. Otherwise unremarkable x-rays of the toes..    Electronically signed by:  Naun Davis MD  10/08/2023 05:36 PM CDT Workstation: [id number]                               Lab Results   Component Value Date    WBC 6.45 10/09/2023    HGB 11.2 (L) 10/09/2023    HCT 35.0 (L) 10/09/2023    MCV 94 10/09/2023     10/09/2023     BMP  Lab Results   Component Value Date     10/09/2023    K 4.1 10/09/2023     10/09/2023    CO2 34 (H) 10/09/2023    BUN 9 10/09/2023    CREATININE 0.6 10/09/2023    CALCIUM 8.7 10/09/2023    ANIONGAP 2 (L) 10/09/2023     (H) 10/09/2023     (H) 10/08/2023     (H) 10/08/2023       No results found for this or any previous visit.        Pre-op Assessment    I have reviewed the Patient Summary Reports.     I have reviewed the Nursing Notes. I have reviewed the NPO Status.   I have reviewed the Medications.     Review of Systems  Anesthesia Hx:  Patient had delayed emergence after a few jaw surgeries at [facility] many years ago. History of prior surgery of interest to airway management or planning: jaw. Denies Family Hx of Anesthesia complications.   Denies Personal Hx of Anesthesia complications.   Social:  No Alcohol Use, Smoker    Hematology/Oncology:     Oncology Normal    -- Anemia:   EENT/Dental:   chronic allergic rhinitis Only 5 teeth, all lower Eyes: Eye Disease: Diabetic Retinopathy     Cardiovascular:   Hypertension, well controlled hyperlipidemia ECG has been reviewed.    Pulmonary:  Pulmonary Normal    Renal/:  Renal/ Normal     Hepatic/GI:   GERD, well controlled    Musculoskeletal:   Arthritis  Gangrene of toe of right foot    Sciatica of right side Joint Disease:  Arthritis, Osteoarthritis    Neurological:   Neuromuscular Disease, Sciatica of right side Osteoarthritis  Peripheral Neuropathy    Endocrine:   Diabetes, poorly controlled,  using insulin    Psych:  Psychiatric Normal           Physical Exam  General: Well nourished, Cooperative, Alert and Oriented    Airway:  Mallampati: II   Mouth Opening: Normal  TM Distance: Normal  Tongue: Normal  Neck ROM: Normal ROM    Chest/Lungs:  Clear to auscultation, Normal Respiratory Rate    Heart:  Rate: Normal  Rhythm: Regular Rhythm  Sounds: Normal        Anesthesia Plan  Type of Anesthesia, risks & benefits discussed:    Anesthesia Type: Gen Natural Airway  Intra-op Monitoring Plan: Standard ASA Monitors  Post Op Pain Control Plan: multimodal analgesia and IV/PO Opioids PRN  Induction:  IV  Airway Plan: , Post-Induction  Informed Consent: Informed consent signed with the Patient and all parties understand the risks and agree with anesthesia plan.  All questions answered.   ASA Score: 3  Anesthesia Plan Notes: GNA    No Decadron     Zofran 4 mg iv    Ofirmev 1000 mg iv    Ready For Surgery From Anesthesia Perspective.     .

## 2023-10-09 NOTE — PLAN OF CARE
[facility]  Initial Discharge Assessment       Primary Care Provider: Alfie Lancaster MD    Admission Diagnosis: Gangrene of toe [I96]    Admission Date: 10/8/2023  Expected Discharge Date:     Transition of Care Barriers: None     met with Pt at bedside to complete discharge assessment. Pt AAOx4s. Demographics, PCP, and insurance verified. No home health. No dialysis. Pt reports ability to complete ADLs without assistance. DME listed below. Pt verbalized plan to discharge home via family transport. Pt has no other needs to be addressed at this time. CM will continue to follow.     Payor: MEDICAID / Plan: [place] [employer] / Product Type: Managed Medicaid /     Extended Emergency Contact Information  Primary Emergency Contact: Haase,Wanda  Address: [address] [place] [address] [id number] [place] [address]  Home Phone: [phone]  Mobile Phone: [phone]  Relation: Mother   needed? No    Discharge Plan A: Home with family  Discharge Plan B: Home with family      [facility] [place], [place] - [address] [facility].  [address] [place] [address]  Phone: [phone] Fax: [phone]      Initial Assessment (most recent)       Adult Discharge Assessment - 10/09/23 0935          Discharge Assessment    Assessment Type Discharge Planning Assessment     Confirmed/corrected address, phone number and insurance Yes     Confirmed Demographics Correct on Facesheet     Source of Information patient     Reason For Admission Gangrene of toe of right foot     People in Home parent(s)     Facility Arrived From: home     Do you expect to return to your current living situation? Yes     Do you have help at home or someone to help you manage your care at home? No     Prior to hospitilization cognitive status: Unable to Assess     Current cognitive status: Alert/Oriented     Equipment Currently Used at Home glucometer     Readmission within 30 days? No      Patient currently being followed by outpatient case management? No     Do you currently have service(s) that help you manage your care at home? No     Do you take prescription medications? Yes     Do you have prescription coverage? Yes     Cover[place] [facility] [facility] [place] [facility]     Do you have any problems affording any of your prescribed medications? No     Is the patient taking medications as prescribed? yes     Who is going to help you get home at discharge? Haase,Wanda (Mother)   [phone] (Mobile)     How do you get to doctors appointments? car, drives self     Are you on dialysis? No     Do you take coumadin? No     DME Needed Upon Discharge  none     Discharge Plan discussed with: Patient     Transition of Care Barriers None     Discharge Plan A Home with family     Discharge Plan B Home with family

## 2023-10-09 NOTE — CARE UPDATE
10/08/23 2000   Patient Assessment/Suction   Level of Consciousness (AVPU) alert   Respiratory Effort Normal   PRE-TX-O2   Device (Oxygen Therapy) room air   SpO2 96 %   Pulse Oximetry Type Continuous   Pulse 74   Resp (!) 23   BP (!) 144/82   Labs   $ Was an ABG obtained? Venous Line  (drawn by RN)   $ Labs Tech Time 15 min

## 2023-10-09 NOTE — NURSING
"Pt arrived to unit via stretcher with x1 transporter in attendance.  A/O x4.  Respirations unlabored on RA.  Pt initially refused 4 eye assessment but then changed to refuse only private and buttocks to be visualized but states "I swear I don't have nothing on me."  Wound to foot continues with pictures obtained in ED.  Otherwise skin intact with old scarring scattered throughout.  Pt endorses numbness to rt foot and intermittent tingling to left foot at baseline.  VSS.  See flowsheet for full assessment.  Able to verbalize wants/needs.  No s/s of distress.  Fall/safety precautions initiated.  Pt refusing bed alarm at present but states will utilize call light for assistance when getting OOB.    "

## 2023-10-09 NOTE — PROGRESS NOTES
[facility]  Progress Note    Patient Name: Philip Alberts  MRN: [id number]  Patient Class: IP- Inpatient   Admission Date: 10/8/2023  Length of Stay: 1 days  Attending Physician: Kaden Sparks MD  Primary Care Provider: Alfie Lancaster MD        Subjective:     Principal Problem:Gangrene of toe of right foot        HPI:  No notes on file    Overview/Hospital Course:  10/09  Pt will have OR visit today  Denies any new issues      Interval History:     Review of Systems   Constitutional:  Negative for activity change and appetite change.   HENT:  Negative for congestion and dental problem.    Eyes:  Negative for discharge and itching.   Respiratory:  Negative for shortness of breath.    Cardiovascular:  Negative for chest pain.   Gastrointestinal:  Negative for abdominal distention and abdominal pain.   Endocrine: Negative for cold intolerance.   Genitourinary:  Negative for difficulty urinating and dysuria.   Musculoskeletal:  Negative for arthralgias and back pain.   Skin:  Positive for wound. Negative for color change.   Neurological:  Negative for dizziness and facial asymmetry.   Hematological:  Negative for adenopathy.   Psychiatric/Behavioral:  Negative for agitation and behavioral problems.      Objective:     Vital Signs (Most Recent):  Temp: 98.3 °F (36.8 °C) (10/09/23 1232)  Pulse: 66 (10/09/23 1232)  Resp: 16 (10/09/23 1232)  BP: 138/76 (10/09/23 1232)  SpO2: 98 % (10/09/23 1232) Vital Signs (24h Range):  Temp:  [97.6 °F (36.4 °C)-98.6 °F (37 °C)] 98.3 °F (36.8 °C)  Pulse:  [60-92] 66  Resp:  [13-31] 16  SpO2:  [96 %-100 %] 98 %  BP: (107-184)/() 138/76     Weight: 74.9 kg (165 lb 2 oz)  Body mass index is 22.39 kg/m².    Intake/Output Summary (Last 24 hours) at 10/9/2023 1324  Last data filed at 10/9/2023 1313  Gross per 24 hour   Intake 740 ml   Output --   Net 740 ml         Physical Exam  Vitals and nursing note reviewed.   Constitutional:       Appearance: He is  well-developed.   HENT:      Head: Atraumatic.   Eyes:      Pupils: Pupils are equal, round, and reactive to light.   Cardiovascular:      Rate and Rhythm: Normal rate and regular rhythm.   Pulmonary:      Effort: Pulmonary effort is normal.      Breath sounds: Normal breath sounds.   Abdominal:      General: Bowel sounds are normal.      Palpations: Abdomen is soft.   Musculoskeletal:         General: Normal range of motion.      Cervical back: Full passive range of motion without pain and normal range of motion.   Skin:     General: Skin is warm.      Comments: Bandage intact on foot   Neurological:      Mental Status: He is alert and oriented to person, place, and time.             Significant Labs: All pertinent labs within the past 24 hours have been reviewed.  CBC:   Recent Labs   Lab 10/08/23  1658 10/08/23  2019 10/09/23  0627   WBC 8.05  --  6.45   HGB 12.5*  --  11.2*   HCT 38.1* 36 35.0*     --  228     CMP:   Recent Labs   Lab 10/08/23  1658 10/08/23  2128 10/09/23  0626    137 138   K 4.5 4.1 4.1   CL 99 101 102   CO2 25 26 34*   * 237* 212*   BUN 12 10 9   CREATININE 0.9 0.6 0.6   CALCIUM 9.3 9.1 8.7   PROT 8.1 7.3 6.7   ALBUMIN 3.3* 3.6 3.3*   BILITOT 0.2 0.3 0.2   ALKPHOS 98 80 77   AST 26 18 17   ALT 19 14 12   ANIONGAP 12 10 2*       Significant Imaging: I have reviewed all pertinent imaging results/findings within the past 24 hours.      Assessment/Plan:      * Gangrene of toe of right foot  Imaging showed  Fluid collection with tract extending to the plantar foot at the third digit involves the third PIP joint.   There is a destructive erosion of the head of the third proximal phalanx with subluxation of the third PIP joint.   These findings are consistent with osteomyelitis and septic arthritis with fistulous tract extending from the PIP joint to the plantar tissues of the third digit  Started on iv abx  Podiatry on Board and pt will have OR visit on 10/09      Septic  arthritis  As above       Osteomyelitis  As above       Cellulitis of extremity  As above       Skin ulcer of third toe of right foot, with necrosis of bone  As above       Diabetes mellitus, type 2  Maintain present insulin regime      VTE Risk Mitigation (From admission, onward)         Ordered     Place JÚNIOR hose  Until discontinued         10/08/23 2120     IP VTE LOW RISK PATIENT  Once         10/08/23 2120                Discharge Planning   ROCIO: 10/11/2023     Code Status: Full Code   Is the patient medically ready for discharge?:     Reason for patient still in hospital (select all that apply): Treatment  Discharge Plan A: Home with family                  Kaden Sparks MD  Department of Hospital Medicine   [facility]

## 2023-10-10 LAB
ALBUMIN SERPL BCP-MCNC: 3.5 G/DL (ref 3.5–5.2)
ALP SERPL-CCNC: 100 U/L (ref 55–135)
ALT SERPL W/O P-5'-P-CCNC: 14 U/L (ref 10–44)
ANION GAP SERPL CALC-SCNC: 5 MMOL/L (ref 8–16)
AST SERPL-CCNC: 20 U/L (ref 10–40)
BASOPHILS # BLD AUTO: 0.1 K/UL (ref 0–0.2)
BASOPHILS NFR BLD: 1.3 % (ref 0–1.9)
BILIRUB SERPL-MCNC: 0.3 MG/DL (ref 0.1–1)
BUN SERPL-MCNC: 11 MG/DL (ref 6–20)
CALCIUM SERPL-MCNC: 9.2 MG/DL (ref 8.7–10.5)
CHLORIDE SERPL-SCNC: 100 MMOL/L (ref 95–110)
CO2 SERPL-SCNC: 33 MMOL/L (ref 23–29)
CREAT SERPL-MCNC: 0.8 MG/DL (ref 0.5–1.4)
DIFFERENTIAL METHOD: ABNORMAL
EOSINOPHIL # BLD AUTO: 0.3 K/UL (ref 0–0.5)
EOSINOPHIL NFR BLD: 3.4 % (ref 0–8)
ERYTHROCYTE [DISTWIDTH] IN BLOOD BY AUTOMATED COUNT: 12.7 % (ref 11.5–14.5)
EST. GFR  (NO RACE VARIABLE): >60 ML/MIN/1.73 M^2
GLUCOSE SERPL-MCNC: 153 MG/DL (ref 70–110)
GLUCOSE SERPL-MCNC: 186 MG/DL (ref 70–110)
GLUCOSE SERPL-MCNC: 227 MG/DL (ref 70–110)
GLUCOSE SERPL-MCNC: 244 MG/DL (ref 70–110)
GLUCOSE SERPL-MCNC: 269 MG/DL (ref 70–110)
GLUCOSE SERPL-MCNC: 405 MG/DL (ref 70–110)
GRAM STN SPEC: NORMAL
GRAM STN SPEC: NORMAL
HCT VFR BLD AUTO: 36.6 % (ref 40–54)
HGB BLD-MCNC: 12.2 G/DL (ref 14–18)
IMM GRANULOCYTES # BLD AUTO: 0.02 K/UL (ref 0–0.04)
IMM GRANULOCYTES NFR BLD AUTO: 0.3 % (ref 0–0.5)
LYMPHOCYTES # BLD AUTO: 2.7 K/UL (ref 1–4.8)
LYMPHOCYTES NFR BLD: 35.3 % (ref 18–48)
MCH RBC QN AUTO: 30.6 PG (ref 27–31)
MCHC RBC AUTO-ENTMCNC: 33.3 G/DL (ref 32–36)
MCV RBC AUTO: 92 FL (ref 82–98)
MONOCYTES # BLD AUTO: 0.8 K/UL (ref 0.3–1)
MONOCYTES NFR BLD: 10.7 % (ref 4–15)
NEUTROPHILS # BLD AUTO: 3.8 K/UL (ref 1.8–7.7)
NEUTROPHILS NFR BLD: 49 % (ref 38–73)
NRBC BLD-RTO: 0 /100 WBC
PLATELET # BLD AUTO: 210 K/UL (ref 150–450)
PMV BLD AUTO: 13.3 FL (ref 9.2–12.9)
POTASSIUM SERPL-SCNC: 4.6 MMOL/L (ref 3.5–5.1)
PROT SERPL-MCNC: 7.4 G/DL (ref 6–8.4)
RBC # BLD AUTO: 3.99 M/UL (ref 4.6–6.2)
SODIUM SERPL-SCNC: 138 MMOL/L (ref 136–145)
VANCOMYCIN TROUGH SERPL-MCNC: 15.2 UG/ML
WBC # BLD AUTO: 7.76 K/UL (ref 3.9–12.7)

## 2023-10-10 PROCEDURE — 25000003 PHARM REV CODE 250: Performed by: INTERNAL MEDICINE

## 2023-10-10 PROCEDURE — 94761 N-INVAS EAR/PLS OXIMETRY MLT: CPT

## 2023-10-10 PROCEDURE — 99232 PR SUBSEQUENT HOSPITAL CARE,LEVL II: ICD-10-PCS | Mod: ,,, | Performed by: NURSE PRACTITIONER

## 2023-10-10 PROCEDURE — 12000002 HC ACUTE/MED SURGE SEMI-PRIVATE ROOM

## 2023-10-10 PROCEDURE — 99232 PR SUBSEQUENT HOSPITAL CARE,LEVL II: ICD-10-PCS | Mod: ,,, | Performed by: PODIATRIST

## 2023-10-10 PROCEDURE — 99900035 HC TECH TIME PER 15 MIN (STAT)

## 2023-10-10 PROCEDURE — 97605 NEG PRS WND THER DME<=50SQCM: CPT

## 2023-10-10 PROCEDURE — 80053 COMPREHEN METABOLIC PANEL: CPT | Performed by: NURSE PRACTITIONER

## 2023-10-10 PROCEDURE — 36415 COLL VENOUS BLD VENIPUNCTURE: CPT | Performed by: NURSE PRACTITIONER

## 2023-10-10 PROCEDURE — 63600175 PHARM REV CODE 636 W HCPCS: Performed by: INTERNAL MEDICINE

## 2023-10-10 PROCEDURE — 36415 COLL VENOUS BLD VENIPUNCTURE: CPT | Performed by: FAMILY MEDICINE

## 2023-10-10 PROCEDURE — 80202 ASSAY OF VANCOMYCIN: CPT | Performed by: FAMILY MEDICINE

## 2023-10-10 PROCEDURE — 25000003 PHARM REV CODE 250: Performed by: NURSE PRACTITIONER

## 2023-10-10 PROCEDURE — 85025 COMPLETE CBC W/AUTO DIFF WBC: CPT | Performed by: NURSE PRACTITIONER

## 2023-10-10 PROCEDURE — 63600175 PHARM REV CODE 636 W HCPCS: Performed by: FAMILY MEDICINE

## 2023-10-10 PROCEDURE — 99232 SBSQ HOSP IP/OBS MODERATE 35: CPT | Mod: ,,, | Performed by: NURSE PRACTITIONER

## 2023-10-10 PROCEDURE — 25000003 PHARM REV CODE 250: Performed by: PODIATRIST

## 2023-10-10 PROCEDURE — 25000003 PHARM REV CODE 250: Performed by: FAMILY MEDICINE

## 2023-10-10 PROCEDURE — 99232 SBSQ HOSP IP/OBS MODERATE 35: CPT | Mod: ,,, | Performed by: PODIATRIST

## 2023-10-10 RX ORDER — HYDROMORPHONE HYDROCHLORIDE 1 MG/ML
0.5 INJECTION, SOLUTION INTRAMUSCULAR; INTRAVENOUS; SUBCUTANEOUS EVERY 6 HOURS PRN
Status: DISCONTINUED | OUTPATIENT
Start: 2023-10-10 | End: 2023-10-11 | Stop reason: HOSPADM

## 2023-10-10 RX ORDER — GABAPENTIN 300 MG/1
600 CAPSULE ORAL 3 TIMES DAILY
Status: DISCONTINUED | OUTPATIENT
Start: 2023-10-10 | End: 2023-10-11 | Stop reason: HOSPADM

## 2023-10-10 RX ADMIN — MEROPENEM 1 G: 1 INJECTION, POWDER, FOR SOLUTION INTRAVENOUS at 02:10

## 2023-10-10 RX ADMIN — PRAVASTATIN SODIUM 20 MG: 10 TABLET ORAL at 08:10

## 2023-10-10 RX ADMIN — INSULIN DETEMIR 17 UNITS: 100 INJECTION, SOLUTION SUBCUTANEOUS at 08:10

## 2023-10-10 RX ADMIN — FAMOTIDINE 20 MG: 20 TABLET ORAL at 08:10

## 2023-10-10 RX ADMIN — INSULIN ASPART 3 UNITS: 100 INJECTION, SOLUTION INTRAVENOUS; SUBCUTANEOUS at 11:10

## 2023-10-10 RX ADMIN — INSULIN DETEMIR 17 UNITS: 100 INJECTION, SOLUTION SUBCUTANEOUS at 09:10

## 2023-10-10 RX ADMIN — MEROPENEM 1 G: 1 INJECTION, POWDER, FOR SOLUTION INTRAVENOUS at 11:10

## 2023-10-10 RX ADMIN — FAMOTIDINE 20 MG: 20 TABLET ORAL at 09:10

## 2023-10-10 RX ADMIN — GABAPENTIN 600 MG: 300 CAPSULE ORAL at 08:10

## 2023-10-10 RX ADMIN — INSULIN ASPART 5 UNITS: 100 INJECTION, SOLUTION INTRAVENOUS; SUBCUTANEOUS at 09:10

## 2023-10-10 RX ADMIN — HYDROCODONE BITARTRATE AND ACETAMINOPHEN 1 TABLET: 10; 325 TABLET ORAL at 06:10

## 2023-10-10 RX ADMIN — GABAPENTIN 600 MG: 300 CAPSULE ORAL at 04:10

## 2023-10-10 RX ADMIN — HYDROMORPHONE HYDROCHLORIDE 0.5 MG: 0.5 INJECTION, SOLUTION INTRAMUSCULAR; INTRAVENOUS; SUBCUTANEOUS at 08:10

## 2023-10-10 RX ADMIN — HYDROCODONE BITARTRATE AND ACETAMINOPHEN 1 TABLET: 5; 325 TABLET ORAL at 11:10

## 2023-10-10 RX ADMIN — HYDROMORPHONE HYDROCHLORIDE 0.5 MG: 0.5 INJECTION, SOLUTION INTRAMUSCULAR; INTRAVENOUS; SUBCUTANEOUS at 11:10

## 2023-10-10 RX ADMIN — VANCOMYCIN HYDROCHLORIDE 1250 MG: 1.25 INJECTION, POWDER, LYOPHILIZED, FOR SOLUTION INTRAVENOUS at 11:10

## 2023-10-10 RX ADMIN — HYDROCODONE BITARTRATE AND ACETAMINOPHEN 1 TABLET: 5; 325 TABLET ORAL at 03:10

## 2023-10-10 RX ADMIN — LISINOPRIL 5 MG: 5 TABLET ORAL at 09:10

## 2023-10-10 RX ADMIN — MEROPENEM 1 G: 1 INJECTION, POWDER, FOR SOLUTION INTRAVENOUS at 08:10

## 2023-10-10 RX ADMIN — VANCOMYCIN HYDROCHLORIDE 1250 MG: 1.25 INJECTION, POWDER, LYOPHILIZED, FOR SOLUTION INTRAVENOUS at 02:10

## 2023-10-10 NOTE — CONSULTS
Dressing with large amount of strike through bloody drainage.  Small blood clot on top of wound bed, cleaned and irrigated with normal saline, 3rd toe amputation site, 20% yellow slough, 80% red wound bed,  wound extends to the lateral of the 2nd toe and partial base where there is some thick macerated tissue.  1.5x2x0.5cm.  4th lateral toe ulcer 0.8x0.6x0.4cm with tendon exposure. Periwound redness dorsum and plantar foot. Medial 5th toe slight redness Suture x 1 dorsum and 1 plantar noted.  Skin prep applied then moldable ring around edges of amputation site.  1 piece of green foam placed, 1 piece of green foam to the 4th lateral toe, 1 piece of green foam tracking to dorsum foot, dome applied, suction at -125mmhg applied. Padded with gauze and light kerlix and ace wrap, Post op shoe replace.

## 2023-10-10 NOTE — PROGRESS NOTES
[facility]  Podiatry  Progress Note    Patient Name: Philip Alberts  MRN: [id number]  Admission Date: 10/8/2023  Hospital Length of Stay: 2 days  Attending Physician: Kaden Sparks MD  Primary Care Provider: Alfie Lancaster MD     Subjective:     Interval History:  Patient seen at the bedside.  Denies any pain.  Patient does state that he wants to go home.  Intraoperative cultures are still pending.    Follow-up For: Procedure(s) (LRB):  AMPUTATION, TOE (Right)    Post-Operative Day: 1 Day Post-Op    Scheduled Meds:   famotidine  20 mg Oral BID    insulin detemir U-100  17 Units Subcutaneous BID    lisinopriL  5 mg Oral Daily    meropenem (MERREM) IVPB  1 g Intravenous Q8H    pravastatin  20 mg Oral QHS    vancomycin (VANCOCIN) IV (PEDS and ADULTS)  1,250 mg Intravenous Q12H     Continuous Infusions:  PRN Meds:acetaminophen, dextrose 50%, dextrose 50%, glucagon (human recombinant), glucagon (human recombinant), glucose, glucose, HYDROcodone-acetaminophen, HYDROcodone-acetaminophen, insulin aspart U-100, melatonin, ondansetron, ondansetron, promethazine, sodium chloride 0.9%, sodium chloride 0.9%, traMADoL, Pharmacy to dose Vancomycin consult **AND** vancomycin - pharmacy to dose    Review of Systems   Constitutional:  Negative for chills, fatigue, fever and unexpected weight change.   HENT:  Negative for hearing loss and trouble swallowing.    Eyes:  Negative for photophobia and visual disturbance.   Respiratory:  Negative for cough, shortness of breath and wheezing.    Cardiovascular:  Negative for chest pain, palpitations and leg swelling.   Gastrointestinal:  Negative for abdominal pain and nausea.   Genitourinary:  Negative for dysuria and frequency.   Musculoskeletal:  Positive for joint swelling. Negative for arthralgias, back pain, gait problem and myalgias.   Skin:  Positive for color change and wound. Negative for rash.   Neurological:  Positive for numbness. Negative for tremors, seizures,  speech difficulty, weakness and headaches.   Hematological:  Does not bruise/bleed easily.     Objective:     Vital Signs (Most Recent):  Temp: 98.4 °F (36.9 °C) (10/10/23 0330)  Pulse: 75 (10/10/23 0617)  Resp: 20 (10/10/23 0624)  BP: (!) 108/57 (10/10/23 0617)  SpO2: 99 % (10/10/23 0330) Vital Signs (24h Range):  Temp:  [98.2 °F (36.8 °C)-98.7 °F (37.1 °C)] 98.4 °F (36.9 °C)  Pulse:  [63-75] 75  Resp:  [16-20] 20  SpO2:  [97 %-99 %] 99 %  BP: (108-153)/(57-89) 108/57     Weight: 74.9 kg (165 lb 2 oz)  Body mass index is 22.39 kg/m².    Foot Exam    Laboratory:  A1C:   Recent Labs   Lab 04/26/23  0659 08/07/23  0843   HGBA1C 10.0* 9.4*     CBC:   Recent Labs   Lab 10/10/23  0534   WBC 7.76   RBC 3.99*   HGB 12.2*   HCT 36.6*      MCV 92   MCH 30.6   MCHC 33.3     CMP:   Recent Labs   Lab 10/10/23  0534   *   CALCIUM 9.2   ALBUMIN 3.5   PROT 7.4      K 4.6   CO2 33*      BUN 11   CREATININE 0.8   ALKPHOS 100   ALT 14   AST 20   BILITOT 0.3     CRP:   Recent Labs   Lab 10/08/23  2128   CRP 43.1*     ESR:   Recent Labs   Lab 10/08/23  1658   SEDRATE >90*     Wound Cultures:   Recent Labs   Lab 10/09/23  1323   LABAERO Skin quynh,  no predominant organism       Diagnostic Results:  I have reviewed all pertinent imaging results/findings within the past 24 hours.    US Lower Extremity Arteries Bilateral  CLINICAL HISTORY:  43 years (1980) Male Diabetic foot infection, smoker    TECHNIQUE:  US LOWER EXTREMITY ARTERIES BILATERAL. Doppler sonographic ultrasound of the arteries of the extremities was performed. Images obtained in grayscale and color with Doppler.    COMPARISON:  None available.    FINDINGS:  RIGHT thigh:  Common femoral artery: (81 cm/s, peak systolic velocity) minimal plaque is seen in the arteries of the right lower extremity.  Profunda: (75 cm/s) partially visualized but with normal color flow.  (Superficial) femoral artery:  - Proximal: (75 cm/s)  - Mid: (84 cm/s)  -  Distal: (119 cm/s)  Popliteal (93 cm/s)    RIGHT calf:  Anterior tibial artery: (59 cm/s)  Posterior tibial artery: (52 cm/s)  Peroneal artery: (71 cm/s)  Dorsalis pedis artery: Not imaged.    LEFT thigh:  Common femoral artery: (102 cm/s, peak systolic velocity) minimal plaque is seen.  Profunda: (92 cm/s) partially visualized but with normal color flow.  (Superficial) femoral artery:  - Proximal: (92 cm/s)  - Mid: (78 cm/s)  - Distal: (93 cm/s)  Popliteal (6. cm/s)    LEFT calf:  Anterior tibial artery: (56 cm/s)  Posterior tibial artery: (23 cm/s)  Peroneal artery: (55 cm/s)  Dorsalis pedis artery: not imaged.    IMPRESSION:  No clinically significant stenosis, large vessel occlusion or aneurysm in the visualized arteries of the lower extremities.    .    Electronically signed by:  William Wing MD  10/10/2023 07:06 AM CDT Workstation: [id number]        Clinical Findings:  Surgical dressing and postoperative shoe was clean dry and intact.  Left undisturbed.    Assessment/Plan:     Active Diagnoses:    Diagnosis Date Noted POA    PRINCIPAL PROBLEM:  Gangrene of toe of right foot [I96] 10/08/2023 Yes    Osteomyelitis [M86.9] 10/09/2023 Unknown    Septic arthritis [M00.9] 10/09/2023 Unknown    Diabetes mellitus due to underlying condition with hyperglycemia, with long-term current use of insulin [E08.65, Z79.4] 10/09/2023 Not Applicable    Skin ulcer of third toe of right foot, with necrosis of bone [L97.514] 10/08/2023 Yes    Cellulitis of extremity [L03.119] 10/08/2023 Yes    Diabetic peripheral neuropathy [E11.42] 09/16/2019 Yes    Smoker [F17.200] 12/26/2018 Yes    Diabetes mellitus, type 2 [E11.9] 08/15/2018 Yes      Problems Resolved During this Admission:       Plan is for wound VAC placement to the right foot.  Will await final intraoperative cultures.  I stressed with the patient the importance of limiting weight-bearing, better control of his diabetes, and smoking cessation to limit risk of delayed  healing, nonhealing, and further amputation.  Also explained the patient the risk of leaving the hospital prematurely as this can also put him at significant risk for further limb loss.    LUANNE DavisM  Podiatry  [facility]

## 2023-10-10 NOTE — PROGRESS NOTES
Progress Note  Infectious Disease    Admit Date: 10/8/2023   LOS: 2 days     SUBJECTIVE:     Follow-up For:  Gangrene/osteomyelitis    INTERVAL HISTORY:    10/10 @ 0905 (Flip):  Interim reviewed.  Patient seen and examined in his room.  He is awake and alert and is somewhat agitated today.  He would really like to go home.  He does not feel like they are giving him enough insulin and is not getting his gabapentin.  He denies any pain but has significant neuropathy in his lower extremities and that is why he needs the gabapentin.  He denies fevers or chills, nausea vomiting, states he has IBS and has intermittent constipation/diarrhea that is not different than usual.  He said he will let us know if he has significant diarrhea while on antibiotics.  He has been afebrile with max temp 98.7° in the last 24 hours.  BUN/CR 11/0.8, WBC 7.76, platelets 210, glucose 227 on CMP this morning.  He is currently on vancomycin and meropenem.  Surgical cultures are pending and Gram stain with rare Gram-positive cocci.  Arterial ultrasound with no significant stenosis.     Antibiotics (From admission, onward)      Start     Stop Route Frequency Ordered    10/09/23 1100  vancomycin 1.25 g in dextrose 5% 250 mL IVPB (ready to mix)        See Hyperspace for full Linked Orders Report.    -- IV Every 12 hours (non-standard times) 10/08/23 2222    10/09/23 0100  meropenem 1 g in sodium chloride 0.9 % 100 mL IVPB (ready to mix system)        Note to Pharmacy: Ht: 6' (1.829 m)  Wt: 74.8 kg (165 lb)  Estimated Creatinine Clearance: 166.2 mL/min (based on SCr of 0.6 mg/dL).  Body mass index is 22.38 kg/m².    -- IV Every 8 hours (non-standard times) 10/08/23 2254    10/08/23 2316  vancomycin - pharmacy to dose  (vancomycin IVPB (PEDS and ADULTS))        See Hyperspace for full Linked Orders Report.    -- IV pharmacy to manage frequency 10/08/23 2216            Antifungals (From admission, onward)      None             Antivirals (From  admission, onward)      None            Review of Systems:  HEENT: No change in vision, sore throat, ulcers, thrush, dysphagia  Heart: No chest pain, orthopnea or edema  Lungs: No shortness of breath, cough, sputum production or pleuritic pain  Abdomen: No nausea, vomiting, abdominal pain, appetite is good, + IBS  Extremities: No  cyanosis, edema, joint swelling or new pain  MSK: No new pain, swelling  Neurological: No headaches, focal weakness   Psych:  No changes in mood  Skin: No rash, itching, or erythema, Right foot    VAD: No IV site issues    OBJECTIVE:     Vital Signs (Most Recent)  Temp: 98.1 °F (36.7 °C) (10/10/23 1119)  Pulse: 75 (10/10/23 1119)  Resp: 16 (10/10/23 1119)  BP: 114/69 (10/10/23 1119)  SpO2: 99 % (10/10/23 1119)    Temperature Range Min/Max (Last 24H):  Temp:  [98.1 °F (36.7 °C)-98.7 °F (37.1 °C)]     I & O (Last 24H):  Intake/Output Summary (Last 24 hours) at 10/10/2023 1414  Last data filed at 10/9/2023 1714  Gross per 24 hour   Intake 100 ml   Output --   Net 100 ml     Physical Exam:  General:  In NAD. Alert and attentive, cooperative, comfortable  Eyes:  Anicteric, PERRL, EOMI  ENT:  No ulcers, exudates, thrush, nares patent, dentition is  Neck:  Supple, no adenopathy appreciated  Lungs: Clear, no consolidation, rales, wheezes, rub  Heart:  RRR, no gallop/murmur noted  Abd:  Soft, NT, ND, normal BS, no masses/organomegaly appreciated.  :  Voids Leo, urine clear, no flank tenderness  Musc:  Joints without effusion, swelling,  erythema, synovitis, ambulatory  Skin:  No rashes. Scarring to lower extremities.  Wound: Surgical wound to rt foot with dressing clean and dry  Neuro:  Alert, attentive, speech fluent, face symmetric, moves all extremities, no focal weakness   Psych:  Mild agitation  Extrem: No edema, erythema, phlebitis, cellulitis, warm and well perfused  VAD: PIV  Isolation:  None    Wounds    10/10:                      10/8:              CBC LAST 7 DAYS  Recent Labs   Lab  "10/08/23  1658 10/08/23  2019 10/09/23  0627 10/10/23  0534   WBC 8.05  --  6.45 7.76   RBC 4.11*  --  3.72* 3.99*   HGB 12.5*  --  11.2* 12.2*   HCT 38.1* 36 35.0* 36.6*   MCV 93  --  94 92   MCH 30.4  --  30.1 30.6   MCHC 32.8  --  32.0 33.3   RDW 12.9  --  12.8 12.7     --  228 210   MPV 13.7*  --  13.5* 13.3*   GRAN 52.5  4.2  --  38.5  2.5 49.0  3.8   LYMPH 35.2  2.8  --  46.5  3.0 35.3  2.7   MONO 9.1  0.7  --  9.0  0.6 10.7  0.8   BASO 0.09  --  0.07 0.10   NRBC 0  --  0 0       CHEMISTRY LAST 7 DAYS  Recent Labs   Lab 10/08/23  1658 10/08/23  2019 10/08/23  2128 10/09/23  0626 10/10/23  0534     --  137 138 138   K 4.5  --  4.1 4.1 4.6   CL 99  --  101 102 100   CO2 25  --  26 34* 33*   ANIONGAP 12  --  10 2* 5*   BUN 12  --  10 9 11   CREATININE 0.9  --  0.6 0.6 0.8   *  --  237* 212* 227*   CALCIUM 9.3  --  9.1 8.7 9.2   PH  --  7.399  --   --   --    MG  --   --   --  1.8  --    ALBUMIN 3.3*  --  3.6 3.3* 3.5   PROT 8.1  --  7.3 6.7 7.4   ALKPHOS 98  --  80 77 100   ALT 19  --  14 12 14   AST 26  --  18 17 20   BILITOT 0.2  --  0.3 0.2 0.3       Estimated Creatinine Clearance: 124.8 mL/min (based on SCr of 0.8 mg/dL).    INFLAMMATORY/PROCAL  LAST 7 DAYS  Recent Labs   Lab 10/08/23  1658 10/08/23  2128   PROCAL  --  <0.050   CRP 51.1* 43.1*     No results found for: "ESR"  CRP   Date Value Ref Range Status   10/08/2023 43.1 (H) 0.0 - 8.2 mg/L Final   10/08/2023 51.1 (H) 0.0 - 8.2 mg/L Final   09/27/2021 9.06 (H) <0.76 mg/dL Final   10/05/2018 0.14 0.00 - 1.40 mg/dL        PRIOR  MICROBIOLOGY:    No results found for the last 90 days.           LAST 7 DAYS MICROBIOLOGY   Microbiology Results (last 7 days)       Procedure Component Value Units Date/Time    Gram stain [[id number]] Collected: 10/09/23 1323    Order Status: Completed Specimen: Wound from Toe, Right Foot Updated: 10/10/23 0918     Gram Stain Result Rare WBC's      Rare Gram positive cocci    Narrative:      4th toe "    Tissue culture [[id number]] Collected: 10/09/23 1321    Order Status: Completed Specimen: Tissue Updated: 10/10/23 0918     Aerobic Culture - Tissue Skin quynh, no predominant organism     Gram Stain Result Rare WBC's      Rare Gram positive cocci    Narrative:      3rd toe    Aerobic culture [[id number]] Collected: 10/09/23 1323    Order Status: Completed Specimen: Wound from Toe, Right Foot Updated: 10/10/23 0738     Aerobic Bacterial Culture Skin quynh,  no predominant organism    Narrative:      4th toe    Blood Culture #1 **CANNOT BE ORDERED STAT** [[id number]] Collected: 10/08/23 1616    Order Status: Completed Specimen: Blood from Antecubital, Right Updated: 10/09/23 1832     Blood Culture, Routine No Growth to date      No Growth to date    Fungus culture [[id number]] Collected: 10/09/23 1321    Order Status: Sent Specimen: Bone from Toe, Right Foot Updated: 10/09/23 1359    AFB Culture & Smear [[id number]] Collected: 10/09/23 1321    Order Status: Sent Specimen: Bone from Toe, Right Foot Updated: 10/09/23 1359    Culture, Anaerobic [[id number]] Collected: 10/09/23 1321    Order Status: Sent Specimen: Bone from Toe, Right Foot Updated: 10/09/23 1358    AFB Culture & Smear [[id number]] Collected: 10/09/23 1323    Order Status: Sent Specimen: Wound from Toe, Right Foot Updated: 10/09/23 1353    Culture, Anaerobic [[id number]] Collected: 10/09/23 1323    Order Status: Sent Specimen: Wound from Toe, Right Foot Updated: 10/09/23 1352    Fungus culture [[id number]] Collected: 10/09/23 1323    Order Status: Canceled Specimen: Wound from Toe, Right Foot     Aerobic culture [[id number]] Collected: 10/09/23 1321    Order Status: Canceled Specimen: Tissue from Toe, Right Foot     Gram stain [[id number]] Collected: 10/09/23 1321    Order Status: Canceled Specimen: Wound from Toe, Right Foot               CURRENT/PREVIOUS VISIT EKG  Results for orders placed or performed during the hospital encounter of 09/27/21   EKG  12-lead    Collection Time: 09/27/21 11:29 PM    Narrative    Test Reason : R50.9,    Vent. Rate : 098 BPM     Atrial Rate : 098 BPM     P-R Int : 148 ms          QRS Dur : 076 ms      QT Int : 336 ms       P-R-T Axes : 071 092 068 degrees     QTc Int : 428 ms    Sinus rhythm with occasional Premature ventricular complexes  Rightward axis  Borderline Abnormal ECG  No previous ECGs available  Confirmed by Shar ODONNELL, Grady ALMANZAR ([id number]) on 9/30/2021 8:55:42 AM    Referred By: TOMI   SELF           Confirmed By:Grady Myles MD       Significant Labs: All pertinent labs within the past 24 hours have been reviewed.     Significant Imaging: I have reviewed all relevant and available imaging results/findings within the past 24 hours.    Plain film of the foot  1.  Fluid collection with tract extending to the plantar foot at the third digit involves the third PIP joint. There is a destructive erosion of the head of the third proximal phalanx with subluxation of the third PIP joint. These findings are consistent with osteomyelitis and septic arthritis with fistulous tract extending from the PIP joint to the plantar tissues of the third digit. There is associated destruction of the collateral ligaments and plantar tendons of the third digit.  2.  Other areas of STIR bone marrow hyperintensity and enhancement involving the second, fourth and fifth digits is felt to reflect osteomyelitis.  3.  Stranding STIR hyperintensities with enhancement demonstrated of the forefoot, consistent with cellulitis.     MRI of the forefoot 10/9  1.  Fluid collection with tract extending to the plantar foot at the third digit involves the third PIP joint. There is a destructive erosion of the head of the third proximal phalanx with subluxation of the third PIP joint. These findings are consistent with osteomyelitis and septic arthritis with fistulous tract extending from the PIP joint to the plantar tissues of the third digit. There is  associated destruction of the collateral ligaments and plantar tendons of the third digit.  2.  Other areas of STIR bone marrow hyperintensity and enhancement involving the second, fourth and fifth digits is felt to reflect osteomyelitis.  3.  Stranding STIR hyperintensities with enhancement demonstrated of the forefoot, consistent with cellulitis.    Arterial ultrasound of lower extremities with no significant stenosis large vessel occlusion or aneurysm in the visualized arteries of the lower extremities.    ASSESSMENT/PLAN:     1.         Gangrene/osteomyelitis/septic arthritis right 3rd toe  S/p amputation and debridement 10/9  Fungal, AFB, anaerobic and aerobic tissue/bone cultures are pending     2.          Probable osteomyelitis adjacent 2nd and 4th toes  MRI with osteomyelitis, ESR > 90 CRP 51.1, procalcitonin < 0.050 negative, WBC 7.76, afebrile  Currently on empiric meropenem and vancomycin  No significant arterial stenosis  CRP 51.1, procal negative, ESR greater than 90     3.         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         Surgical asplenia post trauma   Splenectomy 2000     5.          Smoker    Encouraged to quit smoking     Recommendations:    Follow cultures  Continue vancomycin with pharmacy to dose, keep levels 15 -20   Continue meropenem 1 g IV every 8 hours  Aggressive blood sugar control  Evaluate immunization needs for asplenic state  Will d/w Dr. Santiago    Counseled with patient and mother that he will need IV antibiotics to treat the adjacent osteomyelitis and toes 2 and 4 as well as counseled about smoking cessation and diabetes.         Medical Decision Making during this encounter was  [_] Low Complexity  [_] Moderate Complexity  [xx] High Complexity      Asmita Castelan NP    Date of Service: 10/10/2023  2:14 PM

## 2023-10-10 NOTE — SUBJECTIVE & OBJECTIVE
Interval History:       Review of Systems   Constitutional:  Negative for activity change and appetite change.   HENT:  Negative for congestion and dental problem.    Eyes:  Negative for discharge and itching.   Respiratory:  Negative for shortness of breath.    Cardiovascular:  Negative for chest pain.   Gastrointestinal:  Negative for abdominal distention and abdominal pain.   Endocrine: Negative for cold intolerance.   Genitourinary:  Negative for difficulty urinating and dysuria.   Musculoskeletal:  Negative for arthralgias and back pain.   Skin:  Positive for wound. Negative for color change.   Neurological:  Negative for dizziness and facial asymmetry.   Hematological:  Negative for adenopathy.   Psychiatric/Behavioral:  Negative for agitation and behavioral problems.      Objective:     Vital Signs (Most Recent):  Temp: 98.1 °F (36.7 °C) (10/10/23 1119)  Pulse: 75 (10/10/23 1119)  Resp: 16 (10/10/23 1546)  BP: 114/69 (10/10/23 1119)  SpO2: 99 % (10/10/23 1119) Vital Signs (24h Range):  Temp:  [98.1 °F (36.7 °C)-98.7 °F (37.1 °C)] 98.1 °F (36.7 °C)  Pulse:  [63-75] 75  Resp:  [16-20] 16  SpO2:  [97 %-99 %] 99 %  BP: (108-153)/(57-89) 114/69     Weight: 74.9 kg (165 lb 2 oz)  Body mass index is 22.39 kg/m².    Intake/Output Summary (Last 24 hours) at 10/10/2023 1643  Last data filed at 10/9/2023 1714  Gross per 24 hour   Intake 100 ml   Output --   Net 100 ml         Physical Exam  Vitals and nursing note reviewed.   Constitutional:       Appearance: He is well-developed.   HENT:      Head: Atraumatic.      Right Ear: External ear normal.      Left Ear: External ear normal.      Nose: Nose normal.      Mouth/Throat:      Mouth: Mucous membranes are moist.   Cardiovascular:      Rate and Rhythm: Normal rate.   Pulmonary:      Effort: Pulmonary effort is normal.   Musculoskeletal:         General: Normal range of motion.      Cervical back: Full passive range of motion without pain and normal range of motion.    Skin:     General: Skin is warm.      Comments: Bandage intact   Neurological:      Mental Status: He is alert and oriented to person, place, and time.   Psychiatric:         Behavior: Behavior normal.             Significant Labs: All pertinent labs within the past 24 hours have been reviewed.  CBC:   Recent Labs   Lab 10/08/23  1658 10/08/23  2019 10/09/23  0627 10/10/23  0534   WBC 8.05  --  6.45 7.76   HGB 12.5*  --  11.2* 12.2*   HCT 38.1* 36 35.0* 36.6*     --  228 210     CMP:   Recent Labs   Lab 10/08/23  2128 10/09/23  0626 10/10/23  0534    138 138   K 4.1 4.1 4.6    102 100   CO2 26 34* 33*   * 212* 227*   BUN 10 9 11   CREATININE 0.6 0.6 0.8   CALCIUM 9.1 8.7 9.2   PROT 7.3 6.7 7.4   ALBUMIN 3.6 3.3* 3.5   BILITOT 0.3 0.2 0.3   ALKPHOS 80 77 100   AST 18 17 20   ALT 14 12 14   ANIONGAP 10 2* 5*       Significant Imaging: I have reviewed all pertinent imaging results/findings within the past 24 hours.

## 2023-10-10 NOTE — CARE UPDATE
10/09/23 1955   Patient Assessment/Suction   Level of Consciousness (AVPU) alert   Respiratory Effort Normal   PRE-TX-O2   Device (Oxygen Therapy) room air   SpO2 98 %   Pulse Oximetry Type Intermittent   Pulse 75   Resp 18   Incentive Spirometer   $ Incentive Spirometer Charges refused  (STATED HE DOES NOT NEED)

## 2023-10-10 NOTE — ASSESSMENT & PLAN NOTE
Imaging showed  Fluid collection with tract extending to the plantar foot at the third digit involves the third PIP joint.   There is a destructive erosion of the head of the third proximal phalanx with subluxation of the third PIP joint.   These findings are consistent with osteomyelitis and septic arthritis with fistulous tract extending from the PIP joint to the plantar tissues of the third digit  Started on iv abx  S/p Amputation right 3rd toe and  Excisional debridement below fascia of right 2nd and 4th toes

## 2023-10-10 NOTE — PROGRESS NOTES
Discussed with NP and reviewed and agree with documentation. Photos reviewed.   Cultures with only skin quynh, in the setting of prior bactrim and clindamycin.  If cultures are negative tomorrow, we will have to recommend regimen for most likely pathogens.   Will f/u

## 2023-10-10 NOTE — CONSULTS
Faxed info to [employer] [facility]   to obtain a wound vac for home use.  Will update when further information is obtained.

## 2023-10-10 NOTE — PROGRESS NOTES
[facility] Medicine  Progress Note    Patient Name: Philip Alberts  MRN: [id number]  Patient Class: IP- Inpatient   Admission Date: 10/8/2023  Length of Stay: 2 days  Attending Physician: Kaden Sparks MD  Primary Care Provider: Alfie Lancaster MD        Subjective:     Principal Problem:Gangrene of toe of right foot        HPI:  No notes on file    Overview/Hospital Course:  10/09  Pt will have OR visit today  Denies any new issues    10/10  Postoperatively doing fine  No new issues       Interval History:       Review of Systems   Constitutional:  Negative for activity change and appetite change.   HENT:  Negative for congestion and dental problem.    Eyes:  Negative for discharge and itching.   Respiratory:  Negative for shortness of breath.    Cardiovascular:  Negative for chest pain.   Gastrointestinal:  Negative for abdominal distention and abdominal pain.   Endocrine: Negative for cold intolerance.   Genitourinary:  Negative for difficulty urinating and dysuria.   Musculoskeletal:  Negative for arthralgias and back pain.   Skin:  Positive for wound. Negative for color change.   Neurological:  Negative for dizziness and facial asymmetry.   Hematological:  Negative for adenopathy.   Psychiatric/Behavioral:  Negative for agitation and behavioral problems.      Objective:     Vital Signs (Most Recent):  Temp: 98.1 °F (36.7 °C) (10/10/23 1119)  Pulse: 75 (10/10/23 1119)  Resp: 16 (10/10/23 1546)  BP: 114/69 (10/10/23 1119)  SpO2: 99 % (10/10/23 1119) Vital Signs (24h Range):  Temp:  [98.1 °F (36.7 °C)-98.7 °F (37.1 °C)] 98.1 °F (36.7 °C)  Pulse:  [63-75] 75  Resp:  [16-20] 16  SpO2:  [97 %-99 %] 99 %  BP: (108-153)/(57-89) 114/69     Weight: 74.9 kg (165 lb 2 oz)  Body mass index is 22.39 kg/m².    Intake/Output Summary (Last 24 hours) at 10/10/2023 1643  Last data filed at 10/9/2023 1714  Gross per 24 hour   Intake 100 ml   Output --   Net 100 ml         Physical Exam  Vitals and nursing note  reviewed.   Constitutional:       Appearance: He is well-developed.   HENT:      Head: Atraumatic.      Right Ear: External ear normal.      Left Ear: External ear normal.      Nose: Nose normal.      Mouth/Throat:      Mouth: Mucous membranes are moist.   Cardiovascular:      Rate and Rhythm: Normal rate.   Pulmonary:      Effort: Pulmonary effort is normal.   Musculoskeletal:         General: Normal range of motion.      Cervical back: Full passive range of motion without pain and normal range of motion.   Skin:     General: Skin is warm.      Comments: Bandage intact   Neurological:      Mental Status: He is alert and oriented to person, place, and time.   Psychiatric:         Behavior: Behavior normal.             Significant Labs: All pertinent labs within the past 24 hours have been reviewed.  CBC:   Recent Labs   Lab 10/08/23  1658 10/08/23  2019 10/09/23  0627 10/10/23  0534   WBC 8.05  --  6.45 7.76   HGB 12.5*  --  11.2* 12.2*   HCT 38.1* 36 35.0* 36.6*     --  228 210     CMP:   Recent Labs   Lab 10/08/23  2128 10/09/23  0626 10/10/23  0534    138 138   K 4.1 4.1 4.6    102 100   CO2 26 34* 33*   * 212* 227*   BUN 10 9 11   CREATININE 0.6 0.6 0.8   CALCIUM 9.1 8.7 9.2   PROT 7.3 6.7 7.4   ALBUMIN 3.6 3.3* 3.5   BILITOT 0.3 0.2 0.3   ALKPHOS 80 77 100   AST 18 17 20   ALT 14 12 14   ANIONGAP 10 2* 5*       Significant Imaging: I have reviewed all pertinent imaging results/findings within the past 24 hours.      Assessment/Plan:      * Gangrene of toe of right foot  Imaging showed  Fluid collection with tract extending to the plantar foot at the third digit involves the third PIP joint.   There is a destructive erosion of the head of the third proximal phalanx with subluxation of the third PIP joint.   These findings are consistent with osteomyelitis and septic arthritis with fistulous tract extending from the PIP joint to the plantar tissues of the third digit  Started on iv  abx  S/p Amputation right 3rd toe and  Excisional debridement below fascia of right 2nd and 4th toes       Osteomyelitis  As above       Septic arthritis  As above       Diabetes mellitus due to underlying condition with hyperglycemia, with long-term current use of insulin  Maintain present regime       Cellulitis of extremity  As above       Skin ulcer of third toe of right foot, with necrosis of bone  As above       Diabetic peripheral neuropathy  Aware       Smoker  Aware       Diabetes mellitus, type 2  Maintain present insulin regime      VTE Risk Mitigation (From admission, onward)         Ordered     Place JÚNIOR hose  Until discontinued         10/08/23 2120     IP VTE LOW RISK PATIENT  Once         10/08/23 2120                Discharge Planning   ROCIO: 10/11/2023     Code Status: Full Code   Is the patient medically ready for discharge?:     Reason for patient still in hospital (select all that apply): Treatment  Discharge Plan A: Home with family                  Kaden Sparks MD  Department of Hospital Medicine   [facility]

## 2023-10-10 NOTE — PROGRESS NOTES
Met with patient for diabetic diet education. Provided written and verbal education, along with contact information. Follow for full assessment if patient still here tomorrow.

## 2023-10-11 VITALS
OXYGEN SATURATION: 98 % | SYSTOLIC BLOOD PRESSURE: 129 MMHG | RESPIRATION RATE: 18 BRPM | WEIGHT: 165.13 LBS | HEART RATE: 83 BPM | TEMPERATURE: 99 F | DIASTOLIC BLOOD PRESSURE: 78 MMHG | BODY MASS INDEX: 22.37 KG/M2 | HEIGHT: 72 IN

## 2023-10-11 PROBLEM — I96 GANGRENE OF TOE OF RIGHT FOOT: Status: RESOLVED | Noted: 2023-10-08 | Resolved: 2023-10-11

## 2023-10-11 PROBLEM — M00.9 SEPTIC ARTHRITIS: Status: RESOLVED | Noted: 2023-10-09 | Resolved: 2023-10-11

## 2023-10-11 PROBLEM — L97.514: Status: RESOLVED | Noted: 2023-10-08 | Resolved: 2023-10-11

## 2023-10-11 PROBLEM — M86.9 OSTEOMYELITIS: Status: RESOLVED | Noted: 2023-10-09 | Resolved: 2023-10-11

## 2023-10-11 PROBLEM — L03.119 CELLULITIS OF EXTREMITY: Status: RESOLVED | Noted: 2023-10-08 | Resolved: 2023-10-11

## 2023-10-11 LAB
ALBUMIN SERPL BCP-MCNC: 3.3 G/DL (ref 3.5–5.2)
ALP SERPL-CCNC: 94 U/L (ref 55–135)
ALT SERPL W/O P-5'-P-CCNC: 14 U/L (ref 10–44)
ANION GAP SERPL CALC-SCNC: 5 MMOL/L (ref 8–16)
AST SERPL-CCNC: 19 U/L (ref 10–40)
BACTERIA SPEC ANAEROBE CULT: NORMAL
BACTERIA SPEC ANAEROBE CULT: NORMAL
BASOPHILS # BLD AUTO: 0.1 K/UL (ref 0–0.2)
BASOPHILS NFR BLD: 1.4 % (ref 0–1.9)
BILIRUB SERPL-MCNC: 0.2 MG/DL (ref 0.1–1)
BUN SERPL-MCNC: 16 MG/DL (ref 6–20)
CALCIUM SERPL-MCNC: 8.9 MG/DL (ref 8.7–10.5)
CHLORIDE SERPL-SCNC: 100 MMOL/L (ref 95–110)
CO2 SERPL-SCNC: 33 MMOL/L (ref 23–29)
CREAT SERPL-MCNC: 0.8 MG/DL (ref 0.5–1.4)
DIFFERENTIAL METHOD: ABNORMAL
EOSINOPHIL # BLD AUTO: 0.3 K/UL (ref 0–0.5)
EOSINOPHIL NFR BLD: 3.6 % (ref 0–8)
ERYTHROCYTE [DISTWIDTH] IN BLOOD BY AUTOMATED COUNT: 12.7 % (ref 11.5–14.5)
EST. GFR  (NO RACE VARIABLE): >60 ML/MIN/1.73 M^2
GLUCOSE SERPL-MCNC: 188 MG/DL (ref 70–110)
GLUCOSE SERPL-MCNC: 233 MG/DL (ref 70–110)
GLUCOSE SERPL-MCNC: 382 MG/DL (ref 70–110)
HCT VFR BLD AUTO: 34.1 % (ref 40–54)
HGB BLD-MCNC: 11.2 G/DL (ref 14–18)
IMM GRANULOCYTES # BLD AUTO: 0.01 K/UL (ref 0–0.04)
IMM GRANULOCYTES NFR BLD AUTO: 0.1 % (ref 0–0.5)
LYMPHOCYTES # BLD AUTO: 3 K/UL (ref 1–4.8)
LYMPHOCYTES NFR BLD: 43.2 % (ref 18–48)
MCH RBC QN AUTO: 30.3 PG (ref 27–31)
MCHC RBC AUTO-ENTMCNC: 32.8 G/DL (ref 32–36)
MCV RBC AUTO: 92 FL (ref 82–98)
MONOCYTES # BLD AUTO: 0.6 K/UL (ref 0.3–1)
MONOCYTES NFR BLD: 8.8 % (ref 4–15)
NEUTROPHILS # BLD AUTO: 3 K/UL (ref 1.8–7.7)
NEUTROPHILS NFR BLD: 42.9 % (ref 38–73)
NRBC BLD-RTO: 0 /100 WBC
PLATELET # BLD AUTO: 195 K/UL (ref 150–450)
PMV BLD AUTO: 13.7 FL (ref 9.2–12.9)
POTASSIUM SERPL-SCNC: 3.9 MMOL/L (ref 3.5–5.1)
PROT SERPL-MCNC: 6.8 G/DL (ref 6–8.4)
RBC # BLD AUTO: 3.7 M/UL (ref 4.6–6.2)
SODIUM SERPL-SCNC: 138 MMOL/L (ref 136–145)
WBC # BLD AUTO: 6.92 K/UL (ref 3.9–12.7)

## 2023-10-11 PROCEDURE — 63600175 PHARM REV CODE 636 W HCPCS: Performed by: FAMILY MEDICINE

## 2023-10-11 PROCEDURE — 94761 N-INVAS EAR/PLS OXIMETRY MLT: CPT

## 2023-10-11 PROCEDURE — 36415 COLL VENOUS BLD VENIPUNCTURE: CPT | Performed by: NURSE PRACTITIONER

## 2023-10-11 PROCEDURE — 99233 PR SUBSEQUENT HOSPITAL CARE,LEVL III: ICD-10-PCS | Mod: FS,,, | Performed by: NURSE PRACTITIONER

## 2023-10-11 PROCEDURE — 99233 SBSQ HOSP IP/OBS HIGH 50: CPT | Mod: FS,,, | Performed by: NURSE PRACTITIONER

## 2023-10-11 PROCEDURE — 25000003 PHARM REV CODE 250: Performed by: PODIATRIST

## 2023-10-11 PROCEDURE — 63600175 PHARM REV CODE 636 W HCPCS: Performed by: INTERNAL MEDICINE

## 2023-10-11 PROCEDURE — 25000003 PHARM REV CODE 250: Performed by: NURSE PRACTITIONER

## 2023-10-11 PROCEDURE — 80053 COMPREHEN METABOLIC PANEL: CPT | Performed by: NURSE PRACTITIONER

## 2023-10-11 PROCEDURE — 25000003 PHARM REV CODE 250: Performed by: INTERNAL MEDICINE

## 2023-10-11 PROCEDURE — 85025 COMPLETE CBC W/AUTO DIFF WBC: CPT | Performed by: NURSE PRACTITIONER

## 2023-10-11 PROCEDURE — 99900035 HC TECH TIME PER 15 MIN (STAT)

## 2023-10-11 PROCEDURE — 25000003 PHARM REV CODE 250: Performed by: FAMILY MEDICINE

## 2023-10-11 RX ORDER — HYDROCODONE BITARTRATE AND ACETAMINOPHEN 5; 325 MG/1; MG/1
1 TABLET ORAL EVERY 12 HOURS PRN
Qty: 10 TABLET | Refills: 0 | Status: SHIPPED | OUTPATIENT
Start: 2023-10-11 | End: 2024-01-22

## 2023-10-11 RX ORDER — DOXYCYCLINE 100 MG/1
100 CAPSULE ORAL 2 TIMES DAILY
Qty: 84 CAPSULE | Refills: 0 | Status: SHIPPED | OUTPATIENT
Start: 2023-10-11 | End: 2023-11-22

## 2023-10-11 RX ADMIN — INSULIN ASPART 5 UNITS: 100 INJECTION, SOLUTION INTRAVENOUS; SUBCUTANEOUS at 12:10

## 2023-10-11 RX ADMIN — LISINOPRIL 5 MG: 5 TABLET ORAL at 09:10

## 2023-10-11 RX ADMIN — HYDROCODONE BITARTRATE AND ACETAMINOPHEN 1 TABLET: 5; 325 TABLET ORAL at 09:10

## 2023-10-11 RX ADMIN — MEROPENEM 1 G: 1 INJECTION, POWDER, FOR SOLUTION INTRAVENOUS at 12:10

## 2023-10-11 RX ADMIN — MEROPENEM 1 G: 1 INJECTION, POWDER, FOR SOLUTION INTRAVENOUS at 03:10

## 2023-10-11 RX ADMIN — FAMOTIDINE 20 MG: 20 TABLET ORAL at 09:10

## 2023-10-11 RX ADMIN — HYDROMORPHONE HYDROCHLORIDE 0.5 MG: 0.5 INJECTION, SOLUTION INTRAMUSCULAR; INTRAVENOUS; SUBCUTANEOUS at 03:10

## 2023-10-11 RX ADMIN — INSULIN DETEMIR 17 UNITS: 100 INJECTION, SOLUTION SUBCUTANEOUS at 09:10

## 2023-10-11 RX ADMIN — GABAPENTIN 600 MG: 300 CAPSULE ORAL at 09:10

## 2023-10-11 NOTE — PLAN OF CARE
10/11/23 1422   Final Note   Assessment Type Final Discharge Note   Anticipated Discharge Disposition Home   Hospital Resources/Appts/Education Provided Patient refused appointment set-up  (Patient instructed to make post hospital follow up appointment with their PCP in 7 to 10 days from discharge)   Post-Acute Status   Post-Acute Authorization IV Infusion   Coverage Medicaid   IV Infusion Status Set-up Complete/Auth obtained   Discharge Delays None known at this time

## 2023-10-11 NOTE — CARE UPDATE
10/10/23 6697   Patient Assessment/Suction   Level of Consciousness (AVPU) alert   Respiratory Effort Normal   PRE-TX-O2   Device (Oxygen Therapy) room air   SpO2 97 %   Pulse Oximetry Type Intermittent   $ Pulse Oximetry - Multiple Charge Pulse Oximetry - Multiple   Pulse 75   Resp 18   Incentive Spirometer   $ Incentive Spirometer Charges refused

## 2023-10-11 NOTE — DISCHARGE SUMMARY
[facility] Medicine  Discharge Summary      Patient Name: Philip Alberts  MRN: [id number]  LEONIE: [id number]  Patient Class: IP- Inpatient  Admission Date: 10/8/2023  Hospital Length of Stay: 3 days  Discharge Date and Time:  10/11/2023 1:47 PM  Attending Physician: Kaden Sparks MD   Discharging Provider: Kaden Sparks MD  Primary Care Provider: Alfie Lancaster MD    Primary Care Team: Networked reference to record PCT     HPI:   No notes on file    Procedure(s) (LRB):  AMPUTATION, TOE (Right)      Hospital Course:   Pt got admitted with Gangrene/Osteomyelitis of R foot toe & Septic arthritis   Imaging showed  Fluid collection with tract extending to the plantar foot at the third digit involves the third PIP joint.   There was a destructive erosion of the head of the third proximal phalanx with subluxation of the third PIP joint.   Pt underwent Amputation right 3rd toe and  Excisional debridement below fascia of right 2nd and 4th toes   Later pt was discharged to home with PO doxycycline  He will get iv Dalvance x 2 via infusion center        Goals of Care Treatment Preferences:  Code Status: Full Code      Consults:   Consults (From admission, onward)        Status Ordering Provider     Inpatient consult to Social Work/Case Management  Once        Provider:  (Not yet assigned)    Acknowledged LOUIE SHI     Inpatient consult to Social Work/Case Management  Once        Provider:  (Not yet assigned)    Acknowledged JACQUELINE GIBBONS     Inpatient consult to Infectious Diseases  Once        Provider:  Jacqueline Gibbons MD    Completed TODD FLOWER     Pharmacy to dose Vancomycin consult  Once        Provider:  (Not yet assigned)   See [facility] for full Linked Orders Report.    Acknowledged PALMIRA BALL     Inpatient consult to Hospitalist  Once        Provider:  Palmira Ball NP    Acknowledged MELISSA ESPINOSA     Inpatient consult to Podiatry  Once        Provider:  Jack  JORDAN Pulido    Completed MELISSA ESPINOSA          No new Assessment & Plan notes have been filed under this hospital service since the last note was generated.  Service: Hospital Medicine    Final Active Diagnoses:    Diagnosis Date Noted POA    Diabetes mellitus due to underlying condition with hyperglycemia, with long-term current use of insulin [E08.65, Z79.4] 10/09/2023 Not Applicable    Diabetic peripheral neuropathy [E11.42] 09/16/2019 Yes    Smoker [F17.200] 12/26/2018 Yes    Diabetes mellitus, type 2 [E11.9] 08/15/2018 Yes      Problems Resolved During this Admission:    Diagnosis Date Noted Date Resolved POA    PRINCIPAL PROBLEM:  Gangrene of toe of right foot [I96] 10/08/2023 10/11/2023 Yes    Osteomyelitis [M86.9] 10/09/2023 10/11/2023 Unknown    Septic arthritis [M00.9] 10/09/2023 10/11/2023 Unknown    Skin ulcer of third toe of right foot, with necrosis of bone [L97.514] 10/08/2023 10/11/2023 Yes    Cellulitis of extremity [L03.119] 10/08/2023 10/11/2023 Yes       Discharged Condition: good    Disposition: Home or Self Care    Follow Up:    Patient Instructions:      Ambulatory referral/consult to Wound Clinic   Standing Status: Future   Referral Priority: Routine Referral Type: Consultation   Referral Reason: Specialty Services Required   Requested Specialty: Wound Care   Number of Visits Requested: 1       Significant Diagnostic Studies: Labs:   CMP   Recent Labs   Lab 10/10/23  0534 10/11/23  0455    138   K 4.6 3.9    100   CO2 33* 33*   * 233*   BUN 11 16   CREATININE 0.8 0.8   CALCIUM 9.2 8.9   PROT 7.4 6.8   ALBUMIN 3.5 3.3*   BILITOT 0.3 0.2   ALKPHOS 100 94   AST 20 19   ALT 14 14   ANIONGAP 5* 5*    and CBC   Recent Labs   Lab 10/10/23  0534 10/11/23  0455   WBC 7.76 6.92   HGB 12.2* 11.2*   HCT 36.6* 34.1*    195       Pending Diagnostic Studies:     Procedure Component Value Units Date/Time    Specimen to Pathology - Surgery [[id number]] Collected: 10/09/23  1323    Order Status: Sent Lab Status: No result     Specimen: Tissue          Medications:  Reconciled Home Medications:      Medication List      START taking these medications    doxycycline 100 MG Cap  Commonly known as: VIBRAMYCIN  Take 1 capsule (100 mg total) by mouth 2 (two) times daily.     HYDROcodone-acetaminophen 5-325 mg per tablet  Commonly known as: NORCO  Take 1 tablet by mouth every 12 (twelve) hours as needed for Pain.        CHANGE how you take these medications    insulin glargine 100 units/mL SubQ pen  Commonly known as: LANTUS SOLOSTAR U-100 INSULIN  17 units twice a day  What changed:   · how much to take  · how to take this  · when to take this     * lancets Misc  To check BGqid times daily, to use with insurance preferred meter  What changed:   · how much to take  · how to take this  · when to take this     * TRUEPLUS LANCETS 33 gauge Misc  Generic drug: lancets  Inject 1 lancet into the skin 4 (four) times daily before meals and nightly.  What changed: Another medication with the same name was changed. Make sure you understand how and when to take each.         * This list has 2 medication(s) that are the same as other medications prescribed for you. Read the directions carefully, and ask your doctor or other care provider to review them with you.            CONTINUE taking these medications    * BD ULTRA-FINE DENIS PEN NEEDLE MISC  by Misc.(Non-Drug; Combo Route) route.     * BD ULTRA-FINE DENIS PEN NEEDLE MISC  1 Needle by Misc.(Non-Drug; Combo Route) route 4 (four) times daily.     blood sugar diagnostic Strp  To check BG qid times daily, to use with insurance preferred meter     blood-glucose meter kit  To check BG qid times daily, to use with insurance preferred meter     esomeprazole 20 MG capsule  Commonly known as: NEXIUM  Take 20 mg by mouth before breakfast.     gabapentin 300 MG capsule  Commonly known as: NEURONTIN  Take 2 capsules (600 mg total) by mouth 3 (three) times daily.    "  insulin aspart U-100 100 unit/mL (3 mL) Inpn pen  Commonly known as: NovoLOG FlexPen U-100 Insulin  Inject 10 Units into the skin 4 (four) times daily. Per sliding scale as instructed     insulin syringe-needle U-100 0.3 mL 31 gauge x 5/16" Syrg  1 Device by Misc.(Non-Drug; Combo Route) route 4 (four) times daily.     lisinopriL 5 MG tablet  Commonly known as: PRINIVIL,ZESTRIL  Take 1 tablet (5 mg total) by mouth once daily.     pravastatin 20 MG tablet  Commonly known as: PRAVACHOL  Take 1 tablet (20 mg total) by mouth every evening.     sildenafiL 100 MG tablet  Commonly known as: VIAGRA  Take 1 tablet (100 mg total) by mouth daily as needed for Erectile Dysfunction.         * This list has 2 medication(s) that are the same as other medications prescribed for you. Read the directions carefully, and ask your doctor or other care provider to review them with you.            STOP taking these medications    EPINEPHrine 0.3 mg/0.3 mL Atin  Commonly known as: EPIPEN            Indwelling Lines/Drains at time of discharge:   Lines/Drains/Airways     None               Physical Exam  Cardiovascular:      Rate and Rhythm: Normal rate.   Neurological:      General: No focal deficit present.      Mental Status: He is alert.       Time spent on the discharge of patient: 45 minutes         Kaden Sparks MD  Department of Hospital Medicine  [facility]  "

## 2023-10-11 NOTE — PLAN OF CARE
Noted ID recommendation for Dalvance 1.5G x2 doses and OP wound care for wound vac change. Spoke with Ina at [facility] [phone] who states they accept the patient's insurance and they are running it for authorization for outpatient infusion at their suite in [place]. [facility] [phone] requesting call back with verification that they accept this insurer for outpatient wound care at their  clinic. Met with patient at bedside, explained all above and he is in agreement with this plan. Will continue to follow and update as appropriate.

## 2023-10-11 NOTE — CARE UPDATE
10/11/23 0100   Patient Assessment/Suction   Level of Consciousness (AVPU)   (pt is sleeping)   Incentive Spirometer   $ Incentive Spirometer Charges refused  (pt has refused the IS and instruction for 3 days)

## 2023-10-11 NOTE — CONSULTS
[facility] approved for home wound vac therapy, will be here around noon.  Will transfer to [facility] when pt is discharged. [employer] Wound vac is currently running at -125mmhg without issue.

## 2023-10-11 NOTE — PLAN OF CARE
Patient to receive outpatient IV Abx infusions set up with [facility] that will be completed at their [place] infusion suite, verified with Matt from [facility] [phone]. Patient will receive outpatient wound care with wound vac care at [facility] wound [facility], verified with Vicky at [facility] [phone].  Discharge orders and chart reviewed with no further post-acute discharge needs identified at this time.  At this time, patient is cleared for discharge from Case Management standpoint.         10/11/23 1422   Final Note   Assessment Type Final Discharge Note   Anticipated Discharge Disposition Home   Hospital Resources/Appts/Education Provided Appointments scheduled and added to AVS   Post-Acute Status   Post-Acute Authorization IV Infusion   Coverage Medicaid   IV Infusion Status Set-up Complete/Auth obtained   Discharge Delays None known at this time

## 2023-10-11 NOTE — CONSULTS
Changed out wound vac from [employer] to [employer] pump, dome removed and replaced with a [employer] dome, suction at -125mmhg with good seal.   areas of foot visible unchanged. Discussed how to seal dressing if seal is broken.  what to do if unable to get a seal or get in touch with wound care clinic.  Patient states he has taking care of his  wounds in the past and will be able to clean and care of wound.  Offered supplies, states he has supplies at home

## 2023-10-11 NOTE — PROGRESS NOTES
Progress Note  Infectious Disease    Admit Date: 10/8/2023   LOS: 3 days     SUBJECTIVE:     Follow-up For:  Gangrene/osteomyelitis    INTERVAL HISTORY:    10/11 @ 1054 (Flip):  Interim reviewed.  Patient seen and examined in his room.  He said he was ready to go home on his dressed and packed up.  Wound care is going to deliver his home wound VAC at noon to his room.  We discussed oral antibiotics and outpatient infusion.  He states pain is under control, no fevers chills, no dry mouth or ulcerations, no shortness a breath, cough, nausea vomiting or abdominal pain.  He has a wound VAC in place currently with cast shoe on.  Afebrile with T-max 98.7°, WBC 6.92, platelets 195, BUN/CR 16/0.8.  Currently on vancomycin and meropenem.  Blood cultures with no growth today, anaerobic culture is pending, AFB and fungal cultures are pending, tissue and aerobic culture with skin quynh no predominant organism.    10/10 @ 0905 (Flip):  Interim reviewed.  Patient seen and examined in his room.  He is awake and alert and is somewhat agitated today.  He would really like to go home.  He does not feel like they are giving him enough insulin and is not getting his gabapentin.  He denies any pain but has significant neuropathy in his lower extremities and that is why he needs the gabapentin.  He denies fevers or chills, nausea vomiting, states he has IBS and has intermittent constipation/diarrhea that is not different than usual.  He said he will let us know if he has significant diarrhea while on antibiotics.  He has been afebrile with max temp 98.7° in the last 24 hours.  BUN/CR 11/0.8, WBC 7.76, platelets 210, glucose 227 on CMP this morning.  He is currently on vancomycin and meropenem.  Surgical cultures are pending and Gram stain with rare Gram-positive cocci.  Arterial ultrasound with no significant stenosis.     Antibiotics (From admission, onward)      Start     Stop Route Frequency Ordered    10/09/23 1100  vancomycin  1.25 g in dextrose 5% 250 mL IVPB (ready to mix)        See Hyperspace for full Linked Orders Report.    -- IV Every 12 hours (non-standard times) 10/08/23 2222    10/09/23 0100  meropenem 1 g in sodium chloride 0.9 % 100 mL IVPB (ready to mix system)        Note to Pharmacy: Ht: 6' (1.829 m)  Wt: 74.8 kg (165 lb)  Estimated Creatinine Clearance: 166.2 mL/min (based on SCr of 0.6 mg/dL).  Body mass index is 22.38 kg/m².    -- IV Every 8 hours (non-standard times) 10/08/23 2254    10/08/23 2316  vancomycin - pharmacy to dose  (vancomycin IVPB (PEDS and ADULTS))        See Hyperspace for full Linked Orders Report.    -- IV pharmacy to manage frequency 10/08/23 2216            Antifungals (From admission, onward)      None             Antivirals (From admission, onward)      None            Review of Systems:  HEENT: No change in vision, sore throat, ulcers, thrush, dysphagia  Heart: No chest pain, orthopnea or edema  Lungs: No shortness of breath, cough, sputum production or pleuritic pain  Abdomen: No nausea, vomiting, abdominal pain, appetite is good, + IBS  Extremities: No  cyanosis, edema, joint swelling or new pain  MSK: No new pain, swelling  Neurological: No headaches, focal weakness   Psych:  No changes in mood  Skin: No rash, itching, or erythema, Right foot  surgical site  VAD: No IV site issues    OBJECTIVE:     Vital Signs (Most Recent)  Temp: 98.7 °F (37.1 °C) (10/11/23 1206)  Pulse: 83 (10/11/23 1206)  Resp: 18 (10/11/23 1206)  BP: 129/78 (10/11/23 1206)  SpO2: 98 % (10/11/23 1206)    Temperature Range Min/Max (Last 24H):  Temp:  [98.1 °F (36.7 °C)-98.7 °F (37.1 °C)]     I & O (Last 24H):  Intake/Output Summary (Last 24 hours) at 10/11/2023 1207  Last data filed at 10/11/2023 0907  Gross per 24 hour   Intake 500 ml   Output --   Net 500 ml       Physical Exam:  General:  Awake and alert, sitting up on bed, pleasant and conversant  Eyes:  Anicteric, PERRL  ENT:  No ulcers, exudates, thrush, nares patent,  dentition is  Lungs: Respirations soft and even, no tachypnea or increased work of breathing, on room air.  Heart:  Good perfusion, warm and dry skin, good peripheral  Abd:  No tenderness to palpation  :  Voids, urine clear  Musc:  Joints without effusion, swelling,  erythema, synovitis, ambulatory  Skin:  No rashes.   Wound: Surgical wound to rt foot with wound VAC in place, dressing is clean and dry.  Neuro:  Alert, attentive, speech fluent, face symmetric, moves all extremities, no focal weakness   Psych:  Calm and cooperative., good mood, normal affect  Extrem: No edema, erythema, phlebitis, cellulitis, warm and well perfused  VAD: PIV  Isolation:  None    Wounds    10/10:                      10/8:              CBC LAST 7 DAYS  Recent Labs   Lab 10/08/23  1658 10/08/23  2019 10/09/23  0627 10/10/23  0534 10/11/23  0455   WBC 8.05  --  6.45 7.76 6.92   RBC 4.11*  --  3.72* 3.99* 3.70*   HGB 12.5*  --  11.2* 12.2* 11.2*   HCT 38.1* 36 35.0* 36.6* 34.1*   MCV 93  --  94 92 92   MCH 30.4  --  30.1 30.6 30.3   MCHC 32.8  --  32.0 33.3 32.8   RDW 12.9  --  12.8 12.7 12.7     --  228 210 195   MPV 13.7*  --  13.5* 13.3* 13.7*   GRAN 52.5  4.2  --  38.5  2.5 49.0  3.8 42.9  3.0   LYMPH 35.2  2.8  --  46.5  3.0 35.3  2.7 43.2  3.0   MONO 9.1  0.7  --  9.0  0.6 10.7  0.8 8.8  0.6   BASO 0.09  --  0.07 0.10 0.10   NRBC 0  --  0 0 0         CHEMISTRY LAST 7 DAYS  Recent Labs   Lab 10/08/23  1658 10/08/23  2019 10/08/23  2128 10/09/23  0626 10/10/23  0534 10/11/23  0455     --  137 138 138 138   K 4.5  --  4.1 4.1 4.6 3.9   CL 99  --  101 102 100 100   CO2 25  --  26 34* 33* 33*   ANIONGAP 12  --  10 2* 5* 5*   BUN 12  --  10 9 11 16   CREATININE 0.9  --  0.6 0.6 0.8 0.8   *  --  237* 212* 227* 233*   CALCIUM 9.3  --  9.1 8.7 9.2 8.9   PH  --  7.399  --   --   --   --    MG  --   --   --  1.8  --   --    ALBUMIN 3.3*  --  3.6 3.3* 3.5 3.3*   PROT 8.1  --  7.3 6.7 7.4 6.8   ALKPHOS 98  --   "80 77 100 94   ALT 19  --  14 12 14 14   AST 26  --  18 17 20 19   BILITOT 0.2  --  0.3 0.2 0.3 0.2         Estimated Creatinine Clearance: 124.8 mL/min (based on SCr of 0.8 mg/dL).    INFLAMMATORY/PROCAL  LAST 7 DAYS  Recent Labs   Lab 10/08/23  1658 10/08/23  2128   PROCAL  --  <0.050   CRP 51.1* 43.1*       No results found for: "ESR"  CRP   Date Value Ref Range Status   10/08/2023 43.1 (H) 0.0 - 8.2 mg/L Final   10/08/2023 51.1 (H) 0.0 - 8.2 mg/L Final   09/27/2021 9.06 (H) <0.76 mg/dL Final   10/05/2018 0.14 0.00 - 1.40 mg/dL        PRIOR  MICROBIOLOGY:    No results found for the last 90 days.           LAST 7 DAYS MICROBIOLOGY   Microbiology Results (last 7 days)       Procedure Component Value Units Date/Time    Tissue culture [[id number]] Collected: 10/09/23 1321    Order Status: Completed Specimen: Tissue Updated: 10/11/23 1054     Aerobic Culture - Tissue Skin quynh, no predominant organism     Gram Stain Result Rare WBC's      Rare Gram positive cocci    Narrative:      3rd toe    Aerobic culture [[id number]] Collected: 10/09/23 1323    Order Status: Completed Specimen: Wound from Toe, Right Foot Updated: 10/11/23 1053     Aerobic Bacterial Culture Skin quynh,  no predominant organism    Narrative:      4th toe    Blood Culture #1 **CANNOT BE ORDERED STAT** [[id number]] Collected: 10/08/23 1616    Order Status: Completed Specimen: Blood from Antecubital, Right Updated: 10/10/23 1832     Blood Culture, Routine No Growth to date      No Growth to date      No Growth to date    Gram stain [[id number]] Collected: 10/09/23 1323    Order Status: Completed Specimen: Wound from Toe, Right Foot Updated: 10/10/23 0918     Gram Stain Result Rare WBC's      Rare Gram positive cocci    Narrative:      4th toe    Fungus culture [[id number]] Collected: 10/09/23 1321    Order Status: Sent Specimen: Bone from Toe, Right Foot Updated: 10/09/23 1359    AFB Culture & Smear [[id number]] Collected: 10/09/23 1321    Order " Status: Sent Specimen: Bone from Toe, Right Foot Updated: 10/09/23 1359    Culture, Anaerobic [[id number]] Collected: 10/09/23 1321    Order Status: Sent Specimen: Bone from Toe, Right Foot Updated: 10/09/23 1358    AFB Culture & Smear [[id number]] Collected: 10/09/23 1323    Order Status: Sent Specimen: Wound from Toe, Right Foot Updated: 10/09/23 1353    Culture, Anaerobic [[id number]] Collected: 10/09/23 1323    Order Status: Sent Specimen: Wound from Toe, Right Foot Updated: 10/09/23 1352    Fungus culture [[id number]] Collected: 10/09/23 1323    Order Status: Canceled Specimen: Wound from Toe, Right Foot     Aerobic culture [[id number]] Collected: 10/09/23 1321    Order Status: Canceled Specimen: Tissue from Toe, Right Foot     Gram stain [[id number]] Collected: 10/09/23 1321    Order Status: Canceled Specimen: Wound from Toe, Right Foot               CURRENT/PREVIOUS VISIT EKG  Results for orders placed or performed during the hospital encounter of 09/27/21   EKG 12-lead    Collection Time: 09/27/21 11:29 PM    Narrative    Test Reason : R50.9,    Vent. Rate : 098 BPM     Atrial Rate : 098 BPM     P-R Int : 148 ms          QRS Dur : 076 ms      QT Int : 336 ms       P-R-T Axes : 071 092 068 degrees     QTc Int : 428 ms    Sinus rhythm with occasional Premature ventricular complexes  Rightward axis  Borderline Abnormal ECG  No previous ECGs available  Confirmed by Shar ODONNELL, Grady ALMANZAR ([id number]) on 9/30/2021 8:55:42 AM    Referred By: TOMI   SELF           Confirmed By:Grady Myles MD       Significant Labs: All pertinent labs within the past 24 hours have been reviewed.     Significant Imaging: I have reviewed all relevant and available imaging results/findings within the past 24 hours.    Plain film of the foot  1.  Fluid collection with tract extending to the plantar foot at the third digit involves the third PIP joint. There is a destructive erosion of the head of the third proximal phalanx with  subluxation of the third PIP joint. These findings are consistent with osteomyelitis and septic arthritis with fistulous tract extending from the PIP joint to the plantar tissues of the third digit. There is associated destruction of the collateral ligaments and plantar tendons of the third digit.  2.  Other areas of STIR bone marrow hyperintensity and enhancement involving the second, fourth and fifth digits is felt to reflect osteomyelitis.  3.  Stranding STIR hyperintensities with enhancement demonstrated of the forefoot, consistent with cellulitis.     MRI of the forefoot 10/9  1.  Fluid collection with tract extending to the plantar foot at the third digit involves the third PIP joint. There is a destructive erosion of the head of the third proximal phalanx with subluxation of the third PIP joint. These findings are consistent with osteomyelitis and septic arthritis with fistulous tract extending from the PIP joint to the plantar tissues of the third digit. There is associated destruction of the collateral ligaments and plantar tendons of the third digit.  2.  Other areas of STIR bone marrow hyperintensity and enhancement involving the second, fourth and fifth digits is felt to reflect osteomyelitis.  3.  Stranding STIR hyperintensities with enhancement demonstrated of the forefoot, consistent with cellulitis.    Arterial ultrasound of lower extremities with no significant stenosis large vessel occlusion or aneurysm in the visualized arteries of the lower extremities.    ASSESSMENT/PLAN:     1.         Gangrene/osteomyelitis/septic arthritis right 3rd toe  S/p amputation and debridement 10/9  Fungal and AFB pending, anaerobic cultures pending, pathology is pending  Aerobic and tissue culture with skin quynh, no predominant organism    2.          Probable osteomyelitis adjacent 2nd and 4th toes  MRI with osteomyelitis, ESR > 90 CRP 51.1, procalcitonin < 0.050 negative, WBC 7.76, afebrile  Currently on empiric  meropenem and vancomycin  No significant arterial stenosis  CRP 51.1, procal negative, ESR greater than 90      3.         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         Surgical asplenia post trauma   Splenectomy 2000     5.          Smoker    Encouraged to quit smoking     Recommendations:  Continue vancomycin with pharmacy to dose, keep levels 15 -20 while inpatient  Continue meropenem 1 g IV every 8 hours while inpatient  Aggressive blood sugar control  Evaluate immunization needs for asplenic state  Follow-up on path report       INFECTIOUS DISEASE DISCHARGE RECOMMENDATIONS:  Dalbavancin 1.5 g IV infusion x2 doses 8 to 10 days apart  Doxycycline 100 mg oral twice daily for 6 weeks through November 20th  Follow-up with infectious disease in 2-3 weeks, the office will call to schedule an appointment  Continue outpatient wound care/wound VAC as directed   Follow-up with podiatry as directed  Evaluate immunization needs for asplenic state  Follow-up on path report      Medical Decision Making during this encounter was  [_] Low Complexity  [_] Moderate Complexity  [xx] High Complexity      Asmita Castelan NP    Date of Service: 10/11/2023  2:14 PM

## 2023-10-12 ENCOUNTER — PATIENT OUTREACH (OUTPATIENT)
Dept: ADMINISTRATIVE | Facility: CLINIC | Age: 43
End: 2023-10-12
Payer: MEDICAID

## 2023-10-12 NOTE — PROGRESS NOTES
C3 nurse spoke with Philip Alberts for a [facility] post hospital discharge follow up call. The patient has a scheduled [facility] appointment with Alfie Lancaster MD on 10/18/23 @ 1500.

## 2023-10-13 LAB
BACTERIA BLD CULT: NORMAL
BACTERIA SPEC AEROBE CULT: NORMAL
BACTERIA TISS AEROBE CULT: NORMAL
GRAM STN SPEC: NORMAL
GRAM STN SPEC: NORMAL

## 2023-10-17 ENCOUNTER — OFFICE VISIT (OUTPATIENT)
Dept: WOUND CARE | Facility: HOSPITAL | Age: 43
End: 2023-10-17
Attending: PODIATRIST
Payer: MEDICAID

## 2023-10-17 VITALS
TEMPERATURE: 99 F | RESPIRATION RATE: 16 BRPM | HEART RATE: 102 BPM | DIASTOLIC BLOOD PRESSURE: 90 MMHG | SYSTOLIC BLOOD PRESSURE: 146 MMHG

## 2023-10-17 DIAGNOSIS — Z87.2 HISTORY OF ULCER OF LOWER EXTREMITY: ICD-10-CM

## 2023-10-17 DIAGNOSIS — L97.513 ULCER OF RIGHT FOOT WITH NECROSIS OF MUSCLE: ICD-10-CM

## 2023-10-17 DIAGNOSIS — Z09 HOSPITAL DISCHARGE FOLLOW-UP: ICD-10-CM

## 2023-10-17 DIAGNOSIS — Z89.431 HISTORY OF AMPUTATION OF RIGHT FOOT: ICD-10-CM

## 2023-10-17 DIAGNOSIS — E08.65 DIABETES MELLITUS DUE TO UNDERLYING CONDITION WITH HYPERGLYCEMIA, WITH LONG-TERM CURRENT USE OF INSULIN: ICD-10-CM

## 2023-10-17 DIAGNOSIS — E11.42 TYPE 2 DIABETES MELLITUS WITH DIABETIC POLYNEUROPATHY, WITH LONG-TERM CURRENT USE OF INSULIN: ICD-10-CM

## 2023-10-17 DIAGNOSIS — Z79.4 TYPE 2 DIABETES MELLITUS WITH DIABETIC POLYNEUROPATHY, WITH LONG-TERM CURRENT USE OF INSULIN: ICD-10-CM

## 2023-10-17 DIAGNOSIS — F17.200 SMOKER: ICD-10-CM

## 2023-10-17 DIAGNOSIS — E11.42 DIABETIC PERIPHERAL NEUROPATHY: ICD-10-CM

## 2023-10-17 DIAGNOSIS — Z91.89 AT HIGH RISK FOR INADEQUATE NUTRITIONAL INTAKE: ICD-10-CM

## 2023-10-17 DIAGNOSIS — L08.9 WOUND INFECTION: ICD-10-CM

## 2023-10-17 DIAGNOSIS — R26.2 DIFFICULTY IN WALKING, NOT ELSEWHERE CLASSIFIED: ICD-10-CM

## 2023-10-17 DIAGNOSIS — E11.628 DIABETIC FOOT INFECTION: ICD-10-CM

## 2023-10-17 DIAGNOSIS — L03.119 CELLULITIS AND ABSCESS OF FOOT: ICD-10-CM

## 2023-10-17 DIAGNOSIS — Z79.4 DIABETES MELLITUS DUE TO UNDERLYING CONDITION WITH HYPERGLYCEMIA, WITH LONG-TERM CURRENT USE OF INSULIN: ICD-10-CM

## 2023-10-17 DIAGNOSIS — E11.65 UNCONTROLLED TYPE 2 DIABETES MELLITUS WITH HYPERGLYCEMIA: ICD-10-CM

## 2023-10-17 DIAGNOSIS — L97.514 ULCER OF RIGHT FOOT WITH NECROSIS OF BONE: Primary | ICD-10-CM

## 2023-10-17 DIAGNOSIS — L02.619 CELLULITIS AND ABSCESS OF FOOT: ICD-10-CM

## 2023-10-17 DIAGNOSIS — L08.9 DIABETIC FOOT INFECTION: ICD-10-CM

## 2023-10-17 DIAGNOSIS — T14.8XXA WOUND INFECTION: ICD-10-CM

## 2023-10-17 PROCEDURE — 4010F ACE/ARB THERAPY RXD/TAKEN: CPT | Mod: CPTII,,, | Performed by: PODIATRIST

## 2023-10-17 PROCEDURE — 3066F NEPHROPATHY DOC TX: CPT | Mod: CPTII,,, | Performed by: PODIATRIST

## 2023-10-17 PROCEDURE — 11043 DBRDMT MUSC&/FSCA 1ST 20/<: CPT | Mod: PN | Performed by: PODIATRIST

## 2023-10-17 PROCEDURE — 99204 OFFICE O/P NEW MOD 45 MIN: CPT | Mod: 25,PN | Performed by: PODIATRIST

## 2023-10-17 PROCEDURE — 3046F HEMOGLOBIN A1C LEVEL >9.0%: CPT | Mod: CPTII,,, | Performed by: PODIATRIST

## 2023-10-17 PROCEDURE — 3077F PR MOST RECENT SYSTOLIC BLOOD PRESSURE >= 140 MM HG: ICD-10-PCS | Mod: CPTII,,, | Performed by: PODIATRIST

## 2023-10-17 PROCEDURE — 11042 DBRDMT SUBQ TIS 1ST 20SQCM/<: CPT | Mod: 59,79,, | Performed by: PODIATRIST

## 2023-10-17 PROCEDURE — 97605 NEG PRS WND THER DME<=50SQCM: CPT | Mod: PN | Performed by: PODIATRIST

## 2023-10-17 PROCEDURE — 3080F DIAST BP >= 90 MM HG: CPT | Mod: CPTII,,, | Performed by: PODIATRIST

## 2023-10-17 PROCEDURE — 3046F PR MOST RECENT HEMOGLOBIN A1C LEVEL > 9.0%: ICD-10-PCS | Mod: CPTII,,, | Performed by: PODIATRIST

## 2023-10-17 PROCEDURE — 3077F SYST BP >= 140 MM HG: CPT | Mod: CPTII,,, | Performed by: PODIATRIST

## 2023-10-17 PROCEDURE — 99214 PR OFFICE/OUTPT VISIT, EST, LEVL IV, 30-39 MIN: ICD-10-PCS | Mod: 25,24,, | Performed by: PODIATRIST

## 2023-10-17 PROCEDURE — 1159F PR MEDICATION LIST DOCUMENTED IN MEDICAL RECORD: ICD-10-PCS | Mod: CPTII,,, | Performed by: PODIATRIST

## 2023-10-17 PROCEDURE — 3066F PR DOCUMENTATION OF TREATMENT FOR NEPHROPATHY: ICD-10-PCS | Mod: CPTII,,, | Performed by: PODIATRIST

## 2023-10-17 PROCEDURE — 1160F RVW MEDS BY RX/DR IN RCRD: CPT | Mod: CPTII,,, | Performed by: PODIATRIST

## 2023-10-17 PROCEDURE — 3061F NEG MICROALBUMINURIA REV: CPT | Mod: CPTII,,, | Performed by: PODIATRIST

## 2023-10-17 PROCEDURE — 4010F PR ACE/ARB THEARPY RXD/TAKEN: ICD-10-PCS | Mod: CPTII,,, | Performed by: PODIATRIST

## 2023-10-17 PROCEDURE — 1111F DSCHRG MED/CURRENT MED MERGE: CPT | Mod: CPTII,,, | Performed by: PODIATRIST

## 2023-10-17 PROCEDURE — 99214 OFFICE O/P EST MOD 30 MIN: CPT | Mod: 25,24,, | Performed by: PODIATRIST

## 2023-10-17 PROCEDURE — 11042 PR DEBRIDEMENT, SKIN, SUB-Q TISSUE,=<20 SQ CM: ICD-10-PCS | Mod: 59,79,, | Performed by: PODIATRIST

## 2023-10-17 PROCEDURE — 3080F PR MOST RECENT DIASTOLIC BLOOD PRESSURE >= 90 MM HG: ICD-10-PCS | Mod: CPTII,,, | Performed by: PODIATRIST

## 2023-10-17 PROCEDURE — 11043 DBRDMT MUSC&/FSCA 1ST 20/<: CPT | Mod: 79,,, | Performed by: PODIATRIST

## 2023-10-17 PROCEDURE — 1111F PR DISCHARGE MEDS RECONCILED W/ CURRENT OUTPATIENT MED LIST: ICD-10-PCS | Mod: CPTII,,, | Performed by: PODIATRIST

## 2023-10-17 PROCEDURE — 1160F PR REVIEW ALL MEDS BY PRESCRIBER/CLIN PHARMACIST DOCUMENTED: ICD-10-PCS | Mod: CPTII,,, | Performed by: PODIATRIST

## 2023-10-17 PROCEDURE — 11043 PR DEBRIDEMENT, SKIN, SUB-Q TISSUE,MUSCLE,=<20 SQ CM: ICD-10-PCS | Mod: 79,,, | Performed by: PODIATRIST

## 2023-10-17 PROCEDURE — 3061F PR NEG MICROALBUMINURIA RESULT DOCUMENTED/REVIEW: ICD-10-PCS | Mod: CPTII,,, | Performed by: PODIATRIST

## 2023-10-17 PROCEDURE — 11042 DBRDMT SUBQ TIS 1ST 20SQCM/<: CPT | Mod: 59,PN | Performed by: PODIATRIST

## 2023-10-17 PROCEDURE — 1159F MED LIST DOCD IN RCRD: CPT | Mod: CPTII,,, | Performed by: PODIATRIST

## 2023-10-18 ENCOUNTER — OFFICE VISIT (OUTPATIENT)
Dept: FAMILY MEDICINE | Facility: CLINIC | Age: 43
End: 2023-10-18
Payer: MEDICAID

## 2023-10-18 VITALS
SYSTOLIC BLOOD PRESSURE: 129 MMHG | WEIGHT: 168 LBS | HEIGHT: 72 IN | HEART RATE: 93 BPM | BODY MASS INDEX: 22.75 KG/M2 | DIASTOLIC BLOOD PRESSURE: 76 MMHG

## 2023-10-18 DIAGNOSIS — Z60.4 SOCIAL ISOLATION: ICD-10-CM

## 2023-10-18 DIAGNOSIS — Z79.4 TYPE 2 DIABETES MELLITUS WITH DIABETIC POLYNEUROPATHY, WITH LONG-TERM CURRENT USE OF INSULIN: ICD-10-CM

## 2023-10-18 DIAGNOSIS — M86.9 OSTEOMYELITIS OF THIRD TOE OF RIGHT FOOT: ICD-10-CM

## 2023-10-18 DIAGNOSIS — I10 PRIMARY HYPERTENSION: Primary | ICD-10-CM

## 2023-10-18 DIAGNOSIS — I96 NECROSIS OF TOE: ICD-10-CM

## 2023-10-18 DIAGNOSIS — E11.42 TYPE 2 DIABETES MELLITUS WITH DIABETIC POLYNEUROPATHY, WITH LONG-TERM CURRENT USE OF INSULIN: ICD-10-CM

## 2023-10-18 PROCEDURE — 4010F PR ACE/ARB THEARPY RXD/TAKEN: ICD-10-PCS | Mod: CPTII,,, | Performed by: INTERNAL MEDICINE

## 2023-10-18 PROCEDURE — 3061F NEG MICROALBUMINURIA REV: CPT | Mod: CPTII,,, | Performed by: INTERNAL MEDICINE

## 2023-10-18 PROCEDURE — 99214 PR OFFICE/OUTPT VISIT, EST, LEVL IV, 30-39 MIN: ICD-10-PCS | Mod: S$PBB,,, | Performed by: INTERNAL MEDICINE

## 2023-10-18 PROCEDURE — 1160F PR REVIEW ALL MEDS BY PRESCRIBER/CLIN PHARMACIST DOCUMENTED: ICD-10-PCS | Mod: CPTII,,, | Performed by: INTERNAL MEDICINE

## 2023-10-18 PROCEDURE — 1159F PR MEDICATION LIST DOCUMENTED IN MEDICAL RECORD: ICD-10-PCS | Mod: CPTII,,, | Performed by: INTERNAL MEDICINE

## 2023-10-18 PROCEDURE — 4010F ACE/ARB THERAPY RXD/TAKEN: CPT | Mod: CPTII,,, | Performed by: INTERNAL MEDICINE

## 2023-10-18 PROCEDURE — 3074F SYST BP LT 130 MM HG: CPT | Mod: CPTII,,, | Performed by: INTERNAL MEDICINE

## 2023-10-18 PROCEDURE — 3066F PR DOCUMENTATION OF TREATMENT FOR NEPHROPATHY: ICD-10-PCS | Mod: CPTII,,, | Performed by: INTERNAL MEDICINE

## 2023-10-18 PROCEDURE — 3061F PR NEG MICROALBUMINURIA RESULT DOCUMENTED/REVIEW: ICD-10-PCS | Mod: CPTII,,, | Performed by: INTERNAL MEDICINE

## 2023-10-18 PROCEDURE — 3078F DIAST BP <80 MM HG: CPT | Mod: CPTII,,, | Performed by: INTERNAL MEDICINE

## 2023-10-18 PROCEDURE — 3066F NEPHROPATHY DOC TX: CPT | Mod: CPTII,,, | Performed by: INTERNAL MEDICINE

## 2023-10-18 PROCEDURE — 1111F PR DISCHARGE MEDS RECONCILED W/ CURRENT OUTPATIENT MED LIST: ICD-10-PCS | Mod: CPTII,,, | Performed by: INTERNAL MEDICINE

## 2023-10-18 PROCEDURE — 3008F PR BODY MASS INDEX (BMI) DOCUMENTED: ICD-10-PCS | Mod: CPTII,,, | Performed by: INTERNAL MEDICINE

## 2023-10-18 PROCEDURE — 3046F PR MOST RECENT HEMOGLOBIN A1C LEVEL > 9.0%: ICD-10-PCS | Mod: CPTII,,, | Performed by: INTERNAL MEDICINE

## 2023-10-18 PROCEDURE — 99214 OFFICE O/P EST MOD 30 MIN: CPT | Mod: S$PBB,,, | Performed by: INTERNAL MEDICINE

## 2023-10-18 PROCEDURE — 1159F MED LIST DOCD IN RCRD: CPT | Mod: CPTII,,, | Performed by: INTERNAL MEDICINE

## 2023-10-18 PROCEDURE — 3078F PR MOST RECENT DIASTOLIC BLOOD PRESSURE < 80 MM HG: ICD-10-PCS | Mod: CPTII,,, | Performed by: INTERNAL MEDICINE

## 2023-10-18 PROCEDURE — 3008F BODY MASS INDEX DOCD: CPT | Mod: CPTII,,, | Performed by: INTERNAL MEDICINE

## 2023-10-18 PROCEDURE — 3046F HEMOGLOBIN A1C LEVEL >9.0%: CPT | Mod: CPTII,,, | Performed by: INTERNAL MEDICINE

## 2023-10-18 PROCEDURE — 3074F PR MOST RECENT SYSTOLIC BLOOD PRESSURE < 130 MM HG: ICD-10-PCS | Mod: CPTII,,, | Performed by: INTERNAL MEDICINE

## 2023-10-18 PROCEDURE — 1160F RVW MEDS BY RX/DR IN RCRD: CPT | Mod: CPTII,,, | Performed by: INTERNAL MEDICINE

## 2023-10-18 PROCEDURE — 99214 OFFICE O/P EST MOD 30 MIN: CPT | Performed by: INTERNAL MEDICINE

## 2023-10-18 PROCEDURE — 1111F DSCHRG MED/CURRENT MED MERGE: CPT | Mod: CPTII,,, | Performed by: INTERNAL MEDICINE

## 2023-10-18 RX ORDER — INSULIN ASPART 100 [IU]/ML
10 INJECTION, SOLUTION INTRAVENOUS; SUBCUTANEOUS 4 TIMES DAILY
Qty: 12 ML | Refills: 5 | Status: SHIPPED | OUTPATIENT
Start: 2023-10-18 | End: 2024-10-17

## 2023-10-18 RX ORDER — GABAPENTIN 300 MG/1
600 CAPSULE ORAL 3 TIMES DAILY
Qty: 180 CAPSULE | Refills: 5 | Status: SHIPPED | OUTPATIENT
Start: 2023-10-18 | End: 2024-10-17

## 2023-10-18 RX ORDER — INSULIN GLARGINE 100 [IU]/ML
INJECTION, SOLUTION SUBCUTANEOUS
Qty: 9 ML | Refills: 5 | Status: SHIPPED | OUTPATIENT
Start: 2023-10-18 | End: 2023-11-23 | Stop reason: SDUPTHER

## 2023-10-18 SDOH — SOCIAL DETERMINANTS OF HEALTH (SDOH): SOCIAL EXCLUSION AND REJECTION: Z60.4

## 2023-10-18 NOTE — PROGRESS NOTES
Subjective:       Patient ID: Philip Alberts is a 43 y.o. male.    Chief Complaint: Hospital Follow Up, Toe amputation/necrosis, Hyperlipidemia, Hypertension, and Diabetes    PATIENT IS A 43-YEAR-OLD MALE WHO COMES FOLLOW-UP.    HE IS WAS RECENTLY HOSPITALIZED FOR OSTEOMYELITIS AND NECROSIS OF THE 4TH TOE    HE WAS TREATED WITH AMPUTATION OF THE TOE    HE WAS ALSO PRESCRIBED ANTIBIOTICS AND home antibiotics also arranged and also getting doxycycline.      1.-poorly-controlled diabetes mellitus on long and short-acting insulin.  He was educated on the mechanism of action of short-acting insulin and long-acting insulin at that point.  He was under the impression that he has to take the long-acting insulin 3 times a day.    2.-chronic tobacco dependency was noted also and he was advised to quit smoking.  At that point he had stated that he has no other pleasures left in life and not even eating given lack of teeth and that smoking is his only pleasure.  3.-gastroesophageal reflux   4.-hyperlipidemia  5.-neuropathy.        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 12/3/2018     Social History     Socioeconomic History    Marital status: Single   Tobacco Use    Smoking status: Every Day    Smokeless tobacco: Never   Substance and Sexual Activity    Alcohol use: No    Drug use: No    Sexual activity: Yes     Social Determinants of Health     Financial Resource Strain: High Risk (8/17/2023)    Overall Financial Resource Strain (CARDIA)     Difficulty of Paying Living Expenses: Hard   Food Insecurity: No Food Insecurity (8/17/2023)    Hunger Vital Sign     Worried About Running Out of  Food in the Last Year: Never true     Ran Out of Food in the Last Year: Never true   Transportation Needs: No Transportation Needs (8/17/2023)    PRAPARE - Transportation     Lack of Transportation (Medical): No     Lack of Transportation (Non-Medical): No   Physical Activity: Inactive (8/17/2023)    Exercise Vital Sign     Days of Exercise per Week: 0 days     Minutes of Exercise per Session: 0 min   Stress: Stress Concern Present (8/17/2023)    [employer] [employer] - Occupational Stress Questionnaire     Feeling of Stress : To some extent   Social Connections: Socially Isolated (8/17/2023)    Social Connection and Isolation Panel [NHANES]     Frequency of Communication with Friends and Family: Twice a week     Frequency of Social Gatherings with Friends and Family: Twice a week     Attends Alevism Services: Never     Active Member of Clubs or Organizations: No     Attends Club or Organization Meetings: Never     Marital Status:    Housing Stability: Low Risk  (8/17/2023)    Housing Stability Vital Sign     Unable to Pay for Housing in the Last Year: No     Number of Places Lived in the Last Year: 1     Unstable Housing in the Last Year: No     Past Surgical History:   Procedure Laterality Date    MANDIBLE FRACTURE SURGERY  07/17/2000    MANDIBLE FRACTURE SURGERY      SPLENECTOMY, TOTAL  07/17/2000    TOE AMPUTATION Right 10/9/2023    Procedure: AMPUTATION, TOE;  Surgeon: Ashok Santiago DPM;  Location: [facility];  Service: Podiatry;  Laterality: Right;     Family History   Problem Relation Age of Onset    Diabetes Mother     Cancer Father        Review of Systems   Constitutional:  Positive for fatigue. Negative for activity change, chills, diaphoresis and fever. Unexpected weight change: gained 7 lbs.  HENT:  Negative for congestion, facial swelling, nosebleeds, postnasal drip, sore throat and trouble swallowing.    Eyes:  Negative for pain, discharge and visual disturbance.    Respiratory:  Negative for cough, chest tightness and wheezing.    Cardiovascular:  Negative for chest pain, palpitations and leg swelling.   Gastrointestinal:  Negative for abdominal distention, abdominal pain, blood in stool and diarrhea.   Endocrine: Positive for polydipsia and polyuria. Negative for cold intolerance, heat intolerance and polyphagia.   Genitourinary:  Negative for dysuria, flank pain, frequency, hematuria, scrotal swelling and urgency.   Musculoskeletal:  Negative for arthralgias, back pain and joint swelling.   Skin:  Negative for color change, pallor and rash.   Allergic/Immunologic: Negative for environmental allergies, food allergies and immunocompromised state.        History of splenectomy.   Neurological:  Positive for dizziness, tremors and weakness. Negative for seizures, syncope, speech difficulty, light-headedness, numbness and headaches.   Hematological:  Negative for adenopathy. Does not bruise/bleed easily.   Psychiatric/Behavioral:  Negative for agitation, behavioral problems, confusion, dysphoric mood and sleep disturbance. The patient is not nervous/anxious.          Objective:      Blood pressure (!) 141/75, pulse 94, height 6' (1.829 m), weight 76.2 kg (168 lb). Body mass index is 22.78 kg/m².  Physical Exam  Vitals and nursing note reviewed.   Constitutional:       General: He is not in acute distress.     Appearance: He is well-developed. He is ill-appearing (Somewhat chronically ill). He is not toxic-appearing or diaphoretic.      Comments: BMI is 22.24   HENT:      Head: Normocephalic and atraumatic.      Mouth/Throat:      Pharynx: No oropharyngeal exudate or posterior oropharyngeal erythema.      Comments: Edentulous.  Eyes:      Conjunctiva/sclera: Conjunctivae normal.   Neck:      Thyroid: No thyromegaly.      Vascular: No JVD.      Trachea: No tracheal deviation.   Cardiovascular:      Rate and Rhythm: Normal rate and regular rhythm.      Heart sounds: Normal heart  sounds. No murmur heard.     No friction rub. No gallop.   Pulmonary:      Effort: Pulmonary effort is normal.      Breath sounds: Normal breath sounds.   Abdominal:      General: There is no distension.      Palpations: Abdomen is soft.      Tenderness: There is no abdominal tenderness.   Musculoskeletal:         General: No swelling.      Cervical back: Neck supple. No rigidity.      Right lower leg: No edema.      Left lower leg: No edema.      Left foot: Normal range of motion.        Feet:    Feet:      Left foot:      Protective Sensation: 5 sites tested.  3 sites sensed.      Skin integrity: No ulcer, blister or erythema.      Toenail Condition: Left toenails are abnormally thick.      Comments: Right foot was not examined today.  It is under a thick dressing and has a wound VAC.  I do not want to disturb this.  He will send me the pictures.  The left foot is being examined today.  Lymphadenopathy:      Cervical: No cervical adenopathy.   Skin:     General: Skin is warm and dry.   Neurological:      Mental Status: He is alert. Mental status is at baseline.      Gait: Gait normal.   Psychiatric:         Mood and Affect: Mood is anxious.      Comments: Verbose             Assessment:             Primary hypertension    Type 2 diabetes mellitus with diabetic polyneuropathy, with long-term current use of insulin  -     gabapentin (NEURONTIN) 300 MG capsule; Take 2 capsules (600 mg total) by mouth 3 (three) times daily.  Dispense: 180 capsule; Refill: 5  -     insulin aspart U-100 (NOVOLOG FLEXPEN U-100 INSULIN) 100 unit/mL (3 mL) InPn pen; Inject 10 Units into the skin 4 (four) times daily. Per sliding scale as instructed  Dispense: 12 mL; Refill: 5  -     insulin (LANTUS SOLOSTAR U-100 INSULIN) glargine 100 units/mL SubQ pen; 17 units twice a day  Dispense: 9 mL; Refill: 5    Osteomyelitis of third toe of right foot    Social isolation    Necrosis of toe         No results displayed because visit has over 200  results.      Lab Visit on 08/07/2023   Component Date Value Ref Range Status    Hemoglobin A1C 08/07/2023 9.4 (H)  4.5 - 6.2 % Final    Estimated Avg Glucose 08/07/2023 223 (H)  68 - 131 mg/dL Final    Sodium 08/07/2023 140  136 - 145 mmol/L Final    Potassium 08/07/2023 4.5  3.5 - 5.1 mmol/L Final    Chloride 08/07/2023 104  95 - 110 mmol/L Final    CO2 08/07/2023 31 (H)  23 - 29 mmol/L Final    Glucose 08/07/2023 164 (H)  70 - 110 mg/dL Final    BUN 08/07/2023 12  6 - 20 mg/dL Final    Creatinine 08/07/2023 0.7  0.5 - 1.4 mg/dL Final    Calcium 08/07/2023 8.9  8.7 - 10.5 mg/dL Final    Anion Gap 08/07/2023 5 (L)  8 - 16 mmol/L Final    eGFR 08/07/2023 >60.0  >60 mL/min/1.73 m^2 Final           0 Result Notes             Component Ref Range & Units 6 d ago  (10/11/23) 6 d ago  (10/11/23) 7 d ago  (10/10/23) 7 d ago  (10/10/23) 7 d ago  (10/10/23) 7 d ago  (10/10/23) 7 d ago  (10/10/23)   POC Glucose 70 - 110 382 High   188 High   186 High   153 High   269 High   405 High   244 High          Component Ref Range & Units 2 mo ago  (8/7/23) 5 mo ago  (4/26/23) 8 mo ago  (2/10/23) 2 yr ago  (6/28/21) 2 yr ago  (1/6/21) 4 yr ago  (1/21/19) 5 yr ago  (10/5/18)   Hemoglobin A1C 4.5 - 6.2 % 9.4 High   10.0 High  CM  12.2 High  CM  9.0 High  CM  13.1 High  CM  11.7 High  R  10.5 High  R    DIAGNOSIS:   10/12/2023 JAR     RIGHT 3RD TOE, AMPUTATION   - GANGRENOUS NECROSIS AND ACUTE OSTEOMYELITIS               Plan:           Primary hypertension    Type 2 diabetes mellitus with diabetic polyneuropathy, with long-term current use of insulin    Osteomyelitis of third toe of right foot    Social isolation    Necrosis of toe      Poorly-controlled diabetes has been noted and now begin the series of complications with amputation of the 3rd toe following osteomyelitis.  Neuropathy symptoms have certain.    Diabetes management continues to be challenging no support from any specialist in the side of [place] will take his Medicaid  card.  Patient's self control of his medical issues is also somewhat limited with social isolation.    Support structure uncertain.      He continues on antibiotics and should follow-up with the Podiatry also.  He is getting infusion for dalbavancin and doxycycline by mouth.  Do    Status of last tetanus vaccination uncertain.  He got update on tetanus vaccination at the [facility] urgent care center.  Will try to get those records.    COVID precautions discussed.      Other preventive measures also discussed.      I had ordered labs previously which are still undone.    He will keep his regular follow-up as previously scheduled.  Need to consider the following:-    1.-continues glucose monitoring  2.-insulin pump for tighter control of diabetes.      Endocrinology services extremely limited in this side [facility].  It will be difficult given his Medicaid limitations.    Spent ramón 30 minutes with patient which involved review of pts medical conditions, labs, medications and with 50% of time face-to-face discussion about medical problems, management and any applicable changes.        Current Outpatient Medications:     blood sugar diagnostic Strp, To check BG qid times daily, to use with insurance preferred meter, Disp: 150 strip, Rfl: 3    doxycycline (VIBRAMYCIN) 100 MG Cap, Take 1 capsule (100 mg total) by mouth 2 (two) times daily., Disp: 84 capsule, Rfl: 0    esomeprazole (NEXIUM) 20 MG capsule, Take 20 mg by mouth before breakfast., Disp: , Rfl:     gabapentin (NEURONTIN) 300 MG capsule, Take 2 capsules (600 mg total) by mouth 3 (three) times daily., Disp: 180 capsule, Rfl: 5    HYDROcodone-acetaminophen (NORCO) 5-325 mg per tablet, Take 1 tablet by mouth every 12 (twelve) hours as needed for Pain., Disp: 10 tablet, Rfl: 0    insulin (LANTUS SOLOSTAR U-100 INSULIN) glargine 100 units/mL SubQ pen, 17 units twice a day (Patient taking differently: Inject 17 Units into the skin 2 (two) times a day. 17 units twice a  "day), Disp: 9 mL, Rfl: 5    insulin aspart U-100 (NOVOLOG FLEXPEN U-100 INSULIN) 100 unit/mL (3 mL) InPn pen, Inject 10 Units into the skin 4 (four) times daily. Per sliding scale as instructed, Disp: 12 mL, Rfl: 5    insulin syringe-needle U-100 0.3 mL 31 gauge x 5/16" Syrg, 1 Device by Misc.(Non-Drug; Combo Route) route 4 (four) times daily., Disp: 200 each, Rfl: 3    lancets Misc, To check BGqid times daily, to use with insurance preferred meter (Patient taking differently: 1 lancet  by Misc.(Non-Drug; Combo Route) route 4 (four) times daily. To check BGqid times daily, to use with insurance preferred meter), Disp: 200 each, Rfl: 3    lisinopriL (PRINIVIL,ZESTRIL) 5 MG tablet, Take 1 tablet (5 mg total) by mouth once daily., Disp: 90 tablet, Rfl: 3    pen needle, diabetic (BD ULTRA-FINE DENIS PEN NEEDLE MISC), 1 Needle by Misc.(Non-Drug; Combo Route) route 4 (four) times daily., Disp: , Rfl:     pravastatin (PRAVACHOL) 20 MG tablet, Take 1 tablet (20 mg total) by mouth every evening., Disp: 90 tablet, Rfl: 3    blood-glucose meter kit, To check BG qid times daily, to use with insurance preferred meter, Disp: 1 each, Rfl: 0    sildenafiL (VIAGRA) 100 MG tablet, Take 1 tablet (100 mg total) by mouth daily as needed for Erectile Dysfunction., Disp: 20 tablet, Rfl: 5    "

## 2023-10-18 NOTE — PROGRESS NOTES
[address] [facility] [address]  [place] [address]  Phone: [phone]   [phone]    Patient's PCP:Alfie Lancaster MD  Referring Provider: Aaareferral Self    Subjective:      Chief Complaint:: Wound Care, Wound Consult, Diabetes, Diabetic Foot Ulcer (right medial 4th toe diabetic foot ulcer, right medial ankle diabetic foot ulcer, and right 3rd digit amputation site diabetic foot ulcer), Wound Check, Wound Infection, Pressure Ulcer, Non-healing Wound, Peripheral Neuropathy, Numbness, Foot Ulcer, Difficulty Walking, Cellulitis, and Hospital Follow Up    HPI  Philip Alberts is a 43 y.o. male who presents today with a complaint of right medial 4th toe diabetic foot ulcer, right medial ankle diabetic foot ulcer, and right 3rd digit amputation site diabetic foot ulcer.  See wound docs documentation for full assessment and evaluation of all wounds.     First visit with patient. Patient was recently hospitalized and underwent Amputation right 3rd toe and  Excisional debridement below fascia of right 2nd and 4th toes.  He was originally admitted with Gangrene/Osteomyelitis of R foot toe & Septic arthritis   Imaging showed  Fluid collection with tract extending to the plantar foot at the third digit involves the third PIP joint.  Reviewed all notes from patients medical chart from last 12 months, including imaging, procedures, studies, progress notes,  cultures, antibiotic regimens, photos,  etc.       EVALUATION, ASSESSMENT, & PLAN:     1. Debridement of right right medial 4th toe diabetic foot ulcer and debridement of right medial ankle diabetic foot ulcer.  Evaluation and Assessment only of right 3rd toe amputation site diabetic foot ulcer.   See Wound Docs for assessment of wounds and procedure notes  2. Continue taking all medications as prescribed  3. Dressing changes, see Wound docs for dressing change orders  4. RTC one week   5. Counseled patient on increasing protein intake, not getting wound wet,  keeping dressing clean dry and intact, following a healthy diet, elevating legs when able, removing pressure from wound    Total time spent for E&M 40  Total time for debridement 35 minutes     NPWT is medically necessary for this patient at this time to acheive acceleration of granulation tissue and wound closure. It is my clinical judgement that this cannot be acheived using topical dressing or medications.         Counseling/Education:  I provided patient education verbally regarding:   The aspects of diabetes and how it pertains to the feet. I explained the importance of proper diabetic foot care and how it is essential for the health of their feet.    I discussed the importance of knowing their Hemoglobin A1c and that the level needs to be as close to 6 as possible. I discussed the increase complications of high blood sugar including stroke, blindness, heart attack, kidney failure and loss of limb secondary to neuropathy and PVD.     With neuropathy, beware of any breaks in the skin or redness. These areas are not recognized early due to the numbness.    I discussed Diabetes, lower back issues, metabolic disorders, systemic causes, chemotherapy, vitamin deficiency, heavy metal exposure, as some of the causes. I also explained that as much as 40% of the time we can not find a cause. I discussed different treatments available to control the symptoms but which may not cure the problem.       Counseled patient on the aspects of diabetes and how it pertains to the feet.  I explained the importance of proper diabetic foot care and how it is essential for the health of their feet.      Shoe inspection. Patient instructed on proper foot hygeine. We discussed wearing proper shoe gear, daily foot inspections, never walking without protective shoe gear, never putting sharp instruments to feet, routine podiatric nail visits every 2-3 months.        Proper ulcer care and the possible need for serial debridements, topical  medications, specific dressings and biological engineered skin substitutes if indicated.    Patient should call the office immediately if any signs of infection, such as fever, chills, sweats, increased redness or pain.    Patient was instructed to call the clinic or go to the emergency department if their symptoms do not improve, worsens, or if new symptoms develop.  Patient was advised that if any increased swelling, pain, or numbness arise to go immediately to the ED. Patient knows to call any time if an emergency arises. Shared decision making occurred and patient verbalized understanding in agreement with this plan.       >50% of this > 60 minute visit was spent face to face educating/counseling the patient    I spent a total of 60 minutes on the day of the visit.This includes face to face time and non-face to face time preparing to see the patient (eg, review of tests), obtaining and/or reviewing separately obtained history, documenting clinical information in the electronic or other health record, independently interpreting results and communicating results to the patient/family/caregiver, or care coordinator.       Much of the documentation for this visit was completed in the Wound Docs system.  Please see the attached documentation for further details about the patient's care. Scanned under the Media tab.        Anat Santiago DPM        Vitals:    10/17/23 1441   BP: (!) 146/90   Pulse: 102   Resp: 16   Temp: 99.2 °F (37.3 °C)   PainSc: 0-No pain      Shoe Size:     Past Surgical History:   Procedure Laterality Date    MANDIBLE FRACTURE SURGERY  07/17/2000    MANDIBLE FRACTURE SURGERY      SPLENECTOMY, TOTAL  07/17/2000    TOE AMPUTATION Right 10/9/2023    Procedure: AMPUTATION, TOE;  Surgeon: Ashok Santiago DPM;  Location: [facility];  Service: Podiatry;  Laterality: Right;     Past Medical History:   Diagnosis Date    Diabetes mellitus, type 2     Encounter for blood transfusion     GERD (gastroesophageal  "reflux disease)     Hyperlipidemia     Hypertension     Neuropathy     Osteoarthritis     Osteomyelitis 10/9/2023    Skin ulcer of third toe of right foot, with necrosis of bone 10/8/2023    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  Tobacco History:  reports that he has been smoking. He has never used smokeless tobacco.  Drug History:  reports no history of drug use.    Review of patient's allergies indicates:   Allergen Reactions    Pcn [penicillins] Anaphylaxis     Tolerates cephalexin       Current Outpatient Medications   Medication Sig Dispense Refill    blood sugar diagnostic Strp To check BG qid times daily, to use with insurance preferred meter 150 strip 3    blood-glucose meter kit To check BG qid times daily, to use with insurance preferred meter 1 each 0    doxycycline (VIBRAMYCIN) 100 MG Cap Take 1 capsule (100 mg total) by mouth 2 (two) times daily. 84 capsule 0    esomeprazole (NEXIUM) 20 MG capsule Take 20 mg by mouth before breakfast.      gabapentin (NEURONTIN) 300 MG capsule Take 2 capsules (600 mg total) by mouth 3 (three) times daily. 180 capsule 5    HYDROcodone-acetaminophen (NORCO) 5-325 mg per tablet Take 1 tablet by mouth every 12 (twelve) hours as needed for Pain. 10 tablet 0    insulin (LANTUS SOLOSTAR U-100 INSULIN) glargine 100 units/mL SubQ pen 17 units twice a day (Patient taking differently: Inject 17 Units into the skin 2 (two) times a day. 17 units twice a day) 9 mL 5    insulin aspart U-100 (NOVOLOG FLEXPEN U-100 INSULIN) 100 unit/mL (3 mL) InPn pen Inject 10 Units into the skin 4 (four) times daily. Per sliding scale as instructed 12 mL 5    insulin syringe-needle U-100 0.3 mL 31 gauge x 5/16" Syrg 1 Device by Misc.(Non-Drug; Combo Route) route 4 (four) times " daily. 200 each 3    lancets Misc To check BGqid times daily, to use with insurance preferred meter (Patient taking differently: 1 lancet  by Misc.(Non-Drug; Combo Route) route 4 (four) times daily. To check BGqid times daily, to use with insurance preferred meter) 200 each 3    lisinopriL (PRINIVIL,ZESTRIL) 5 MG tablet Take 1 tablet (5 mg total) by mouth once daily. 90 tablet 3    pen needle, diabetic (BD ULTRA-FINE DENIS PEN NEEDLE MISC) by Misc.(Non-Drug; Combo Route) route.      pen needle, diabetic (BD ULTRA-FINE DENIS PEN NEEDLE MISC) 1 Needle by Misc.(Non-Drug; Combo Route) route 4 (four) times daily.      pravastatin (PRAVACHOL) 20 MG tablet Take 1 tablet (20 mg total) by mouth every evening. 90 tablet 3    sildenafiL (VIAGRA) 100 MG tablet Take 1 tablet (100 mg total) by mouth daily as needed for Erectile Dysfunction. 20 tablet 5    TRUEPLUS LANCETS 33 gauge Misc Inject 1 lancet into the skin 4 (four) times daily before meals and nightly. 150 each 5     No current facility-administered medications for this visit.       Review of Systems   Constitutional:  Negative for chills, fatigue, fever and unexpected weight change.   HENT:  Negative for hearing loss and trouble swallowing.    Eyes:  Negative for photophobia and visual disturbance.   Respiratory:  Negative for cough, shortness of breath and wheezing.    Cardiovascular:  Negative for chest pain, palpitations and leg swelling.   Gastrointestinal:  Negative for abdominal pain and nausea.   Genitourinary:  Negative for dysuria and frequency.   Musculoskeletal:  Negative for arthralgias, back pain, gait problem, joint swelling and myalgias.   Skin:  Positive for wound. Negative for rash.   Neurological:  Positive for numbness. Negative for tremors, seizures, weakness and headaches.   Hematological:  Does not bruise/bleed easily.         Objective:        Physical Exam:   Foot Exam  Physical Exam  Ortho/SPM Exam     Imaging:            Assessment:       1.  Ulcer of right foot with necrosis of bone    2. Diabetes mellitus due to underlying condition with hyperglycemia, with long-term current use of insulin    3. Diabetic foot infection    4. At high risk for inadequate nutritional intake    5. Cellulitis and abscess of foot    6. Diabetic peripheral neuropathy    7. Hospital discharge follow-up    8. Uncontrolled type 2 diabetes mellitus with hyperglycemia    9. Smoker    10. Type 2 diabetes mellitus with diabetic polyneuropathy, with long-term current use of insulin    11. Wound infection    12. Ulcer of right foot with necrosis of muscle    13. Difficulty in walking, not elsewhere classified    14. History of amputation of right foot    15. History of ulcer of lower extremity      Plan:   Ulcer of right foot with necrosis of bone    Diabetes mellitus due to underlying condition with hyperglycemia, with long-term current use of insulin    Diabetic foot infection    At high risk for inadequate nutritional intake    Cellulitis and abscess of foot    Diabetic peripheral neuropathy    Hospital discharge follow-up    Uncontrolled type 2 diabetes mellitus with hyperglycemia    Smoker    Type 2 diabetes mellitus with diabetic polyneuropathy, with long-term current use of insulin    Wound infection    Ulcer of right foot with necrosis of muscle    Difficulty in walking, not elsewhere classified    History of amputation of right foot    History of ulcer of lower extremity      Follow up in about 3 days (around 10/20/2023).    Procedures          Counseling:     I provided patient education verbally regarding:   Patient diagnosis, treatment options, as well as alternatives, risks, and benefits.     This note was created using Dragon voice recognition software that occasionally misinterpreted phrases or words.

## 2023-10-20 ENCOUNTER — OFFICE VISIT (OUTPATIENT)
Dept: WOUND CARE | Facility: HOSPITAL | Age: 43
End: 2023-10-20
Attending: PODIATRIST
Payer: MEDICAID

## 2023-10-20 VITALS
HEART RATE: 100 BPM | DIASTOLIC BLOOD PRESSURE: 72 MMHG | RESPIRATION RATE: 19 BRPM | SYSTOLIC BLOOD PRESSURE: 117 MMHG | TEMPERATURE: 98 F

## 2023-10-20 DIAGNOSIS — R26.2 DIFFICULTY IN WALKING, NOT ELSEWHERE CLASSIFIED: ICD-10-CM

## 2023-10-20 DIAGNOSIS — E11.628 DIABETIC FOOT INFECTION: ICD-10-CM

## 2023-10-20 DIAGNOSIS — Z79.4 TYPE 2 DIABETES MELLITUS WITH DIABETIC POLYNEUROPATHY, WITH LONG-TERM CURRENT USE OF INSULIN: ICD-10-CM

## 2023-10-20 DIAGNOSIS — E08.65 DIABETES MELLITUS DUE TO UNDERLYING CONDITION WITH HYPERGLYCEMIA, WITH LONG-TERM CURRENT USE OF INSULIN: Primary | ICD-10-CM

## 2023-10-20 DIAGNOSIS — L97.514 ULCER OF RIGHT FOOT WITH NECROSIS OF BONE: ICD-10-CM

## 2023-10-20 DIAGNOSIS — T14.8XXA WOUND INFECTION: ICD-10-CM

## 2023-10-20 DIAGNOSIS — L03.119 CELLULITIS AND ABSCESS OF FOOT: ICD-10-CM

## 2023-10-20 DIAGNOSIS — E11.42 TYPE 2 DIABETES MELLITUS WITH DIABETIC POLYNEUROPATHY, WITH LONG-TERM CURRENT USE OF INSULIN: ICD-10-CM

## 2023-10-20 DIAGNOSIS — Z89.431 HISTORY OF AMPUTATION OF RIGHT FOOT: ICD-10-CM

## 2023-10-20 DIAGNOSIS — L02.619 CELLULITIS AND ABSCESS OF FOOT: ICD-10-CM

## 2023-10-20 DIAGNOSIS — L08.9 DIABETIC FOOT INFECTION: ICD-10-CM

## 2023-10-20 DIAGNOSIS — L97.513 ULCER OF RIGHT FOOT WITH NECROSIS OF MUSCLE: ICD-10-CM

## 2023-10-20 DIAGNOSIS — Z09 HOSPITAL DISCHARGE FOLLOW-UP: ICD-10-CM

## 2023-10-20 DIAGNOSIS — F17.200 SMOKER: ICD-10-CM

## 2023-10-20 DIAGNOSIS — L08.9 WOUND INFECTION: ICD-10-CM

## 2023-10-20 DIAGNOSIS — E11.42 DIABETIC PERIPHERAL NEUROPATHY: ICD-10-CM

## 2023-10-20 DIAGNOSIS — Z79.4 DIABETES MELLITUS DUE TO UNDERLYING CONDITION WITH HYPERGLYCEMIA, WITH LONG-TERM CURRENT USE OF INSULIN: Primary | ICD-10-CM

## 2023-10-20 DIAGNOSIS — Z91.89 AT HIGH RISK FOR INADEQUATE NUTRITIONAL INTAKE: ICD-10-CM

## 2023-10-20 DIAGNOSIS — E11.65 UNCONTROLLED TYPE 2 DIABETES MELLITUS WITH HYPERGLYCEMIA: ICD-10-CM

## 2023-10-20 DIAGNOSIS — Z87.2 HISTORY OF ULCER OF LOWER EXTREMITY: ICD-10-CM

## 2023-10-20 PROCEDURE — 99214 OFFICE O/P EST MOD 30 MIN: CPT | Mod: 25,,, | Performed by: PODIATRIST

## 2023-10-20 PROCEDURE — 1159F PR MEDICATION LIST DOCUMENTED IN MEDICAL RECORD: ICD-10-PCS | Mod: CPTII,,, | Performed by: PODIATRIST

## 2023-10-20 PROCEDURE — 3074F PR MOST RECENT SYSTOLIC BLOOD PRESSURE < 130 MM HG: ICD-10-PCS | Mod: CPTII,,, | Performed by: PODIATRIST

## 2023-10-20 PROCEDURE — 1111F PR DISCHARGE MEDS RECONCILED W/ CURRENT OUTPATIENT MED LIST: ICD-10-PCS | Mod: CPTII,,, | Performed by: PODIATRIST

## 2023-10-20 PROCEDURE — 11042 DBRDMT SUBQ TIS 1ST 20SQCM/<: CPT | Mod: 59

## 2023-10-20 PROCEDURE — 3046F HEMOGLOBIN A1C LEVEL >9.0%: CPT | Mod: CPTII,,, | Performed by: PODIATRIST

## 2023-10-20 PROCEDURE — 1160F RVW MEDS BY RX/DR IN RCRD: CPT | Mod: CPTII,,, | Performed by: PODIATRIST

## 2023-10-20 PROCEDURE — 4010F PR ACE/ARB THEARPY RXD/TAKEN: ICD-10-PCS | Mod: CPTII,,, | Performed by: PODIATRIST

## 2023-10-20 PROCEDURE — 1159F MED LIST DOCD IN RCRD: CPT | Mod: CPTII,,, | Performed by: PODIATRIST

## 2023-10-20 PROCEDURE — 11043 DBRDMT MUSC&/FSCA 1ST 20/<: CPT | Mod: PN | Performed by: PODIATRIST

## 2023-10-20 PROCEDURE — 11043 DBRDMT MUSC&/FSCA 1ST 20/<: CPT | Mod: ,,, | Performed by: PODIATRIST

## 2023-10-20 PROCEDURE — 3061F NEG MICROALBUMINURIA REV: CPT | Mod: CPTII,,, | Performed by: PODIATRIST

## 2023-10-20 PROCEDURE — 99214 PR OFFICE/OUTPT VISIT, EST, LEVL IV, 30-39 MIN: ICD-10-PCS | Mod: 25,,, | Performed by: PODIATRIST

## 2023-10-20 PROCEDURE — 1111F DSCHRG MED/CURRENT MED MERGE: CPT | Mod: CPTII,,, | Performed by: PODIATRIST

## 2023-10-20 PROCEDURE — 3074F SYST BP LT 130 MM HG: CPT | Mod: CPTII,,, | Performed by: PODIATRIST

## 2023-10-20 PROCEDURE — 3078F PR MOST RECENT DIASTOLIC BLOOD PRESSURE < 80 MM HG: ICD-10-PCS | Mod: CPTII,,, | Performed by: PODIATRIST

## 2023-10-20 PROCEDURE — 3061F PR NEG MICROALBUMINURIA RESULT DOCUMENTED/REVIEW: ICD-10-PCS | Mod: CPTII,,, | Performed by: PODIATRIST

## 2023-10-20 PROCEDURE — 3078F DIAST BP <80 MM HG: CPT | Mod: CPTII,,, | Performed by: PODIATRIST

## 2023-10-20 PROCEDURE — 11043 PR DEBRIDEMENT, SKIN, SUB-Q TISSUE,MUSCLE,=<20 SQ CM: ICD-10-PCS | Mod: ,,, | Performed by: PODIATRIST

## 2023-10-20 PROCEDURE — 3066F NEPHROPATHY DOC TX: CPT | Mod: CPTII,,, | Performed by: PODIATRIST

## 2023-10-20 PROCEDURE — 4010F ACE/ARB THERAPY RXD/TAKEN: CPT | Mod: CPTII,,, | Performed by: PODIATRIST

## 2023-10-20 PROCEDURE — 3066F PR DOCUMENTATION OF TREATMENT FOR NEPHROPATHY: ICD-10-PCS | Mod: CPTII,,, | Performed by: PODIATRIST

## 2023-10-20 PROCEDURE — 1160F PR REVIEW ALL MEDS BY PRESCRIBER/CLIN PHARMACIST DOCUMENTED: ICD-10-PCS | Mod: CPTII,,, | Performed by: PODIATRIST

## 2023-10-20 PROCEDURE — 3046F PR MOST RECENT HEMOGLOBIN A1C LEVEL > 9.0%: ICD-10-PCS | Mod: CPTII,,, | Performed by: PODIATRIST

## 2023-10-21 NOTE — PROGRESS NOTES
[address] [facility] [address]  [place] [address]  Phone: [phone]   [phone]    Patient's PCP:Alfie Lancaster MD  Referring Provider: Aaareferral Self    Subjective:      Chief Complaint:: Wound Care, Diabetic Foot Ulcer (right medial 4th toe diabetic foot ulcer, right medial ankle diabetic foot ulcer, and right 3rd digit amputation site diabetic foot ulcer), Non-healing Wound Follow Up, Wound Check, Non-healing Wound, Pressure Ulcer, Wound Infection, Foot Ulcer, Numbness, Peripheral Neuropathy, and Diabetes    HPI  Philip Alberts is a 43 y.o. male who presents today with a complaint of right medial 4th toe diabetic foot ulcer, right medial ankle diabetic foot ulcer, and right 3rd digit amputation site diabetic foot ulcer.  See wound docs documentation for full assessment and evaluation of all wounds.      First visit with patient. Patient was recently hospitalized and underwent Amputation right 3rd toe and  Excisional debridement below fascia of right 2nd and 4th toes.  He was originally admitted with Gangrene/Osteomyelitis of R foot toe & Septic arthritis   Imaging showed  Fluid collection with tract extending to the plantar foot at the third digit involves the third PIP joint.  Reviewed all notes from patients medical chart from last 12 months, including imaging, procedures, studies, progress notes,  cultures, antibiotic regimens, photos,  etc.         EVALUATION, ASSESSMENT, & PLAN:      1. Debridement of right 3rd toe amputation site diabetic foot ulcer. right right medial 4th toe diabetic foot ulcer and debridement of right medial ankle diabetic foot ulcer.  Evaluation and Assessment only of  right medial 4th toe diabetic foot ulcer and right medial ankle diabetic foot ulcer.   See Wound Docs for assessment of wounds and procedure notes  2. Continue taking all medications as prescribed  3. Dressing changes, see Wound docs for dressing change orders  4. RTC one week   5. Counseled patient on  increasing protein intake, not getting wound wet, keeping dressing clean dry and intact, following a healthy diet, elevating legs when able, removing pressure from wound     Total time spent for E&M 40  Total time for debridement 35 minutes      NPWT is medically necessary for this patient at this time to acheive acceleration of granulation tissue and wound closure. It is my clinical judgement that this cannot be acheived using topical dressing or medications.            Counseling/Education:  I provided patient education verbally regarding:   The aspects of diabetes and how it pertains to the feet. I explained the importance of proper diabetic foot care and how it is essential for the health of their feet.     I discussed the importance of knowing their Hemoglobin A1c and that the level needs to be as close to 6 as possible. I discussed the increase complications of high blood sugar including stroke, blindness, heart attack, kidney failure and loss of limb secondary to neuropathy and PVD.      With neuropathy, beware of any breaks in the skin or redness. These areas are not recognized early due to the numbness.     I discussed Diabetes, lower back issues, metabolic disorders, systemic causes, chemotherapy, vitamin deficiency, heavy metal exposure, as some of the causes. I also explained that as much as 40% of the time we can not find a cause. I discussed different treatments available to control the symptoms but which may not cure the problem.         Counseled patient on the aspects of diabetes and how it pertains to the feet.  I explained the importance of proper diabetic foot care and how it is essential for the health of their feet.        Shoe inspection. Patient instructed on proper foot hygeine. We discussed wearing proper shoe gear, daily foot inspections, never walking without protective shoe gear, never putting sharp instruments to feet, routine podiatric nail visits every 2-3 months.          Proper  ulcer care and the possible need for serial debridements, topical medications, specific dressings and biological engineered skin substitutes if indicated.     Patient should call the office immediately if any signs of infection, such as fever, chills, sweats, increased redness or pain.     Patient was instructed to call the clinic or go to the emergency department if their symptoms do not improve, worsens, or if new symptoms develop.  Patient was advised that if any increased swelling, pain, or numbness arise to go immediately to the ED. Patient knows to call any time if an emergency arises. Shared decision making occurred and patient verbalized understanding in agreement with this plan.         >50% of this > 60 minute visit was spent face to face educating/counseling the patient     I spent a total of 60 minutes on the day of the visit.This includes face to face time and non-face to face time preparing to see the patient (eg, review of tests), obtaining and/or reviewing separately obtained history, documenting clinical information in the electronic or other health record, independently interpreting results and communicating results to the patient/family/caregiver, or care coordinator.        Much of the documentation for this visit was completed in the Wound Docs system.  Please see the attached documentation for further details about the patient's care. Scanned under the Media tab.         Anat Santiago DPM         Vitals:    10/20/23 1544   BP: 117/72   Pulse: 100   Resp: 19   Temp: 98.2 °F (36.8 °C)   PainSc: 0-No pain      Shoe Size:     Past Surgical History:   Procedure Laterality Date    MANDIBLE FRACTURE SURGERY  07/17/2000    MANDIBLE FRACTURE SURGERY      SPLENECTOMY, TOTAL  07/17/2000    TOE AMPUTATION Right 10/9/2023    Procedure: AMPUTATION, TOE;  Surgeon: Ashok Santiago DPM;  Location: [facility];  Service: Podiatry;  Laterality: Right;     Past Medical History:   Diagnosis Date    Diabetes mellitus,  "type 2     Encounter for blood transfusion     GERD (gastroesophageal reflux disease)     Hyperlipidemia     Hypertension     Neuropathy     Osteoarthritis     Osteomyelitis 10/9/2023    Skin ulcer of third toe of right foot, with necrosis of bone 10/8/2023    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  Tobacco History:  reports that he has been smoking. He has never used smokeless tobacco.  Drug History:  reports no history of drug use.    Review of patient's allergies indicates:   Allergen Reactions    Pcn [penicillins] Anaphylaxis     Tolerates cephalexin       Current Outpatient Medications   Medication Sig Dispense Refill    blood sugar diagnostic Strp To check BG qid times daily, to use with insurance preferred meter 150 strip 3    blood-glucose meter kit To check BG qid times daily, to use with insurance preferred meter 1 each 0    doxycycline (VIBRAMYCIN) 100 MG Cap Take 1 capsule (100 mg total) by mouth 2 (two) times daily. 84 capsule 0    esomeprazole (NEXIUM) 20 MG capsule Take 20 mg by mouth before breakfast.      gabapentin (NEURONTIN) 300 MG capsule Take 2 capsules (600 mg total) by mouth 3 (three) times daily. 180 capsule 5    HYDROcodone-acetaminophen (NORCO) 5-325 mg per tablet Take 1 tablet by mouth every 12 (twelve) hours as needed for Pain. 10 tablet 0    insulin (LANTUS SOLOSTAR U-100 INSULIN) glargine 100 units/mL SubQ pen 17 units twice a day 9 mL 5    insulin aspart U-100 (NOVOLOG FLEXPEN U-100 INSULIN) 100 unit/mL (3 mL) InPn pen Inject 10 Units into the skin 4 (four) times daily. Per sliding scale as instructed 12 mL 5    insulin syringe-needle U-100 0.3 mL 31 gauge x 5/16" Syrg 1 Device by Misc.(Non-Drug; Combo Route) route 4 (four) times daily. 200 each 3    lancets Misc " To check BGqid times daily, to use with insurance preferred meter (Patient taking differently: 1 lancet  by Misc.(Non-Drug; Combo Route) route 4 (four) times daily. To check BGqid times daily, to use with insurance preferred meter) 200 each 3    lisinopriL (PRINIVIL,ZESTRIL) 5 MG tablet Take 1 tablet (5 mg total) by mouth once daily. 90 tablet 3    pen needle, diabetic (BD ULTRA-FINE DENIS PEN NEEDLE MISC) 1 Needle by Misc.(Non-Drug; Combo Route) route 4 (four) times daily.      pravastatin (PRAVACHOL) 20 MG tablet Take 1 tablet (20 mg total) by mouth every evening. 90 tablet 3    sildenafiL (VIAGRA) 100 MG tablet Take 1 tablet (100 mg total) by mouth daily as needed for Erectile Dysfunction. 20 tablet 5     No current facility-administered medications for this visit.       Review of Systems   Constitutional:  Negative for chills, fatigue, fever and unexpected weight change.   HENT:  Negative for hearing loss and trouble swallowing.    Eyes:  Negative for photophobia and visual disturbance.   Respiratory:  Negative for cough, shortness of breath and wheezing.    Cardiovascular:  Negative for chest pain, palpitations and leg swelling.   Gastrointestinal:  Negative for abdominal pain and nausea.   Genitourinary:  Negative for dysuria and frequency.   Musculoskeletal:  Negative for arthralgias, back pain, gait problem, joint swelling and myalgias.   Skin:  Positive for wound. Negative for rash.   Neurological:  Positive for numbness. Negative for tremors, seizures, weakness and headaches.   Hematological:  Does not bruise/bleed easily.         Objective:        Physical Exam:   Foot Exam  Physical Exam  Ortho/SPM Exam     Imaging:            Assessment:       1. Diabetes mellitus due to underlying condition with hyperglycemia, with long-term current use of insulin    2. Cellulitis and abscess of foot    3. Uncontrolled type 2 diabetes mellitus with hyperglycemia    4. Wound infection    5. Difficulty in walking, not  elsewhere classified    6. History of amputation of right foot    7. Ulcer of right foot with necrosis of bone    8. Smoker    9. Diabetic peripheral neuropathy    10. Diabetic foot infection    11. At high risk for inadequate nutritional intake    12. Hospital discharge follow-up    13. Type 2 diabetes mellitus with diabetic polyneuropathy, with long-term current use of insulin    14. Ulcer of right foot with necrosis of muscle    15. History of ulcer of lower extremity      Plan:   Diabetes mellitus due to underlying condition with hyperglycemia, with long-term current use of insulin    Cellulitis and abscess of foot    Uncontrolled type 2 diabetes mellitus with hyperglycemia    Wound infection    Difficulty in walking, not elsewhere classified    History of amputation of right foot    Ulcer of right foot with necrosis of bone    Smoker    Diabetic peripheral neuropathy    Diabetic foot infection    At high risk for inadequate nutritional intake    Hospital discharge follow-up    Type 2 diabetes mellitus with diabetic polyneuropathy, with long-term current use of insulin    Ulcer of right foot with necrosis of muscle    History of ulcer of lower extremity      Follow up in about 1 week (around 10/27/2023).    Procedures          Counseling:     I provided patient education verbally regarding:   Patient diagnosis, treatment options, as well as alternatives, risks, and benefits.     This note was created using Dragon voice recognition software that occasionally misinterpreted phrases or words.

## 2023-10-24 ENCOUNTER — OFFICE VISIT (OUTPATIENT)
Dept: WOUND CARE | Facility: HOSPITAL | Age: 43
End: 2023-10-24
Attending: PODIATRIST
Payer: MEDICAID

## 2023-10-24 VITALS
RESPIRATION RATE: 18 BRPM | HEIGHT: 72 IN | DIASTOLIC BLOOD PRESSURE: 89 MMHG | BODY MASS INDEX: 22.75 KG/M2 | HEART RATE: 98 BPM | SYSTOLIC BLOOD PRESSURE: 154 MMHG | WEIGHT: 168 LBS | TEMPERATURE: 98 F

## 2023-10-24 DIAGNOSIS — T14.8XXA WOUND INFECTION: ICD-10-CM

## 2023-10-24 DIAGNOSIS — L08.9 WOUND INFECTION: ICD-10-CM

## 2023-10-24 DIAGNOSIS — Z89.431 HISTORY OF AMPUTATION OF RIGHT FOOT: ICD-10-CM

## 2023-10-24 DIAGNOSIS — E11.42 DIABETIC PERIPHERAL NEUROPATHY: ICD-10-CM

## 2023-10-24 DIAGNOSIS — Z91.89 AT HIGH RISK FOR INADEQUATE NUTRITIONAL INTAKE: ICD-10-CM

## 2023-10-24 DIAGNOSIS — E11.42 TYPE 2 DIABETES MELLITUS WITH DIABETIC POLYNEUROPATHY, WITH LONG-TERM CURRENT USE OF INSULIN: ICD-10-CM

## 2023-10-24 DIAGNOSIS — R26.2 DIFFICULTY IN WALKING, NOT ELSEWHERE CLASSIFIED: ICD-10-CM

## 2023-10-24 DIAGNOSIS — E08.65 DIABETES MELLITUS DUE TO UNDERLYING CONDITION WITH HYPERGLYCEMIA, WITH LONG-TERM CURRENT USE OF INSULIN: ICD-10-CM

## 2023-10-24 DIAGNOSIS — L97.513 ULCER OF RIGHT FOOT WITH NECROSIS OF MUSCLE: ICD-10-CM

## 2023-10-24 DIAGNOSIS — L02.619 CELLULITIS AND ABSCESS OF FOOT: ICD-10-CM

## 2023-10-24 DIAGNOSIS — L08.9 DIABETIC FOOT INFECTION: ICD-10-CM

## 2023-10-24 DIAGNOSIS — L03.119 CELLULITIS AND ABSCESS OF FOOT: ICD-10-CM

## 2023-10-24 DIAGNOSIS — F17.200 SMOKER: ICD-10-CM

## 2023-10-24 DIAGNOSIS — Z79.4 TYPE 2 DIABETES MELLITUS WITH DIABETIC POLYNEUROPATHY, WITH LONG-TERM CURRENT USE OF INSULIN: ICD-10-CM

## 2023-10-24 DIAGNOSIS — E11.65 UNCONTROLLED TYPE 2 DIABETES MELLITUS WITH HYPERGLYCEMIA: ICD-10-CM

## 2023-10-24 DIAGNOSIS — Z79.4 DIABETES MELLITUS DUE TO UNDERLYING CONDITION WITH HYPERGLYCEMIA, WITH LONG-TERM CURRENT USE OF INSULIN: ICD-10-CM

## 2023-10-24 DIAGNOSIS — Z87.2 HISTORY OF ULCER OF LOWER EXTREMITY: ICD-10-CM

## 2023-10-24 DIAGNOSIS — E11.628 DIABETIC FOOT INFECTION: ICD-10-CM

## 2023-10-24 DIAGNOSIS — L97.514 ULCER OF RIGHT FOOT WITH NECROSIS OF BONE: Primary | ICD-10-CM

## 2023-10-24 PROCEDURE — 1111F DSCHRG MED/CURRENT MED MERGE: CPT | Mod: CPTII,,, | Performed by: PODIATRIST

## 2023-10-24 PROCEDURE — 99214 PR OFFICE/OUTPT VISIT, EST, LEVL IV, 30-39 MIN: ICD-10-PCS | Mod: 25,,, | Performed by: PODIATRIST

## 2023-10-24 PROCEDURE — 97605 NEG PRS WND THER DME<=50SQCM: CPT | Mod: PN | Performed by: PODIATRIST

## 2023-10-24 PROCEDURE — 11043 DBRDMT MUSC&/FSCA 1ST 20/<: CPT | Mod: ,,, | Performed by: PODIATRIST

## 2023-10-24 PROCEDURE — 11043 PR DEBRIDEMENT, SKIN, SUB-Q TISSUE,MUSCLE,=<20 SQ CM: ICD-10-PCS | Mod: ,,, | Performed by: PODIATRIST

## 2023-10-24 PROCEDURE — 11043 DBRDMT MUSC&/FSCA 1ST 20/<: CPT | Mod: PN | Performed by: PODIATRIST

## 2023-10-24 PROCEDURE — 99214 OFFICE O/P EST MOD 30 MIN: CPT | Mod: 25,,, | Performed by: PODIATRIST

## 2023-10-24 PROCEDURE — 3077F SYST BP >= 140 MM HG: CPT | Mod: CPTII,,, | Performed by: PODIATRIST

## 2023-10-24 PROCEDURE — 1111F PR DISCHARGE MEDS RECONCILED W/ CURRENT OUTPATIENT MED LIST: ICD-10-PCS | Mod: CPTII,,, | Performed by: PODIATRIST

## 2023-10-24 PROCEDURE — 1159F PR MEDICATION LIST DOCUMENTED IN MEDICAL RECORD: ICD-10-PCS | Mod: CPTII,,, | Performed by: PODIATRIST

## 2023-10-24 PROCEDURE — 3079F DIAST BP 80-89 MM HG: CPT | Mod: CPTII,,, | Performed by: PODIATRIST

## 2023-10-24 PROCEDURE — 11042 PR DEBRIDEMENT, SKIN, SUB-Q TISSUE,=<20 SQ CM: ICD-10-PCS | Mod: 59,,, | Performed by: PODIATRIST

## 2023-10-24 PROCEDURE — 3066F NEPHROPATHY DOC TX: CPT | Mod: CPTII,,, | Performed by: PODIATRIST

## 2023-10-24 PROCEDURE — 4010F ACE/ARB THERAPY RXD/TAKEN: CPT | Mod: CPTII,,, | Performed by: PODIATRIST

## 2023-10-24 PROCEDURE — 3046F PR MOST RECENT HEMOGLOBIN A1C LEVEL > 9.0%: ICD-10-PCS | Mod: CPTII,,, | Performed by: PODIATRIST

## 2023-10-24 PROCEDURE — 3079F PR MOST RECENT DIASTOLIC BLOOD PRESSURE 80-89 MM HG: ICD-10-PCS | Mod: CPTII,,, | Performed by: PODIATRIST

## 2023-10-24 PROCEDURE — 3061F NEG MICROALBUMINURIA REV: CPT | Mod: CPTII,,, | Performed by: PODIATRIST

## 2023-10-24 PROCEDURE — 3008F PR BODY MASS INDEX (BMI) DOCUMENTED: ICD-10-PCS | Mod: CPTII,,, | Performed by: PODIATRIST

## 2023-10-24 PROCEDURE — 3066F PR DOCUMENTATION OF TREATMENT FOR NEPHROPATHY: ICD-10-PCS | Mod: CPTII,,, | Performed by: PODIATRIST

## 2023-10-24 PROCEDURE — 11042 DBRDMT SUBQ TIS 1ST 20SQCM/<: CPT | Mod: 59,,, | Performed by: PODIATRIST

## 2023-10-24 PROCEDURE — 3061F PR NEG MICROALBUMINURIA RESULT DOCUMENTED/REVIEW: ICD-10-PCS | Mod: CPTII,,, | Performed by: PODIATRIST

## 2023-10-24 PROCEDURE — 1159F MED LIST DOCD IN RCRD: CPT | Mod: CPTII,,, | Performed by: PODIATRIST

## 2023-10-24 PROCEDURE — 1160F RVW MEDS BY RX/DR IN RCRD: CPT | Mod: CPTII,,, | Performed by: PODIATRIST

## 2023-10-24 PROCEDURE — 3046F HEMOGLOBIN A1C LEVEL >9.0%: CPT | Mod: CPTII,,, | Performed by: PODIATRIST

## 2023-10-24 PROCEDURE — 3077F PR MOST RECENT SYSTOLIC BLOOD PRESSURE >= 140 MM HG: ICD-10-PCS | Mod: CPTII,,, | Performed by: PODIATRIST

## 2023-10-24 PROCEDURE — 3008F BODY MASS INDEX DOCD: CPT | Mod: CPTII,,, | Performed by: PODIATRIST

## 2023-10-24 PROCEDURE — 1160F PR REVIEW ALL MEDS BY PRESCRIBER/CLIN PHARMACIST DOCUMENTED: ICD-10-PCS | Mod: CPTII,,, | Performed by: PODIATRIST

## 2023-10-24 PROCEDURE — 4010F PR ACE/ARB THEARPY RXD/TAKEN: ICD-10-PCS | Mod: CPTII,,, | Performed by: PODIATRIST

## 2023-10-24 PROCEDURE — 11042 DBRDMT SUBQ TIS 1ST 20SQCM/<: CPT | Mod: 59,PN | Performed by: PODIATRIST

## 2023-10-25 NOTE — PROGRESS NOTES
[address] [facility] [address]  [place] [address]  Phone: [phone]   [phone]    Patient's PCP:Alfie Lancaster MD  Referring Provider: Aaareferral Self    Subjective:      Chief Complaint:: Wound Care, Diabetes, Diabetic Foot Ulcer (right medial 4th toe diabetic foot ulcer, right medial ankle diabetic foot ulcer, and right 3rd digit amputation site diabetic foot ulcer), Wound Check, Non-healing Wound, Pressure Ulcer, Numbness, Peripheral Neuropathy, Wound Infection, Foot Ulcer, and Difficulty Walking    HPI  Philip Alberts is a 43 y.o. male who presents todayfor follow up of right medial 4th toe diabetic foot ulcer, right medial ankle diabetic foot ulcer, and right 3rd digit amputation site diabetic foot ulcer.  See wound docs documentation for full assessment and evaluation of all wounds.      First visit with patient. Patient was recently hospitalized and underwent Amputation right 3rd toe and  Excisional debridement below fascia of right 2nd and 4th toes.  He was originally admitted with Gangrene/Osteomyelitis of R foot toe & Septic arthritis   Imaging showed  Fluid collection with tract extending to the plantar foot at the third digit involves the third PIP joint.  Reviewed all notes from patients medical chart from last 12 months, including imaging, procedures, studies, progress notes,  cultures, antibiotic regimens, photos,  etc.         EVALUATION, ASSESSMENT, & PLAN:      1. Debridement of right 3rd toe amputation site diabetic foot ulcer and Debridement of right medial 4th toe diabetic foot ulcer.  Evaluation and Assessment only right medial ankle diabetic foot ulcer.   See Wound Docs for assessment of wounds and procedure notes  2. Continue taking all medications as prescribed  3. Dressing changes, see Wound docs for dressing change orders  4. RTC one week   5. Counseled patient on increasing protein intake, not getting wound wet, keeping dressing clean dry and intact, following a healthy  diet, elevating legs when able, removing pressure from wound     Total time spent for E&M 40  Total time for debridement 35 minutes      NPWT is medically necessary for this patient at this time to acheive acceleration of granulation tissue and wound closure. It is my clinical judgement that this cannot be acheived using topical dressing or medications.            Counseling/Education:  I provided patient education verbally regarding:   The aspects of diabetes and how it pertains to the feet. I explained the importance of proper diabetic foot care and how it is essential for the health of their feet.     I discussed the importance of knowing their Hemoglobin A1c and that the level needs to be as close to 6 as possible. I discussed the increase complications of high blood sugar including stroke, blindness, heart attack, kidney failure and loss of limb secondary to neuropathy and PVD.      With neuropathy, beware of any breaks in the skin or redness. These areas are not recognized early due to the numbness.     I discussed Diabetes, lower back issues, metabolic disorders, systemic causes, chemotherapy, vitamin deficiency, heavy metal exposure, as some of the causes. I also explained that as much as 40% of the time we can not find a cause. I discussed different treatments available to control the symptoms but which may not cure the problem.         Counseled patient on the aspects of diabetes and how it pertains to the feet.  I explained the importance of proper diabetic foot care and how it is essential for the health of their feet.        Shoe inspection. Patient instructed on proper foot hygeine. We discussed wearing proper shoe gear, daily foot inspections, never walking without protective shoe gear, never putting sharp instruments to feet, routine podiatric nail visits every 2-3 months.          Proper ulcer care and the possible need for serial debridements, topical medications, specific dressings and biological  engineered skin substitutes if indicated.     Patient should call the office immediately if any signs of infection, such as fever, chills, sweats, increased redness or pain.     Patient was instructed to call the clinic or go to the emergency department if their symptoms do not improve, worsens, or if new symptoms develop.  Patient was advised that if any increased swelling, pain, or numbness arise to go immediately to the ED. Patient knows to call any time if an emergency arises. Shared decision making occurred and patient verbalized understanding in agreement with this plan.         >50% of this > 60 minute visit was spent face to face educating/counseling the patient     I spent a total of 60 minutes on the day of the visit.This includes face to face time and non-face to face time preparing to see the patient (eg, review of tests), obtaining and/or reviewing separately obtained history, documenting clinical information in the electronic or other health record, independently interpreting results and communicating results to the patient/family/caregiver, or care coordinator.        Much of the documentation for this visit was completed in the Wound Docs system.  Please see the attached documentation for further details about the patient's care. Scanned under the Media tab.         Anat Santiago DPM         Vitals:    10/24/23 1508   BP: (!) 154/89   Pulse: 98   Resp: 18   Temp: 98.4 °F (36.9 °C)   Weight: 76.2 kg (168 lb)   Height: 6' (1.829 m)   PainSc: 0-No pain      Shoe Size:     Past Surgical History:   Procedure Laterality Date    MANDIBLE FRACTURE SURGERY  07/17/2000    MANDIBLE FRACTURE SURGERY      SPLENECTOMY, TOTAL  07/17/2000    TOE AMPUTATION Right 10/9/2023    Procedure: AMPUTATION, TOE;  Surgeon: Ashok Santiago DPM;  Location: [facility];  Service: Podiatry;  Laterality: Right;     Past Medical History:   Diagnosis Date    Diabetes mellitus, type 2     Encounter for blood transfusion     GERD  "(gastroesophageal reflux disease)     Hyperlipidemia     Hypertension     Neuropathy     Osteoarthritis     Osteomyelitis 10/9/2023    Skin ulcer of third toe of right foot, with necrosis of bone 10/8/2023    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  Tobacco History:  reports that he has been smoking. He has never used smokeless tobacco.  Drug History:  reports no history of drug use.    Review of patient's allergies indicates:   Allergen Reactions    Pcn [penicillins] Anaphylaxis     Tolerates cephalexin       Current Outpatient Medications   Medication Sig Dispense Refill    blood sugar diagnostic Strp To check BG qid times daily, to use with insurance preferred meter 150 strip 3    blood-glucose meter kit To check BG qid times daily, to use with insurance preferred meter 1 each 0    doxycycline (VIBRAMYCIN) 100 MG Cap Take 1 capsule (100 mg total) by mouth 2 (two) times daily. 84 capsule 0    esomeprazole (NEXIUM) 20 MG capsule Take 20 mg by mouth before breakfast.      gabapentin (NEURONTIN) 300 MG capsule Take 2 capsules (600 mg total) by mouth 3 (three) times daily. 180 capsule 5    HYDROcodone-acetaminophen (NORCO) 5-325 mg per tablet Take 1 tablet by mouth every 12 (twelve) hours as needed for Pain. 10 tablet 0    insulin (LANTUS SOLOSTAR U-100 INSULIN) glargine 100 units/mL SubQ pen 17 units twice a day 9 mL 5    insulin aspart U-100 (NOVOLOG FLEXPEN U-100 INSULIN) 100 unit/mL (3 mL) InPn pen Inject 10 Units into the skin 4 (four) times daily. Per sliding scale as instructed 12 mL 5    insulin syringe-needle U-100 0.3 mL 31 gauge x 5/16" Syrg 1 Device by Misc.(Non-Drug; Combo Route) route 4 (four) times daily. 200 each 3    lancets Misc To check BGqid times daily, to use with insurance " preferred meter (Patient taking differently: 1 lancet  by Misc.(Non-Drug; Combo Route) route 4 (four) times daily. To check BGqid times daily, to use with insurance preferred meter) 200 each 3    lisinopriL (PRINIVIL,ZESTRIL) 5 MG tablet Take 1 tablet (5 mg total) by mouth once daily. 90 tablet 3    pen needle, diabetic (BD ULTRA-FINE DENIS PEN NEEDLE MISC) 1 Needle by Misc.(Non-Drug; Combo Route) route 4 (four) times daily.      pravastatin (PRAVACHOL) 20 MG tablet Take 1 tablet (20 mg total) by mouth every evening. 90 tablet 3    sildenafiL (VIAGRA) 100 MG tablet Take 1 tablet (100 mg total) by mouth daily as needed for Erectile Dysfunction. 20 tablet 5     No current facility-administered medications for this visit.       Review of Systems   Constitutional:  Negative for chills, fatigue, fever and unexpected weight change.   HENT:  Negative for hearing loss and trouble swallowing.    Eyes:  Negative for photophobia and visual disturbance.   Respiratory:  Negative for cough, shortness of breath and wheezing.    Cardiovascular:  Negative for chest pain, palpitations and leg swelling.   Gastrointestinal:  Negative for abdominal pain and nausea.   Genitourinary:  Negative for dysuria and frequency.   Musculoskeletal:  Negative for arthralgias, back pain, gait problem, joint swelling and myalgias.   Skin:  Positive for wound. Negative for rash.   Neurological:  Positive for numbness. Negative for tremors, seizures, weakness and headaches.   Hematological:  Does not bruise/bleed easily.         Objective:        Physical Exam:   Foot Exam  Physical Exam  Ortho/SPM Exam     Imaging:            Assessment:       1. Ulcer of right foot with necrosis of bone    2. Ulcer of right foot with necrosis of muscle    3. Diabetes mellitus due to underlying condition with hyperglycemia, with long-term current use of insulin    4. Cellulitis and abscess of foot    5. Uncontrolled type 2 diabetes mellitus with hyperglycemia    6.  Wound infection    7. Difficulty in walking, not elsewhere classified    8. Diabetic foot infection    9. Diabetic peripheral neuropathy    10. History of ulcer of lower extremity    11. Smoker    12. Type 2 diabetes mellitus with diabetic polyneuropathy, with long-term current use of insulin    13. At high risk for inadequate nutritional intake    14. History of amputation of right foot      Plan:   Ulcer of right foot with necrosis of bone    Ulcer of right foot with necrosis of muscle    Diabetes mellitus due to underlying condition with hyperglycemia, with long-term current use of insulin    Cellulitis and abscess of foot    Uncontrolled type 2 diabetes mellitus with hyperglycemia    Wound infection    Difficulty in walking, not elsewhere classified    Diabetic foot infection    Diabetic peripheral neuropathy    History of ulcer of lower extremity    Smoker    Type 2 diabetes mellitus with diabetic polyneuropathy, with long-term current use of insulin    At high risk for inadequate nutritional intake    History of amputation of right foot      Follow up in about 3 days (around 10/27/2023).    Procedures          Counseling:     I provided patient education verbally regarding:   Patient diagnosis, treatment options, as well as alternatives, risks, and benefits.     This note was created using Dragon voice recognition software that occasionally misinterpreted phrases or words.

## 2023-10-26 ENCOUNTER — OFFICE VISIT (OUTPATIENT)
Dept: INFECTIOUS DISEASES | Facility: CLINIC | Age: 43
End: 2023-10-26
Payer: MEDICAID

## 2023-10-26 VITALS
WEIGHT: 164 LBS | SYSTOLIC BLOOD PRESSURE: 148 MMHG | TEMPERATURE: 98 F | BODY MASS INDEX: 22.21 KG/M2 | HEIGHT: 72 IN | OXYGEN SATURATION: 96 % | DIASTOLIC BLOOD PRESSURE: 74 MMHG | HEART RATE: 82 BPM

## 2023-10-26 DIAGNOSIS — E11.42 TYPE 2 DIABETES MELLITUS WITH PERIPHERAL NEUROPATHY: ICD-10-CM

## 2023-10-26 DIAGNOSIS — E11.65 UNCONTROLLED TYPE 2 DIABETES MELLITUS WITH HYPERGLYCEMIA: ICD-10-CM

## 2023-10-26 DIAGNOSIS — M86.9 OSTEOMYELITIS OF THIRD TOE OF RIGHT FOOT: Primary | ICD-10-CM

## 2023-10-26 DIAGNOSIS — F17.200 SMOKER: ICD-10-CM

## 2023-10-26 DIAGNOSIS — Z79.2 ENCOUNTER FOR LONG-TERM (CURRENT) USE OF ANTIBIOTICS: ICD-10-CM

## 2023-10-26 PROCEDURE — 3066F PR DOCUMENTATION OF TREATMENT FOR NEPHROPATHY: ICD-10-PCS | Mod: CPTII,S$GLB,, | Performed by: INTERNAL MEDICINE

## 2023-10-26 PROCEDURE — 99214 PR OFFICE/OUTPT VISIT, EST, LEVL IV, 30-39 MIN: ICD-10-PCS | Mod: S$GLB,,, | Performed by: INTERNAL MEDICINE

## 2023-10-26 PROCEDURE — 3077F PR MOST RECENT SYSTOLIC BLOOD PRESSURE >= 140 MM HG: ICD-10-PCS | Mod: CPTII,S$GLB,, | Performed by: INTERNAL MEDICINE

## 2023-10-26 PROCEDURE — 1111F PR DISCHARGE MEDS RECONCILED W/ CURRENT OUTPATIENT MED LIST: ICD-10-PCS | Mod: CPTII,S$GLB,, | Performed by: INTERNAL MEDICINE

## 2023-10-26 PROCEDURE — 3061F PR NEG MICROALBUMINURIA RESULT DOCUMENTED/REVIEW: ICD-10-PCS | Mod: CPTII,S$GLB,, | Performed by: INTERNAL MEDICINE

## 2023-10-26 PROCEDURE — 1160F PR REVIEW ALL MEDS BY PRESCRIBER/CLIN PHARMACIST DOCUMENTED: ICD-10-PCS | Mod: CPTII,S$GLB,, | Performed by: INTERNAL MEDICINE

## 2023-10-26 PROCEDURE — 1111F DSCHRG MED/CURRENT MED MERGE: CPT | Mod: CPTII,S$GLB,, | Performed by: INTERNAL MEDICINE

## 2023-10-26 PROCEDURE — 3008F BODY MASS INDEX DOCD: CPT | Mod: CPTII,S$GLB,, | Performed by: INTERNAL MEDICINE

## 2023-10-26 PROCEDURE — 3008F PR BODY MASS INDEX (BMI) DOCUMENTED: ICD-10-PCS | Mod: CPTII,S$GLB,, | Performed by: INTERNAL MEDICINE

## 2023-10-26 PROCEDURE — 4010F ACE/ARB THERAPY RXD/TAKEN: CPT | Mod: CPTII,S$GLB,, | Performed by: INTERNAL MEDICINE

## 2023-10-26 PROCEDURE — 3046F HEMOGLOBIN A1C LEVEL >9.0%: CPT | Mod: CPTII,S$GLB,, | Performed by: INTERNAL MEDICINE

## 2023-10-26 PROCEDURE — 3077F SYST BP >= 140 MM HG: CPT | Mod: CPTII,S$GLB,, | Performed by: INTERNAL MEDICINE

## 2023-10-26 PROCEDURE — 3061F NEG MICROALBUMINURIA REV: CPT | Mod: CPTII,S$GLB,, | Performed by: INTERNAL MEDICINE

## 2023-10-26 PROCEDURE — 1160F RVW MEDS BY RX/DR IN RCRD: CPT | Mod: CPTII,S$GLB,, | Performed by: INTERNAL MEDICINE

## 2023-10-26 PROCEDURE — 99214 OFFICE O/P EST MOD 30 MIN: CPT | Mod: S$GLB,,, | Performed by: INTERNAL MEDICINE

## 2023-10-26 PROCEDURE — 3046F PR MOST RECENT HEMOGLOBIN A1C LEVEL > 9.0%: ICD-10-PCS | Mod: CPTII,S$GLB,, | Performed by: INTERNAL MEDICINE

## 2023-10-26 PROCEDURE — 1159F PR MEDICATION LIST DOCUMENTED IN MEDICAL RECORD: ICD-10-PCS | Mod: CPTII,S$GLB,, | Performed by: INTERNAL MEDICINE

## 2023-10-26 PROCEDURE — 3066F NEPHROPATHY DOC TX: CPT | Mod: CPTII,S$GLB,, | Performed by: INTERNAL MEDICINE

## 2023-10-26 PROCEDURE — 3078F PR MOST RECENT DIASTOLIC BLOOD PRESSURE < 80 MM HG: ICD-10-PCS | Mod: CPTII,S$GLB,, | Performed by: INTERNAL MEDICINE

## 2023-10-26 PROCEDURE — 1159F MED LIST DOCD IN RCRD: CPT | Mod: CPTII,S$GLB,, | Performed by: INTERNAL MEDICINE

## 2023-10-26 PROCEDURE — 4010F PR ACE/ARB THEARPY RXD/TAKEN: ICD-10-PCS | Mod: CPTII,S$GLB,, | Performed by: INTERNAL MEDICINE

## 2023-10-26 PROCEDURE — 3078F DIAST BP <80 MM HG: CPT | Mod: CPTII,S$GLB,, | Performed by: INTERNAL MEDICINE

## 2023-10-26 NOTE — PROGRESS NOTES
"Subjective:       Patient ID: Philip Alberts is a 43 y.o. male.    Chief Complaint:: Hospital Follow Up    HPI  seen at [facility] for osteomyelitis right 3rd toe, requiring amputation. MRI also showed inflammation in adjacent toes, cultures were negative due to prior antibiotic exposure. Uncontrolled diabetes, smoker. After discharge,   Received dalvance x 2, on 10/12 and 10/19. He continues to take doxycycline. No diarrhea, rashes, thrush.     His blood sugar is labile. Eats out, because he doesn't cook, has edentulous upper ridge, his denture does not fit. Has not seen dentist and is putting off follow up to achieve 2 goals , refitting upper dentures and pulling lower teeth and waiting until next year. His fingers are fatigued from punctures. he is not interested in dietician counseling, "I have all the handouts", but does not practice any carb portion control. Extensive discussion on what   barriers he perceives are keeping him from achieving good control and healthy diet.   Cut back on cigarettes from almost 2 packs /day and now down to 1/2 pack.     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 12/3/2018     Past Surgical History:   Procedure Laterality Date    MANDIBLE FRACTURE SURGERY  07/17/2000    MANDIBLE FRACTURE SURGERY      SPLENECTOMY, TOTAL  07/17/2000    TOE AMPUTATION Right 10/9/2023    Procedure: AMPUTATION, TOE;  Surgeon: Ashok Santiago DPM;  Location: [facility];  Service: Podiatry;  Laterality: Right;     Social History     Tobacco Use    Smoking " status: Every Day    Smokeless tobacco: Never   Substance Use Topics    Alcohol use: No        Family History   Problem Relation Age of Onset    Diabetes Mother     Cancer Father          Review of Systems    Constitutional: No fever, chills, sweats, fatigue, weakness, weight loss    Eyes: No change in vision,     ENT: No sore throat, mouth pains, or lesions.  Severe dental disease at baseline. Asked if it was ok to pursue dental issues now and encouraged that while he is on antibiotics is best time.     Cardiovascular: No chest pain, JEFFERSON, or pedal edema    Respiratory: No shortness of breath, JEFFERSON,  but chronic smoking    Gastrointestinal: No abdominal pain, nausea, vomiting, diarrhea,      Genitourinary:      Musculoskeletal: No new pain, joint swelling, or injuries    Integumentary: No new rashes, skin lesions, or wounds    Neurological: No dizziness, vertigo, unusual headaches,but clearly has neuropathy    Psychiatric: No anxiety, depression    Endocrine: Blood sugars are not well controlled    Lymphatic: No lymphadenopathy,   malignancy    VAD: none    Objective:      Blood pressure (!) 148/74, pulse 82, temperature 98 °F (36.7 °C), height 6' (1.829 m), weight 74.4 kg (164 lb), SpO2 96 %. Body mass index is 22.24 kg/m².  Physical Exam      General: Alert and attentive, cooperative and in no distress    Eyes:   anicteric, EOMI    Neck: Supple,      ENT: EAC patent, nares patent, no oral lesions, upper edentulous, lower teeth in terrible condition, no thrush    Cardiovascular:      Respiratory:      Gastrointestinal:  no  distention    Genitourinary:      Integumentary: Skin without rashes,    Vascular: No peripheral edema or phlebitis, warm      Musculoskeletal: Ambulates without difficulty, no acute arthritis, synovitis or myositis     Lymphatic:      Neurological: Normal LOC, cranial nerves, speech,   gait    Psychiatric: Normal mood, speech,  demeanor.  Describes many barriers to his blood sugar control but  has not maximized what he can do.     Wound:10/24/23      VAD:        Recent Diagnostics:  Hospital records, cultures     MRI  1.  Fluid collection with tract extending to the plantar foot at the third digit involves the third PIP joint. There is a destructive erosion of the head of the third proximal phalanx with subluxation of the third PIP joint. These findings are consistent with osteomyelitis and septic arthritis with fistulous tract extending from the PIP joint to the plantar tissues of the third digit. There is associated destruction of the collateral ligaments and plantar tendons of the third digit.  2.  Other areas of STIR bone marrow hyperintensity and enhancement involving the second, fourth and fifth digits is felt to reflect osteomyelitis.  3.  Stranding STIR hyperintensities with enhancement demonstrated of the forefoot, consistent with cellulitis.        Assessment and Plan:           Osteomyelitis of third toe of right foot    Uncontrolled type 2 diabetes mellitus with hyperglycemia    Type 2 diabetes mellitus with peripheral neuropathy    Encounter for long-term (current) use of antibiotics    Smoker      Continue the doxycycline    Continue to reduce cigarettes    Return on 11/22    This note was created using Dragon voice recognition software that occasionally misinterpreted phrases or words.

## 2023-10-27 ENCOUNTER — OFFICE VISIT (OUTPATIENT)
Dept: WOUND CARE | Facility: HOSPITAL | Age: 43
End: 2023-10-27
Attending: PODIATRIST
Payer: MEDICAID

## 2023-10-27 VITALS
SYSTOLIC BLOOD PRESSURE: 130 MMHG | RESPIRATION RATE: 18 BRPM | TEMPERATURE: 98 F | DIASTOLIC BLOOD PRESSURE: 76 MMHG | HEART RATE: 87 BPM

## 2023-10-27 DIAGNOSIS — Z79.4 TYPE 2 DIABETES MELLITUS WITH DIABETIC POLYNEUROPATHY, WITH LONG-TERM CURRENT USE OF INSULIN: ICD-10-CM

## 2023-10-27 DIAGNOSIS — E08.65 DIABETES MELLITUS DUE TO UNDERLYING CONDITION WITH HYPERGLYCEMIA, WITH LONG-TERM CURRENT USE OF INSULIN: ICD-10-CM

## 2023-10-27 DIAGNOSIS — E11.42 TYPE 2 DIABETES MELLITUS WITH DIABETIC POLYNEUROPATHY, WITH LONG-TERM CURRENT USE OF INSULIN: ICD-10-CM

## 2023-10-27 DIAGNOSIS — R26.2 DIFFICULTY IN WALKING, NOT ELSEWHERE CLASSIFIED: ICD-10-CM

## 2023-10-27 DIAGNOSIS — F17.200 SMOKER: ICD-10-CM

## 2023-10-27 DIAGNOSIS — Z87.2 HISTORY OF ULCER OF LOWER EXTREMITY: ICD-10-CM

## 2023-10-27 DIAGNOSIS — E11.65 UNCONTROLLED TYPE 2 DIABETES MELLITUS WITH HYPERGLYCEMIA: ICD-10-CM

## 2023-10-27 DIAGNOSIS — Z89.431 HISTORY OF AMPUTATION OF RIGHT FOOT: ICD-10-CM

## 2023-10-27 DIAGNOSIS — L02.619 CELLULITIS AND ABSCESS OF FOOT: ICD-10-CM

## 2023-10-27 DIAGNOSIS — E11.42 DIABETIC PERIPHERAL NEUROPATHY: ICD-10-CM

## 2023-10-27 DIAGNOSIS — Z91.89 AT HIGH RISK FOR INADEQUATE NUTRITIONAL INTAKE: ICD-10-CM

## 2023-10-27 DIAGNOSIS — Z79.4 DIABETES MELLITUS DUE TO UNDERLYING CONDITION WITH HYPERGLYCEMIA, WITH LONG-TERM CURRENT USE OF INSULIN: ICD-10-CM

## 2023-10-27 DIAGNOSIS — L08.9 WOUND INFECTION: ICD-10-CM

## 2023-10-27 DIAGNOSIS — T14.8XXA WOUND INFECTION: ICD-10-CM

## 2023-10-27 DIAGNOSIS — E11.628 DIABETIC FOOT INFECTION: ICD-10-CM

## 2023-10-27 DIAGNOSIS — L97.513 ULCER OF RIGHT FOOT WITH NECROSIS OF MUSCLE: ICD-10-CM

## 2023-10-27 DIAGNOSIS — L97.514 ULCER OF RIGHT FOOT WITH NECROSIS OF BONE: Primary | ICD-10-CM

## 2023-10-27 DIAGNOSIS — L03.119 CELLULITIS AND ABSCESS OF FOOT: ICD-10-CM

## 2023-10-27 DIAGNOSIS — L08.9 DIABETIC FOOT INFECTION: ICD-10-CM

## 2023-10-27 PROCEDURE — 3078F DIAST BP <80 MM HG: CPT | Mod: CPTII,,, | Performed by: PODIATRIST

## 2023-10-27 PROCEDURE — 1159F MED LIST DOCD IN RCRD: CPT | Mod: CPTII,,, | Performed by: PODIATRIST

## 2023-10-27 PROCEDURE — 97605 NEG PRS WND THER DME<=50SQCM: CPT | Mod: PN | Performed by: PODIATRIST

## 2023-10-27 PROCEDURE — 3061F PR NEG MICROALBUMINURIA RESULT DOCUMENTED/REVIEW: ICD-10-PCS | Mod: CPTII,,, | Performed by: PODIATRIST

## 2023-10-27 PROCEDURE — 3078F PR MOST RECENT DIASTOLIC BLOOD PRESSURE < 80 MM HG: ICD-10-PCS | Mod: CPTII,,, | Performed by: PODIATRIST

## 2023-10-27 PROCEDURE — 3066F NEPHROPATHY DOC TX: CPT | Mod: CPTII,,, | Performed by: PODIATRIST

## 2023-10-27 PROCEDURE — 1160F RVW MEDS BY RX/DR IN RCRD: CPT | Mod: CPTII,,, | Performed by: PODIATRIST

## 2023-10-27 PROCEDURE — 11043 DBRDMT MUSC&/FSCA 1ST 20/<: CPT | Mod: ,,, | Performed by: PODIATRIST

## 2023-10-27 PROCEDURE — 99214 PR OFFICE/OUTPT VISIT, EST, LEVL IV, 30-39 MIN: ICD-10-PCS | Mod: 25,,, | Performed by: PODIATRIST

## 2023-10-27 PROCEDURE — 99214 OFFICE O/P EST MOD 30 MIN: CPT | Mod: 25,,, | Performed by: PODIATRIST

## 2023-10-27 PROCEDURE — 4010F ACE/ARB THERAPY RXD/TAKEN: CPT | Mod: CPTII,,, | Performed by: PODIATRIST

## 2023-10-27 PROCEDURE — 11043 PR DEBRIDEMENT, SKIN, SUB-Q TISSUE,MUSCLE,=<20 SQ CM: ICD-10-PCS | Mod: ,,, | Performed by: PODIATRIST

## 2023-10-27 PROCEDURE — 3046F HEMOGLOBIN A1C LEVEL >9.0%: CPT | Mod: CPTII,,, | Performed by: PODIATRIST

## 2023-10-27 PROCEDURE — 1111F PR DISCHARGE MEDS RECONCILED W/ CURRENT OUTPATIENT MED LIST: ICD-10-PCS | Mod: CPTII,,, | Performed by: PODIATRIST

## 2023-10-27 PROCEDURE — 3075F SYST BP GE 130 - 139MM HG: CPT | Mod: CPTII,,, | Performed by: PODIATRIST

## 2023-10-27 PROCEDURE — 1111F DSCHRG MED/CURRENT MED MERGE: CPT | Mod: CPTII,,, | Performed by: PODIATRIST

## 2023-10-27 PROCEDURE — 3075F PR MOST RECENT SYSTOLIC BLOOD PRESS GE 130-139MM HG: ICD-10-PCS | Mod: CPTII,,, | Performed by: PODIATRIST

## 2023-10-27 PROCEDURE — 11043 DBRDMT MUSC&/FSCA 1ST 20/<: CPT | Mod: PN | Performed by: PODIATRIST

## 2023-10-27 PROCEDURE — 3066F PR DOCUMENTATION OF TREATMENT FOR NEPHROPATHY: ICD-10-PCS | Mod: CPTII,,, | Performed by: PODIATRIST

## 2023-10-27 PROCEDURE — 1159F PR MEDICATION LIST DOCUMENTED IN MEDICAL RECORD: ICD-10-PCS | Mod: CPTII,,, | Performed by: PODIATRIST

## 2023-10-27 PROCEDURE — 11042 DBRDMT SUBQ TIS 1ST 20SQCM/<: CPT | Mod: 59

## 2023-10-27 PROCEDURE — 4010F PR ACE/ARB THEARPY RXD/TAKEN: ICD-10-PCS | Mod: CPTII,,, | Performed by: PODIATRIST

## 2023-10-27 PROCEDURE — 3061F NEG MICROALBUMINURIA REV: CPT | Mod: CPTII,,, | Performed by: PODIATRIST

## 2023-10-27 PROCEDURE — 1160F PR REVIEW ALL MEDS BY PRESCRIBER/CLIN PHARMACIST DOCUMENTED: ICD-10-PCS | Mod: CPTII,,, | Performed by: PODIATRIST

## 2023-10-27 PROCEDURE — 3046F PR MOST RECENT HEMOGLOBIN A1C LEVEL > 9.0%: ICD-10-PCS | Mod: CPTII,,, | Performed by: PODIATRIST

## 2023-10-27 NOTE — PROGRESS NOTES
[address] [facility] [address]  [place] [address]  Phone: [phone]   [phone]    Patient's PCP:Alfie Lancaster MD  Referring Provider: Aaareferral Self    Subjective:      Chief Complaint:: Wound Care, Diabetic Foot Ulcer (right medial 4th toe diabetic foot ulcer, right medial ankle diabetic foot ulcer, and right 3rd digit amputation site diabetic foot ulcer), Wound Check, Diabetes, Difficulty Walking, Foot Ulcer, Non-healing Wound, Pressure Ulcer, Numbness, and Peripheral Neuropathy    HPI  Philip Alberts is a 43 y.o. male who presents todayfor follow up of right medial 4th toe diabetic foot ulcer, right medial ankle diabetic foot ulcer, and right 3rd digit amputation site diabetic foot ulcer.  See wound docs documentation for full assessment and evaluation of all wounds.      EVALUATION, ASSESSMENT, & PLAN:      1.  Debridement of right medial 4th toe diabetic foot ulcer.  Evaluation and Assessment only right medial ankle diabetic foot ulcer and right 3rd toe amputation site diabetic foot ulcer.  See Wound Docs for assessment of wounds and procedure notes  2. Continue taking all medications as prescribed  3. Dressing changes, see Wound docs for dressing change orders  4. RTC one week   5. Counseled patient on increasing protein intake, not getting wound wet, keeping dressing clean dry and intact, following a healthy diet, elevating legs when able, removing pressure from wound     Total time spent for E&M 40  Total time for debridement 35 minutes      NPWT is medically necessary for this patient at this time to acheive acceleration of granulation tissue and wound closure. It is my clinical judgement that this cannot be acheived using topical dressing or medications.            Counseling/Education:  I provided patient education verbally regarding:   The aspects of diabetes and how it pertains to the feet. I explained the importance of proper diabetic foot care and how it is essential for the health of  their feet.     I discussed the importance of knowing their Hemoglobin A1c and that the level needs to be as close to 6 as possible. I discussed the increase complications of high blood sugar including stroke, blindness, heart attack, kidney failure and loss of limb secondary to neuropathy and PVD.      With neuropathy, beware of any breaks in the skin or redness. These areas are not recognized early due to the numbness.     I discussed Diabetes, lower back issues, metabolic disorders, systemic causes, chemotherapy, vitamin deficiency, heavy metal exposure, as some of the causes. I also explained that as much as 40% of the time we can not find a cause. I discussed different treatments available to control the symptoms but which may not cure the problem.         Counseled patient on the aspects of diabetes and how it pertains to the feet.  I explained the importance of proper diabetic foot care and how it is essential for the health of their feet.        Shoe inspection. Patient instructed on proper foot hygeine. We discussed wearing proper shoe gear, daily foot inspections, never walking without protective shoe gear, never putting sharp instruments to feet, routine podiatric nail visits every 2-3 months.          Proper ulcer care and the possible need for serial debridements, topical medications, specific dressings and biological engineered skin substitutes if indicated.     Patient should call the office immediately if any signs of infection, such as fever, chills, sweats, increased redness or pain.     Patient was instructed to call the clinic or go to the emergency department if their symptoms do not improve, worsens, or if new symptoms develop.  Patient was advised that if any increased swelling, pain, or numbness arise to go immediately to the ED. Patient knows to call any time if an emergency arises. Shared decision making occurred and patient verbalized understanding in agreement with this plan.         >50%  of this > 60 minute visit was spent face to face educating/counseling the patient     I spent a total of 60 minutes on the day of the visit.This includes face to face time and non-face to face time preparing to see the patient (eg, review of tests), obtaining and/or reviewing separately obtained history, documenting clinical information in the electronic or other health record, independently interpreting results and communicating results to the patient/family/caregiver, or care coordinator.        Much of the documentation for this visit was completed in the Wound Docs system.  Please see the attached documentation for further details about the patient's care. Scanned under the Media tab.         Anat Santiago DPM         Vitals:    10/27/23 0812   BP: 130/76   Pulse: 87   Resp: 18   Temp: 98.2 °F (36.8 °C)   TempSrc: Skin   PainSc: 0-No pain      Shoe Size:     Past Surgical History:   Procedure Laterality Date    MANDIBLE FRACTURE SURGERY  07/17/2000    MANDIBLE FRACTURE SURGERY      SPLENECTOMY, TOTAL  07/17/2000    TOE AMPUTATION Right 10/9/2023    Procedure: AMPUTATION, TOE;  Surgeon: Ashok Santiago DPM;  Location: [facility];  Service: Podiatry;  Laterality: Right;     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  Tobacco History:  reports that he has been smoking. He has never used smokeless tobacco.  Drug History:  reports no history of drug use.    Review of patient's allergies  "indicates:   Allergen Reactions    Pcn [penicillins] Anaphylaxis     Tolerates cephalexin       Current Outpatient Medications   Medication Sig Dispense Refill    blood sugar diagnostic Strp To check BG qid times daily, to use with insurance preferred meter 150 strip 3    blood-glucose meter kit To check BG qid times daily, to use with insurance preferred meter 1 each 0    doxycycline (VIBRAMYCIN) 100 MG Cap Take 1 capsule (100 mg total) by mouth 2 (two) times daily. 84 capsule 0    esomeprazole (NEXIUM) 20 MG capsule Take 20 mg by mouth before breakfast.      gabapentin (NEURONTIN) 300 MG capsule Take 2 capsules (600 mg total) by mouth 3 (three) times daily. 180 capsule 5    HYDROcodone-acetaminophen (NORCO) 5-325 mg per tablet Take 1 tablet by mouth every 12 (twelve) hours as needed for Pain. (Patient not taking: Reported on 10/26/2023) 10 tablet 0    insulin (LANTUS SOLOSTAR U-100 INSULIN) glargine 100 units/mL SubQ pen 17 units twice a day 9 mL 5    insulin aspart U-100 (NOVOLOG FLEXPEN U-100 INSULIN) 100 unit/mL (3 mL) InPn pen Inject 10 Units into the skin 4 (four) times daily. Per sliding scale as instructed 12 mL 5    insulin syringe-needle U-100 0.3 mL 31 gauge x 5/16" Syrg 1 Device by Misc.(Non-Drug; Combo Route) route 4 (four) times daily. 200 each 3    lancets Misc To check BGqid times daily, to use with insurance preferred meter (Patient taking differently: 1 lancet  by Misc.(Non-Drug; Combo Route) route 4 (four) times daily. To check BGqid times daily, to use with insurance preferred meter) 200 each 3    lisinopriL (PRINIVIL,ZESTRIL) 5 MG tablet Take 1 tablet (5 mg total) by mouth once daily. 90 tablet 3    pen needle, diabetic (BD ULTRA-FINE DENIS PEN NEEDLE MISC) 1 Needle by Misc.(Non-Drug; Combo Route) route 4 (four) times daily.      pravastatin (PRAVACHOL) 20 MG tablet Take 1 tablet (20 mg total) by mouth every evening. 90 tablet 3    sildenafiL (VIAGRA) 100 MG tablet Take 1 tablet (100 mg total) " by mouth daily as needed for Erectile Dysfunction. 20 tablet 5     No current facility-administered medications for this visit.       Review of Systems   Constitutional:  Negative for chills, fatigue, fever and unexpected weight change.   HENT:  Negative for hearing loss and trouble swallowing.    Eyes:  Negative for photophobia and visual disturbance.   Respiratory:  Negative for cough, shortness of breath and wheezing.    Cardiovascular:  Negative for chest pain, palpitations and leg swelling.   Gastrointestinal:  Negative for abdominal pain and nausea.   Genitourinary:  Negative for dysuria and frequency.   Musculoskeletal:  Negative for arthralgias, back pain, gait problem, joint swelling and myalgias.   Skin:  Positive for wound. Negative for rash.   Neurological:  Positive for numbness. Negative for tremors, seizures, weakness and headaches.   Hematological:  Does not bruise/bleed easily.         Objective:        Physical Exam:   Foot Exam  Physical Exam  Ortho/SPM Exam     Imaging:            Assessment:       1. Ulcer of right foot with necrosis of bone    2. Ulcer of right foot with necrosis of muscle    3. Cellulitis and abscess of foot    4. Diabetes mellitus due to underlying condition with hyperglycemia, with long-term current use of insulin    5. Wound infection    6. Uncontrolled type 2 diabetes mellitus with hyperglycemia    7. Diabetic foot infection    8. Diabetic peripheral neuropathy    9. Difficulty in walking, not elsewhere classified    10. History of ulcer of lower extremity    11. Smoker    12. Type 2 diabetes mellitus with diabetic polyneuropathy, with long-term current use of insulin    13. History of amputation of right foot    14. At high risk for inadequate nutritional intake      Plan:   Ulcer of right foot with necrosis of bone    Ulcer of right foot with necrosis of muscle    Cellulitis and abscess of foot    Diabetes mellitus due to underlying condition with hyperglycemia, with  long-term current use of insulin    Wound infection    Uncontrolled type 2 diabetes mellitus with hyperglycemia    Diabetic foot infection    Diabetic peripheral neuropathy    Difficulty in walking, not elsewhere classified    History of ulcer of lower extremity    Smoker    Type 2 diabetes mellitus with diabetic polyneuropathy, with long-term current use of insulin    History of amputation of right foot    At high risk for inadequate nutritional intake      Follow up in about 4 days (around 10/31/2023).    Procedures          Counseling:     I provided patient education verbally regarding:   Patient diagnosis, treatment options, as well as alternatives, risks, and benefits.     This note was created using Dragon voice recognition software that occasionally misinterpreted phrases or words.

## 2023-10-31 ENCOUNTER — OFFICE VISIT (OUTPATIENT)
Dept: WOUND CARE | Facility: HOSPITAL | Age: 43
End: 2023-10-31
Attending: PODIATRIST
Payer: MEDICAID

## 2023-10-31 VITALS
SYSTOLIC BLOOD PRESSURE: 170 MMHG | HEART RATE: 76 BPM | TEMPERATURE: 98 F | DIASTOLIC BLOOD PRESSURE: 93 MMHG | RESPIRATION RATE: 20 BRPM

## 2023-10-31 DIAGNOSIS — Z91.89 AT HIGH RISK FOR INADEQUATE NUTRITIONAL INTAKE: ICD-10-CM

## 2023-10-31 DIAGNOSIS — T14.8XXA WOUND INFECTION: ICD-10-CM

## 2023-10-31 DIAGNOSIS — R26.2 DIFFICULTY IN WALKING, NOT ELSEWHERE CLASSIFIED: ICD-10-CM

## 2023-10-31 DIAGNOSIS — L08.9 WOUND INFECTION: ICD-10-CM

## 2023-10-31 DIAGNOSIS — Z79.4 TYPE 2 DIABETES MELLITUS WITH DIABETIC POLYNEUROPATHY, WITH LONG-TERM CURRENT USE OF INSULIN: ICD-10-CM

## 2023-10-31 DIAGNOSIS — L97.513 ULCER OF RIGHT FOOT WITH NECROSIS OF MUSCLE: Primary | ICD-10-CM

## 2023-10-31 DIAGNOSIS — E11.42 TYPE 2 DIABETES MELLITUS WITH DIABETIC POLYNEUROPATHY, WITH LONG-TERM CURRENT USE OF INSULIN: ICD-10-CM

## 2023-10-31 DIAGNOSIS — Z89.431 HISTORY OF AMPUTATION OF RIGHT FOOT: ICD-10-CM

## 2023-10-31 DIAGNOSIS — Z79.4 DIABETES MELLITUS DUE TO UNDERLYING CONDITION WITH HYPERGLYCEMIA, WITH LONG-TERM CURRENT USE OF INSULIN: ICD-10-CM

## 2023-10-31 DIAGNOSIS — E11.42 DIABETIC PERIPHERAL NEUROPATHY: ICD-10-CM

## 2023-10-31 DIAGNOSIS — Z87.2 HISTORY OF ULCER OF LOWER EXTREMITY: ICD-10-CM

## 2023-10-31 DIAGNOSIS — L03.119 CELLULITIS AND ABSCESS OF FOOT: ICD-10-CM

## 2023-10-31 DIAGNOSIS — L97.514 ULCER OF RIGHT FOOT WITH NECROSIS OF BONE: ICD-10-CM

## 2023-10-31 DIAGNOSIS — L08.9 DIABETIC FOOT INFECTION: ICD-10-CM

## 2023-10-31 DIAGNOSIS — E11.65 UNCONTROLLED TYPE 2 DIABETES MELLITUS WITH HYPERGLYCEMIA: ICD-10-CM

## 2023-10-31 DIAGNOSIS — E08.65 DIABETES MELLITUS DUE TO UNDERLYING CONDITION WITH HYPERGLYCEMIA, WITH LONG-TERM CURRENT USE OF INSULIN: ICD-10-CM

## 2023-10-31 DIAGNOSIS — F17.200 SMOKER: ICD-10-CM

## 2023-10-31 DIAGNOSIS — E11.628 DIABETIC FOOT INFECTION: ICD-10-CM

## 2023-10-31 DIAGNOSIS — L02.619 CELLULITIS AND ABSCESS OF FOOT: ICD-10-CM

## 2023-10-31 PROCEDURE — 11043 PR DEBRIDEMENT, SKIN, SUB-Q TISSUE,MUSCLE,=<20 SQ CM: ICD-10-PCS | Mod: ,,, | Performed by: PODIATRIST

## 2023-10-31 PROCEDURE — 3066F NEPHROPATHY DOC TX: CPT | Mod: CPTII,,, | Performed by: PODIATRIST

## 2023-10-31 PROCEDURE — 4010F ACE/ARB THERAPY RXD/TAKEN: CPT | Mod: CPTII,,, | Performed by: PODIATRIST

## 2023-10-31 PROCEDURE — 3061F PR NEG MICROALBUMINURIA RESULT DOCUMENTED/REVIEW: ICD-10-PCS | Mod: CPTII,,, | Performed by: PODIATRIST

## 2023-10-31 PROCEDURE — 11043 DBRDMT MUSC&/FSCA 1ST 20/<: CPT | Mod: ,,, | Performed by: PODIATRIST

## 2023-10-31 PROCEDURE — 3080F PR MOST RECENT DIASTOLIC BLOOD PRESSURE >= 90 MM HG: ICD-10-PCS | Mod: CPTII,,, | Performed by: PODIATRIST

## 2023-10-31 PROCEDURE — 3046F PR MOST RECENT HEMOGLOBIN A1C LEVEL > 9.0%: ICD-10-PCS | Mod: CPTII,,, | Performed by: PODIATRIST

## 2023-10-31 PROCEDURE — 1111F DSCHRG MED/CURRENT MED MERGE: CPT | Mod: CPTII,,, | Performed by: PODIATRIST

## 2023-10-31 PROCEDURE — 11042 PR DEBRIDEMENT, SKIN, SUB-Q TISSUE,=<20 SQ CM: ICD-10-PCS | Mod: 59,,, | Performed by: PODIATRIST

## 2023-10-31 PROCEDURE — 1160F PR REVIEW ALL MEDS BY PRESCRIBER/CLIN PHARMACIST DOCUMENTED: ICD-10-PCS | Mod: CPTII,,, | Performed by: PODIATRIST

## 2023-10-31 PROCEDURE — 3077F SYST BP >= 140 MM HG: CPT | Mod: CPTII,,, | Performed by: PODIATRIST

## 2023-10-31 PROCEDURE — 1160F RVW MEDS BY RX/DR IN RCRD: CPT | Mod: CPTII,,, | Performed by: PODIATRIST

## 2023-10-31 PROCEDURE — 11042 DBRDMT SUBQ TIS 1ST 20SQCM/<: CPT | Mod: 59,,, | Performed by: PODIATRIST

## 2023-10-31 PROCEDURE — 3066F PR DOCUMENTATION OF TREATMENT FOR NEPHROPATHY: ICD-10-PCS | Mod: CPTII,,, | Performed by: PODIATRIST

## 2023-10-31 PROCEDURE — 3077F PR MOST RECENT SYSTOLIC BLOOD PRESSURE >= 140 MM HG: ICD-10-PCS | Mod: CPTII,,, | Performed by: PODIATRIST

## 2023-10-31 PROCEDURE — 11043 DBRDMT MUSC&/FSCA 1ST 20/<: CPT | Mod: PN | Performed by: PODIATRIST

## 2023-10-31 PROCEDURE — 99214 OFFICE O/P EST MOD 30 MIN: CPT | Mod: 25,,, | Performed by: PODIATRIST

## 2023-10-31 PROCEDURE — 1159F PR MEDICATION LIST DOCUMENTED IN MEDICAL RECORD: ICD-10-PCS | Mod: CPTII,,, | Performed by: PODIATRIST

## 2023-10-31 PROCEDURE — 4010F PR ACE/ARB THEARPY RXD/TAKEN: ICD-10-PCS | Mod: CPTII,,, | Performed by: PODIATRIST

## 2023-10-31 PROCEDURE — 3080F DIAST BP >= 90 MM HG: CPT | Mod: CPTII,,, | Performed by: PODIATRIST

## 2023-10-31 PROCEDURE — 3061F NEG MICROALBUMINURIA REV: CPT | Mod: CPTII,,, | Performed by: PODIATRIST

## 2023-10-31 PROCEDURE — 1111F PR DISCHARGE MEDS RECONCILED W/ CURRENT OUTPATIENT MED LIST: ICD-10-PCS | Mod: CPTII,,, | Performed by: PODIATRIST

## 2023-10-31 PROCEDURE — 1159F MED LIST DOCD IN RCRD: CPT | Mod: CPTII,,, | Performed by: PODIATRIST

## 2023-10-31 PROCEDURE — 99214 PR OFFICE/OUTPT VISIT, EST, LEVL IV, 30-39 MIN: ICD-10-PCS | Mod: 25,,, | Performed by: PODIATRIST

## 2023-10-31 PROCEDURE — 11042 DBRDMT SUBQ TIS 1ST 20SQCM/<: CPT | Mod: 59,PN | Performed by: PODIATRIST

## 2023-10-31 PROCEDURE — 3046F HEMOGLOBIN A1C LEVEL >9.0%: CPT | Mod: CPTII,,, | Performed by: PODIATRIST

## 2023-11-01 NOTE — PROGRESS NOTES
[address] [facility] [address]  [place] [address]  Phone: [phone]   [phone]    Patient's PCP:Alfie Lancaster MD  Referring Provider: Aaareferral Self    Subjective:      Chief Complaint:: Wound Care, Diabetes, Wound Check, Wound Infection, Non-healing Wound, Numbness, Pressure Ulcer, Peripheral Neuropathy, Diabetic Foot Ulcer (right medial 4th toe diabetic foot ulcer, right medial ankle diabetic foot ulcer, and right 3rd digit amputation site diabetic foot ulcer), Foot Ulcer, and Osteomyelitis     HPI  Philip Alberts is a 43 y.o. male who presents todayfor follow up of right medial 4th toe diabetic foot ulcer, right medial ankle diabetic foot ulcer, and right 3rd digit amputation site diabetic foot ulcer.  See wound docs documentation for full assessment and evaluation of all wounds.      EVALUATION, ASSESSMENT, & PLAN:      Debridement of right 3rd toe amputation site diabetic foot ulcer and Debridement of right medial 4th toe diabetic foot ulcer. Evaluation and Assessment only right medial ankle diabetic foot ulcer.   See Wound Docs for assessment of wounds and procedure notes    2. Continue taking all medications as prescribed  3. Dressing changes, see Wound docs for dressing change orders  4. RTC one week   5. Counseled patient on increasing protein intake, not getting wound wet, keeping dressing clean dry and intact, following a healthy diet, elevating legs when able, removing pressure from wound     Total time spent for E&M 40  Total time for debridement 35 minutes      NPWT is medically necessary for this patient at this time to acheive acceleration of granulation tissue and wound closure. It is my clinical judgement that this cannot be acheived using topical dressing or medications.            Counseling/Education:  I provided patient education verbally regarding:   The aspects of diabetes and how it pertains to the feet. I explained the importance of proper diabetic foot care and how it is  essential for the health of their feet.     I discussed the importance of knowing their Hemoglobin A1c and that the level needs to be as close to 6 as possible. I discussed the increase complications of high blood sugar including stroke, blindness, heart attack, kidney failure and loss of limb secondary to neuropathy and PVD.      With neuropathy, beware of any breaks in the skin or redness. These areas are not recognized early due to the numbness.     I discussed Diabetes, lower back issues, metabolic disorders, systemic causes, chemotherapy, vitamin deficiency, heavy metal exposure, as some of the causes. I also explained that as much as 40% of the time we can not find a cause. I discussed different treatments available to control the symptoms but which may not cure the problem.         Counseled patient on the aspects of diabetes and how it pertains to the feet.  I explained the importance of proper diabetic foot care and how it is essential for the health of their feet.        Shoe inspection. Patient instructed on proper foot hygeine. We discussed wearing proper shoe gear, daily foot inspections, never walking without protective shoe gear, never putting sharp instruments to feet, routine podiatric nail visits every 2-3 months.          Proper ulcer care and the possible need for serial debridements, topical medications, specific dressings and biological engineered skin substitutes if indicated.     Patient should call the office immediately if any signs of infection, such as fever, chills, sweats, increased redness or pain.     Patient was instructed to call the clinic or go to the emergency department if their symptoms do not improve, worsens, or if new symptoms develop.  Patient was advised that if any increased swelling, pain, or numbness arise to go immediately to the ED. Patient knows to call any time if an emergency arises. Shared decision making occurred and patient verbalized understanding in agreement  with this plan.         >50% of this > 60 minute visit was spent face to face educating/counseling the patient     I spent a total of 60 minutes on the day of the visit.This includes face to face time and non-face to face time preparing to see the patient (eg, review of tests), obtaining and/or reviewing separately obtained history, documenting clinical information in the electronic or other health record, independently interpreting results and communicating results to the patient/family/caregiver, or care coordinator.        Much of the documentation for this visit was completed in the Wound Docs system.  Please see the attached documentation for further details about the patient's care. Scanned under the Media tab.         nAat Santiago DPM            Vitals:    10/31/23 0957   BP: (!) 170/93   Pulse: 76   Resp: 20   Temp: 98.1 °F (36.7 °C)   PainSc: 0-No pain      Shoe Size:     Past Surgical History:   Procedure Laterality Date    MANDIBLE FRACTURE SURGERY  07/17/2000    MANDIBLE FRACTURE SURGERY      SPLENECTOMY, TOTAL  07/17/2000    TOE AMPUTATION Right 10/9/2023    Procedure: AMPUTATION, TOE;  Surgeon: Ashok Santiago DPM;  Location: [facility];  Service: Podiatry;  Laterality: Right;     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  Tobacco History:  reports that he has been smoking. He has never used smokeless tobacco.  Drug History:  reports no history of drug use.    Review of  "patient's allergies indicates:   Allergen Reactions    Pcn [penicillins] Anaphylaxis     Tolerates cephalexin       Current Outpatient Medications   Medication Sig Dispense Refill    blood sugar diagnostic Strp To check BG qid times daily, to use with insurance preferred meter 150 strip 3    blood-glucose meter kit To check BG qid times daily, to use with insurance preferred meter 1 each 0    doxycycline (VIBRAMYCIN) 100 MG Cap Take 1 capsule (100 mg total) by mouth 2 (two) times daily. 84 capsule 0    esomeprazole (NEXIUM) 20 MG capsule Take 20 mg by mouth before breakfast.      gabapentin (NEURONTIN) 300 MG capsule Take 2 capsules (600 mg total) by mouth 3 (three) times daily. 180 capsule 5    HYDROcodone-acetaminophen (NORCO) 5-325 mg per tablet Take 1 tablet by mouth every 12 (twelve) hours as needed for Pain. (Patient not taking: Reported on 10/26/2023) 10 tablet 0    insulin (LANTUS SOLOSTAR U-100 INSULIN) glargine 100 units/mL SubQ pen 17 units twice a day 9 mL 5    insulin aspart U-100 (NOVOLOG FLEXPEN U-100 INSULIN) 100 unit/mL (3 mL) InPn pen Inject 10 Units into the skin 4 (four) times daily. Per sliding scale as instructed 12 mL 5    insulin syringe-needle U-100 0.3 mL 31 gauge x 5/16" Syrg 1 Device by Misc.(Non-Drug; Combo Route) route 4 (four) times daily. 200 each 3    lancets Misc To check BGqid times daily, to use with insurance preferred meter (Patient taking differently: 1 lancet  by Misc.(Non-Drug; Combo Route) route 4 (four) times daily. To check BGqid times daily, to use with insurance preferred meter) 200 each 3    lisinopriL (PRINIVIL,ZESTRIL) 5 MG tablet Take 1 tablet (5 mg total) by mouth once daily. 90 tablet 3    pen needle, diabetic (BD ULTRA-FINE DENIS PEN NEEDLE MISC) 1 Needle by Misc.(Non-Drug; Combo Route) route 4 (four) times daily.      pravastatin (PRAVACHOL) 20 MG tablet Take 1 tablet (20 mg total) by mouth every evening. 90 tablet 3    sildenafiL (VIAGRA) 100 MG tablet Take 1 " tablet (100 mg total) by mouth daily as needed for Erectile Dysfunction. 20 tablet 5     No current facility-administered medications for this visit.       Review of Systems   Constitutional:  Negative for chills, fatigue, fever and unexpected weight change.   HENT:  Negative for hearing loss and trouble swallowing.    Eyes:  Negative for photophobia and visual disturbance.   Respiratory:  Negative for cough, shortness of breath and wheezing.    Cardiovascular:  Negative for chest pain, palpitations and leg swelling.   Gastrointestinal:  Negative for abdominal pain and nausea.   Genitourinary:  Negative for dysuria and frequency.   Musculoskeletal:  Negative for arthralgias, back pain, gait problem, joint swelling and myalgias.   Skin:  Positive for wound. Negative for rash.   Neurological:  Positive for numbness. Negative for tremors, seizures, weakness and headaches.   Hematological:  Does not bruise/bleed easily.         Objective:        Physical Exam:   Foot Exam  Physical Exam  Ortho/SPM Exam     Imaging:            Assessment:       1. Ulcer of right foot with necrosis of muscle    2. Diabetes mellitus due to underlying condition with hyperglycemia, with long-term current use of insulin    3. Diabetic foot infection    4. Diabetic peripheral neuropathy    5. Ulcer of right foot with necrosis of bone    6. Cellulitis and abscess of foot    7. Difficulty in walking, not elsewhere classified    8. At high risk for inadequate nutritional intake    9. History of ulcer of lower extremity    10. Wound infection    11. History of amputation of right foot    12. Type 2 diabetes mellitus with diabetic polyneuropathy, with long-term current use of insulin    13. Uncontrolled type 2 diabetes mellitus with hyperglycemia    14. Smoker      Plan:   Ulcer of right foot with necrosis of muscle    Diabetes mellitus due to underlying condition with hyperglycemia, with long-term current use of insulin    Diabetic foot  infection    Diabetic peripheral neuropathy    Ulcer of right foot with necrosis of bone    Cellulitis and abscess of foot    Difficulty in walking, not elsewhere classified    At high risk for inadequate nutritional intake    History of ulcer of lower extremity    Wound infection    History of amputation of right foot    Type 2 diabetes mellitus with diabetic polyneuropathy, with long-term current use of insulin    Uncontrolled type 2 diabetes mellitus with hyperglycemia    Smoker      Follow up in about 1 week (around 11/7/2023).    Procedures          Counseling:     I provided patient education verbally regarding:   Patient diagnosis, treatment options, as well as alternatives, risks, and benefits.     This note was created using Dragon voice recognition software that occasionally misinterpreted phrases or words.

## 2023-11-06 LAB — FUNGUS SPEC CULT: NORMAL

## 2023-11-07 ENCOUNTER — OFFICE VISIT (OUTPATIENT)
Dept: WOUND CARE | Facility: HOSPITAL | Age: 43
End: 2023-11-07
Attending: PODIATRIST
Payer: MEDICAID

## 2023-11-07 VITALS
DIASTOLIC BLOOD PRESSURE: 91 MMHG | TEMPERATURE: 98 F | RESPIRATION RATE: 18 BRPM | HEART RATE: 81 BPM | SYSTOLIC BLOOD PRESSURE: 149 MMHG

## 2023-11-07 DIAGNOSIS — E11.65 UNCONTROLLED TYPE 2 DIABETES MELLITUS WITH HYPERGLYCEMIA: ICD-10-CM

## 2023-11-07 DIAGNOSIS — E11.628 DIABETIC FOOT INFECTION: ICD-10-CM

## 2023-11-07 DIAGNOSIS — E08.65 DIABETES MELLITUS DUE TO UNDERLYING CONDITION WITH HYPERGLYCEMIA, WITH LONG-TERM CURRENT USE OF INSULIN: ICD-10-CM

## 2023-11-07 DIAGNOSIS — L03.119 CELLULITIS AND ABSCESS OF FOOT: ICD-10-CM

## 2023-11-07 DIAGNOSIS — Z91.89 AT HIGH RISK FOR INADEQUATE NUTRITIONAL INTAKE: ICD-10-CM

## 2023-11-07 DIAGNOSIS — F17.200 SMOKER: ICD-10-CM

## 2023-11-07 DIAGNOSIS — L02.619 CELLULITIS AND ABSCESS OF FOOT: ICD-10-CM

## 2023-11-07 DIAGNOSIS — L08.9 DIABETIC FOOT INFECTION: ICD-10-CM

## 2023-11-07 DIAGNOSIS — R26.2 DIFFICULTY IN WALKING, NOT ELSEWHERE CLASSIFIED: ICD-10-CM

## 2023-11-07 DIAGNOSIS — L08.9 WOUND INFECTION: ICD-10-CM

## 2023-11-07 DIAGNOSIS — T14.8XXA WOUND INFECTION: ICD-10-CM

## 2023-11-07 DIAGNOSIS — Z79.4 DIABETES MELLITUS DUE TO UNDERLYING CONDITION WITH HYPERGLYCEMIA, WITH LONG-TERM CURRENT USE OF INSULIN: ICD-10-CM

## 2023-11-07 DIAGNOSIS — L97.514 ULCER OF RIGHT FOOT WITH NECROSIS OF BONE: ICD-10-CM

## 2023-11-07 DIAGNOSIS — Z79.4 TYPE 2 DIABETES MELLITUS WITH DIABETIC POLYNEUROPATHY, WITH LONG-TERM CURRENT USE OF INSULIN: ICD-10-CM

## 2023-11-07 DIAGNOSIS — E11.42 TYPE 2 DIABETES MELLITUS WITH DIABETIC POLYNEUROPATHY, WITH LONG-TERM CURRENT USE OF INSULIN: ICD-10-CM

## 2023-11-07 DIAGNOSIS — Z89.431 HISTORY OF AMPUTATION OF RIGHT FOOT: ICD-10-CM

## 2023-11-07 DIAGNOSIS — Z87.2 HISTORY OF ULCER OF LOWER EXTREMITY: ICD-10-CM

## 2023-11-07 DIAGNOSIS — L97.513 ULCER OF RIGHT FOOT WITH NECROSIS OF MUSCLE: Primary | ICD-10-CM

## 2023-11-07 DIAGNOSIS — E11.42 DIABETIC PERIPHERAL NEUROPATHY: ICD-10-CM

## 2023-11-07 PROCEDURE — 1159F PR MEDICATION LIST DOCUMENTED IN MEDICAL RECORD: ICD-10-PCS | Mod: CPTII,,, | Performed by: PODIATRIST

## 2023-11-07 PROCEDURE — 11043 DBRDMT MUSC&/FSCA 1ST 20/<: CPT | Mod: PN | Performed by: PODIATRIST

## 2023-11-07 PROCEDURE — 4010F PR ACE/ARB THEARPY RXD/TAKEN: ICD-10-PCS | Mod: CPTII,,, | Performed by: PODIATRIST

## 2023-11-07 PROCEDURE — 3080F PR MOST RECENT DIASTOLIC BLOOD PRESSURE >= 90 MM HG: ICD-10-PCS | Mod: CPTII,,, | Performed by: PODIATRIST

## 2023-11-07 PROCEDURE — 3077F PR MOST RECENT SYSTOLIC BLOOD PRESSURE >= 140 MM HG: ICD-10-PCS | Mod: CPTII,,, | Performed by: PODIATRIST

## 2023-11-07 PROCEDURE — 99214 OFFICE O/P EST MOD 30 MIN: CPT | Mod: 25,,, | Performed by: PODIATRIST

## 2023-11-07 PROCEDURE — 3046F PR MOST RECENT HEMOGLOBIN A1C LEVEL > 9.0%: ICD-10-PCS | Mod: CPTII,,, | Performed by: PODIATRIST

## 2023-11-07 PROCEDURE — 3066F NEPHROPATHY DOC TX: CPT | Mod: CPTII,,, | Performed by: PODIATRIST

## 2023-11-07 PROCEDURE — 1160F RVW MEDS BY RX/DR IN RCRD: CPT | Mod: CPTII,,, | Performed by: PODIATRIST

## 2023-11-07 PROCEDURE — 1111F PR DISCHARGE MEDS RECONCILED W/ CURRENT OUTPATIENT MED LIST: ICD-10-PCS | Mod: CPTII,,, | Performed by: PODIATRIST

## 2023-11-07 PROCEDURE — 3046F HEMOGLOBIN A1C LEVEL >9.0%: CPT | Mod: CPTII,,, | Performed by: PODIATRIST

## 2023-11-07 PROCEDURE — 99214 PR OFFICE/OUTPT VISIT, EST, LEVL IV, 30-39 MIN: ICD-10-PCS | Mod: 25,,, | Performed by: PODIATRIST

## 2023-11-07 PROCEDURE — 1160F PR REVIEW ALL MEDS BY PRESCRIBER/CLIN PHARMACIST DOCUMENTED: ICD-10-PCS | Mod: CPTII,,, | Performed by: PODIATRIST

## 2023-11-07 PROCEDURE — 3061F PR NEG MICROALBUMINURIA RESULT DOCUMENTED/REVIEW: ICD-10-PCS | Mod: CPTII,,, | Performed by: PODIATRIST

## 2023-11-07 PROCEDURE — 11043 PR DEBRIDEMENT, SKIN, SUB-Q TISSUE,MUSCLE,=<20 SQ CM: ICD-10-PCS | Mod: ,,, | Performed by: PODIATRIST

## 2023-11-07 PROCEDURE — 3066F PR DOCUMENTATION OF TREATMENT FOR NEPHROPATHY: ICD-10-PCS | Mod: CPTII,,, | Performed by: PODIATRIST

## 2023-11-07 PROCEDURE — 3080F DIAST BP >= 90 MM HG: CPT | Mod: CPTII,,, | Performed by: PODIATRIST

## 2023-11-07 PROCEDURE — 4010F ACE/ARB THERAPY RXD/TAKEN: CPT | Mod: CPTII,,, | Performed by: PODIATRIST

## 2023-11-07 PROCEDURE — 3077F SYST BP >= 140 MM HG: CPT | Mod: CPTII,,, | Performed by: PODIATRIST

## 2023-11-07 PROCEDURE — 97605 NEG PRS WND THER DME<=50SQCM: CPT | Mod: PN | Performed by: PODIATRIST

## 2023-11-07 PROCEDURE — 11042 DBRDMT SUBQ TIS 1ST 20SQCM/<: CPT | Mod: 59,,, | Performed by: PODIATRIST

## 2023-11-07 PROCEDURE — 1159F MED LIST DOCD IN RCRD: CPT | Mod: CPTII,,, | Performed by: PODIATRIST

## 2023-11-07 PROCEDURE — 1111F DSCHRG MED/CURRENT MED MERGE: CPT | Mod: CPTII,,, | Performed by: PODIATRIST

## 2023-11-07 PROCEDURE — 11042 DBRDMT SUBQ TIS 1ST 20SQCM/<: CPT | Mod: 59,PN | Performed by: PODIATRIST

## 2023-11-07 PROCEDURE — 11043 DBRDMT MUSC&/FSCA 1ST 20/<: CPT | Mod: ,,, | Performed by: PODIATRIST

## 2023-11-07 PROCEDURE — 3061F NEG MICROALBUMINURIA REV: CPT | Mod: CPTII,,, | Performed by: PODIATRIST

## 2023-11-07 PROCEDURE — 11042 PR DEBRIDEMENT, SKIN, SUB-Q TISSUE,=<20 SQ CM: ICD-10-PCS | Mod: 59,,, | Performed by: PODIATRIST

## 2023-11-08 NOTE — PROGRESS NOTES
[address] [facility] [address]  [place] [address]  Phone: [phone]   [phone]    Patient's PCP:Alfie Lancaster MD  Referring Provider: Aaareferral Self    Subjective:      Chief Complaint:: Wound Care, Diabetes, Diabetic Foot Ulcer (right medial 4th toe diabetic foot ulcer, right medial ankle diabetic foot ulcer, and right 3rd digit amputation site diabetic foot ulcer), Wound Check, Wound Infection, Non-healing Wound, Numbness, Peripheral Neuropathy, Pressure Ulcer, Foot Ulcer, Difficulty Walking, and Blister (New, right dorsal foot)    HPI  Philip Alberts is a 43 y.o. male who presents todayfor follow up of right medial 4th toe diabetic foot ulcer, right medial ankle diabetic foot ulcer, and right 3rd digit amputation site diabetic foot ulcer.  Additionally, patient presents with a new right dorsal foot blister.   See wound docs documentation for full assessment and evaluation of all wounds.      EVALUATION, ASSESSMENT, & PLAN:      Debridement of right 3rd toe amputation site diabetic foot ulcer and Debridement of right medial 4th toe diabetic foot ulcer. Evaluation and Assessment only right medial ankle diabetic foot ulcer and new right dorsal foot blister.   See Wound Docs for assessment of wounds and procedure notes    2. Continue taking all medications as prescribed  3. Dressing changes, see Wound docs for dressing change orders  4. RTC one week   5. Counseled patient on increasing protein intake, not getting wound wet, keeping dressing clean dry and intact, following a healthy diet, elevating legs when able, removing pressure from wound     Total time spent for E&M 40  Total time for debridement 35 minutes      NPWT is medically necessary for this patient at this time to acheive acceleration of granulation tissue and wound closure. It is my clinical judgement that this cannot be acheived using topical dressing or medications.            Counseling/Education:  I provided patient education  verbally regarding:   The aspects of diabetes and how it pertains to the feet. I explained the importance of proper diabetic foot care and how it is essential for the health of their feet.     I discussed the importance of knowing their Hemoglobin A1c and that the level needs to be as close to 6 as possible. I discussed the increase complications of high blood sugar including stroke, blindness, heart attack, kidney failure and loss of limb secondary to neuropathy and PVD.      With neuropathy, beware of any breaks in the skin or redness. These areas are not recognized early due to the numbness.     I discussed Diabetes, lower back issues, metabolic disorders, systemic causes, chemotherapy, vitamin deficiency, heavy metal exposure, as some of the causes. I also explained that as much as 40% of the time we can not find a cause. I discussed different treatments available to control the symptoms but which may not cure the problem.         Counseled patient on the aspects of diabetes and how it pertains to the feet.  I explained the importance of proper diabetic foot care and how it is essential for the health of their feet.        Shoe inspection. Patient instructed on proper foot hygeine. We discussed wearing proper shoe gear, daily foot inspections, never walking without protective shoe gear, never putting sharp instruments to feet, routine podiatric nail visits every 2-3 months.          Proper ulcer care and the possible need for serial debridements, topical medications, specific dressings and biological engineered skin substitutes if indicated.     Patient should call the office immediately if any signs of infection, such as fever, chills, sweats, increased redness or pain.     Patient was instructed to call the clinic or go to the emergency department if their symptoms do not improve, worsens, or if new symptoms develop.  Patient was advised that if any increased swelling, pain, or numbness arise to go immediately  to the ED. Patient knows to call any time if an emergency arises. Shared decision making occurred and patient verbalized understanding in agreement with this plan.         >50% of this > 60 minute visit was spent face to face educating/counseling the patient     I spent a total of 60 minutes on the day of the visit.This includes face to face time and non-face to face time preparing to see the patient (eg, review of tests), obtaining and/or reviewing separately obtained history, documenting clinical information in the electronic or other health record, independently interpreting results and communicating results to the patient/family/caregiver, or care coordinator.        Much of the documentation for this visit was completed in the Wound Docs system.  Please see the attached documentation for further details about the patient's care. Scanned under the Media tab.         Anat Santiago DPM            Vitals:    11/07/23 1156   BP: (!) 149/91   Pulse: 81   Resp: 18   Temp: 98.1 °F (36.7 °C)   PainSc: 0-No pain      Shoe Size:     Past Surgical History:   Procedure Laterality Date    MANDIBLE FRACTURE SURGERY  07/17/2000    MANDIBLE FRACTURE SURGERY      SPLENECTOMY, TOTAL  07/17/2000    TOE AMPUTATION Right 10/9/2023    Procedure: AMPUTATION, TOE;  Surgeon: Ashok Santiago DPM;  Location: [facility] OR;  Service: Podiatry;  Laterality: Right;     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 12/3/2018     Family History   Problem Relation Age of Onset    Diabetes Mother     Cancer Father         Social History:   Marital Status: Single  Alcohol History:  reports no history of alcohol  "use.  Tobacco History:  reports that he has been smoking. He has never used smokeless tobacco.  Drug History:  reports no history of drug use.    Review of patient's allergies indicates:   Allergen Reactions    Pcn [penicillins] Anaphylaxis     Tolerates cephalexin       Current Outpatient Medications   Medication Sig Dispense Refill    blood sugar diagnostic Strp To check BG qid times daily, to use with insurance preferred meter 150 strip 3    blood-glucose meter kit To check BG qid times daily, to use with insurance preferred meter 1 each 0    doxycycline (VIBRAMYCIN) 100 MG Cap Take 1 capsule (100 mg total) by mouth 2 (two) times daily. 84 capsule 0    esomeprazole (NEXIUM) 20 MG capsule Take 20 mg by mouth before breakfast.      gabapentin (NEURONTIN) 300 MG capsule Take 2 capsules (600 mg total) by mouth 3 (three) times daily. 180 capsule 5    HYDROcodone-acetaminophen (NORCO) 5-325 mg per tablet Take 1 tablet by mouth every 12 (twelve) hours as needed for Pain. (Patient not taking: Reported on 10/26/2023) 10 tablet 0    insulin (LANTUS SOLOSTAR U-100 INSULIN) glargine 100 units/mL SubQ pen 17 units twice a day 9 mL 5    insulin aspart U-100 (NOVOLOG FLEXPEN U-100 INSULIN) 100 unit/mL (3 mL) InPn pen Inject 10 Units into the skin 4 (four) times daily. Per sliding scale as instructed 12 mL 5    insulin syringe-needle U-100 0.3 mL 31 gauge x 5/16" Syrg 1 Device by Misc.(Non-Drug; Combo Route) route 4 (four) times daily. 200 each 3    lancets Misc To check BGqid times daily, to use with insurance preferred meter (Patient taking differently: 1 lancet  by Misc.(Non-Drug; Combo Route) route 4 (four) times daily. To check BGqid times daily, to use with insurance preferred meter) 200 each 3    lisinopriL (PRINIVIL,ZESTRIL) 5 MG tablet Take 1 tablet (5 mg total) by mouth once daily. 90 tablet 3    pen needle, diabetic (BD ULTRA-FINE DENIS PEN NEEDLE MISC) 1 Needle by Misc.(Non-Drug; Combo Route) route 4 (four) times " daily.      pravastatin (PRAVACHOL) 20 MG tablet Take 1 tablet (20 mg total) by mouth every evening. 90 tablet 3    sildenafiL (VIAGRA) 100 MG tablet Take 1 tablet (100 mg total) by mouth daily as needed for Erectile Dysfunction. 20 tablet 5     No current facility-administered medications for this visit.       Review of Systems   Constitutional:  Negative for chills, fatigue, fever and unexpected weight change.   HENT:  Negative for hearing loss and trouble swallowing.    Eyes:  Negative for photophobia and visual disturbance.   Respiratory:  Negative for cough, shortness of breath and wheezing.    Cardiovascular:  Negative for chest pain, palpitations and leg swelling.   Gastrointestinal:  Negative for abdominal pain and nausea.   Genitourinary:  Negative for dysuria and frequency.   Musculoskeletal:  Negative for arthralgias, back pain, gait problem, joint swelling and myalgias.   Skin:  Positive for wound. Negative for rash.   Neurological:  Positive for numbness. Negative for tremors, seizures, weakness and headaches.   Hematological:  Does not bruise/bleed easily.         Objective:        Physical Exam:   Foot Exam  Physical Exam  Ortho/SPM Exam     Imaging:            Assessment:       1. Ulcer of right foot with necrosis of muscle    2. Diabetes mellitus due to underlying condition with hyperglycemia, with long-term current use of insulin    3. Wound infection    4. Diabetic foot infection    5. Diabetic peripheral neuropathy    6. Ulcer of right foot with necrosis of bone    7. History of ulcer of lower extremity    8. Uncontrolled type 2 diabetes mellitus with hyperglycemia    9. At high risk for inadequate nutritional intake    10. Type 2 diabetes mellitus with diabetic polyneuropathy, with long-term current use of insulin    11. History of amputation of right foot    12. Cellulitis and abscess of foot    13. Difficulty in walking, not elsewhere classified    14. Smoker      Plan:   Ulcer of right foot  with necrosis of muscle    Diabetes mellitus due to underlying condition with hyperglycemia, with long-term current use of insulin    Wound infection    Diabetic foot infection    Diabetic peripheral neuropathy    Ulcer of right foot with necrosis of bone    History of ulcer of lower extremity    Uncontrolled type 2 diabetes mellitus with hyperglycemia    At high risk for inadequate nutritional intake    Type 2 diabetes mellitus with diabetic polyneuropathy, with long-term current use of insulin    History of amputation of right foot    Cellulitis and abscess of foot    Difficulty in walking, not elsewhere classified    Smoker      Follow up in about 3 days (around 11/10/2023).    Procedures          Counseling:     I provided patient education verbally regarding:   Patient diagnosis, treatment options, as well as alternatives, risks, and benefits.     This note was created using Dragon voice recognition software that occasionally misinterpreted phrases or words.

## 2023-11-10 ENCOUNTER — OFFICE VISIT (OUTPATIENT)
Dept: WOUND CARE | Facility: HOSPITAL | Age: 43
End: 2023-11-10
Attending: PODIATRIST
Payer: MEDICAID

## 2023-11-10 VITALS
DIASTOLIC BLOOD PRESSURE: 94 MMHG | RESPIRATION RATE: 18 BRPM | HEART RATE: 79 BPM | SYSTOLIC BLOOD PRESSURE: 165 MMHG | TEMPERATURE: 98 F

## 2023-11-10 DIAGNOSIS — L08.9 WOUND INFECTION: ICD-10-CM

## 2023-11-10 DIAGNOSIS — L08.9 DIABETIC FOOT INFECTION: ICD-10-CM

## 2023-11-10 DIAGNOSIS — R26.2 DIFFICULTY IN WALKING, NOT ELSEWHERE CLASSIFIED: ICD-10-CM

## 2023-11-10 DIAGNOSIS — E11.628 DIABETIC FOOT INFECTION: ICD-10-CM

## 2023-11-10 DIAGNOSIS — Z09 HOSPITAL DISCHARGE FOLLOW-UP: ICD-10-CM

## 2023-11-10 DIAGNOSIS — Z79.4 DIABETES MELLITUS DUE TO UNDERLYING CONDITION WITH HYPERGLYCEMIA, WITH LONG-TERM CURRENT USE OF INSULIN: ICD-10-CM

## 2023-11-10 DIAGNOSIS — L97.513 ULCER OF RIGHT FOOT WITH NECROSIS OF MUSCLE: Primary | ICD-10-CM

## 2023-11-10 DIAGNOSIS — T14.8XXA WOUND INFECTION: ICD-10-CM

## 2023-11-10 DIAGNOSIS — Z79.4 TYPE 2 DIABETES MELLITUS WITH DIABETIC POLYNEUROPATHY, WITH LONG-TERM CURRENT USE OF INSULIN: ICD-10-CM

## 2023-11-10 DIAGNOSIS — Z91.89 AT HIGH RISK FOR INADEQUATE NUTRITIONAL INTAKE: ICD-10-CM

## 2023-11-10 DIAGNOSIS — L03.119 CELLULITIS AND ABSCESS OF FOOT: ICD-10-CM

## 2023-11-10 DIAGNOSIS — L97.514 ULCER OF RIGHT FOOT WITH NECROSIS OF BONE: ICD-10-CM

## 2023-11-10 DIAGNOSIS — E11.42 DIABETIC PERIPHERAL NEUROPATHY: ICD-10-CM

## 2023-11-10 DIAGNOSIS — E11.65 UNCONTROLLED TYPE 2 DIABETES MELLITUS WITH HYPERGLYCEMIA: ICD-10-CM

## 2023-11-10 DIAGNOSIS — E08.65 DIABETES MELLITUS DUE TO UNDERLYING CONDITION WITH HYPERGLYCEMIA, WITH LONG-TERM CURRENT USE OF INSULIN: ICD-10-CM

## 2023-11-10 DIAGNOSIS — Z87.2 HISTORY OF ULCER OF LOWER EXTREMITY: ICD-10-CM

## 2023-11-10 DIAGNOSIS — F17.200 SMOKER: ICD-10-CM

## 2023-11-10 DIAGNOSIS — L02.619 CELLULITIS AND ABSCESS OF FOOT: ICD-10-CM

## 2023-11-10 DIAGNOSIS — E11.42 TYPE 2 DIABETES MELLITUS WITH DIABETIC POLYNEUROPATHY, WITH LONG-TERM CURRENT USE OF INSULIN: ICD-10-CM

## 2023-11-10 DIAGNOSIS — Z89.431 HISTORY OF AMPUTATION OF RIGHT FOOT: ICD-10-CM

## 2023-11-10 PROCEDURE — 4010F PR ACE/ARB THEARPY RXD/TAKEN: ICD-10-PCS | Mod: CPTII,,, | Performed by: PODIATRIST

## 2023-11-10 PROCEDURE — 3066F NEPHROPATHY DOC TX: CPT | Mod: CPTII,,, | Performed by: PODIATRIST

## 2023-11-10 PROCEDURE — 3061F NEG MICROALBUMINURIA REV: CPT | Mod: CPTII,,, | Performed by: PODIATRIST

## 2023-11-10 PROCEDURE — 4010F ACE/ARB THERAPY RXD/TAKEN: CPT | Mod: CPTII,,, | Performed by: PODIATRIST

## 2023-11-10 PROCEDURE — 3066F PR DOCUMENTATION OF TREATMENT FOR NEPHROPATHY: ICD-10-PCS | Mod: CPTII,,, | Performed by: PODIATRIST

## 2023-11-10 PROCEDURE — 99214 PR OFFICE/OUTPT VISIT, EST, LEVL IV, 30-39 MIN: ICD-10-PCS | Mod: ,,, | Performed by: PODIATRIST

## 2023-11-10 PROCEDURE — 3046F PR MOST RECENT HEMOGLOBIN A1C LEVEL > 9.0%: ICD-10-PCS | Mod: CPTII,,, | Performed by: PODIATRIST

## 2023-11-10 PROCEDURE — 3077F PR MOST RECENT SYSTOLIC BLOOD PRESSURE >= 140 MM HG: ICD-10-PCS | Mod: CPTII,,, | Performed by: PODIATRIST

## 2023-11-10 PROCEDURE — 3061F PR NEG MICROALBUMINURIA RESULT DOCUMENTED/REVIEW: ICD-10-PCS | Mod: CPTII,,, | Performed by: PODIATRIST

## 2023-11-10 PROCEDURE — 1160F PR REVIEW ALL MEDS BY PRESCRIBER/CLIN PHARMACIST DOCUMENTED: ICD-10-PCS | Mod: CPTII,,, | Performed by: PODIATRIST

## 2023-11-10 PROCEDURE — 1111F PR DISCHARGE MEDS RECONCILED W/ CURRENT OUTPATIENT MED LIST: ICD-10-PCS | Mod: CPTII,,, | Performed by: PODIATRIST

## 2023-11-10 PROCEDURE — 99214 OFFICE O/P EST MOD 30 MIN: CPT | Mod: ,,, | Performed by: PODIATRIST

## 2023-11-10 PROCEDURE — 1111F DSCHRG MED/CURRENT MED MERGE: CPT | Mod: CPTII,,, | Performed by: PODIATRIST

## 2023-11-10 PROCEDURE — 3080F DIAST BP >= 90 MM HG: CPT | Mod: CPTII,,, | Performed by: PODIATRIST

## 2023-11-10 PROCEDURE — 97605 NEG PRS WND THER DME<=50SQCM: CPT | Mod: PN | Performed by: PODIATRIST

## 2023-11-10 PROCEDURE — 1159F PR MEDICATION LIST DOCUMENTED IN MEDICAL RECORD: ICD-10-PCS | Mod: CPTII,,, | Performed by: PODIATRIST

## 2023-11-10 PROCEDURE — 1159F MED LIST DOCD IN RCRD: CPT | Mod: CPTII,,, | Performed by: PODIATRIST

## 2023-11-10 PROCEDURE — 1160F RVW MEDS BY RX/DR IN RCRD: CPT | Mod: CPTII,,, | Performed by: PODIATRIST

## 2023-11-10 PROCEDURE — 3080F PR MOST RECENT DIASTOLIC BLOOD PRESSURE >= 90 MM HG: ICD-10-PCS | Mod: CPTII,,, | Performed by: PODIATRIST

## 2023-11-10 PROCEDURE — 3077F SYST BP >= 140 MM HG: CPT | Mod: CPTII,,, | Performed by: PODIATRIST

## 2023-11-10 PROCEDURE — 3046F HEMOGLOBIN A1C LEVEL >9.0%: CPT | Mod: CPTII,,, | Performed by: PODIATRIST

## 2023-11-11 NOTE — PROGRESS NOTES
[address] [facility] [address]  [place] [address]  Phone: [phone]   [phone]    Patient's PCP:Alfie Lancaster MD  Referring Provider: Aaareferral Self    Subjective:      Chief Complaint:: Wound Care, Diabetes, Wound Check, Wound Infection, Foot Ulcer, Difficulty Walking, Diabetic Foot Ulcer, Numbness, Peripheral Neuropathy, Non-healing Wound, and Pressure Ulcer    HPI  Philip Alberts is a 43 y.o. male who presents todayfor follow up of right medial 4th toe diabetic foot ulcer, right medial ankle diabetic foot ulcer, and right 3rd digit amputation site diabetic foot ulcer.  Additionally, patient presents with a new right dorsal foot blister.   See wound docs documentation for full assessment and evaluation of all wounds.      EVALUATION, ASSESSMENT, & PLAN:      Evaluation and assessment of all wounds.   See Wound Docs for assessment of wounds and procedure notes    2. Continue taking all medications as prescribed  3. Dressing changes, see Wound docs for dressing change orders  4. RTC one week   5. Counseled patient on increasing protein intake, not getting wound wet, keeping dressing clean dry and intact, following a healthy diet, elevating legs when able, removing pressure from wound     Total time spent for E&M 40  Total time for debridement 35 minutes      NPWT is medically necessary for this patient at this time to acheive acceleration of granulation tissue and wound closure. It is my clinical judgement that this cannot be acheived using topical dressing or medications.            Counseling/Education:  I provided patient education verbally regarding:   The aspects of diabetes and how it pertains to the feet. I explained the importance of proper diabetic foot care and how it is essential for the health of their feet.     I discussed the importance of knowing their Hemoglobin A1c and that the level needs to be as close to 6 as possible. I discussed the increase complications of high blood sugar  including stroke, blindness, heart attack, kidney failure and loss of limb secondary to neuropathy and PVD.      With neuropathy, beware of any breaks in the skin or redness. These areas are not recognized early due to the numbness.     I discussed Diabetes, lower back issues, metabolic disorders, systemic causes, chemotherapy, vitamin deficiency, heavy metal exposure, as some of the causes. I also explained that as much as 40% of the time we can not find a cause. I discussed different treatments available to control the symptoms but which may not cure the problem.         Counseled patient on the aspects of diabetes and how it pertains to the feet.  I explained the importance of proper diabetic foot care and how it is essential for the health of their feet.        Shoe inspection. Patient instructed on proper foot hygeine. We discussed wearing proper shoe gear, daily foot inspections, never walking without protective shoe gear, never putting sharp instruments to feet, routine podiatric nail visits every 2-3 months.          Proper ulcer care and the possible need for serial debridements, topical medications, specific dressings and biological engineered skin substitutes if indicated.     Patient should call the office immediately if any signs of infection, such as fever, chills, sweats, increased redness or pain.     Patient was instructed to call the clinic or go to the emergency department if their symptoms do not improve, worsens, or if new symptoms develop.  Patient was advised that if any increased swelling, pain, or numbness arise to go immediately to the ED. Patient knows to call any time if an emergency arises. Shared decision making occurred and patient verbalized understanding in agreement with this plan.         >50% of this > 60 minute visit was spent face to face educating/counseling the patient     I spent a total of 60 minutes on the day of the visit.This includes face to face time and non-face to  face time preparing to see the patient (eg, review of tests), obtaining and/or reviewing separately obtained history, documenting clinical information in the electronic or other health record, independently interpreting results and communicating results to the patient/family/caregiver, or care coordinator.        Much of the documentation for this visit was completed in the Wound Docs system.  Please see the attached documentation for further details about the patient's care. Scanned under the Media tab.         Anat JORDAN Santiago            Vitals:    11/10/23 1024   BP: (!) 165/94   Pulse: 79   Resp: 18   Temp: 98.4 °F (36.9 °C)   TempSrc: Skin   PainSc: 0-No pain      Shoe Size:     Past Surgical History:   Procedure Laterality Date    MANDIBLE FRACTURE SURGERY  07/17/2000    MANDIBLE FRACTURE SURGERY      SPLENECTOMY, TOTAL  07/17/2000    TOE AMPUTATION Right 10/9/2023    Procedure: AMPUTATION, TOE;  Surgeon: Ashok Santiago DPM;  Location: [facility];  Service: Podiatry;  Laterality: Right;     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  Tobacco History:  reports that he has been smoking. He has never used smokeless tobacco.  Drug History:  reports no history of drug use.    Review of patient's allergies indicates:   Allergen Reactions    Pcn [penicillins] Anaphylaxis     Tolerates cephalexin       Current Outpatient Medications   Medication Sig Dispense Refill    blood sugar  "diagnostic Strp To check BG qid times daily, to use with insurance preferred meter 150 strip 3    blood-glucose meter kit To check BG qid times daily, to use with insurance preferred meter 1 each 0    doxycycline (VIBRAMYCIN) 100 MG Cap Take 1 capsule (100 mg total) by mouth 2 (two) times daily. 84 capsule 0    esomeprazole (NEXIUM) 20 MG capsule Take 20 mg by mouth before breakfast.      gabapentin (NEURONTIN) 300 MG capsule Take 2 capsules (600 mg total) by mouth 3 (three) times daily. 180 capsule 5    HYDROcodone-acetaminophen (NORCO) 5-325 mg per tablet Take 1 tablet by mouth every 12 (twelve) hours as needed for Pain. (Patient not taking: Reported on 10/26/2023) 10 tablet 0    insulin (LANTUS SOLOSTAR U-100 INSULIN) glargine 100 units/mL SubQ pen 17 units twice a day 9 mL 5    insulin aspart U-100 (NOVOLOG FLEXPEN U-100 INSULIN) 100 unit/mL (3 mL) InPn pen Inject 10 Units into the skin 4 (four) times daily. Per sliding scale as instructed 12 mL 5    insulin syringe-needle U-100 0.3 mL 31 gauge x 5/16" Syrg 1 Device by Misc.(Non-Drug; Combo Route) route 4 (four) times daily. 200 each 3    lancets Misc To check BGqid times daily, to use with insurance preferred meter (Patient taking differently: 1 lancet  by Misc.(Non-Drug; Combo Route) route 4 (four) times daily. To check BGqid times daily, to use with insurance preferred meter) 200 each 3    lisinopriL (PRINIVIL,ZESTRIL) 5 MG tablet Take 1 tablet (5 mg total) by mouth once daily. 90 tablet 3    pen needle, diabetic (BD ULTRA-FINE DENIS PEN NEEDLE MISC) 1 Needle by Misc.(Non-Drug; Combo Route) route 4 (four) times daily.      pravastatin (PRAVACHOL) 20 MG tablet Take 1 tablet (20 mg total) by mouth every evening. 90 tablet 3    sildenafiL (VIAGRA) 100 MG tablet Take 1 tablet (100 mg total) by mouth daily as needed for Erectile Dysfunction. 20 tablet 5     No current facility-administered medications for this visit.       Review of Systems   Constitutional:  " Negative for chills, fatigue, fever and unexpected weight change.   HENT:  Negative for hearing loss and trouble swallowing.    Eyes:  Negative for photophobia and visual disturbance.   Respiratory:  Negative for cough, shortness of breath and wheezing.    Cardiovascular:  Negative for chest pain, palpitations and leg swelling.   Gastrointestinal:  Negative for abdominal pain and nausea.   Genitourinary:  Negative for dysuria and frequency.   Musculoskeletal:  Negative for arthralgias, back pain, gait problem, joint swelling and myalgias.   Skin:  Positive for wound. Negative for rash.   Neurological:  Positive for numbness. Negative for tremors, seizures, weakness and headaches.   Hematological:  Does not bruise/bleed easily.         Objective:        Physical Exam:   Foot Exam  Physical Exam  Ortho/SPM Exam     Imaging:            Assessment:       1. Ulcer of right foot with necrosis of muscle    2. Ulcer of right foot with necrosis of bone    3. Cellulitis and abscess of foot    4. Diabetes mellitus due to underlying condition with hyperglycemia, with long-term current use of insulin    5. Wound infection    6. Diabetic foot infection    7. Diabetic peripheral neuropathy    8. Type 2 diabetes mellitus with diabetic polyneuropathy, with long-term current use of insulin    9. Uncontrolled type 2 diabetes mellitus with hyperglycemia    10. Difficulty in walking, not elsewhere classified    11. Hospital discharge follow-up    12. Smoker    13. At high risk for inadequate nutritional intake    14. History of ulcer of lower extremity    15. History of amputation of right foot      Plan:   Ulcer of right foot with necrosis of muscle    Ulcer of right foot with necrosis of bone    Cellulitis and abscess of foot    Diabetes mellitus due to underlying condition with hyperglycemia, with long-term current use of insulin    Wound infection    Diabetic foot infection    Diabetic peripheral neuropathy    Type 2 diabetes  mellitus with diabetic polyneuropathy, with long-term current use of insulin    Uncontrolled type 2 diabetes mellitus with hyperglycemia    Difficulty in walking, not elsewhere classified    Hospital discharge follow-up    Smoker    At high risk for inadequate nutritional intake    History of ulcer of lower extremity    History of amputation of right foot      Follow up in about 4 days (around 11/14/2023).    Procedures          Counseling:     I provided patient education verbally regarding:   Patient diagnosis, treatment options, as well as alternatives, risks, and benefits.     This note was created using Dragon voice recognition software that occasionally misinterpreted phrases or words.

## 2023-11-14 ENCOUNTER — OFFICE VISIT (OUTPATIENT)
Dept: WOUND CARE | Facility: HOSPITAL | Age: 43
End: 2023-11-14
Attending: PODIATRIST
Payer: MEDICAID

## 2023-11-14 VITALS
RESPIRATION RATE: 16 BRPM | DIASTOLIC BLOOD PRESSURE: 94 MMHG | HEART RATE: 79 BPM | TEMPERATURE: 99 F | SYSTOLIC BLOOD PRESSURE: 165 MMHG

## 2023-11-14 DIAGNOSIS — Z79.4 TYPE 2 DIABETES MELLITUS WITH DIABETIC POLYNEUROPATHY, WITH LONG-TERM CURRENT USE OF INSULIN: ICD-10-CM

## 2023-11-14 DIAGNOSIS — E11.42 DIABETIC PERIPHERAL NEUROPATHY: ICD-10-CM

## 2023-11-14 DIAGNOSIS — L97.513 ULCER OF RIGHT FOOT WITH NECROSIS OF MUSCLE: Primary | ICD-10-CM

## 2023-11-14 DIAGNOSIS — E11.628 DIABETIC FOOT INFECTION: ICD-10-CM

## 2023-11-14 DIAGNOSIS — L97.514 ULCER OF RIGHT FOOT WITH NECROSIS OF BONE: ICD-10-CM

## 2023-11-14 DIAGNOSIS — R26.2 DIFFICULTY IN WALKING, NOT ELSEWHERE CLASSIFIED: ICD-10-CM

## 2023-11-14 DIAGNOSIS — L03.119 CELLULITIS AND ABSCESS OF FOOT: ICD-10-CM

## 2023-11-14 DIAGNOSIS — L08.9 WOUND INFECTION: ICD-10-CM

## 2023-11-14 DIAGNOSIS — Z79.4 DIABETES MELLITUS DUE TO UNDERLYING CONDITION WITH HYPERGLYCEMIA, WITH LONG-TERM CURRENT USE OF INSULIN: ICD-10-CM

## 2023-11-14 DIAGNOSIS — F17.200 SMOKER: ICD-10-CM

## 2023-11-14 DIAGNOSIS — Z87.2 HISTORY OF ULCER OF LOWER EXTREMITY: ICD-10-CM

## 2023-11-14 DIAGNOSIS — L02.619 CELLULITIS AND ABSCESS OF FOOT: ICD-10-CM

## 2023-11-14 DIAGNOSIS — E08.65 DIABETES MELLITUS DUE TO UNDERLYING CONDITION WITH HYPERGLYCEMIA, WITH LONG-TERM CURRENT USE OF INSULIN: ICD-10-CM

## 2023-11-14 DIAGNOSIS — T14.8XXA WOUND INFECTION: ICD-10-CM

## 2023-11-14 DIAGNOSIS — Z89.431 HISTORY OF AMPUTATION OF RIGHT FOOT: ICD-10-CM

## 2023-11-14 DIAGNOSIS — E11.42 TYPE 2 DIABETES MELLITUS WITH DIABETIC POLYNEUROPATHY, WITH LONG-TERM CURRENT USE OF INSULIN: ICD-10-CM

## 2023-11-14 DIAGNOSIS — Z91.89 AT HIGH RISK FOR INADEQUATE NUTRITIONAL INTAKE: ICD-10-CM

## 2023-11-14 DIAGNOSIS — L08.9 DIABETIC FOOT INFECTION: ICD-10-CM

## 2023-11-14 DIAGNOSIS — E11.65 UNCONTROLLED TYPE 2 DIABETES MELLITUS WITH HYPERGLYCEMIA: ICD-10-CM

## 2023-11-14 DIAGNOSIS — Z09 HOSPITAL DISCHARGE FOLLOW-UP: ICD-10-CM

## 2023-11-14 PROCEDURE — 97605 NEG PRS WND THER DME<=50SQCM: CPT | Mod: PN | Performed by: PODIATRIST

## 2023-11-14 PROCEDURE — 11042 DBRDMT SUBQ TIS 1ST 20SQCM/<: CPT | Mod: 59,,, | Performed by: PODIATRIST

## 2023-11-14 PROCEDURE — 11042 DBRDMT SUBQ TIS 1ST 20SQCM/<: CPT | Mod: 59,PN | Performed by: PODIATRIST

## 2023-11-14 PROCEDURE — 11043 DBRDMT MUSC&/FSCA 1ST 20/<: CPT | Mod: PN | Performed by: PODIATRIST

## 2023-11-14 PROCEDURE — 11043 DBRDMT MUSC&/FSCA 1ST 20/<: CPT | Mod: ,,, | Performed by: PODIATRIST

## 2023-11-14 PROCEDURE — 99499 UNLISTED E&M SERVICE: CPT | Mod: ,,, | Performed by: PODIATRIST

## 2023-11-14 PROCEDURE — 11042 DBRDMT SUBQ TIS 1ST 20SQCM/<: CPT | Mod: 59,PN

## 2023-11-14 PROCEDURE — 11043 DBRDMT MUSC&/FSCA 1ST 20/<: CPT | Mod: PN

## 2023-11-14 PROCEDURE — 11042 PR DEBRIDEMENT, SKIN, SUB-Q TISSUE,=<20 SQ CM: ICD-10-PCS | Mod: 59,,, | Performed by: PODIATRIST

## 2023-11-14 PROCEDURE — 11043 PR DEBRIDEMENT, SKIN, SUB-Q TISSUE,MUSCLE,=<20 SQ CM: ICD-10-PCS | Mod: ,,, | Performed by: PODIATRIST

## 2023-11-14 PROCEDURE — 99499 NO LOS: ICD-10-PCS | Mod: ,,, | Performed by: PODIATRIST

## 2023-11-15 NOTE — PROGRESS NOTES
[address] [facility] [address]  [place] [address]  Phone: [phone]   [phone]    Patient's PCP:Alfie Lancaster MD  Referring Provider: Aaareferral Self    Subjective:      Chief Complaint:: Wound Care, Wound Consult, Diabetes, Diabetic Foot Ulcer, Wound Check, Non-healing Wound, Wound Infection, Peripheral Neuropathy, Numbness, Foot Ulcer, and Difficulty Walking    HPI  Philip Alberts is a 43 y.o. male who presents todayfor follow up of right medial 4th toe diabetic foot ulcer,  right 3rd digit amputation site diabetic foot ulcer, and right dorsal foot blister.  See wound docs documentation for full assessment and evaluation of all wounds.      EVALUATION, ASSESSMENT, & PLAN:      Debridement of right 3rd toe amputation site diabetic foot ulcer and Debridement of right medial 4th toe diabetic foot ulcer. Evaluation and Assessment only of right dorsal foot blister.   See Wound Docs for assessment of wounds and procedure notes    2. Continue taking all medications as prescribed  3. Dressing changes, see Wound docs for dressing change orders  4. RTC one week   5. Counseled patient on increasing protein intake, not getting wound wet, keeping dressing clean dry and intact, following a healthy diet, elevating legs when able, removing pressure from wound     Total time spent for E&M 40  Total time for debridement 35 minutes      NPWT is medically necessary for this patient at this time to acheive acceleration of granulation tissue and wound closure. It is my clinical judgement that this cannot be acheived using topical dressing or medications.            Counseling/Education:  I provided patient education verbally regarding:   The aspects of diabetes and how it pertains to the feet. I explained the importance of proper diabetic foot care and how it is essential for the health of their feet.     I discussed the importance of knowing their Hemoglobin A1c and that the level needs to be as close to 6 as possible.  I discussed the increase complications of high blood sugar including stroke, blindness, heart attack, kidney failure and loss of limb secondary to neuropathy and PVD.      With neuropathy, beware of any breaks in the skin or redness. These areas are not recognized early due to the numbness.     I discussed Diabetes, lower back issues, metabolic disorders, systemic causes, chemotherapy, vitamin deficiency, heavy metal exposure, as some of the causes. I also explained that as much as 40% of the time we can not find a cause. I discussed different treatments available to control the symptoms but which may not cure the problem.         Counseled patient on the aspects of diabetes and how it pertains to the feet.  I explained the importance of proper diabetic foot care and how it is essential for the health of their feet.        Shoe inspection. Patient instructed on proper foot hygeine. We discussed wearing proper shoe gear, daily foot inspections, never walking without protective shoe gear, never putting sharp instruments to feet, routine podiatric nail visits every 2-3 months.          Proper ulcer care and the possible need for serial debridements, topical medications, specific dressings and biological engineered skin substitutes if indicated.     Patient should call the office immediately if any signs of infection, such as fever, chills, sweats, increased redness or pain.     Patient was instructed to call the clinic or go to the emergency department if their symptoms do not improve, worsens, or if new symptoms develop.  Patient was advised that if any increased swelling, pain, or numbness arise to go immediately to the ED. Patient knows to call any time if an emergency arises. Shared decision making occurred and patient verbalized understanding in agreement with this plan.         >50% of this > 60 minute visit was spent face to face educating/counseling the patient     I spent a total of 60 minutes on the day of  the visit.This includes face to face time and non-face to face time preparing to see the patient (eg, review of tests), obtaining and/or reviewing separately obtained history, documenting clinical information in the electronic or other health record, independently interpreting results and communicating results to the patient/family/caregiver, or care coordinator.        Much of the documentation for this visit was completed in the Wound Docs system.  Please see the attached documentation for further details about the patient's care. Scanned under the Media tab.         Anat JORDAN Santiago            Vitals:    11/14/23 1443   BP: (!) 165/94   Pulse: 79   Resp: 16   Temp: 98.5 °F (36.9 °C)   PainSc: 0-No pain      Shoe Size:     Past Surgical History:   Procedure Laterality Date    MANDIBLE FRACTURE SURGERY  07/17/2000    MANDIBLE FRACTURE SURGERY      SPLENECTOMY, TOTAL  07/17/2000    TOE AMPUTATION Right 10/9/2023    Procedure: AMPUTATION, TOE;  Surgeon: Ashok Santiago DPM;  Location: [facility];  Service: Podiatry;  Laterality: Right;     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  Tobacco History:  reports that he has been smoking. He has never used smokeless tobacco.  Drug History:  reports no history of drug use.    Review of patient's allergies indicates:   Allergen Reactions    Pcn [penicillins] Anaphylaxis     Tolerates cephalexin       Current Outpatient Medications  "  Medication Sig Dispense Refill    blood sugar diagnostic Strp To check BG qid times daily, to use with insurance preferred meter 150 strip 3    blood-glucose meter kit To check BG qid times daily, to use with insurance preferred meter 1 each 0    doxycycline (VIBRAMYCIN) 100 MG Cap Take 1 capsule (100 mg total) by mouth 2 (two) times daily. 84 capsule 0    esomeprazole (NEXIUM) 20 MG capsule Take 20 mg by mouth before breakfast.      gabapentin (NEURONTIN) 300 MG capsule Take 2 capsules (600 mg total) by mouth 3 (three) times daily. 180 capsule 5    HYDROcodone-acetaminophen (NORCO) 5-325 mg per tablet Take 1 tablet by mouth every 12 (twelve) hours as needed for Pain. (Patient not taking: Reported on 10/26/2023) 10 tablet 0    insulin (LANTUS SOLOSTAR U-100 INSULIN) glargine 100 units/mL SubQ pen 17 units twice a day 9 mL 5    insulin aspart U-100 (NOVOLOG FLEXPEN U-100 INSULIN) 100 unit/mL (3 mL) InPn pen Inject 10 Units into the skin 4 (four) times daily. Per sliding scale as instructed 12 mL 5    insulin syringe-needle U-100 0.3 mL 31 gauge x 5/16" Syrg 1 Device by Misc.(Non-Drug; Combo Route) route 4 (four) times daily. 200 each 3    lancets Misc To check BGqid times daily, to use with insurance preferred meter (Patient taking differently: 1 lancet  by Misc.(Non-Drug; Combo Route) route 4 (four) times daily. To check BGqid times daily, to use with insurance preferred meter) 200 each 3    lisinopriL (PRINIVIL,ZESTRIL) 5 MG tablet Take 1 tablet (5 mg total) by mouth once daily. 90 tablet 3    pen needle, diabetic (BD ULTRA-FINE DENIS PEN NEEDLE MISC) 1 Needle by Misc.(Non-Drug; Combo Route) route 4 (four) times daily.      pravastatin (PRAVACHOL) 20 MG tablet Take 1 tablet (20 mg total) by mouth every evening. 90 tablet 3    sildenafiL (VIAGRA) 100 MG tablet Take 1 tablet (100 mg total) by mouth daily as needed for Erectile Dysfunction. 20 tablet 5     No current facility-administered medications for this visit. "       Review of Systems   Constitutional:  Negative for chills, fatigue, fever and unexpected weight change.   HENT:  Negative for hearing loss and trouble swallowing.    Eyes:  Negative for photophobia and visual disturbance.   Respiratory:  Negative for cough, shortness of breath and wheezing.    Cardiovascular:  Negative for chest pain, palpitations and leg swelling.   Gastrointestinal:  Negative for abdominal pain and nausea.   Genitourinary:  Negative for dysuria and frequency.   Musculoskeletal:  Negative for arthralgias, back pain, gait problem, joint swelling and myalgias.   Skin:  Positive for wound. Negative for rash.   Neurological:  Positive for numbness. Negative for tremors, seizures, weakness and headaches.   Hematological:  Does not bruise/bleed easily.         Objective:        Physical Exam:   Foot Exam  Physical Exam  Ortho/SPM Exam     Imaging:            Assessment:       1. Ulcer of right foot with necrosis of muscle    2. Ulcer of right foot with necrosis of bone    3. Cellulitis and abscess of foot    4. Diabetes mellitus due to underlying condition with hyperglycemia, with long-term current use of insulin    5. Type 2 diabetes mellitus with diabetic polyneuropathy, with long-term current use of insulin    6. Diabetic peripheral neuropathy    7. Diabetic foot infection    8. Wound infection    9. Difficulty in walking, not elsewhere classified    10. Hospital discharge follow-up    11. Smoker    12. Uncontrolled type 2 diabetes mellitus with hyperglycemia    13. At high risk for inadequate nutritional intake    14. History of ulcer of lower extremity    15. History of amputation of right foot      Plan:   Ulcer of right foot with necrosis of muscle    Ulcer of right foot with necrosis of bone    Cellulitis and abscess of foot    Diabetes mellitus due to underlying condition with hyperglycemia, with long-term current use of insulin    Type 2 diabetes mellitus with diabetic polyneuropathy, with  long-term current use of insulin    Diabetic peripheral neuropathy    Diabetic foot infection    Wound infection    Difficulty in walking, not elsewhere classified    Hospital discharge follow-up    Smoker    Uncontrolled type 2 diabetes mellitus with hyperglycemia    At high risk for inadequate nutritional intake    History of ulcer of lower extremity    History of amputation of right foot      Follow up in about 3 days (around 11/17/2023).    Procedures          Counseling:     I provided patient education verbally regarding:   Patient diagnosis, treatment options, as well as alternatives, risks, and benefits.     This note was created using Dragon voice recognition software that occasionally misinterpreted phrases or words.

## 2023-11-17 ENCOUNTER — OFFICE VISIT (OUTPATIENT)
Dept: WOUND CARE | Facility: HOSPITAL | Age: 43
End: 2023-11-17
Attending: PODIATRIST
Payer: MEDICAID

## 2023-11-17 VITALS
DIASTOLIC BLOOD PRESSURE: 85 MMHG | RESPIRATION RATE: 18 BRPM | SYSTOLIC BLOOD PRESSURE: 161 MMHG | TEMPERATURE: 98 F | HEART RATE: 95 BPM

## 2023-11-17 DIAGNOSIS — Z89.431 HISTORY OF AMPUTATION OF RIGHT FOOT: ICD-10-CM

## 2023-11-17 DIAGNOSIS — Z79.4 DIABETES MELLITUS DUE TO UNDERLYING CONDITION WITH HYPERGLYCEMIA, WITH LONG-TERM CURRENT USE OF INSULIN: ICD-10-CM

## 2023-11-17 DIAGNOSIS — E11.42 TYPE 2 DIABETES MELLITUS WITH DIABETIC POLYNEUROPATHY, WITH LONG-TERM CURRENT USE OF INSULIN: ICD-10-CM

## 2023-11-17 DIAGNOSIS — E11.628 DIABETIC FOOT INFECTION: ICD-10-CM

## 2023-11-17 DIAGNOSIS — R26.2 DIFFICULTY IN WALKING, NOT ELSEWHERE CLASSIFIED: ICD-10-CM

## 2023-11-17 DIAGNOSIS — L03.119 CELLULITIS AND ABSCESS OF FOOT: ICD-10-CM

## 2023-11-17 DIAGNOSIS — Z91.89 AT HIGH RISK FOR INADEQUATE NUTRITIONAL INTAKE: ICD-10-CM

## 2023-11-17 DIAGNOSIS — E11.65 UNCONTROLLED TYPE 2 DIABETES MELLITUS WITH HYPERGLYCEMIA: ICD-10-CM

## 2023-11-17 DIAGNOSIS — L02.619 CELLULITIS AND ABSCESS OF FOOT: ICD-10-CM

## 2023-11-17 DIAGNOSIS — L97.513 RIGHT FOOT ULCER, WITH NECROSIS OF MUSCLE: Primary | ICD-10-CM

## 2023-11-17 DIAGNOSIS — L97.514 ULCER OF RIGHT FOOT WITH NECROSIS OF BONE: ICD-10-CM

## 2023-11-17 DIAGNOSIS — E08.65 DIABETES MELLITUS DUE TO UNDERLYING CONDITION WITH HYPERGLYCEMIA, WITH LONG-TERM CURRENT USE OF INSULIN: ICD-10-CM

## 2023-11-17 DIAGNOSIS — E11.42 DIABETIC PERIPHERAL NEUROPATHY: ICD-10-CM

## 2023-11-17 DIAGNOSIS — T14.8XXA WOUND INFECTION: ICD-10-CM

## 2023-11-17 DIAGNOSIS — Z09 HOSPITAL DISCHARGE FOLLOW-UP: ICD-10-CM

## 2023-11-17 DIAGNOSIS — L97.513 ULCER OF RIGHT FOOT WITH NECROSIS OF MUSCLE: ICD-10-CM

## 2023-11-17 DIAGNOSIS — Z87.2 HISTORY OF ULCER OF LOWER EXTREMITY: ICD-10-CM

## 2023-11-17 DIAGNOSIS — L08.9 WOUND INFECTION: ICD-10-CM

## 2023-11-17 DIAGNOSIS — L08.9 DIABETIC FOOT INFECTION: ICD-10-CM

## 2023-11-17 DIAGNOSIS — F17.200 SMOKER: ICD-10-CM

## 2023-11-17 DIAGNOSIS — Z79.4 TYPE 2 DIABETES MELLITUS WITH DIABETIC POLYNEUROPATHY, WITH LONG-TERM CURRENT USE OF INSULIN: ICD-10-CM

## 2023-11-17 PROCEDURE — 3061F NEG MICROALBUMINURIA REV: CPT | Mod: CPTII,,, | Performed by: PODIATRIST

## 2023-11-17 PROCEDURE — 99214 PR OFFICE/OUTPT VISIT, EST, LEVL IV, 30-39 MIN: ICD-10-PCS | Mod: ,,, | Performed by: PODIATRIST

## 2023-11-17 PROCEDURE — 3079F DIAST BP 80-89 MM HG: CPT | Mod: CPTII,,, | Performed by: PODIATRIST

## 2023-11-17 PROCEDURE — 3077F SYST BP >= 140 MM HG: CPT | Mod: CPTII,,, | Performed by: PODIATRIST

## 2023-11-17 PROCEDURE — 3066F NEPHROPATHY DOC TX: CPT | Mod: CPTII,,, | Performed by: PODIATRIST

## 2023-11-17 PROCEDURE — 1159F MED LIST DOCD IN RCRD: CPT | Mod: CPTII,,, | Performed by: PODIATRIST

## 2023-11-17 PROCEDURE — 3077F PR MOST RECENT SYSTOLIC BLOOD PRESSURE >= 140 MM HG: ICD-10-PCS | Mod: CPTII,,, | Performed by: PODIATRIST

## 2023-11-17 PROCEDURE — 4010F ACE/ARB THERAPY RXD/TAKEN: CPT | Mod: CPTII,,, | Performed by: PODIATRIST

## 2023-11-17 PROCEDURE — 3066F PR DOCUMENTATION OF TREATMENT FOR NEPHROPATHY: ICD-10-PCS | Mod: CPTII,,, | Performed by: PODIATRIST

## 2023-11-17 PROCEDURE — 1159F PR MEDICATION LIST DOCUMENTED IN MEDICAL RECORD: ICD-10-PCS | Mod: CPTII,,, | Performed by: PODIATRIST

## 2023-11-17 PROCEDURE — 3079F PR MOST RECENT DIASTOLIC BLOOD PRESSURE 80-89 MM HG: ICD-10-PCS | Mod: CPTII,,, | Performed by: PODIATRIST

## 2023-11-17 PROCEDURE — 1160F RVW MEDS BY RX/DR IN RCRD: CPT | Mod: CPTII,,, | Performed by: PODIATRIST

## 2023-11-17 PROCEDURE — 1160F PR REVIEW ALL MEDS BY PRESCRIBER/CLIN PHARMACIST DOCUMENTED: ICD-10-PCS | Mod: CPTII,,, | Performed by: PODIATRIST

## 2023-11-17 PROCEDURE — 99214 OFFICE O/P EST MOD 30 MIN: CPT | Mod: ,,, | Performed by: PODIATRIST

## 2023-11-17 PROCEDURE — 3061F PR NEG MICROALBUMINURIA RESULT DOCUMENTED/REVIEW: ICD-10-PCS | Mod: CPTII,,, | Performed by: PODIATRIST

## 2023-11-17 PROCEDURE — 3046F HEMOGLOBIN A1C LEVEL >9.0%: CPT | Mod: CPTII,,, | Performed by: PODIATRIST

## 2023-11-17 PROCEDURE — 97605 NEG PRS WND THER DME<=50SQCM: CPT | Mod: PN | Performed by: PODIATRIST

## 2023-11-17 PROCEDURE — 3046F PR MOST RECENT HEMOGLOBIN A1C LEVEL > 9.0%: ICD-10-PCS | Mod: CPTII,,, | Performed by: PODIATRIST

## 2023-11-17 PROCEDURE — 4010F PR ACE/ARB THEARPY RXD/TAKEN: ICD-10-PCS | Mod: CPTII,,, | Performed by: PODIATRIST

## 2023-11-18 NOTE — PROGRESS NOTES
[address] [facility] [address]  [place] [address]  Phone: [phone]   [phone]    Patient's PCP:Alfie Lancaster MD  Referring Provider: Aaareferral Self    Subjective:      Chief Complaint:: Non-healing Wound (foot), Diabetes, Diabetic Foot Ulcer, Wound Check, Wound Care, Wound Infection, Pressure Ulcer, Numbness, Peripheral Neuropathy, Foot Ulcer, and Difficulty Walking    HPI  Philip Alberts is a 43 y.o. male who presents todayfor follow up of right medial 4th toe diabetic foot ulcer,  right 3rd digit amputation site diabetic foot ulcer, and right dorsal foot blister.  See wound docs documentation for full assessment and evaluation of all wounds.      EVALUATION, ASSESSMENT, & PLAN:        See Wound Docs for assessment of wounds and procedure notes    2. Continue taking all medications as prescribed  3. Dressing changes, see Wound docs for dressing change orders  4. RTC one week   5. Counseled patient on increasing protein intake, not getting wound wet, keeping dressing clean dry and intact, following a healthy diet, elevating legs when able, removing pressure from wound        NPWT is medically necessary for this patient at this time to acheive acceleration of granulation tissue and wound closure. It is my clinical judgement that this cannot be acheived using topical dressing or medications.            Counseling/Education:  I provided patient education verbally regarding:   The aspects of diabetes and how it pertains to the feet. I explained the importance of proper diabetic foot care and how it is essential for the health of their feet.     I discussed the importance of knowing their Hemoglobin A1c and that the level needs to be as close to 6 as possible. I discussed the increase complications of high blood sugar including stroke, blindness, heart attack, kidney failure and loss of limb secondary to neuropathy and PVD.      With neuropathy, beware of any breaks in the skin or redness. These areas  are not recognized early due to the numbness.     I discussed Diabetes, lower back issues, metabolic disorders, systemic causes, chemotherapy, vitamin deficiency, heavy metal exposure, as some of the causes. I also explained that as much as 40% of the time we can not find a cause. I discussed different treatments available to control the symptoms but which may not cure the problem.         Counseled patient on the aspects of diabetes and how it pertains to the feet.  I explained the importance of proper diabetic foot care and how it is essential for the health of their feet.        Shoe inspection. Patient instructed on proper foot hygeine. We discussed wearing proper shoe gear, daily foot inspections, never walking without protective shoe gear, never putting sharp instruments to feet, routine podiatric nail visits every 2-3 months.          Proper ulcer care and the possible need for serial debridements, topical medications, specific dressings and biological engineered skin substitutes if indicated.     Patient should call the office immediately if any signs of infection, such as fever, chills, sweats, increased redness or pain.     Patient was instructed to call the clinic or go to the emergency department if their symptoms do not improve, worsens, or if new symptoms develop.  Patient was advised that if any increased swelling, pain, or numbness arise to go immediately to the ED. Patient knows to call any time if an emergency arises. Shared decision making occurred and patient verbalized understanding in agreement with this plan.         >50% of this > 60 minute visit was spent face to face educating/counseling the patient     I spent a total of 60 minutes on the day of the visit.This includes face to face time and non-face to face time preparing to see the patient (eg, review of tests), obtaining and/or reviewing separately obtained history, documenting clinical information in the electronic or other health  record, independently interpreting results and communicating results to the patient/family/caregiver, or care coordinator.        Much of the documentation for this visit was completed in the Wound Docs system.  Please see the attached documentation for further details about the patient's care. Scanned under the Media tab.         Anat JORDAN Santiago         Vitals:    11/17/23 0845   BP: (!) 161/85   Pulse: 95   Resp: 18   Temp: 98.2 °F (36.8 °C)      Shoe Size:     Past Surgical History:   Procedure Laterality Date    MANDIBLE FRACTURE SURGERY  07/17/2000    MANDIBLE FRACTURE SURGERY      SPLENECTOMY, TOTAL  07/17/2000    TOE AMPUTATION Right 10/9/2023    Procedure: AMPUTATION, TOE;  Surgeon: Ashok Santiago DPM;  Location: [facility];  Service: Podiatry;  Laterality: Right;     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  Tobacco History:  reports that he has been smoking. He has never used smokeless tobacco.  Drug History:  reports no history of drug use.    Review of patient's allergies indicates:   Allergen Reactions    Pcn [penicillins] Anaphylaxis     Tolerates cephalexin       Current Outpatient Medications   Medication Sig Dispense Refill    blood sugar diagnostic Strp To check BG qid times daily, to use with insurance preferred meter 150 strip 3    blood-glucose meter kit To check BG qid times daily, to use with insurance preferred meter 1 each 0    doxycycline (VIBRAMYCIN) 100  "MG Cap Take 1 capsule (100 mg total) by mouth 2 (two) times daily. 84 capsule 0    esomeprazole (NEXIUM) 20 MG capsule Take 20 mg by mouth before breakfast.      gabapentin (NEURONTIN) 300 MG capsule Take 2 capsules (600 mg total) by mouth 3 (three) times daily. 180 capsule 5    HYDROcodone-acetaminophen (NORCO) 5-325 mg per tablet Take 1 tablet by mouth every 12 (twelve) hours as needed for Pain. (Patient not taking: Reported on 10/26/2023) 10 tablet 0    insulin (LANTUS SOLOSTAR U-100 INSULIN) glargine 100 units/mL SubQ pen 17 units twice a day 9 mL 5    insulin aspart U-100 (NOVOLOG FLEXPEN U-100 INSULIN) 100 unit/mL (3 mL) InPn pen Inject 10 Units into the skin 4 (four) times daily. Per sliding scale as instructed 12 mL 5    insulin syringe-needle U-100 0.3 mL 31 gauge x 5/16" Syrg 1 Device by Misc.(Non-Drug; Combo Route) route 4 (four) times daily. 200 each 3    lancets Misc To check BGqid times daily, to use with insurance preferred meter (Patient taking differently: 1 lancet  by Misc.(Non-Drug; Combo Route) route 4 (four) times daily. To check BGqid times daily, to use with insurance preferred meter) 200 each 3    lisinopriL (PRINIVIL,ZESTRIL) 5 MG tablet Take 1 tablet (5 mg total) by mouth once daily. 90 tablet 3    pen needle, diabetic (BD ULTRA-FINE DENIS PEN NEEDLE MISC) 1 Needle by Misc.(Non-Drug; Combo Route) route 4 (four) times daily.      pravastatin (PRAVACHOL) 20 MG tablet Take 1 tablet (20 mg total) by mouth every evening. 90 tablet 3    sildenafiL (VIAGRA) 100 MG tablet Take 1 tablet (100 mg total) by mouth daily as needed for Erectile Dysfunction. 20 tablet 5     No current facility-administered medications for this visit.       Review of Systems   Constitutional:  Negative for chills, fatigue, fever and unexpected weight change.   HENT:  Negative for hearing loss and trouble swallowing.    Eyes:  Negative for photophobia and visual disturbance.   Respiratory:  Negative for cough, shortness of " breath and wheezing.    Cardiovascular:  Negative for chest pain, palpitations and leg swelling.   Gastrointestinal:  Negative for abdominal pain and nausea.   Genitourinary:  Negative for dysuria and frequency.   Musculoskeletal:  Negative for arthralgias, back pain, gait problem, joint swelling and myalgias.   Skin:  Positive for wound. Negative for rash.   Neurological:  Positive for numbness. Negative for tremors, seizures, weakness and headaches.   Hematological:  Does not bruise/bleed easily.         Objective:        Physical Exam:   Foot Exam  Physical Exam  Ortho/SPM Exam     Imaging:            Assessment:       1. Right foot ulcer, with necrosis of muscle    2. Ulcer of right foot with necrosis of muscle    3. Ulcer of right foot with necrosis of bone    4. Cellulitis and abscess of foot    5. Diabetes mellitus due to underlying condition with hyperglycemia, with long-term current use of insulin    6. Wound infection    7. Diabetic foot infection    8. Smoker    9. Uncontrolled type 2 diabetes mellitus with hyperglycemia    10. Hospital discharge follow-up    11. Type 2 diabetes mellitus with diabetic polyneuropathy, with long-term current use of insulin    12. Difficulty in walking, not elsewhere classified    13. Diabetic peripheral neuropathy    14. At high risk for inadequate nutritional intake    15. History of ulcer of lower extremity    16. History of amputation of right foot      Plan:   Right foot ulcer, with necrosis of muscle    Ulcer of right foot with necrosis of muscle    Ulcer of right foot with necrosis of bone    Cellulitis and abscess of foot    Diabetes mellitus due to underlying condition with hyperglycemia, with long-term current use of insulin    Wound infection    Diabetic foot infection    Smoker    Uncontrolled type 2 diabetes mellitus with hyperglycemia    Hospital discharge follow-up    Type 2 diabetes mellitus with diabetic polyneuropathy, with long-term current use of  insulin    Difficulty in walking, not elsewhere classified    Diabetic peripheral neuropathy    At high risk for inadequate nutritional intake    History of ulcer of lower extremity    History of amputation of right foot      Follow up in about 4 days (around 11/21/2023).    Procedures          Counseling:     I provided patient education verbally regarding:   Patient diagnosis, treatment options, as well as alternatives, risks, and benefits.     This note was created using Dragon voice recognition software that occasionally misinterpreted phrases or words.

## 2023-11-19 ENCOUNTER — PATIENT MESSAGE (OUTPATIENT)
Dept: ADMINISTRATIVE | Facility: OTHER | Age: 43
End: 2023-11-19
Payer: MEDICAID

## 2023-11-20 DIAGNOSIS — Z79.4 TYPE 2 DIABETES MELLITUS WITH DIABETIC POLYNEUROPATHY, WITH LONG-TERM CURRENT USE OF INSULIN: ICD-10-CM

## 2023-11-20 DIAGNOSIS — E11.42 TYPE 2 DIABETES MELLITUS WITH DIABETIC POLYNEUROPATHY, WITH LONG-TERM CURRENT USE OF INSULIN: ICD-10-CM

## 2023-11-20 RX ORDER — CALCIUM CITRATE/VITAMIN D3 200MG-6.25
TABLET ORAL
Qty: 150 EACH | Refills: 3 | Status: SHIPPED | OUTPATIENT
Start: 2023-11-20

## 2023-11-20 RX ORDER — INSULIN GLARGINE 100 [IU]/ML
INJECTION, SOLUTION SUBCUTANEOUS
Qty: 9 ML | Refills: 5 | Status: CANCELLED | OUTPATIENT
Start: 2023-11-20

## 2023-11-21 ENCOUNTER — OFFICE VISIT (OUTPATIENT)
Dept: WOUND CARE | Facility: HOSPITAL | Age: 43
End: 2023-11-21
Attending: PODIATRIST
Payer: MEDICAID

## 2023-11-21 VITALS
DIASTOLIC BLOOD PRESSURE: 81 MMHG | HEART RATE: 87 BPM | SYSTOLIC BLOOD PRESSURE: 161 MMHG | RESPIRATION RATE: 18 BRPM | TEMPERATURE: 98 F

## 2023-11-21 DIAGNOSIS — E11.65 UNCONTROLLED TYPE 2 DIABETES MELLITUS WITH HYPERGLYCEMIA: ICD-10-CM

## 2023-11-21 DIAGNOSIS — T14.8XXA WOUND INFECTION: ICD-10-CM

## 2023-11-21 DIAGNOSIS — R26.2 DIFFICULTY IN WALKING, NOT ELSEWHERE CLASSIFIED: ICD-10-CM

## 2023-11-21 DIAGNOSIS — L97.513 RIGHT FOOT ULCER, WITH NECROSIS OF MUSCLE: Primary | ICD-10-CM

## 2023-11-21 DIAGNOSIS — E11.42 TYPE 2 DIABETES MELLITUS WITH DIABETIC POLYNEUROPATHY, WITH LONG-TERM CURRENT USE OF INSULIN: ICD-10-CM

## 2023-11-21 DIAGNOSIS — L97.514 ULCER OF RIGHT FOOT WITH NECROSIS OF BONE: ICD-10-CM

## 2023-11-21 DIAGNOSIS — L02.619 CELLULITIS AND ABSCESS OF FOOT: ICD-10-CM

## 2023-11-21 DIAGNOSIS — L08.9 WOUND INFECTION: ICD-10-CM

## 2023-11-21 DIAGNOSIS — L97.513 ULCER OF RIGHT FOOT WITH NECROSIS OF MUSCLE: ICD-10-CM

## 2023-11-21 DIAGNOSIS — E11.42 DIABETIC PERIPHERAL NEUROPATHY: ICD-10-CM

## 2023-11-21 DIAGNOSIS — E11.628 DIABETIC FOOT INFECTION: ICD-10-CM

## 2023-11-21 DIAGNOSIS — E08.65 DIABETES MELLITUS DUE TO UNDERLYING CONDITION WITH HYPERGLYCEMIA, WITH LONG-TERM CURRENT USE OF INSULIN: ICD-10-CM

## 2023-11-21 DIAGNOSIS — Z79.4 TYPE 2 DIABETES MELLITUS WITH DIABETIC POLYNEUROPATHY, WITH LONG-TERM CURRENT USE OF INSULIN: ICD-10-CM

## 2023-11-21 DIAGNOSIS — Z91.89 AT HIGH RISK FOR INADEQUATE NUTRITIONAL INTAKE: ICD-10-CM

## 2023-11-21 DIAGNOSIS — F17.200 SMOKER: ICD-10-CM

## 2023-11-21 DIAGNOSIS — L08.9 DIABETIC FOOT INFECTION: ICD-10-CM

## 2023-11-21 DIAGNOSIS — L03.119 CELLULITIS AND ABSCESS OF FOOT: ICD-10-CM

## 2023-11-21 DIAGNOSIS — Z79.4 DIABETES MELLITUS DUE TO UNDERLYING CONDITION WITH HYPERGLYCEMIA, WITH LONG-TERM CURRENT USE OF INSULIN: ICD-10-CM

## 2023-11-21 PROCEDURE — 3077F PR MOST RECENT SYSTOLIC BLOOD PRESSURE >= 140 MM HG: ICD-10-PCS | Mod: CPTII,,, | Performed by: PODIATRIST

## 2023-11-21 PROCEDURE — 4010F PR ACE/ARB THEARPY RXD/TAKEN: ICD-10-PCS | Mod: CPTII,,, | Performed by: PODIATRIST

## 2023-11-21 PROCEDURE — 11043 PR DEBRIDEMENT, SKIN, SUB-Q TISSUE,MUSCLE,=<20 SQ CM: ICD-10-PCS | Mod: ,,, | Performed by: PODIATRIST

## 2023-11-21 PROCEDURE — 1160F PR REVIEW ALL MEDS BY PRESCRIBER/CLIN PHARMACIST DOCUMENTED: ICD-10-PCS | Mod: CPTII,,, | Performed by: PODIATRIST

## 2023-11-21 PROCEDURE — 3066F PR DOCUMENTATION OF TREATMENT FOR NEPHROPATHY: ICD-10-PCS | Mod: CPTII,,, | Performed by: PODIATRIST

## 2023-11-21 PROCEDURE — 3079F DIAST BP 80-89 MM HG: CPT | Mod: CPTII,,, | Performed by: PODIATRIST

## 2023-11-21 PROCEDURE — 1160F RVW MEDS BY RX/DR IN RCRD: CPT | Mod: CPTII,,, | Performed by: PODIATRIST

## 2023-11-21 PROCEDURE — 3077F SYST BP >= 140 MM HG: CPT | Mod: CPTII,,, | Performed by: PODIATRIST

## 2023-11-21 PROCEDURE — 4010F ACE/ARB THERAPY RXD/TAKEN: CPT | Mod: CPTII,,, | Performed by: PODIATRIST

## 2023-11-21 PROCEDURE — 1159F PR MEDICATION LIST DOCUMENTED IN MEDICAL RECORD: ICD-10-PCS | Mod: CPTII,,, | Performed by: PODIATRIST

## 2023-11-21 PROCEDURE — 11042 DBRDMT SUBQ TIS 1ST 20SQCM/<: CPT | Mod: 59,PN | Performed by: PODIATRIST

## 2023-11-21 PROCEDURE — 11042 PR DEBRIDEMENT, SKIN, SUB-Q TISSUE,=<20 SQ CM: ICD-10-PCS | Mod: 59,,, | Performed by: PODIATRIST

## 2023-11-21 PROCEDURE — 3061F PR NEG MICROALBUMINURIA RESULT DOCUMENTED/REVIEW: ICD-10-PCS | Mod: CPTII,,, | Performed by: PODIATRIST

## 2023-11-21 PROCEDURE — 1159F MED LIST DOCD IN RCRD: CPT | Mod: CPTII,,, | Performed by: PODIATRIST

## 2023-11-21 PROCEDURE — 99214 PR OFFICE/OUTPT VISIT, EST, LEVL IV, 30-39 MIN: ICD-10-PCS | Mod: 25,,, | Performed by: PODIATRIST

## 2023-11-21 PROCEDURE — 99214 OFFICE O/P EST MOD 30 MIN: CPT | Mod: 25,,, | Performed by: PODIATRIST

## 2023-11-21 PROCEDURE — 3079F PR MOST RECENT DIASTOLIC BLOOD PRESSURE 80-89 MM HG: ICD-10-PCS | Mod: CPTII,,, | Performed by: PODIATRIST

## 2023-11-21 PROCEDURE — 3066F NEPHROPATHY DOC TX: CPT | Mod: CPTII,,, | Performed by: PODIATRIST

## 2023-11-21 PROCEDURE — 11042 DBRDMT SUBQ TIS 1ST 20SQCM/<: CPT | Mod: 59,,, | Performed by: PODIATRIST

## 2023-11-21 PROCEDURE — 3046F PR MOST RECENT HEMOGLOBIN A1C LEVEL > 9.0%: ICD-10-PCS | Mod: CPTII,,, | Performed by: PODIATRIST

## 2023-11-21 PROCEDURE — 3046F HEMOGLOBIN A1C LEVEL >9.0%: CPT | Mod: CPTII,,, | Performed by: PODIATRIST

## 2023-11-21 PROCEDURE — 11043 DBRDMT MUSC&/FSCA 1ST 20/<: CPT | Mod: PN | Performed by: PODIATRIST

## 2023-11-21 PROCEDURE — 11043 DBRDMT MUSC&/FSCA 1ST 20/<: CPT | Mod: ,,, | Performed by: PODIATRIST

## 2023-11-21 PROCEDURE — 3061F NEG MICROALBUMINURIA REV: CPT | Mod: CPTII,,, | Performed by: PODIATRIST

## 2023-11-21 NOTE — PROGRESS NOTES
[address] [facility] [address]  [place] [address]  Phone: [phone]   [phone]    Patient's PCP:Alfie Lancaster MD  Referring Provider: Aaareferral Self    Subjective:      Chief Complaint:: Wound Care, Pressure Ulcer, Diabetic Foot Ulcer, Wound Consult, Diabetes, Wound Check, Non-healing Wound, Numbness, Peripheral Neuropathy, Foot Ulcer, Difficulty Walking, and Wound Infection    HPI  Philip Alberts is a 43 y.o. male who presents todayfor follow up of right medial 4th toe diabetic foot ulcer,  right 3rd digit amputation site diabetic foot ulcer, and right dorsal foot blister.  See wound docs documentation for full assessment and evaluation of all wounds.      EVALUATION, ASSESSMENT, & PLAN:      Debridement of right 3rd toe amputation site diabetic foot ulcer and Debridement of right medial 4th toe diabetic foot ulcer. Evaluation and Assessment only of right dorsal foot blister.    See Wound Docs for assessment of wounds and procedure notes    2. Continue taking all medications as prescribed  3. Dressing changes, see Wound docs for dressing change orders  4. RTC one week   5. Counseled patient on increasing protein intake, not getting wound wet, keeping dressing clean dry and intact, following a healthy diet, elevating legs when able, removing pressure from wound        NPWT is medically necessary for this patient at this time to acheive acceleration of granulation tissue and wound closure. It is my clinical judgement that this cannot be acheived using topical dressing or medications.            Counseling/Education:  I provided patient education verbally regarding:   The aspects of diabetes and how it pertains to the feet. I explained the importance of proper diabetic foot care and how it is essential for the health of their feet.     I discussed the importance of knowing their Hemoglobin A1c and that the level needs to be as close to 6 as possible. I discussed the increase complications of high  blood sugar including stroke, blindness, heart attack, kidney failure and loss of limb secondary to neuropathy and PVD.      With neuropathy, beware of any breaks in the skin or redness. These areas are not recognized early due to the numbness.     I discussed Diabetes, lower back issues, metabolic disorders, systemic causes, chemotherapy, vitamin deficiency, heavy metal exposure, as some of the causes. I also explained that as much as 40% of the time we can not find a cause. I discussed different treatments available to control the symptoms but which may not cure the problem.         Counseled patient on the aspects of diabetes and how it pertains to the feet.  I explained the importance of proper diabetic foot care and how it is essential for the health of their feet.        Shoe inspection. Patient instructed on proper foot hygeine. We discussed wearing proper shoe gear, daily foot inspections, never walking without protective shoe gear, never putting sharp instruments to feet, routine podiatric nail visits every 2-3 months.          Proper ulcer care and the possible need for serial debridements, topical medications, specific dressings and biological engineered skin substitutes if indicated.     Patient should call the office immediately if any signs of infection, such as fever, chills, sweats, increased redness or pain.     Patient was instructed to call the clinic or go to the emergency department if their symptoms do not improve, worsens, or if new symptoms develop.  Patient was advised that if any increased swelling, pain, or numbness arise to go immediately to the ED. Patient knows to call any time if an emergency arises. Shared decision making occurred and patient verbalized understanding in agreement with this plan.         >50% of this > 60 minute visit was spent face to face educating/counseling the patient     I spent a total of 60 minutes on the day of the visit.This includes face to face time and  non-face to face time preparing to see the patient (eg, review of tests), obtaining and/or reviewing separately obtained history, documenting clinical information in the electronic or other health record, independently interpreting results and communicating results to the patient/family/caregiver, or care coordinator.        Much of the documentation for this visit was completed in the Wound Docs system.  Please see the attached documentation for further details about the patient's care. Scanned under the Media tab.         Anat JORDAN Santiago         Vitals:    11/21/23 1027   BP: (!) 161/81   Pulse: 87   Resp: 18   Temp: 98.2 °F (36.8 °C)   TempSrc: Temporal   PainSc: 0-No pain   PainLoc: Foot      Shoe Size:     Past Surgical History:   Procedure Laterality Date    MANDIBLE FRACTURE SURGERY  07/17/2000    MANDIBLE FRACTURE SURGERY      SPLENECTOMY, TOTAL  07/17/2000    TOE AMPUTATION Right 10/9/2023    Procedure: AMPUTATION, TOE;  Surgeon: Ashok Santiago DPM;  Location: [facility];  Service: Podiatry;  Laterality: Right;     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  Tobacco History:  reports that he has been smoking. He has never used smokeless tobacco.  Drug History:  reports no history of drug use.    Review of patient's allergies indicates:   Allergen Reactions    Pcn [penicillins] Anaphylaxis     Tolerates cephalexin       Current Outpatient Medications   Medication Sig  "Dispense Refill    blood sugar diagnostic (TRUE METRIX GLUCOSE TEST STRIP) Strp USE  STRIP TO CHECK BLOOD GLUCOSE  4 TIMES DAILY 150 each 3    blood-glucose meter kit To check BG qid times daily, to use with insurance preferred meter 1 each 0    doxycycline (VIBRAMYCIN) 100 MG Cap Take 1 capsule (100 mg total) by mouth 2 (two) times daily. 84 capsule 0    esomeprazole (NEXIUM) 20 MG capsule Take 20 mg by mouth before breakfast.      gabapentin (NEURONTIN) 300 MG capsule Take 2 capsules (600 mg total) by mouth 3 (three) times daily. 180 capsule 5    HYDROcodone-acetaminophen (NORCO) 5-325 mg per tablet Take 1 tablet by mouth every 12 (twelve) hours as needed for Pain. (Patient not taking: Reported on 10/26/2023) 10 tablet 0    insulin (LANTUS SOLOSTAR U-100 INSULIN) glargine 100 units/mL SubQ pen 17 units twice a day 9 mL 5    insulin aspart U-100 (NOVOLOG FLEXPEN U-100 INSULIN) 100 unit/mL (3 mL) InPn pen Inject 10 Units into the skin 4 (four) times daily. Per sliding scale as instructed 12 mL 5    insulin syringe-needle U-100 0.3 mL 31 gauge x 5/16" Syrg 1 Device by Misc.(Non-Drug; Combo Route) route 4 (four) times daily. 200 each 3    lancets Misc To check BGqid times daily, to use with insurance preferred meter (Patient taking differently: 1 lancet  by Misc.(Non-Drug; Combo Route) route 4 (four) times daily. To check BGqid times daily, to use with insurance preferred meter) 200 each 3    lisinopriL (PRINIVIL,ZESTRIL) 5 MG tablet Take 1 tablet (5 mg total) by mouth once daily. 90 tablet 3    pen needle, diabetic (BD ULTRA-FINE DENIS PEN NEEDLE MISC) 1 Needle by Misc.(Non-Drug; Combo Route) route 4 (four) times daily.      pravastatin (PRAVACHOL) 20 MG tablet Take 1 tablet (20 mg total) by mouth every evening. 90 tablet 3    sildenafiL (VIAGRA) 100 MG tablet Take 1 tablet (100 mg total) by mouth daily as needed for Erectile Dysfunction. 20 tablet 5     No current facility-administered medications for this visit. "       Review of Systems   Constitutional:  Negative for chills, fatigue, fever and unexpected weight change.   HENT:  Negative for hearing loss and trouble swallowing.    Eyes:  Negative for photophobia and visual disturbance.   Respiratory:  Negative for cough, shortness of breath and wheezing.    Cardiovascular:  Negative for chest pain, palpitations and leg swelling.   Gastrointestinal:  Negative for abdominal pain and nausea.   Genitourinary:  Negative for dysuria and frequency.   Musculoskeletal:  Negative for arthralgias, back pain, gait problem, joint swelling and myalgias.   Skin:  Positive for wound. Negative for rash.   Neurological:  Positive for numbness. Negative for tremors, seizures, weakness and headaches.   Hematological:  Does not bruise/bleed easily.         Objective:        Physical Exam:   Foot Exam  Physical Exam  Ortho/SPM Exam     Imaging:            Assessment:       1. Right foot ulcer, with necrosis of muscle    2. Ulcer of right foot with necrosis of muscle    3. Ulcer of right foot with necrosis of bone    4. Wound infection    5. Diabetes mellitus due to underlying condition with hyperglycemia, with long-term current use of insulin    6. Cellulitis and abscess of foot    7. Diabetic foot infection    8. Smoker    9. Uncontrolled type 2 diabetes mellitus with hyperglycemia    10. Type 2 diabetes mellitus with diabetic polyneuropathy, with long-term current use of insulin    11. Difficulty in walking, not elsewhere classified    12. At high risk for inadequate nutritional intake    13. Diabetic peripheral neuropathy      Plan:   Right foot ulcer, with necrosis of muscle    Ulcer of right foot with necrosis of muscle    Ulcer of right foot with necrosis of bone    Wound infection    Diabetes mellitus due to underlying condition with hyperglycemia, with long-term current use of insulin    Cellulitis and abscess of foot    Diabetic foot infection    Smoker    Uncontrolled type 2 diabetes  mellitus with hyperglycemia    Type 2 diabetes mellitus with diabetic polyneuropathy, with long-term current use of insulin    Difficulty in walking, not elsewhere classified    At high risk for inadequate nutritional intake    Diabetic peripheral neuropathy      Follow up in about 1 week (around 11/28/2023).    Procedures          Counseling:     I provided patient education verbally regarding:   Patient diagnosis, treatment options, as well as alternatives, risks, and benefits.     This note was created using Dragon voice recognition software that occasionally misinterpreted phrases or words.

## 2023-11-22 DIAGNOSIS — E11.42 TYPE 2 DIABETES MELLITUS WITH DIABETIC POLYNEUROPATHY, WITH LONG-TERM CURRENT USE OF INSULIN: ICD-10-CM

## 2023-11-22 DIAGNOSIS — Z79.4 TYPE 2 DIABETES MELLITUS WITH DIABETIC POLYNEUROPATHY, WITH LONG-TERM CURRENT USE OF INSULIN: ICD-10-CM

## 2023-11-23 LAB

## 2023-11-23 RX ORDER — INSULIN GLARGINE 100 [IU]/ML
INJECTION, SOLUTION SUBCUTANEOUS
Qty: 15 ML | Refills: 0 | Status: SHIPPED | OUTPATIENT
Start: 2023-11-23 | End: 2024-01-22

## 2023-11-28 ENCOUNTER — OFFICE VISIT (OUTPATIENT)
Dept: WOUND CARE | Facility: HOSPITAL | Age: 43
End: 2023-11-28
Attending: PODIATRIST
Payer: MEDICAID

## 2023-11-28 VITALS
HEART RATE: 94 BPM | TEMPERATURE: 99 F | RESPIRATION RATE: 20 BRPM | SYSTOLIC BLOOD PRESSURE: 161 MMHG | DIASTOLIC BLOOD PRESSURE: 84 MMHG

## 2023-11-28 DIAGNOSIS — L08.9 DIABETIC FOOT INFECTION: ICD-10-CM

## 2023-11-28 DIAGNOSIS — Z87.2 HISTORY OF ULCER OF LOWER EXTREMITY: ICD-10-CM

## 2023-11-28 DIAGNOSIS — Z79.4 DIABETES MELLITUS DUE TO UNDERLYING CONDITION WITH HYPERGLYCEMIA, WITH LONG-TERM CURRENT USE OF INSULIN: ICD-10-CM

## 2023-11-28 DIAGNOSIS — L97.513 ULCER OF RIGHT FOOT WITH NECROSIS OF MUSCLE: ICD-10-CM

## 2023-11-28 DIAGNOSIS — T14.8XXA WOUND INFECTION: ICD-10-CM

## 2023-11-28 DIAGNOSIS — R26.2 DIFFICULTY IN WALKING, NOT ELSEWHERE CLASSIFIED: ICD-10-CM

## 2023-11-28 DIAGNOSIS — L97.513 RIGHT FOOT ULCER, WITH NECROSIS OF MUSCLE: Primary | ICD-10-CM

## 2023-11-28 DIAGNOSIS — F17.200 SMOKER: ICD-10-CM

## 2023-11-28 DIAGNOSIS — Z79.4 TYPE 2 DIABETES MELLITUS WITH DIABETIC POLYNEUROPATHY, WITH LONG-TERM CURRENT USE OF INSULIN: ICD-10-CM

## 2023-11-28 DIAGNOSIS — E08.65 DIABETES MELLITUS DUE TO UNDERLYING CONDITION WITH HYPERGLYCEMIA, WITH LONG-TERM CURRENT USE OF INSULIN: ICD-10-CM

## 2023-11-28 DIAGNOSIS — Z89.431 HISTORY OF AMPUTATION OF RIGHT FOOT: ICD-10-CM

## 2023-11-28 DIAGNOSIS — E11.628 DIABETIC FOOT INFECTION: ICD-10-CM

## 2023-11-28 DIAGNOSIS — L08.9 WOUND INFECTION: ICD-10-CM

## 2023-11-28 DIAGNOSIS — E11.42 DIABETIC PERIPHERAL NEUROPATHY: ICD-10-CM

## 2023-11-28 DIAGNOSIS — L03.119 CELLULITIS AND ABSCESS OF FOOT: ICD-10-CM

## 2023-11-28 DIAGNOSIS — L02.619 CELLULITIS AND ABSCESS OF FOOT: ICD-10-CM

## 2023-11-28 DIAGNOSIS — E11.65 UNCONTROLLED TYPE 2 DIABETES MELLITUS WITH HYPERGLYCEMIA: ICD-10-CM

## 2023-11-28 DIAGNOSIS — Z91.89 AT HIGH RISK FOR INADEQUATE NUTRITIONAL INTAKE: ICD-10-CM

## 2023-11-28 DIAGNOSIS — E11.42 TYPE 2 DIABETES MELLITUS WITH DIABETIC POLYNEUROPATHY, WITH LONG-TERM CURRENT USE OF INSULIN: ICD-10-CM

## 2023-11-28 DIAGNOSIS — L97.514 ULCER OF RIGHT FOOT WITH NECROSIS OF BONE: ICD-10-CM

## 2023-11-28 PROCEDURE — 1160F RVW MEDS BY RX/DR IN RCRD: CPT | Mod: CPTII,,, | Performed by: PODIATRIST

## 2023-11-28 PROCEDURE — 11043 PR DEBRIDEMENT, SKIN, SUB-Q TISSUE,MUSCLE,=<20 SQ CM: ICD-10-PCS | Mod: ,,, | Performed by: PODIATRIST

## 2023-11-28 PROCEDURE — 3079F PR MOST RECENT DIASTOLIC BLOOD PRESSURE 80-89 MM HG: ICD-10-PCS | Mod: CPTII,,, | Performed by: PODIATRIST

## 2023-11-28 PROCEDURE — 3046F HEMOGLOBIN A1C LEVEL >9.0%: CPT | Mod: CPTII,,, | Performed by: PODIATRIST

## 2023-11-28 PROCEDURE — 3077F PR MOST RECENT SYSTOLIC BLOOD PRESSURE >= 140 MM HG: ICD-10-PCS | Mod: CPTII,,, | Performed by: PODIATRIST

## 2023-11-28 PROCEDURE — 11043 DBRDMT MUSC&/FSCA 1ST 20/<: CPT | Mod: PN | Performed by: PODIATRIST

## 2023-11-28 PROCEDURE — 3061F PR NEG MICROALBUMINURIA RESULT DOCUMENTED/REVIEW: ICD-10-PCS | Mod: CPTII,,, | Performed by: PODIATRIST

## 2023-11-28 PROCEDURE — 1159F MED LIST DOCD IN RCRD: CPT | Mod: CPTII,,, | Performed by: PODIATRIST

## 2023-11-28 PROCEDURE — 4010F ACE/ARB THERAPY RXD/TAKEN: CPT | Mod: CPTII,,, | Performed by: PODIATRIST

## 2023-11-28 PROCEDURE — 4010F PR ACE/ARB THEARPY RXD/TAKEN: ICD-10-PCS | Mod: CPTII,,, | Performed by: PODIATRIST

## 2023-11-28 PROCEDURE — 1160F PR REVIEW ALL MEDS BY PRESCRIBER/CLIN PHARMACIST DOCUMENTED: ICD-10-PCS | Mod: CPTII,,, | Performed by: PODIATRIST

## 2023-11-28 PROCEDURE — 3061F NEG MICROALBUMINURIA REV: CPT | Mod: CPTII,,, | Performed by: PODIATRIST

## 2023-11-28 PROCEDURE — 3077F SYST BP >= 140 MM HG: CPT | Mod: CPTII,,, | Performed by: PODIATRIST

## 2023-11-28 PROCEDURE — 11042 DBRDMT SUBQ TIS 1ST 20SQCM/<: CPT | Mod: 59,PN | Performed by: PODIATRIST

## 2023-11-28 PROCEDURE — 3079F DIAST BP 80-89 MM HG: CPT | Mod: CPTII,,, | Performed by: PODIATRIST

## 2023-11-28 PROCEDURE — 11043 DBRDMT MUSC&/FSCA 1ST 20/<: CPT | Mod: ,,, | Performed by: PODIATRIST

## 2023-11-28 PROCEDURE — 11042 DBRDMT SUBQ TIS 1ST 20SQCM/<: CPT | Mod: 59,,, | Performed by: PODIATRIST

## 2023-11-28 PROCEDURE — 1159F PR MEDICATION LIST DOCUMENTED IN MEDICAL RECORD: ICD-10-PCS | Mod: CPTII,,, | Performed by: PODIATRIST

## 2023-11-28 PROCEDURE — 3066F NEPHROPATHY DOC TX: CPT | Mod: CPTII,,, | Performed by: PODIATRIST

## 2023-11-28 PROCEDURE — 11042 PR DEBRIDEMENT, SKIN, SUB-Q TISSUE,=<20 SQ CM: ICD-10-PCS | Mod: 59,,, | Performed by: PODIATRIST

## 2023-11-28 PROCEDURE — 3046F PR MOST RECENT HEMOGLOBIN A1C LEVEL > 9.0%: ICD-10-PCS | Mod: CPTII,,, | Performed by: PODIATRIST

## 2023-11-28 PROCEDURE — 3066F PR DOCUMENTATION OF TREATMENT FOR NEPHROPATHY: ICD-10-PCS | Mod: CPTII,,, | Performed by: PODIATRIST

## 2023-11-28 PROCEDURE — 99214 PR OFFICE/OUTPT VISIT, EST, LEVL IV, 30-39 MIN: ICD-10-PCS | Mod: 25,,, | Performed by: PODIATRIST

## 2023-11-28 PROCEDURE — 99214 OFFICE O/P EST MOD 30 MIN: CPT | Mod: 25,,, | Performed by: PODIATRIST

## 2023-11-29 NOTE — PROGRESS NOTES
[address] [facility] [address]  [place] [address]  Phone: [phone]   [phone]    Patient's PCP:Alfie Lancaster MD  Referring Provider: Aaareferral Self    Subjective:      Chief Complaint:: Wound Care, Diabetes, Diabetic Foot Ulcer, Wound Check, Wound Infection, Non-healing Wound, Pressure Ulcer, Numbness, Peripheral Neuropathy, Foot Ulcer, Difficulty Walking, and Blister    HPI  Philip Alberts is a 43 y.o. male who presents todayfor follow up of right medial 4th toe diabetic foot ulcer,  right 3rd digit amputation site diabetic foot ulcer, and right dorsal foot blister.  See wound docs documentation for full assessment and evaluation of all wounds.      EVALUATION, ASSESSMENT, & PLAN:      Debridement of right 3rd toe amputation site diabetic foot ulcer and Debridement of right medial 4th toe diabetic foot ulcer. Evaluation and Assessment only of right dorsal foot blister.    See Wound Docs for assessment of wounds and procedure notes    2. Continue taking all medications as prescribed  3. Dressing changes, see Wound docs for dressing change orders  4. RTC one week   5. Counseled patient on increasing protein intake, not getting wound wet, keeping dressing clean dry and intact, following a healthy diet, elevating legs when able, removing pressure from wound             Counseling/Education:  I provided patient education verbally regarding:   The aspects of diabetes and how it pertains to the feet. I explained the importance of proper diabetic foot care and how it is essential for the health of their feet.     I discussed the importance of knowing their Hemoglobin A1c and that the level needs to be as close to 6 as possible. I discussed the increase complications of high blood sugar including stroke, blindness, heart attack, kidney failure and loss of limb secondary to neuropathy and PVD.      With neuropathy, beware of any breaks in the skin or redness. These areas are not recognized early due to the  numbness.     I discussed Diabetes, lower back issues, metabolic disorders, systemic causes, chemotherapy, vitamin deficiency, heavy metal exposure, as some of the causes. I also explained that as much as 40% of the time we can not find a cause. I discussed different treatments available to control the symptoms but which may not cure the problem.         Counseled patient on the aspects of diabetes and how it pertains to the feet.  I explained the importance of proper diabetic foot care and how it is essential for the health of their feet.        Shoe inspection. Patient instructed on proper foot hygeine. We discussed wearing proper shoe gear, daily foot inspections, never walking without protective shoe gear, never putting sharp instruments to feet, routine podiatric nail visits every 2-3 months.          Proper ulcer care and the possible need for serial debridements, topical medications, specific dressings and biological engineered skin substitutes if indicated.     Patient should call the office immediately if any signs of infection, such as fever, chills, sweats, increased redness or pain.     Patient was instructed to call the clinic or go to the emergency department if their symptoms do not improve, worsens, or if new symptoms develop.  Patient was advised that if any increased swelling, pain, or numbness arise to go immediately to the ED. Patient knows to call any time if an emergency arises. Shared decision making occurred and patient verbalized understanding in agreement with this plan.         >50% of this > 60 minute visit was spent face to face educating/counseling the patient     I spent a total of 60 minutes on the day of the visit.This includes face to face time and non-face to face time preparing to see the patient (eg, review of tests), obtaining and/or reviewing separately obtained history, documenting clinical information in the electronic or other health record, independently interpreting  results and communicating results to the patient/family/caregiver, or care coordinator.        Much of the documentation for this visit was completed in the Wound Docs system.  Please see the attached documentation for further details about the patient's care. Scanned under the Media tab.         Anat JORDAN Santiago      Vitals:    11/28/23 0844   BP: (!) 161/84   Pulse: 94   Resp: 20   Temp: 98.6 °F (37 °C)   PainSc: 0-No pain      Shoe Size:     Past Surgical History:   Procedure Laterality Date    MANDIBLE FRACTURE SURGERY  07/17/2000    MANDIBLE FRACTURE SURGERY      SPLENECTOMY, TOTAL  07/17/2000    TOE AMPUTATION Right 10/9/2023    Procedure: AMPUTATION, TOE;  Surgeon: Ashok Santiago DPM;  Location: [facility];  Service: Podiatry;  Laterality: Right;     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  Tobacco History:  reports that he has been smoking. He has never used smokeless tobacco.  Drug History:  reports no history of drug use.    Review of patient's allergies indicates:   Allergen Reactions    Pcn [penicillins] Anaphylaxis     Tolerates cephalexin       Current Outpatient Medications   Medication Sig Dispense Refill    blood sugar diagnostic (TRUE METRIX GLUCOSE TEST STRIP) Strp USE  STRIP TO CHECK BLOOD GLUCOSE  4 TIMES DAILY 150 each 3    blood-glucose meter kit To check BG qid times daily, to use with insurance preferred meter 1 each 0    esomeprazole (NEXIUM) 20 MG  "capsule Take 20 mg by mouth before breakfast.      gabapentin (NEURONTIN) 300 MG capsule Take 2 capsules (600 mg total) by mouth 3 (three) times daily. 180 capsule 5    HYDROcodone-acetaminophen (NORCO) 5-325 mg per tablet Take 1 tablet by mouth every 12 (twelve) hours as needed for Pain. (Patient not taking: Reported on 10/26/2023) 10 tablet 0    insulin aspart U-100 (NOVOLOG FLEXPEN U-100 INSULIN) 100 unit/mL (3 mL) InPn pen Inject 10 Units into the skin 4 (four) times daily. Per sliding scale as instructed 12 mL 5    insulin syringe-needle U-100 0.3 mL 31 gauge x 5/16" Syrg 1 Device by Misc.(Non-Drug; Combo Route) route 4 (four) times daily. 200 each 3    lancets Misc To check BGqid times daily, to use with insurance preferred meter (Patient taking differently: 1 lancet  by Misc.(Non-Drug; Combo Route) route 4 (four) times daily. To check BGqid times daily, to use with insurance preferred meter) 200 each 3    LANTUS SOLOSTAR U-100 INSULIN glargine 100 units/mL SubQ pen INJECT 30 UNITS SUBCUTANEOUSLY EVERY DAY AT BEDTIME 15 mL 0    lisinopriL (PRINIVIL,ZESTRIL) 5 MG tablet Take 1 tablet (5 mg total) by mouth once daily. 90 tablet 3    pen needle, diabetic (BD ULTRA-FINE DENIS PEN NEEDLE MISC) 1 Needle by Misc.(Non-Drug; Combo Route) route 4 (four) times daily.      pravastatin (PRAVACHOL) 20 MG tablet Take 1 tablet (20 mg total) by mouth every evening. 90 tablet 3    sildenafiL (VIAGRA) 100 MG tablet Take 1 tablet (100 mg total) by mouth daily as needed for Erectile Dysfunction. 20 tablet 5     No current facility-administered medications for this visit.       Review of Systems   Constitutional:  Negative for chills, fatigue, fever and unexpected weight change.   HENT:  Negative for hearing loss and trouble swallowing.    Eyes:  Negative for photophobia and visual disturbance.   Respiratory:  Negative for cough, shortness of breath and wheezing.    Cardiovascular:  Negative for chest pain, palpitations and leg " swelling.   Gastrointestinal:  Negative for abdominal pain and nausea.   Genitourinary:  Negative for dysuria and frequency.   Musculoskeletal:  Negative for arthralgias, back pain, gait problem, joint swelling and myalgias.   Skin:  Positive for wound. Negative for rash.   Neurological:  Positive for numbness. Negative for tremors, seizures, weakness and headaches.   Hematological:  Does not bruise/bleed easily.         Objective:        Physical Exam:   Foot Exam  Physical Exam  Ortho/SPM Exam     Imaging:            Assessment:       1. Right foot ulcer, with necrosis of muscle    2. Diabetes mellitus due to underlying condition with hyperglycemia, with long-term current use of insulin    3. Ulcer of right foot with necrosis of muscle    4. Ulcer of right foot with necrosis of bone    5. Wound infection    6. Smoker    7. Diabetic foot infection    8. At high risk for inadequate nutritional intake    9. History of ulcer of lower extremity    10. Difficulty in walking, not elsewhere classified    11. Cellulitis and abscess of foot    12. Type 2 diabetes mellitus with diabetic polyneuropathy, with long-term current use of insulin    13. Uncontrolled type 2 diabetes mellitus with hyperglycemia    14. Diabetic peripheral neuropathy    15. History of amputation of right foot      Plan:   Right foot ulcer, with necrosis of muscle    Diabetes mellitus due to underlying condition with hyperglycemia, with long-term current use of insulin    Ulcer of right foot with necrosis of muscle    Ulcer of right foot with necrosis of bone    Wound infection    Smoker    Diabetic foot infection    At high risk for inadequate nutritional intake    History of ulcer of lower extremity    Difficulty in walking, not elsewhere classified    Cellulitis and abscess of foot    Type 2 diabetes mellitus with diabetic polyneuropathy, with long-term current use of insulin    Uncontrolled type 2 diabetes mellitus with hyperglycemia    Diabetic  peripheral neuropathy    History of amputation of right foot      Follow up in about 1 week (around 12/5/2023).    Procedures          Counseling:     I provided patient education verbally regarding:   Patient diagnosis, treatment options, as well as alternatives, risks, and benefits.     This note was created using Dragon voice recognition software that occasionally misinterpreted phrases or words.

## 2023-12-05 ENCOUNTER — OFFICE VISIT (OUTPATIENT)
Dept: WOUND CARE | Facility: HOSPITAL | Age: 43
End: 2023-12-05
Attending: PODIATRIST
Payer: MEDICAID

## 2023-12-05 VITALS
DIASTOLIC BLOOD PRESSURE: 86 MMHG | RESPIRATION RATE: 19 BRPM | SYSTOLIC BLOOD PRESSURE: 169 MMHG | TEMPERATURE: 99 F | HEART RATE: 90 BPM

## 2023-12-05 DIAGNOSIS — L97.514 ULCER OF RIGHT FOOT WITH NECROSIS OF BONE: ICD-10-CM

## 2023-12-05 DIAGNOSIS — Z79.4 TYPE 2 DIABETES MELLITUS WITH DIABETIC POLYNEUROPATHY, WITH LONG-TERM CURRENT USE OF INSULIN: ICD-10-CM

## 2023-12-05 DIAGNOSIS — R26.2 DIFFICULTY IN WALKING, NOT ELSEWHERE CLASSIFIED: ICD-10-CM

## 2023-12-05 DIAGNOSIS — E11.42 DIABETIC PERIPHERAL NEUROPATHY: ICD-10-CM

## 2023-12-05 DIAGNOSIS — F17.200 SMOKER: ICD-10-CM

## 2023-12-05 DIAGNOSIS — E08.65 DIABETES MELLITUS DUE TO UNDERLYING CONDITION WITH HYPERGLYCEMIA, WITH LONG-TERM CURRENT USE OF INSULIN: ICD-10-CM

## 2023-12-05 DIAGNOSIS — Z79.4 DIABETES MELLITUS DUE TO UNDERLYING CONDITION WITH HYPERGLYCEMIA, WITH LONG-TERM CURRENT USE OF INSULIN: ICD-10-CM

## 2023-12-05 DIAGNOSIS — L03.119 CELLULITIS AND ABSCESS OF FOOT: ICD-10-CM

## 2023-12-05 DIAGNOSIS — L97.513 ULCER OF RIGHT FOOT WITH NECROSIS OF MUSCLE: ICD-10-CM

## 2023-12-05 DIAGNOSIS — Z91.89 AT HIGH RISK FOR INADEQUATE NUTRITIONAL INTAKE: ICD-10-CM

## 2023-12-05 DIAGNOSIS — Z87.2 HISTORY OF ULCER OF LOWER EXTREMITY: ICD-10-CM

## 2023-12-05 DIAGNOSIS — E11.628 DIABETIC FOOT INFECTION: ICD-10-CM

## 2023-12-05 DIAGNOSIS — L97.513 RIGHT FOOT ULCER, WITH NECROSIS OF MUSCLE: Primary | ICD-10-CM

## 2023-12-05 DIAGNOSIS — T14.8XXA WOUND INFECTION: ICD-10-CM

## 2023-12-05 DIAGNOSIS — Z89.431 HISTORY OF AMPUTATION OF RIGHT FOOT: ICD-10-CM

## 2023-12-05 DIAGNOSIS — E11.65 UNCONTROLLED TYPE 2 DIABETES MELLITUS WITH HYPERGLYCEMIA: ICD-10-CM

## 2023-12-05 DIAGNOSIS — L08.9 WOUND INFECTION: ICD-10-CM

## 2023-12-05 DIAGNOSIS — L02.619 CELLULITIS AND ABSCESS OF FOOT: ICD-10-CM

## 2023-12-05 DIAGNOSIS — Z09 HOSPITAL DISCHARGE FOLLOW-UP: ICD-10-CM

## 2023-12-05 DIAGNOSIS — E11.42 TYPE 2 DIABETES MELLITUS WITH DIABETIC POLYNEUROPATHY, WITH LONG-TERM CURRENT USE OF INSULIN: ICD-10-CM

## 2023-12-05 DIAGNOSIS — L08.9 DIABETIC FOOT INFECTION: ICD-10-CM

## 2023-12-05 PROCEDURE — 4010F ACE/ARB THERAPY RXD/TAKEN: CPT | Mod: CPTII,,, | Performed by: PODIATRIST

## 2023-12-05 PROCEDURE — 4010F PR ACE/ARB THEARPY RXD/TAKEN: ICD-10-PCS | Mod: CPTII,,, | Performed by: PODIATRIST

## 2023-12-05 PROCEDURE — 11042 DBRDMT SUBQ TIS 1ST 20SQCM/<: CPT | Mod: ,,, | Performed by: PODIATRIST

## 2023-12-05 PROCEDURE — 11042 DBRDMT SUBQ TIS 1ST 20SQCM/<: CPT | Mod: PN | Performed by: PODIATRIST

## 2023-12-05 PROCEDURE — 99214 OFFICE O/P EST MOD 30 MIN: CPT | Mod: 25,,, | Performed by: PODIATRIST

## 2023-12-05 PROCEDURE — 3077F PR MOST RECENT SYSTOLIC BLOOD PRESSURE >= 140 MM HG: ICD-10-PCS | Mod: CPTII,,, | Performed by: PODIATRIST

## 2023-12-05 PROCEDURE — 3079F PR MOST RECENT DIASTOLIC BLOOD PRESSURE 80-89 MM HG: ICD-10-PCS | Mod: CPTII,,, | Performed by: PODIATRIST

## 2023-12-05 PROCEDURE — 99214 PR OFFICE/OUTPT VISIT, EST, LEVL IV, 30-39 MIN: ICD-10-PCS | Mod: 25,,, | Performed by: PODIATRIST

## 2023-12-05 PROCEDURE — 3079F DIAST BP 80-89 MM HG: CPT | Mod: CPTII,,, | Performed by: PODIATRIST

## 2023-12-05 PROCEDURE — 11042 PR DEBRIDEMENT, SKIN, SUB-Q TISSUE,=<20 SQ CM: ICD-10-PCS | Mod: ,,, | Performed by: PODIATRIST

## 2023-12-05 PROCEDURE — 1159F MED LIST DOCD IN RCRD: CPT | Mod: CPTII,,, | Performed by: PODIATRIST

## 2023-12-05 PROCEDURE — 3077F SYST BP >= 140 MM HG: CPT | Mod: CPTII,,, | Performed by: PODIATRIST

## 2023-12-05 PROCEDURE — 1160F PR REVIEW ALL MEDS BY PRESCRIBER/CLIN PHARMACIST DOCUMENTED: ICD-10-PCS | Mod: CPTII,,, | Performed by: PODIATRIST

## 2023-12-05 PROCEDURE — 1160F RVW MEDS BY RX/DR IN RCRD: CPT | Mod: CPTII,,, | Performed by: PODIATRIST

## 2023-12-05 PROCEDURE — 3061F NEG MICROALBUMINURIA REV: CPT | Mod: CPTII,,, | Performed by: PODIATRIST

## 2023-12-05 PROCEDURE — 3061F PR NEG MICROALBUMINURIA RESULT DOCUMENTED/REVIEW: ICD-10-PCS | Mod: CPTII,,, | Performed by: PODIATRIST

## 2023-12-05 PROCEDURE — 3066F NEPHROPATHY DOC TX: CPT | Mod: CPTII,,, | Performed by: PODIATRIST

## 2023-12-05 PROCEDURE — 3046F HEMOGLOBIN A1C LEVEL >9.0%: CPT | Mod: CPTII,,, | Performed by: PODIATRIST

## 2023-12-05 PROCEDURE — 3066F PR DOCUMENTATION OF TREATMENT FOR NEPHROPATHY: ICD-10-PCS | Mod: CPTII,,, | Performed by: PODIATRIST

## 2023-12-05 PROCEDURE — 3046F PR MOST RECENT HEMOGLOBIN A1C LEVEL > 9.0%: ICD-10-PCS | Mod: CPTII,,, | Performed by: PODIATRIST

## 2023-12-05 PROCEDURE — 1159F PR MEDICATION LIST DOCUMENTED IN MEDICAL RECORD: ICD-10-PCS | Mod: CPTII,,, | Performed by: PODIATRIST

## 2023-12-05 NOTE — PROGRESS NOTES
[address] [facility] [address]  [place] [address]  Phone: [phone]   [phone]    Patient's PCP:Alfie Lancaster MD  Referring Provider: Aaareferral Self    Subjective:      Chief Complaint:: Wound Care, Non-healing Wound Follow Up, Diabetic Foot Ulcer (right medial 4th toe diabetic foot ulcer,  right 3rd digit amputation site diabetic foot ulcer, and right dorsal foot blister), Diabetes, Wound Check, Foot Ulcer, Numbness, Peripheral Neuropathy, Non-healing Wound, Pressure Ulcer, and Difficulty Walking    HPI  Philip Alberts is a 43 y.o. male who presents today for follow up of right medial 4th toe diabetic foot ulcer,  right 3rd digit amputation site diabetic foot ulcer, and right dorsal foot blister.  See wound docs documentation for full assessment and evaluation of all wounds.      EVALUATION, ASSESSMENT, & PLAN:      Debridement of right medial 4th toe diabetic foot ulcer. Evaluation and Assessment only of right 3rd toe amputation site diabetic foot ulcer and right dorsal foot blister.    See Wound Docs for assessment of wounds and procedure notes    2. Continue taking all medications as prescribed  3. Dressing changes, see Wound docs for dressing change orders  4. RTC one week   5. Counseled patient on increasing protein intake, not getting wound wet, keeping dressing clean dry and intact, following a healthy diet, elevating legs when able, removing pressure from wound              Counseling/Education:  I provided patient education verbally regarding:   The aspects of diabetes and how it pertains to the feet. I explained the importance of proper diabetic foot care and how it is essential for the health of their feet.     I discussed the importance of knowing their Hemoglobin A1c and that the level needs to be as close to 6 as possible. I discussed the increase complications of high blood sugar including stroke, blindness, heart attack, kidney failure and loss of limb secondary to neuropathy and  PVD.      With neuropathy, beware of any breaks in the skin or redness. These areas are not recognized early due to the numbness.     I discussed Diabetes, lower back issues, metabolic disorders, systemic causes, chemotherapy, vitamin deficiency, heavy metal exposure, as some of the causes. I also explained that as much as 40% of the time we can not find a cause. I discussed different treatments available to control the symptoms but which may not cure the problem.         Counseled patient on the aspects of diabetes and how it pertains to the feet.  I explained the importance of proper diabetic foot care and how it is essential for the health of their feet.        Shoe inspection. Patient instructed on proper foot hygeine. We discussed wearing proper shoe gear, daily foot inspections, never walking without protective shoe gear, never putting sharp instruments to feet, routine podiatric nail visits every 2-3 months.          Proper ulcer care and the possible need for serial debridements, topical medications, specific dressings and biological engineered skin substitutes if indicated.     Patient should call the office immediately if any signs of infection, such as fever, chills, sweats, increased redness or pain.     Patient was instructed to call the clinic or go to the emergency department if their symptoms do not improve, worsens, or if new symptoms develop.  Patient was advised that if any increased swelling, pain, or numbness arise to go immediately to the ED. Patient knows to call any time if an emergency arises. Shared decision making occurred and patient verbalized understanding in agreement with this plan.         >50% of this > 60 minute visit was spent face to face educating/counseling the patient     I spent a total of 60 minutes on the day of the visit.This includes face to face time and non-face to face time preparing to see the patient (eg, review of tests), obtaining and/or reviewing separately  obtained history, documenting clinical information in the electronic or other health record, independently interpreting results and communicating results to the patient/family/caregiver, or care coordinator.        Much of the documentation for this visit was completed in the Wound Docs system.  Please see the attached documentation for further details about the patient's care. Scanned under the Media tab.         Anat JORDAN Santiago      Vitals:    12/05/23 1013   BP: (!) 169/86   Pulse: 90   Resp: 19   Temp: 98.7 °F (37.1 °C)   PainSc: 0-No pain      Shoe Size:     Past Surgical History:   Procedure Laterality Date    MANDIBLE FRACTURE SURGERY  07/17/2000    MANDIBLE FRACTURE SURGERY      SPLENECTOMY, TOTAL  07/17/2000    TOE AMPUTATION Right 10/9/2023    Procedure: AMPUTATION, TOE;  Surgeon: Ashok Santiago DPM;  Location: [facility];  Service: Podiatry;  Laterality: Right;     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  Tobacco History:  reports that he has been smoking. He has never used smokeless tobacco.  Drug History:  reports no history of drug use.    Review of patient's allergies indicates:   Allergen Reactions    Pcn [penicillins] Anaphylaxis     Tolerates cephalexin       Current Outpatient Medications   Medication Sig Dispense Refill    blood sugar diagnostic (TRUE METRIX GLUCOSE TEST STRIP) Strp USE  STRIP TO CHECK BLOOD GLUCOSE  4 TIMES DAILY 150 each 3    blood-glucose  "meter kit To check BG qid times daily, to use with insurance preferred meter 1 each 0    esomeprazole (NEXIUM) 20 MG capsule Take 20 mg by mouth before breakfast.      gabapentin (NEURONTIN) 300 MG capsule Take 2 capsules (600 mg total) by mouth 3 (three) times daily. 180 capsule 5    HYDROcodone-acetaminophen (NORCO) 5-325 mg per tablet Take 1 tablet by mouth every 12 (twelve) hours as needed for Pain. (Patient not taking: Reported on 10/26/2023) 10 tablet 0    insulin aspart U-100 (NOVOLOG FLEXPEN U-100 INSULIN) 100 unit/mL (3 mL) InPn pen Inject 10 Units into the skin 4 (four) times daily. Per sliding scale as instructed 12 mL 5    insulin syringe-needle U-100 0.3 mL 31 gauge x 5/16" Syrg 1 Device by Misc.(Non-Drug; Combo Route) route 4 (four) times daily. 200 each 3    lancets Misc To check BGqid times daily, to use with insurance preferred meter (Patient taking differently: 1 lancet  by Misc.(Non-Drug; Combo Route) route 4 (four) times daily. To check BGqid times daily, to use with insurance preferred meter) 200 each 3    LANTUS SOLOSTAR U-100 INSULIN glargine 100 units/mL SubQ pen INJECT 30 UNITS SUBCUTANEOUSLY EVERY DAY AT BEDTIME 15 mL 0    lisinopriL (PRINIVIL,ZESTRIL) 5 MG tablet Take 1 tablet (5 mg total) by mouth once daily. 90 tablet 3    pen needle, diabetic (BD ULTRA-FINE DENIS PEN NEEDLE MISC) 1 Needle by Misc.(Non-Drug; Combo Route) route 4 (four) times daily.      pravastatin (PRAVACHOL) 20 MG tablet Take 1 tablet (20 mg total) by mouth every evening. 90 tablet 3    sildenafiL (VIAGRA) 100 MG tablet Take 1 tablet (100 mg total) by mouth daily as needed for Erectile Dysfunction. 20 tablet 5     No current facility-administered medications for this visit.       Review of Systems   Constitutional:  Negative for chills, fatigue, fever and unexpected weight change.   HENT:  Negative for hearing loss and trouble swallowing.    Eyes:  Negative for photophobia and visual disturbance.   Respiratory:  " Negative for cough, shortness of breath and wheezing.    Cardiovascular:  Negative for chest pain, palpitations and leg swelling.   Gastrointestinal:  Negative for abdominal pain and nausea.   Genitourinary:  Negative for dysuria and frequency.   Musculoskeletal:  Negative for arthralgias, back pain, gait problem, joint swelling and myalgias.   Skin:  Positive for wound. Negative for rash.   Neurological:  Positive for numbness. Negative for tremors, seizures, weakness and headaches.   Hematological:  Does not bruise/bleed easily.         Objective:        Physical Exam:   Foot Exam  Physical Exam  Ortho/SPM Exam     Imaging:            Assessment:       1. Right foot ulcer, with necrosis of muscle    2. Ulcer of right foot with necrosis of bone    3. Diabetic foot infection    4. Difficulty in walking, not elsewhere classified    5. Uncontrolled type 2 diabetes mellitus with hyperglycemia    6. Hospital discharge follow-up    7. Diabetic peripheral neuropathy    8. Cellulitis and abscess of foot    9. At high risk for inadequate nutritional intake    10. Wound infection    11. Diabetes mellitus due to underlying condition with hyperglycemia, with long-term current use of insulin    12. Smoker    13. Ulcer of right foot with necrosis of muscle    14. History of ulcer of lower extremity    15. History of amputation of right foot    16. Type 2 diabetes mellitus with diabetic polyneuropathy, with long-term current use of insulin      Plan:   Right foot ulcer, with necrosis of muscle    Ulcer of right foot with necrosis of bone    Diabetic foot infection    Difficulty in walking, not elsewhere classified    Uncontrolled type 2 diabetes mellitus with hyperglycemia    Hospital discharge follow-up    Diabetic peripheral neuropathy    Cellulitis and abscess of foot    At high risk for inadequate nutritional intake    Wound infection    Diabetes mellitus due to underlying condition with hyperglycemia, with long-term current  use of insulin    Smoker    Ulcer of right foot with necrosis of muscle    History of ulcer of lower extremity    History of amputation of right foot    Type 2 diabetes mellitus with diabetic polyneuropathy, with long-term current use of insulin      No follow-ups on file.    Procedures          Counseling:     I provided patient education verbally regarding:   Patient diagnosis, treatment options, as well as alternatives, risks, and benefits.     This note was created using Dragon voice recognition software that occasionally misinterpreted phrases or words.

## 2023-12-12 ENCOUNTER — OFFICE VISIT (OUTPATIENT)
Dept: WOUND CARE | Facility: HOSPITAL | Age: 43
End: 2023-12-12
Attending: PODIATRIST
Payer: MEDICAID

## 2023-12-12 VITALS
DIASTOLIC BLOOD PRESSURE: 92 MMHG | BODY MASS INDEX: 22.75 KG/M2 | SYSTOLIC BLOOD PRESSURE: 166 MMHG | WEIGHT: 168 LBS | HEART RATE: 90 BPM | HEIGHT: 72 IN | TEMPERATURE: 99 F | RESPIRATION RATE: 18 BRPM

## 2023-12-12 DIAGNOSIS — L97.513 ULCER OF RIGHT FOOT WITH NECROSIS OF MUSCLE: ICD-10-CM

## 2023-12-12 DIAGNOSIS — E11.628 DIABETIC FOOT INFECTION: ICD-10-CM

## 2023-12-12 DIAGNOSIS — L97.513 RIGHT FOOT ULCER, WITH NECROSIS OF MUSCLE: Primary | ICD-10-CM

## 2023-12-12 DIAGNOSIS — L97.514 ULCER OF RIGHT FOOT WITH NECROSIS OF BONE: ICD-10-CM

## 2023-12-12 DIAGNOSIS — Z79.4 DIABETES MELLITUS DUE TO UNDERLYING CONDITION WITH HYPERGLYCEMIA, WITH LONG-TERM CURRENT USE OF INSULIN: ICD-10-CM

## 2023-12-12 DIAGNOSIS — E11.42 DIABETIC PERIPHERAL NEUROPATHY: ICD-10-CM

## 2023-12-12 DIAGNOSIS — L08.9 DIABETIC FOOT INFECTION: ICD-10-CM

## 2023-12-12 DIAGNOSIS — Z89.431 HISTORY OF AMPUTATION OF RIGHT FOOT: ICD-10-CM

## 2023-12-12 DIAGNOSIS — R26.2 DIFFICULTY IN WALKING, NOT ELSEWHERE CLASSIFIED: ICD-10-CM

## 2023-12-12 DIAGNOSIS — F17.200 SMOKER: ICD-10-CM

## 2023-12-12 DIAGNOSIS — E11.65 UNCONTROLLED TYPE 2 DIABETES MELLITUS WITH HYPERGLYCEMIA: ICD-10-CM

## 2023-12-12 DIAGNOSIS — E08.65 DIABETES MELLITUS DUE TO UNDERLYING CONDITION WITH HYPERGLYCEMIA, WITH LONG-TERM CURRENT USE OF INSULIN: ICD-10-CM

## 2023-12-12 DIAGNOSIS — Z87.2 HISTORY OF ULCER OF LOWER EXTREMITY: ICD-10-CM

## 2023-12-12 DIAGNOSIS — Z91.89 AT HIGH RISK FOR INADEQUATE NUTRITIONAL INTAKE: ICD-10-CM

## 2023-12-12 DIAGNOSIS — E11.42 TYPE 2 DIABETES MELLITUS WITH DIABETIC POLYNEUROPATHY, WITH LONG-TERM CURRENT USE OF INSULIN: ICD-10-CM

## 2023-12-12 DIAGNOSIS — Z79.4 TYPE 2 DIABETES MELLITUS WITH DIABETIC POLYNEUROPATHY, WITH LONG-TERM CURRENT USE OF INSULIN: ICD-10-CM

## 2023-12-12 PROCEDURE — 11042 PR DEBRIDEMENT, SKIN, SUB-Q TISSUE,=<20 SQ CM: ICD-10-PCS | Mod: ,,, | Performed by: PODIATRIST

## 2023-12-12 PROCEDURE — 99214 PR OFFICE/OUTPT VISIT, EST, LEVL IV, 30-39 MIN: ICD-10-PCS | Mod: 25,,, | Performed by: PODIATRIST

## 2023-12-12 PROCEDURE — 1159F MED LIST DOCD IN RCRD: CPT | Mod: CPTII,,, | Performed by: PODIATRIST

## 2023-12-12 PROCEDURE — 3061F PR NEG MICROALBUMINURIA RESULT DOCUMENTED/REVIEW: ICD-10-PCS | Mod: CPTII,,, | Performed by: PODIATRIST

## 2023-12-12 PROCEDURE — 3077F PR MOST RECENT SYSTOLIC BLOOD PRESSURE >= 140 MM HG: ICD-10-PCS | Mod: CPTII,,, | Performed by: PODIATRIST

## 2023-12-12 PROCEDURE — 3046F PR MOST RECENT HEMOGLOBIN A1C LEVEL > 9.0%: ICD-10-PCS | Mod: CPTII,,, | Performed by: PODIATRIST

## 2023-12-12 PROCEDURE — 3080F PR MOST RECENT DIASTOLIC BLOOD PRESSURE >= 90 MM HG: ICD-10-PCS | Mod: CPTII,,, | Performed by: PODIATRIST

## 2023-12-12 PROCEDURE — 3066F PR DOCUMENTATION OF TREATMENT FOR NEPHROPATHY: ICD-10-PCS | Mod: CPTII,,, | Performed by: PODIATRIST

## 2023-12-12 PROCEDURE — 3077F SYST BP >= 140 MM HG: CPT | Mod: CPTII,,, | Performed by: PODIATRIST

## 2023-12-12 PROCEDURE — 99214 OFFICE O/P EST MOD 30 MIN: CPT | Mod: 25,,, | Performed by: PODIATRIST

## 2023-12-12 PROCEDURE — 1160F RVW MEDS BY RX/DR IN RCRD: CPT | Mod: CPTII,,, | Performed by: PODIATRIST

## 2023-12-12 PROCEDURE — 11042 DBRDMT SUBQ TIS 1ST 20SQCM/<: CPT | Mod: PN | Performed by: PODIATRIST

## 2023-12-12 PROCEDURE — 4010F ACE/ARB THERAPY RXD/TAKEN: CPT | Mod: CPTII,,, | Performed by: PODIATRIST

## 2023-12-12 PROCEDURE — 1159F PR MEDICATION LIST DOCUMENTED IN MEDICAL RECORD: ICD-10-PCS | Mod: CPTII,,, | Performed by: PODIATRIST

## 2023-12-12 PROCEDURE — 3008F PR BODY MASS INDEX (BMI) DOCUMENTED: ICD-10-PCS | Mod: CPTII,,, | Performed by: PODIATRIST

## 2023-12-12 PROCEDURE — 1160F PR REVIEW ALL MEDS BY PRESCRIBER/CLIN PHARMACIST DOCUMENTED: ICD-10-PCS | Mod: CPTII,,, | Performed by: PODIATRIST

## 2023-12-12 PROCEDURE — 11042 DBRDMT SUBQ TIS 1ST 20SQCM/<: CPT | Mod: ,,, | Performed by: PODIATRIST

## 2023-12-12 PROCEDURE — 3061F NEG MICROALBUMINURIA REV: CPT | Mod: CPTII,,, | Performed by: PODIATRIST

## 2023-12-12 PROCEDURE — 4010F PR ACE/ARB THEARPY RXD/TAKEN: ICD-10-PCS | Mod: CPTII,,, | Performed by: PODIATRIST

## 2023-12-12 PROCEDURE — 3008F BODY MASS INDEX DOCD: CPT | Mod: CPTII,,, | Performed by: PODIATRIST

## 2023-12-12 PROCEDURE — 3080F DIAST BP >= 90 MM HG: CPT | Mod: CPTII,,, | Performed by: PODIATRIST

## 2023-12-12 PROCEDURE — 3066F NEPHROPATHY DOC TX: CPT | Mod: CPTII,,, | Performed by: PODIATRIST

## 2023-12-12 PROCEDURE — 3046F HEMOGLOBIN A1C LEVEL >9.0%: CPT | Mod: CPTII,,, | Performed by: PODIATRIST

## 2023-12-13 NOTE — PROGRESS NOTES
[address] [facility] [address]  [place] [address]  Phone: [phone]   [phone]    Patient's PCP:Alfie Lancaster MD  Referring Provider: Aaareferral Self    Subjective:      Chief Complaint:: Wound Care, Diabetes, Wound Check, Pressure Ulcer, Non-healing Wound (right medial 4th toe diabetic foot ulcer,  right 3rd digit amputation site diabetic foot ulcer, and right dorsal foot blister), Numbness, Peripheral Neuropathy, Foot Ulcer, Difficulty Walking, Diabetic Foot Ulcer, and Blister    HPI  Philip Alberts is a 43 y.o. male who presents today for follow up of right medial 4th toe diabetic foot ulcer,  right 3rd digit amputation site diabetic foot ulcer, and right dorsal foot blister.  See wound docs documentation for full assessment and evaluation of all wounds.      EVALUATION, ASSESSMENT, & PLAN:      Debridement of right medial 4th toe diabetic foot ulcer. Evaluation and Assessment only of right 3rd toe amputation site diabetic foot ulcer and right dorsal foot blister.    See Wound Docs for assessment of wounds and procedure notes    2. Continue taking all medications as prescribed  3. Dressing changes, see Wound docs for dressing change orders  4. RTC one week   5. Counseled patient on increasing protein intake, not getting wound wet, keeping dressing clean dry and intact, following a healthy diet, elevating legs when able, removing pressure from wound              Counseling/Education:  I provided patient education verbally regarding:   The aspects of diabetes and how it pertains to the feet. I explained the importance of proper diabetic foot care and how it is essential for the health of their feet.     I discussed the importance of knowing their Hemoglobin A1c and that the level needs to be as close to 6 as possible. I discussed the increase complications of high blood sugar including stroke, blindness, heart attack, kidney failure and loss of limb secondary to neuropathy and PVD.      With  neuropathy, beware of any breaks in the skin or redness. These areas are not recognized early due to the numbness.     I discussed Diabetes, lower back issues, metabolic disorders, systemic causes, chemotherapy, vitamin deficiency, heavy metal exposure, as some of the causes. I also explained that as much as 40% of the time we can not find a cause. I discussed different treatments available to control the symptoms but which may not cure the problem.         Counseled patient on the aspects of diabetes and how it pertains to the feet.  I explained the importance of proper diabetic foot care and how it is essential for the health of their feet.        Shoe inspection. Patient instructed on proper foot hygeine. We discussed wearing proper shoe gear, daily foot inspections, never walking without protective shoe gear, never putting sharp instruments to feet, routine podiatric nail visits every 2-3 months.          Proper ulcer care and the possible need for serial debridements, topical medications, specific dressings and biological engineered skin substitutes if indicated.     Patient should call the office immediately if any signs of infection, such as fever, chills, sweats, increased redness or pain.     Patient was instructed to call the clinic or go to the emergency department if their symptoms do not improve, worsens, or if new symptoms develop.  Patient was advised that if any increased swelling, pain, or numbness arise to go immediately to the ED. Patient knows to call any time if an emergency arises. Shared decision making occurred and patient verbalized understanding in agreement with this plan.         >50% of this > 60 minute visit was spent face to face educating/counseling the patient     I spent a total of 60 minutes on the day of the visit.This includes face to face time and non-face to face time preparing to see the patient (eg, review of tests), obtaining and/or reviewing separately obtained history,  documenting clinical information in the electronic or other health record, independently interpreting results and communicating results to the patient/family/caregiver, or care coordinator.        Much of the documentation for this visit was completed in the Wound Docs system.  Please see the attached documentation for further details about the patient's care. Scanned under the Media tab.         Anat Santiago DPM         Vitals:    12/12/23 0934   BP: (!) 166/92   Pulse: 90   Resp: 18   Temp: 98.8 °F (37.1 °C)   Weight: 76.2 kg (168 lb)   Height: 6' (1.829 m)   PainSc: 0-No pain      Shoe Size:     Past Surgical History:   Procedure Laterality Date    MANDIBLE FRACTURE SURGERY  07/17/2000    MANDIBLE FRACTURE SURGERY      SPLENECTOMY, TOTAL  07/17/2000    TOE AMPUTATION Right 10/9/2023    Procedure: AMPUTATION, TOE;  Surgeon: Ashok Santiago DPM;  Location: [facility];  Service: Podiatry;  Laterality: Right;     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  Tobacco History:  reports that he has been smoking. He has never used smokeless tobacco.  Drug History:  reports no history of drug use.    Review of patient's allergies indicates:   Allergen Reactions    Pcn [penicillins] Anaphylaxis     Tolerates cephalexin       Current Outpatient Medications   Medication Sig Dispense Refill    blood sugar diagnostic (TRUE METRIX GLUCOSE TEST STRIP) Strp USE  STRIP TO CHECK BLOOD GLUCOSE  4 TIMES  "DAILY 150 each 3    blood-glucose meter kit To check BG qid times daily, to use with insurance preferred meter 1 each 0    esomeprazole (NEXIUM) 20 MG capsule Take 20 mg by mouth before breakfast.      gabapentin (NEURONTIN) 300 MG capsule Take 2 capsules (600 mg total) by mouth 3 (three) times daily. 180 capsule 5    HYDROcodone-acetaminophen (NORCO) 5-325 mg per tablet Take 1 tablet by mouth every 12 (twelve) hours as needed for Pain. (Patient not taking: Reported on 10/26/2023) 10 tablet 0    insulin aspart U-100 (NOVOLOG FLEXPEN U-100 INSULIN) 100 unit/mL (3 mL) InPn pen Inject 10 Units into the skin 4 (four) times daily. Per sliding scale as instructed 12 mL 5    insulin syringe-needle U-100 0.3 mL 31 gauge x 5/16" Syrg 1 Device by Misc.(Non-Drug; Combo Route) route 4 (four) times daily. 200 each 3    lancets Misc To check BGqid times daily, to use with insurance preferred meter (Patient taking differently: 1 lancet  by Misc.(Non-Drug; Combo Route) route 4 (four) times daily. To check BGqid times daily, to use with insurance preferred meter) 200 each 3    LANTUS SOLOSTAR U-100 INSULIN glargine 100 units/mL SubQ pen INJECT 30 UNITS SUBCUTANEOUSLY EVERY DAY AT BEDTIME 15 mL 0    lisinopriL (PRINIVIL,ZESTRIL) 5 MG tablet Take 1 tablet (5 mg total) by mouth once daily. 90 tablet 3    pen needle, diabetic (BD ULTRA-FINE DENIS PEN NEEDLE MISC) 1 Needle by Misc.(Non-Drug; Combo Route) route 4 (four) times daily.      pravastatin (PRAVACHOL) 20 MG tablet Take 1 tablet (20 mg total) by mouth every evening. 90 tablet 3    sildenafiL (VIAGRA) 100 MG tablet Take 1 tablet (100 mg total) by mouth daily as needed for Erectile Dysfunction. 20 tablet 5     No current facility-administered medications for this visit.       Review of Systems   Constitutional:  Negative for chills, fatigue, fever and unexpected weight change.   HENT:  Negative for hearing loss and trouble swallowing.    Eyes:  Negative for photophobia and visual " disturbance.   Respiratory:  Negative for cough, shortness of breath and wheezing.    Cardiovascular:  Negative for chest pain, palpitations and leg swelling.   Gastrointestinal:  Negative for abdominal pain and nausea.   Genitourinary:  Negative for dysuria and frequency.   Musculoskeletal:  Negative for arthralgias, back pain, gait problem, joint swelling and myalgias.   Skin:  Positive for wound. Negative for rash.   Neurological:  Positive for numbness. Negative for tremors, seizures, weakness and headaches.   Hematological:  Does not bruise/bleed easily.         Objective:        Physical Exam:   Foot Exam  Physical Exam  Ortho/SPM Exam     Imaging:            Assessment:       1. Right foot ulcer, with necrosis of muscle    2. Ulcer of right foot with necrosis of bone    3. Diabetic foot infection    4. Difficulty in walking, not elsewhere classified    5. Uncontrolled type 2 diabetes mellitus with hyperglycemia    6. At high risk for inadequate nutritional intake    7. Diabetic peripheral neuropathy    8. Diabetes mellitus due to underlying condition with hyperglycemia, with long-term current use of insulin    9. Smoker    10. Ulcer of right foot with necrosis of muscle    11. History of ulcer of lower extremity    12. History of amputation of right foot    13. Type 2 diabetes mellitus with diabetic polyneuropathy, with long-term current use of insulin      Plan:   Right foot ulcer, with necrosis of muscle    Ulcer of right foot with necrosis of bone    Diabetic foot infection    Difficulty in walking, not elsewhere classified    Uncontrolled type 2 diabetes mellitus with hyperglycemia    At high risk for inadequate nutritional intake    Diabetic peripheral neuropathy    Diabetes mellitus due to underlying condition with hyperglycemia, with long-term current use of insulin    Smoker    Ulcer of right foot with necrosis of muscle    History of ulcer of lower extremity    History of amputation of right  foot    Type 2 diabetes mellitus with diabetic polyneuropathy, with long-term current use of insulin      Follow up in about 1 week (around 12/19/2023).    Procedures          Counseling:     I provided patient education verbally regarding:   Patient diagnosis, treatment options, as well as alternatives, risks, and benefits.     This note was created using Dragon voice recognition software that occasionally misinterpreted phrases or words.

## 2023-12-19 ENCOUNTER — OFFICE VISIT (OUTPATIENT)
Dept: WOUND CARE | Facility: HOSPITAL | Age: 43
End: 2023-12-19
Attending: PODIATRIST
Payer: MEDICAID

## 2023-12-19 VITALS
RESPIRATION RATE: 18 BRPM | TEMPERATURE: 98 F | HEART RATE: 90 BPM | DIASTOLIC BLOOD PRESSURE: 85 MMHG | SYSTOLIC BLOOD PRESSURE: 176 MMHG

## 2023-12-19 DIAGNOSIS — L97.513 ULCER OF RIGHT FOOT WITH NECROSIS OF MUSCLE: ICD-10-CM

## 2023-12-19 DIAGNOSIS — E11.42 TYPE 2 DIABETES MELLITUS WITH DIABETIC POLYNEUROPATHY, WITH LONG-TERM CURRENT USE OF INSULIN: ICD-10-CM

## 2023-12-19 DIAGNOSIS — E08.65 DIABETES MELLITUS DUE TO UNDERLYING CONDITION WITH HYPERGLYCEMIA, WITH LONG-TERM CURRENT USE OF INSULIN: ICD-10-CM

## 2023-12-19 DIAGNOSIS — Z89.431 HISTORY OF AMPUTATION OF RIGHT FOOT: ICD-10-CM

## 2023-12-19 DIAGNOSIS — L97.514 ULCER OF RIGHT FOOT WITH NECROSIS OF BONE: ICD-10-CM

## 2023-12-19 DIAGNOSIS — E11.65 UNCONTROLLED TYPE 2 DIABETES MELLITUS WITH HYPERGLYCEMIA: ICD-10-CM

## 2023-12-19 DIAGNOSIS — E11.628 DIABETIC FOOT INFECTION: ICD-10-CM

## 2023-12-19 DIAGNOSIS — R26.2 DIFFICULTY IN WALKING, NOT ELSEWHERE CLASSIFIED: ICD-10-CM

## 2023-12-19 DIAGNOSIS — F17.200 SMOKER: ICD-10-CM

## 2023-12-19 DIAGNOSIS — L97.513 RIGHT FOOT ULCER, WITH NECROSIS OF MUSCLE: Primary | ICD-10-CM

## 2023-12-19 DIAGNOSIS — Z79.4 DIABETES MELLITUS DUE TO UNDERLYING CONDITION WITH HYPERGLYCEMIA, WITH LONG-TERM CURRENT USE OF INSULIN: ICD-10-CM

## 2023-12-19 DIAGNOSIS — Z79.4 TYPE 2 DIABETES MELLITUS WITH DIABETIC POLYNEUROPATHY, WITH LONG-TERM CURRENT USE OF INSULIN: ICD-10-CM

## 2023-12-19 DIAGNOSIS — L08.9 DIABETIC FOOT INFECTION: ICD-10-CM

## 2023-12-19 DIAGNOSIS — Z91.89 AT HIGH RISK FOR INADEQUATE NUTRITIONAL INTAKE: ICD-10-CM

## 2023-12-19 DIAGNOSIS — E11.42 DIABETIC PERIPHERAL NEUROPATHY: ICD-10-CM

## 2023-12-19 DIAGNOSIS — Z87.2 HISTORY OF ULCER OF LOWER EXTREMITY: ICD-10-CM

## 2023-12-19 PROCEDURE — 3079F DIAST BP 80-89 MM HG: CPT | Mod: CPTII,,, | Performed by: PODIATRIST

## 2023-12-19 PROCEDURE — 3077F SYST BP >= 140 MM HG: CPT | Mod: CPTII,,, | Performed by: PODIATRIST

## 2023-12-19 PROCEDURE — 1160F PR REVIEW ALL MEDS BY PRESCRIBER/CLIN PHARMACIST DOCUMENTED: ICD-10-PCS | Mod: CPTII,,, | Performed by: PODIATRIST

## 2023-12-19 PROCEDURE — 3061F NEG MICROALBUMINURIA REV: CPT | Mod: CPTII,,, | Performed by: PODIATRIST

## 2023-12-19 PROCEDURE — 3077F PR MOST RECENT SYSTOLIC BLOOD PRESSURE >= 140 MM HG: ICD-10-PCS | Mod: CPTII,,, | Performed by: PODIATRIST

## 2023-12-19 PROCEDURE — 3061F PR NEG MICROALBUMINURIA RESULT DOCUMENTED/REVIEW: ICD-10-PCS | Mod: CPTII,,, | Performed by: PODIATRIST

## 2023-12-19 PROCEDURE — 3066F PR DOCUMENTATION OF TREATMENT FOR NEPHROPATHY: ICD-10-PCS | Mod: CPTII,,, | Performed by: PODIATRIST

## 2023-12-19 PROCEDURE — 4010F PR ACE/ARB THEARPY RXD/TAKEN: ICD-10-PCS | Mod: CPTII,,, | Performed by: PODIATRIST

## 2023-12-19 PROCEDURE — 3066F NEPHROPATHY DOC TX: CPT | Mod: CPTII,,, | Performed by: PODIATRIST

## 2023-12-19 PROCEDURE — 3046F HEMOGLOBIN A1C LEVEL >9.0%: CPT | Mod: CPTII,,, | Performed by: PODIATRIST

## 2023-12-19 PROCEDURE — 3079F PR MOST RECENT DIASTOLIC BLOOD PRESSURE 80-89 MM HG: ICD-10-PCS | Mod: CPTII,,, | Performed by: PODIATRIST

## 2023-12-19 PROCEDURE — 1159F MED LIST DOCD IN RCRD: CPT | Mod: CPTII,,, | Performed by: PODIATRIST

## 2023-12-19 PROCEDURE — 99214 OFFICE O/P EST MOD 30 MIN: CPT | Mod: 25,,, | Performed by: PODIATRIST

## 2023-12-19 PROCEDURE — 3046F PR MOST RECENT HEMOGLOBIN A1C LEVEL > 9.0%: ICD-10-PCS | Mod: CPTII,,, | Performed by: PODIATRIST

## 2023-12-19 PROCEDURE — 11042 DBRDMT SUBQ TIS 1ST 20SQCM/<: CPT | Mod: ,,, | Performed by: PODIATRIST

## 2023-12-19 PROCEDURE — 4010F ACE/ARB THERAPY RXD/TAKEN: CPT | Mod: CPTII,,, | Performed by: PODIATRIST

## 2023-12-19 PROCEDURE — 99214 PR OFFICE/OUTPT VISIT, EST, LEVL IV, 30-39 MIN: ICD-10-PCS | Mod: 25,,, | Performed by: PODIATRIST

## 2023-12-19 PROCEDURE — 1159F PR MEDICATION LIST DOCUMENTED IN MEDICAL RECORD: ICD-10-PCS | Mod: CPTII,,, | Performed by: PODIATRIST

## 2023-12-19 PROCEDURE — 11042 PR DEBRIDEMENT, SKIN, SUB-Q TISSUE,=<20 SQ CM: ICD-10-PCS | Mod: ,,, | Performed by: PODIATRIST

## 2023-12-19 PROCEDURE — 1160F RVW MEDS BY RX/DR IN RCRD: CPT | Mod: CPTII,,, | Performed by: PODIATRIST

## 2023-12-19 PROCEDURE — 11042 DBRDMT SUBQ TIS 1ST 20SQCM/<: CPT | Mod: PN | Performed by: PODIATRIST

## 2023-12-19 NOTE — PROGRESS NOTES
[address] [facility] [address]  [place] [address]  Phone: [phone]   [phone]    Patient's PCP:Alfie Lancaster MD  Referring Provider: Aaareferral Self    Subjective:      Chief Complaint:: Wound Care, Diabetes, Diabetic Foot Ulcer, Difficulty Walking, Foot Ulcer, Non-healing Wound, Numbness, Peripheral Neuropathy, Pressure Ulcer, and Wound Check    HPI  Philip Alberts is a 43 y.o. male who presents today for follow up of right medial 4th toe diabetic foot ulcer,  right 3rd digit amputation site diabetic foot ulcer, and right dorsal foot blister.  See wound docs documentation for full assessment and evaluation of all wounds.      EVALUATION, ASSESSMENT, & PLAN:      Debridement of right medial 4th toe diabetic foot ulcer. Evaluation and Assessment only of right 3rd toe amputation site diabetic foot ulcer and right dorsal foot blister.    See Wound Docs for assessment of wounds and procedure notes    2. Continue taking all medications as prescribed  3. Dressing changes, see Wound docs for dressing change orders  4. RTC one week   5. Counseled patient on increasing protein intake, not getting wound wet, keeping dressing clean dry and intact, following a healthy diet, elevating legs when able, removing pressure from wound              Counseling/Education:  I provided patient education verbally regarding:   The aspects of diabetes and how it pertains to the feet. I explained the importance of proper diabetic foot care and how it is essential for the health of their feet.     I discussed the importance of knowing their Hemoglobin A1c and that the level needs to be as close to 6 as possible. I discussed the increase complications of high blood sugar including stroke, blindness, heart attack, kidney failure and loss of limb secondary to neuropathy and PVD.      With neuropathy, beware of any breaks in the skin or redness. These areas are not recognized early due to the numbness.     I discussed Diabetes,  lower back issues, metabolic disorders, systemic causes, chemotherapy, vitamin deficiency, heavy metal exposure, as some of the causes. I also explained that as much as 40% of the time we can not find a cause. I discussed different treatments available to control the symptoms but which may not cure the problem.         Counseled patient on the aspects of diabetes and how it pertains to the feet.  I explained the importance of proper diabetic foot care and how it is essential for the health of their feet.        Shoe inspection. Patient instructed on proper foot hygeine. We discussed wearing proper shoe gear, daily foot inspections, never walking without protective shoe gear, never putting sharp instruments to feet, routine podiatric nail visits every 2-3 months.          Proper ulcer care and the possible need for serial debridements, topical medications, specific dressings and biological engineered skin substitutes if indicated.     Patient should call the office immediately if any signs of infection, such as fever, chills, sweats, increased redness or pain.     Patient was instructed to call the clinic or go to the emergency department if their symptoms do not improve, worsens, or if new symptoms develop.  Patient was advised that if any increased swelling, pain, or numbness arise to go immediately to the ED. Patient knows to call any time if an emergency arises. Shared decision making occurred and patient verbalized understanding in agreement with this plan.         >50% of this > 60 minute visit was spent face to face educating/counseling the patient     I spent a total of 60 minutes on the day of the visit.This includes face to face time and non-face to face time preparing to see the patient (eg, review of tests), obtaining and/or reviewing separately obtained history, documenting clinical information in the electronic or other health record, independently interpreting results and communicating results to the  patient/family/caregiver, or care coordinator.        Much of the documentation for this visit was completed in the Wound Docs system.  Please see the attached documentation for further details about the patient's care. Scanned under the Media tab.         Anat JORDAN Santiago         Vitals:    12/19/23 0931   BP: (!) 176/85   Pulse: 90   Resp: 18   Temp: 98.2 °F (36.8 °C)   PainSc: 0-No pain      Shoe Size:     Past Surgical History:   Procedure Laterality Date    MANDIBLE FRACTURE SURGERY  07/17/2000    MANDIBLE FRACTURE SURGERY      SPLENECTOMY, TOTAL  07/17/2000    TOE AMPUTATION Right 10/9/2023    Procedure: AMPUTATION, TOE;  Surgeon: Ashok Santiago DPM;  [facility]: [facility];  Service: Podiatry;  Laterality: Right;     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  Tobacco History:  reports that he has been smoking. He has never used smokeless tobacco.  Drug History:  reports no history of drug use.    Review of patient's allergies indicates:   Allergen Reactions    Pcn [penicillins] Anaphylaxis     Tolerates cephalexin       Current Outpatient Medications   Medication Sig Dispense Refill    blood sugar diagnostic (TRUE METRIX GLUCOSE TEST STRIP) Strp USE  STRIP TO CHECK BLOOD GLUCOSE  4 TIMES DAILY 150 each 3    blood-glucose meter kit To check BG qid times daily, to use with insurance preferred meter 1 each 0    esomeprazole (NEXIUM) 20 MG capsule Take 20 mg by mouth before  "breakfast.      gabapentin (NEURONTIN) 300 MG capsule Take 2 capsules (600 mg total) by mouth 3 (three) times daily. 180 capsule 5    HYDROcodone-acetaminophen (NORCO) 5-325 mg per tablet Take 1 tablet by mouth every 12 (twelve) hours as needed for Pain. (Patient not taking: Reported on 10/26/2023) 10 tablet 0    insulin aspart U-100 (NOVOLOG FLEXPEN U-100 INSULIN) 100 unit/mL (3 mL) InPn pen Inject 10 Units into the skin 4 (four) times daily. Per sliding scale as instructed 12 mL 5    insulin syringe-needle U-100 0.3 mL 31 gauge x 5/16" Syrg 1 Device by Misc.(Non-Drug; Combo Route) route 4 (four) times daily. 200 each 3    lancets Misc To check BGqid times daily, to use with insurance preferred meter (Patient taking differently: 1 lancet  by Misc.(Non-Drug; Combo Route) route 4 (four) times daily. To check BGqid times daily, to use with insurance preferred meter) 200 each 3    LANTUS SOLOSTAR U-100 INSULIN glargine 100 units/mL SubQ pen INJECT 30 UNITS SUBCUTANEOUSLY EVERY DAY AT BEDTIME 15 mL 0    lisinopriL (PRINIVIL,ZESTRIL) 5 MG tablet Take 1 tablet (5 mg total) by mouth once daily. 90 tablet 3    pen needle, diabetic (BD ULTRA-FINE DENIS PEN NEEDLE MISC) 1 Needle by Misc.(Non-Drug; Combo Route) route 4 (four) times daily.      pravastatin (PRAVACHOL) 20 MG tablet Take 1 tablet (20 mg total) by mouth every evening. 90 tablet 3    sildenafiL (VIAGRA) 100 MG tablet Take 1 tablet (100 mg total) by mouth daily as needed for Erectile Dysfunction. 20 tablet 5     No current facility-administered medications for this visit.       Review of Systems   Constitutional:  Negative for chills, fatigue, fever and unexpected weight change.   HENT:  Negative for hearing loss and trouble swallowing.    Eyes:  Negative for photophobia and visual disturbance.   Respiratory:  Negative for cough, shortness of breath and wheezing.    Cardiovascular:  Negative for chest pain, palpitations and leg swelling.   Gastrointestinal:  Negative " for abdominal pain and nausea.   Genitourinary:  Negative for dysuria and frequency.   Musculoskeletal:  Negative for arthralgias, back pain, gait problem, joint swelling and myalgias.   Skin:  Positive for wound. Negative for rash.   Neurological:  Positive for numbness. Negative for tremors, seizures, weakness and headaches.   Hematological:  Does not bruise/bleed easily.         Objective:        Physical Exam:   Foot Exam  Physical Exam  Ortho/SPM Exam     Imaging:            Assessment:       1. Right foot ulcer, with necrosis of muscle    2. Ulcer of right foot with necrosis of bone    3. Diabetic foot infection    4. At high risk for inadequate nutritional intake    5. Uncontrolled type 2 diabetes mellitus with hyperglycemia    6. Diabetes mellitus due to underlying condition with hyperglycemia, with long-term current use of insulin    7. Diabetic peripheral neuropathy    8. Difficulty in walking, not elsewhere classified    9. History of ulcer of lower extremity    10. History of amputation of right foot    11. Smoker    12. Ulcer of right foot with necrosis of muscle    13. Type 2 diabetes mellitus with diabetic polyneuropathy, with long-term current use of insulin      Plan:   Right foot ulcer, with necrosis of muscle    Ulcer of right foot with necrosis of bone    Diabetic foot infection    At high risk for inadequate nutritional intake    Uncontrolled type 2 diabetes mellitus with hyperglycemia    Diabetes mellitus due to underlying condition with hyperglycemia, with long-term current use of insulin    Diabetic peripheral neuropathy    Difficulty in walking, not elsewhere classified    History of ulcer of lower extremity    History of amputation of right foot    Smoker    Ulcer of right foot with necrosis of muscle    Type 2 diabetes mellitus with diabetic polyneuropathy, with long-term current use of insulin      Follow up today (on 12/19/2023), or if symptoms worsen or fail to improve.    Procedures           Counseling:     I provided patient education verbally regarding:   Patient diagnosis, treatment options, as well as alternatives, risks, and benefits.     This note was created using Dragon voice recognition software that occasionally misinterpreted phrases or words.

## 2024-01-03 ENCOUNTER — OFFICE VISIT (OUTPATIENT)
Dept: PODIATRY | Facility: CLINIC | Age: 44
End: 2024-01-03
Attending: FAMILY MEDICINE
Payer: MEDICAID

## 2024-01-03 ENCOUNTER — OFFICE VISIT (OUTPATIENT)
Dept: URGENT CARE | Facility: CLINIC | Age: 44
End: 2024-01-03
Payer: MEDICAID

## 2024-01-03 VITALS
HEIGHT: 72 IN | RESPIRATION RATE: 16 BRPM | WEIGHT: 175.5 LBS | BODY MASS INDEX: 23.77 KG/M2 | OXYGEN SATURATION: 98 % | HEART RATE: 96 BPM

## 2024-01-03 VITALS
DIASTOLIC BLOOD PRESSURE: 80 MMHG | OXYGEN SATURATION: 97 % | HEART RATE: 102 BPM | HEIGHT: 72 IN | SYSTOLIC BLOOD PRESSURE: 170 MMHG | WEIGHT: 175.38 LBS | BODY MASS INDEX: 23.75 KG/M2 | RESPIRATION RATE: 16 BRPM | TEMPERATURE: 98 F

## 2024-01-03 DIAGNOSIS — S90.821A BLISTER (NONTHERMAL), RIGHT FOOT, INITIAL ENCOUNTER: Primary | ICD-10-CM

## 2024-01-03 DIAGNOSIS — Z79.4 TYPE 2 DIABETES MELLITUS WITH DIABETIC POLYNEUROPATHY, WITH LONG-TERM CURRENT USE OF INSULIN: Primary | ICD-10-CM

## 2024-01-03 DIAGNOSIS — S90.821A BLISTER OF RIGHT FOOT, INITIAL ENCOUNTER: ICD-10-CM

## 2024-01-03 DIAGNOSIS — Z86.39 HISTORY OF TYPE 2 DIABETES MELLITUS: ICD-10-CM

## 2024-01-03 DIAGNOSIS — E11.42 TYPE 2 DIABETES MELLITUS WITH DIABETIC POLYNEUROPATHY, WITH LONG-TERM CURRENT USE OF INSULIN: Primary | ICD-10-CM

## 2024-01-03 PROCEDURE — 73630 X-RAY EXAM OF FOOT: CPT | Mod: RT,S$GLB,, | Performed by: RADIOLOGY

## 2024-01-03 PROCEDURE — 99214 OFFICE O/P EST MOD 30 MIN: CPT | Mod: S$GLB,,,

## 2024-01-03 PROCEDURE — 99999 PR PBB SHADOW E&M-EST. PATIENT-LVL V: CPT | Mod: PBBFAC,,, | Performed by: PODIATRIST

## 2024-01-03 PROCEDURE — 1159F MED LIST DOCD IN RCRD: CPT | Mod: CPTII,,, | Performed by: PODIATRIST

## 2024-01-03 PROCEDURE — 10060 I&D ABSCESS SIMPLE/SINGLE: CPT | Mod: PBBFAC,PN | Performed by: PODIATRIST

## 2024-01-03 PROCEDURE — 3008F BODY MASS INDEX DOCD: CPT | Mod: CPTII,,, | Performed by: PODIATRIST

## 2024-01-03 PROCEDURE — 1160F RVW MEDS BY RX/DR IN RCRD: CPT | Mod: CPTII,,, | Performed by: PODIATRIST

## 2024-01-03 PROCEDURE — 10060 I&D ABSCESS SIMPLE/SINGLE: CPT | Mod: S$PBB,,, | Performed by: PODIATRIST

## 2024-01-03 PROCEDURE — 99215 OFFICE O/P EST HI 40 MIN: CPT | Mod: PBBFAC,PN,25 | Performed by: PODIATRIST

## 2024-01-03 PROCEDURE — 99214 OFFICE O/P EST MOD 30 MIN: CPT | Mod: 25,S$PBB,, | Performed by: PODIATRIST

## 2024-01-03 RX ORDER — CEPHALEXIN 500 MG/1
500 CAPSULE ORAL EVERY 6 HOURS
Qty: 28 CAPSULE | Refills: 0 | Status: SHIPPED | OUTPATIENT
Start: 2024-01-03 | End: 2024-01-10

## 2024-01-03 RX ORDER — MUPIROCIN 20 MG/G
OINTMENT TOPICAL 3 TIMES DAILY
Qty: 22 G | Refills: 0 | Status: SHIPPED | OUTPATIENT
Start: 2024-01-03

## 2024-01-03 NOTE — PROGRESS NOTES
[address] [facility] [address]  [place] [address]  Phone: [phone]   [phone]    Patient's PCP:Alfie Lancaster MD  Referring Provider: No ref. provider found    Subjective:      Chief Complaint:: Blister (Larger blister formation and rupture within the last twelve hours fisher to clear drainage)    HPI  Philip Alberts is a 43 y.o. male who presents today with a complaint of Larger blister formation and rupture within the last twelve hours fisher to clear drainage.  The symptoms include large rupture blister, clear to fisher drainage.  The symptoms are aggravated by none. The problem has stayed the same. Treatment to date have included visit to urgent care Dressing, new order for topical and oral antibiotics.       Systemic Doctor: Alfie Lancaster MD   Date Last Seen: 10/18/2023  Blood Sugar: 124  Hemoglobin A1c: 9.4    Vitals:    01/03/24 1416   Pulse: 96   Resp: 16   SpO2: 98%   Weight: 79.6 kg (175 lb 7.8 oz)   Height: 6' (1.829 m)   PainSc:   1      Shoe Size:     Past Surgical History:   Procedure Laterality Date    MANDIBLE FRACTURE SURGERY  07/17/2000    MANDIBLE FRACTURE SURGERY      SPLENECTOMY, TOTAL  07/17/2000    TOE AMPUTATION Right 10/9/2023    Procedure: AMPUTATION, TOE;  Surgeon: Ashok Santiago DPM;  Location: [facility];  Service: Podiatry;  Laterality: Right;     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  Tobacco History:  reports that he has been smoking.  "He has never used smokeless tobacco.  Drug History:  reports no history of drug use.    Review of patient's allergies indicates:   Allergen Reactions    Pcn [penicillins] Anaphylaxis     Tolerates cephalexin       Current Outpatient Medications   Medication Sig Dispense Refill    blood sugar diagnostic (TRUE METRIX GLUCOSE TEST STRIP) Strp USE  STRIP TO CHECK BLOOD GLUCOSE  4 TIMES DAILY 150 each 3    blood-glucose meter kit To check BG qid times daily, to use with insurance preferred meter 1 each 0    cephALEXin (KEFLEX) 500 MG capsule Take 1 capsule (500 mg total) by mouth every 6 (six) hours. for 7 days 28 capsule 0    esomeprazole (NEXIUM) 20 MG capsule Take 20 mg by mouth before breakfast.      gabapentin (NEURONTIN) 300 MG capsule Take 2 capsules (600 mg total) by mouth 3 (three) times daily. 180 capsule 5    HYDROcodone-acetaminophen (NORCO) 5-325 mg per tablet Take 1 tablet by mouth every 12 (twelve) hours as needed for Pain. (Patient not taking: Reported on 10/26/2023) 10 tablet 0    insulin aspart U-100 (NOVOLOG FLEXPEN U-100 INSULIN) 100 unit/mL (3 mL) InPn pen Inject 10 Units into the skin 4 (four) times daily. Per sliding scale as instructed 12 mL 5    insulin syringe-needle U-100 0.3 mL 31 gauge x 5/16" Syrg 1 Device by Misc.(Non-Drug; Combo Route) route 4 (four) times daily. 200 each 3    lancets Misc To check BGqid times daily, to use with insurance preferred meter (Patient taking differently: 1 lancet  by Misc.(Non-Drug; Combo Route) route 4 (four) times daily. To check BGqid times daily, to use with insurance preferred meter) 200 each 3    LANTUS SOLOSTAR U-100 INSULIN glargine 100 units/mL SubQ pen INJECT 30 UNITS SUBCUTANEOUSLY EVERY DAY AT BEDTIME 15 mL 0    lisinopriL (PRINIVIL,ZESTRIL) 5 MG tablet Take 1 tablet (5 mg total) by mouth once daily. 90 tablet 3    mupirocin (BACTROBAN) 2 % ointment Apply topically 3 (three) times daily. 22 g 0    pen needle, diabetic (BD ULTRA-FINE DENIS PEN NEEDLE " MISC) 1 Needle by Misc.(Non-Drug; Combo Route) route 4 (four) times daily.      pravastatin (PRAVACHOL) 20 MG tablet Take 1 tablet (20 mg total) by mouth every evening. 90 tablet 3    sildenafiL (VIAGRA) 100 MG tablet Take 1 tablet (100 mg total) by mouth daily as needed for Erectile Dysfunction. 20 tablet 5     No current facility-administered medications for this visit.       Review of Systems   Constitutional:  Negative for chills, fatigue, fever and unexpected weight change.   HENT:  Negative for hearing loss and trouble swallowing.    Eyes:  Negative for photophobia and visual disturbance.   Respiratory:  Negative for cough, shortness of breath and wheezing.    Cardiovascular:  Negative for chest pain, palpitations and leg swelling.   Gastrointestinal:  Negative for abdominal pain and nausea.   Genitourinary:  Negative for dysuria and frequency.   Musculoskeletal:  Negative for arthralgias, back pain, gait problem, joint swelling and myalgias.   Skin:  Positive for color change and wound. Negative for rash.   Neurological:  Positive for numbness. Negative for tremors, seizures, speech difficulty, weakness and headaches.   Hematological:  Does not bruise/bleed easily.         Objective:        Physical Exam:   Foot Exam    General  Orientation: alert and oriented to person, place, and time   Affect: appropriate       Right Foot/Ankle     Inspection and Palpation  Ecchymosis: none  Tenderness: none   Swelling: (Mild swelling plantar foot)  Skin Exam: blister, drainage, skin changes and ulcer;   Neurovascular  Dorsalis pedis: 2+  Posterior tibial: 2+  Capillary Refill: 2+  Saphenous nerve sensation: diminished  Tibial nerve sensation: diminished  Superficial peroneal nerve sensation: diminished  Deep peroneal nerve sensation: diminished  Sural nerve sensation: diminished    Comments  Large blister present along the plantar foot.  See above photos.  Underlying wound extends to dermis.  Large amount of clear  drainage present.  No purulence.        Physical Exam  Cardiovascular:      Pulses:           Dorsalis pedis pulses are 2+ on the right side.        Posterior tibial pulses are 2+ on the right side.   Feet:      Right foot:      Skin integrity: Ulcer and blister present.               Right Ankle/Foot Exam     Comments:  Large blister present along the plantar foot.  See above photos.  Underlying wound extends to dermis.  Large amount of clear drainage present.  No purulence.        Vascular Exam     Right Pulses  Dorsalis Pedis:      2+  Posterior Tibial:      2+           Imaging: X-Ray Foot Complete 3 view Right  Narrative: EXAMINATION:  XR FOOT COMPLETE 3 VIEW RIGHT    CLINICAL HISTORY:  . Blister (nonthermal), right foot, initial encounter    TECHNIQUE:  AP, lateral, and oblique views of the right foot were performed.    COMPARISON:  Right foot x-ray of October 8, 2023    FINDINGS:  The patient has had recent amputation of the 3rd toe.  The distal head of the 3rd metatarsal appears intact.  There is lateral angulation of the distal phalanx of the big toe without hallux valgus.  No fractures or other osseous abnormalities are identified.  Plain film evidence of osteomyelitis is not presently seen.  Impression: Prior amputation of the 3rd toe.  Lateral angulation of the distal phalanx of the right big toe.    Electronically signed by: Leobardo Thomas MD  Date:    01/03/2024  Time:    12:41                       Assessment:       1. Type 2 diabetes mellitus with diabetic polyneuropathy, with long-term current use of insulin    2. Blister of right foot, initial encounter      Plan:   Type 2 diabetes mellitus with diabetic polyneuropathy, with long-term current use of insulin    Blister of right foot, initial encounter  -     Ambulatory referral/consult to Wound Clinic; Future; Expected date: 01/10/2024      Follow up if symptoms worsen or fail to improve, for Patient will be referred to outpatient wound  care.    Procedures informed consent was obtained.  I drained in the roof the large blister of the plantar foot.  Patient tolerated well.  Foot was dressed with Betadine and wrapped with gauze.  I instructed him on daily care for this.  Also recommend that he utilize the walking boot which he has from his previous surgery.    Patient will be referred to the outpatient wound care center for further management.        Previous office visits including urgent care visit and previous imaging were reviewed in detail today.    Counseling:     I provided patient education verbally regarding:   Patient diagnosis, treatment options, as well as alternatives, risks, and benefits.     This note was created using Dragon voice recognition software that occasionally misinterpreted phrases or words.

## 2024-01-03 NOTE — PROGRESS NOTES
Subjective:      Patient ID: Philip Alberts is a 43 y.o. male.    Vitals:  height is 6' (1.829 m) and weight is 79.6 kg (175 lb 6.4 oz). His temperature is 98.4 °F (36.9 °C). His blood pressure is 170/80 (abnormal) and his pulse is 102. His respiration is 16 and oxygen saturation is 97%.     Chief Complaint: Blister    Pt has a blister on his R foot that popped last night. Pt states he didn't realize the blister was there and got up to walk and it popped. Pt states he's unable to get an immediate appt to see his podiatrist, pcp, or wound care specialist.         Constitution: Negative.   HENT: Negative.     Neck: neck negative.   Cardiovascular: Negative.    Gastrointestinal: Negative.    Musculoskeletal: Negative.    Skin:  Positive for wound (large blister on sole of right foot) and erythema.   Neurological: Negative.    Psychiatric/Behavioral: Negative.        Objective:     Physical Exam   Constitutional: He is oriented to person, place, and time. He appears well-developed.   HENT:   Head: Normocephalic and atraumatic. Head is without abrasion, without contusion and without laceration.   Ears:   Right Ear: External ear normal.   Left Ear: External ear normal.   Nose: Nose normal.   Mouth/Throat: Oropharynx is clear and moist and mucous membranes are normal.   Eyes: Conjunctivae, EOM and lids are normal. Pupils are equal, round, and reactive to light.   Neck: Trachea normal and phonation normal. Neck supple.   Cardiovascular: Normal rate, regular rhythm and normal heart sounds.   Pulmonary/Chest: Effort normal. No respiratory distress.   Musculoskeletal: Normal range of motion.         General: Normal range of motion.        Feet:    Neurological: He is alert and oriented to person, place, and time.   Skin: Skin is warm, dry and no rash. Capillary refill takes less than 2 seconds. erythema No abrasion, No burn, No bruising and No ecchymosis   Psychiatric: His speech is normal and behavior is normal. Mood,  judgment and thought content normal.   Nursing note and vitals reviewed.                Assessment:     1. Blister (nonthermal), right foot, initial encounter    2. History of type 2 diabetes mellitus        Plan:       Blister (nonthermal), right foot, initial encounter  -     cephALEXin (KEFLEX) 500 MG capsule; Take 1 capsule (500 mg total) by mouth every 6 (six) hours. for 7 days  Dispense: 28 capsule; Refill: 0  -     mupirocin (BACTROBAN) 2 % ointment; Apply topically 3 (three) times daily.  Dispense: 22 g; Refill: 0  -     X-Ray Foot Complete 3 view Right; Future; Expected date: 01/03/2024    History of type 2 diabetes mellitus      Spoke with Dr. Santiago's office, they scheduled him at 1420 today for evaluation of right foot.     Foot XR pending     Wound dressed in clinic today.     Discussed medication with patient who acknowledges understanding and is agreeable to POC. Follow up with podiatry. Increase fluid intake. Red flags for ER discussed.

## 2024-01-08 ENCOUNTER — OFFICE VISIT (OUTPATIENT)
Dept: WOUND CARE | Facility: HOSPITAL | Age: 44
End: 2024-01-08
Attending: PODIATRIST
Payer: MEDICAID

## 2024-01-08 VITALS
RESPIRATION RATE: 18 BRPM | TEMPERATURE: 98 F | SYSTOLIC BLOOD PRESSURE: 179 MMHG | DIASTOLIC BLOOD PRESSURE: 99 MMHG | HEART RATE: 84 BPM

## 2024-01-08 DIAGNOSIS — L97.512 ULCER OF RIGHT FOOT, WITH FAT LAYER EXPOSED: Primary | ICD-10-CM

## 2024-01-08 DIAGNOSIS — E11.42 TYPE 2 DIABETES MELLITUS WITH DIABETIC POLYNEUROPATHY, WITH LONG-TERM CURRENT USE OF INSULIN: ICD-10-CM

## 2024-01-08 DIAGNOSIS — Z79.4 DIABETES MELLITUS DUE TO UNDERLYING CONDITION WITH HYPERGLYCEMIA, WITH LONG-TERM CURRENT USE OF INSULIN: ICD-10-CM

## 2024-01-08 DIAGNOSIS — Z89.431 HISTORY OF AMPUTATION OF RIGHT FOOT: ICD-10-CM

## 2024-01-08 DIAGNOSIS — F17.200 SMOKER: ICD-10-CM

## 2024-01-08 DIAGNOSIS — E11.42 DIABETIC PERIPHERAL NEUROPATHY: ICD-10-CM

## 2024-01-08 DIAGNOSIS — Z79.4 TYPE 2 DIABETES MELLITUS WITH DIABETIC POLYNEUROPATHY, WITH LONG-TERM CURRENT USE OF INSULIN: ICD-10-CM

## 2024-01-08 DIAGNOSIS — R26.2 DIFFICULTY IN WALKING, NOT ELSEWHERE CLASSIFIED: ICD-10-CM

## 2024-01-08 DIAGNOSIS — E11.65 UNCONTROLLED TYPE 2 DIABETES MELLITUS WITH HYPERGLYCEMIA: ICD-10-CM

## 2024-01-08 DIAGNOSIS — L97.512 ULCER OF RIGHT FOOT WITH FAT LAYER EXPOSED: ICD-10-CM

## 2024-01-08 DIAGNOSIS — E08.65 DIABETES MELLITUS DUE TO UNDERLYING CONDITION WITH HYPERGLYCEMIA, WITH LONG-TERM CURRENT USE OF INSULIN: ICD-10-CM

## 2024-01-08 DIAGNOSIS — Z87.2 HISTORY OF ULCER OF LOWER EXTREMITY: ICD-10-CM

## 2024-01-08 DIAGNOSIS — Z91.89 AT HIGH RISK FOR INADEQUATE NUTRITIONAL INTAKE: ICD-10-CM

## 2024-01-08 PROCEDURE — 99214 OFFICE O/P EST MOD 30 MIN: CPT | Mod: 24,25,, | Performed by: PODIATRIST

## 2024-01-08 PROCEDURE — 3077F SYST BP >= 140 MM HG: CPT | Mod: CPTII,,, | Performed by: PODIATRIST

## 2024-01-08 PROCEDURE — 11045 DBRDMT SUBQ TISS EACH ADDL: CPT | Mod: PN | Performed by: PODIATRIST

## 2024-01-08 PROCEDURE — 11042 DBRDMT SUBQ TIS 1ST 20SQCM/<: CPT | Mod: PN | Performed by: PODIATRIST

## 2024-01-08 PROCEDURE — 3080F DIAST BP >= 90 MM HG: CPT | Mod: CPTII,,, | Performed by: PODIATRIST

## 2024-01-08 PROCEDURE — 1159F MED LIST DOCD IN RCRD: CPT | Mod: CPTII,,, | Performed by: PODIATRIST

## 2024-01-08 PROCEDURE — 11042 DBRDMT SUBQ TIS 1ST 20SQCM/<: CPT | Mod: 79,,, | Performed by: PODIATRIST

## 2024-01-08 PROCEDURE — 1160F RVW MEDS BY RX/DR IN RCRD: CPT | Mod: CPTII,,, | Performed by: PODIATRIST

## 2024-01-08 PROCEDURE — 11045 DBRDMT SUBQ TISS EACH ADDL: CPT | Mod: ,,, | Performed by: PODIATRIST

## 2024-01-08 RX ORDER — DOXYCYCLINE 100 MG/1
100 CAPSULE ORAL 2 TIMES DAILY
Qty: 28 CAPSULE | Refills: 0 | Status: SHIPPED | OUTPATIENT
Start: 2024-01-08 | End: 2024-01-22

## 2024-01-08 NOTE — PROGRESS NOTES
[address] [facility] [address]  [place] [address]  Phone: [phone]   [phone]    Patient's PCP:Alfie Lancaster MD  Referring Provider: Aaareferral Self    Subjective:      Chief Complaint:: Wound Care, Diabetic Foot Ulcer, Diabetes, Wound Check, Wound Consult, Non-healing Wound, Numbness, Peripheral Neuropathy, and Foot Ulcer    HPI  Philip Alberts is a 43 y.o. male who presents today with a complaint of multiple new diabetic foot ulcers located on his right foot- right plantar foot, right heel, right great toe, right 4th toe, and right 5th toe diabetic foot ulcers.   See wound docs documentation for full assessment and evaluation of all wounds.         EVALUATION, ASSESSMENT, & PLAN:     1. Debridement of right plantar foot diabetic foot ulcer.  Evaluation and Assessment only of right heel diabetic foot ulcer, right great toe diabetic foot ulcer, right 4th toe diabetic foot ulcer, and right 5th toe diabetic foot ulcer.   See Wound Docs for assessment of wounds and procedure notes  2. Continue taking all medications as prescribed  3. Dressing changes, see Wound docs for dressing change orders  4. RTC one week   5. Counseled patient on increasing protein intake, not getting wound wet, keeping dressing clean dry and intact, following a healthy diet, elevating legs when able, removing pressure from wound    Total time spent for E&M 40  Total time for debridement 35 minutes       Counseling/Education:  I provided patient education verbally regarding:   The aspects of diabetes and how it pertains to the feet. I explained the importance of proper diabetic foot care and how it is essential for the health of their feet.    I discussed the importance of knowing their Hemoglobin A1c and that the level needs to be as close to 6 as possible. I discussed the increase complications of high blood sugar including stroke, blindness, heart attack, kidney failure and loss of limb secondary to neuropathy and PVD.     With  neuropathy, beware of any breaks in the skin or redness. These areas are not recognized early due to the numbness.    I discussed Diabetes, lower back issues, metabolic disorders, systemic causes, chemotherapy, vitamin deficiency, heavy metal exposure, as some of the causes. I also explained that as much as 40% of the time we can not find a cause. I discussed different treatments available to control the symptoms but which may not cure the problem.       Counseled patient on the aspects of diabetes and how it pertains to the feet.  I explained the importance of proper diabetic foot care and how it is essential for the health of their feet.      Shoe inspection. Patient instructed on proper foot hygeine. We discussed wearing proper shoe gear, daily foot inspections, never walking without protective shoe gear, never putting sharp instruments to feet, routine podiatric nail visits every 2-3 months.        Proper ulcer care and the possible need for serial debridements, topical medications, specific dressings and biological engineered skin substitutes if indicated.    Patient should call the office immediately if any signs of infection, such as fever, chills, sweats, increased redness or pain.    Patient was instructed to call the clinic or go to the emergency department if their symptoms do not improve, worsens, or if new symptoms develop.  Patient was advised that if any increased swelling, pain, or numbness arise to go immediately to the ED. Patient knows to call any time if an emergency arises. Shared decision making occurred and patient verbalized understanding in agreement with this plan.       >50% of this > 60 minute visit was spent face to face educating/counseling the patient    I spent a total of 60 minutes on the day of the visit.This includes face to face time and non-face to face time preparing to see the patient (eg, review of tests), obtaining and/or reviewing separately obtained history, documenting  clinical information in the electronic or other health record, independently interpreting results and communicating results to the patient/family/caregiver, or care coordinator.       Much of the documentation for this visit was completed in the Wound Docs system.  Please see the attached documentation for further details about the patient's care. Scanned under the Media tab.        Anat Santiago DPM       Vitals:    01/08/24 0853   BP: (!) 179/99   Pulse: 84   Resp: 18   Temp: 98.3 °F (36.8 °C)   PainSc: 0-No pain      Shoe Size:     Past Surgical History:   Procedure Laterality Date    MANDIBLE FRACTURE SURGERY  07/17/2000    MANDIBLE FRACTURE SURGERY      SPLENECTOMY, TOTAL  07/17/2000    TOE AMPUTATION Right 10/9/2023    Procedure: AMPUTATION, TOE;  Surgeon: Ashok Santiago DPM;  Location: [facility];  Service: Podiatry;  Laterality: Right;     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  Tobacco History:  reports that he has been smoking. He has never used smokeless tobacco.  Drug History:  reports no history of drug use.    Review of patient's allergies indicates:   Allergen Reactions    Pcn [penicillins] Anaphylaxis     Tolerates cephalexin       Current Outpatient Medications   Medication Sig Dispense Refill    blood sugar diagnostic (TRUE METRIX GLUCOSE TEST STRIP) Strp USE  STRIP TO CHECK BLOOD GLUCOSE  4 TIMES DAILY 150 each 3    blood-glucose meter kit To check BG qid times  "daily, to use with insurance preferred meter 1 each 0    cephALEXin (KEFLEX) 500 MG capsule Take 1 capsule (500 mg total) by mouth every 6 (six) hours. for 7 days 28 capsule 0    doxycycline (VIBRAMYCIN) 100 MG Cap Take 1 capsule (100 mg total) by mouth 2 (two) times daily. for 14 days 28 capsule 0    esomeprazole (NEXIUM) 20 MG capsule Take 20 mg by mouth before breakfast.      gabapentin (NEURONTIN) 300 MG capsule Take 2 capsules (600 mg total) by mouth 3 (three) times daily. 180 capsule 5    HYDROcodone-acetaminophen (NORCO) 5-325 mg per tablet Take 1 tablet by mouth every 12 (twelve) hours as needed for Pain. (Patient not taking: Reported on 10/26/2023) 10 tablet 0    insulin aspart U-100 (NOVOLOG FLEXPEN U-100 INSULIN) 100 unit/mL (3 mL) InPn pen Inject 10 Units into the skin 4 (four) times daily. Per sliding scale as instructed 12 mL 5    insulin syringe-needle U-100 0.3 mL 31 gauge x 5/16" Syrg 1 Device by Misc.(Non-Drug; Combo Route) route 4 (four) times daily. 200 each 3    lancets Misc To check BGqid times daily, to use with insurance preferred meter (Patient taking differently: 1 lancet  by Misc.(Non-Drug; Combo Route) route 4 (four) times daily. To check BGqid times daily, to use with insurance preferred meter) 200 each 3    LANTUS SOLOSTAR U-100 INSULIN glargine 100 units/mL SubQ pen INJECT 30 UNITS SUBCUTANEOUSLY EVERY DAY AT BEDTIME 15 mL 0    lisinopriL (PRINIVIL,ZESTRIL) 5 MG tablet Take 1 tablet (5 mg total) by mouth once daily. 90 tablet 3    mupirocin (BACTROBAN) 2 % ointment Apply topically 3 (three) times daily. 22 g 0    pen needle, diabetic (BD ULTRA-FINE DENIS PEN NEEDLE MISC) 1 Needle by Misc.(Non-Drug; Combo Route) route 4 (four) times daily.      pravastatin (PRAVACHOL) 20 MG tablet Take 1 tablet (20 mg total) by mouth every evening. 90 tablet 3    sildenafiL (VIAGRA) 100 MG tablet Take 1 tablet (100 mg total) by mouth daily as needed for Erectile Dysfunction. 20 tablet 5     No current " facility-administered medications for this visit.       Review of Systems   Constitutional:  Negative for chills, fatigue, fever and unexpected weight change.   HENT:  Negative for hearing loss and trouble swallowing.    Eyes:  Negative for photophobia and visual disturbance.   Respiratory:  Negative for cough, shortness of breath and wheezing.    Cardiovascular:  Negative for chest pain, palpitations and leg swelling.   Gastrointestinal:  Negative for abdominal pain and nausea.   Genitourinary:  Negative for dysuria and frequency.   Musculoskeletal:  Negative for arthralgias, back pain, gait problem, joint swelling and myalgias.   Skin:  Positive for wound. Negative for rash.   Neurological:  Positive for numbness. Negative for tremors, seizures, weakness and headaches.   Hematological:  Does not bruise/bleed easily.         Objective:        Physical Exam:   Foot Exam  Physical Exam  Ortho/SPM Exam     Imaging:            Assessment:       1. Ulcer of right foot, with fat layer exposed    2. At high risk for inadequate nutritional intake    3. Uncontrolled type 2 diabetes mellitus with hyperglycemia    4. History of amputation of right foot    5. History of ulcer of lower extremity    6. Difficulty in walking, not elsewhere classified    7. Type 2 diabetes mellitus with diabetic polyneuropathy, with long-term current use of insulin    8. Diabetic peripheral neuropathy    9. Smoker    10. Diabetes mellitus due to underlying condition with hyperglycemia, with long-term current use of insulin    11. Ulcer of right foot with fat layer exposed      Plan:   Ulcer of right foot, with fat layer exposed    At high risk for inadequate nutritional intake    Uncontrolled type 2 diabetes mellitus with hyperglycemia    History of amputation of right foot    History of ulcer of lower extremity    Difficulty in walking, not elsewhere classified    Type 2 diabetes mellitus with diabetic polyneuropathy, with long-term current use  of insulin    Diabetic peripheral neuropathy    Smoker    Diabetes mellitus due to underlying condition with hyperglycemia, with long-term current use of insulin    Ulcer of right foot with fat layer exposed    Other orders  -     doxycycline (VIBRAMYCIN) 100 MG Cap; Take 1 capsule (100 mg total) by mouth 2 (two) times daily. for 14 days  Dispense: 28 capsule; Refill: 0      Follow up in about 1 week (around 1/15/2024).    Procedures          Counseling:     I provided patient education verbally regarding:   Patient diagnosis, treatment options, as well as alternatives, risks, and benefits.     This note was created using Dragon voice recognition software that occasionally misinterpreted phrases or words.

## 2024-01-11 LAB
ALBUMIN/CREAT UR: 10 MG/G CREAT (ref 0–29)
BUN SERPL-MCNC: 20 MG/DL (ref 6–24)
BUN/CREAT SERPL: 20 (ref 9–20)
CALCIUM SERPL-MCNC: 9.5 MG/DL (ref 8.7–10.2)
CHLORIDE SERPL-SCNC: 97 MMOL/L (ref 96–106)
CHOLEST SERPL-MCNC: 185 MG/DL (ref 100–199)
CO2 SERPL-SCNC: 26 MMOL/L (ref 20–29)
CREAT SERPL-MCNC: 0.98 MG/DL (ref 0.76–1.27)
CREAT UR-MCNC: 353.4 MG/DL
EST. GFR  (NO RACE VARIABLE): 98 ML/MIN/1.73
GAD65 AB SER IA-ACNC: 1505.2 U/ML (ref 0–5)
GLUCOSE SERPL-MCNC: 205 MG/DL (ref 70–99)
HBA1C MFR BLD: 9.9 % (ref 4.8–5.6)
HDLC SERPL-MCNC: 74 MG/DL
LDLC SERPL CALC-MCNC: 100 MG/DL (ref 0–99)
MICROALBUMIN UR-MCNC: 35.9 UG/ML
POTASSIUM SERPL-SCNC: 4.2 MMOL/L (ref 3.5–5.2)
SODIUM SERPL-SCNC: 142 MMOL/L (ref 134–144)
TRIGL SERPL-MCNC: 57 MG/DL (ref 0–149)
VLDLC SERPL CALC-MCNC: 11 MG/DL (ref 5–40)

## 2024-01-16 ENCOUNTER — OFFICE VISIT (OUTPATIENT)
Dept: WOUND CARE | Facility: HOSPITAL | Age: 44
End: 2024-01-16
Attending: PODIATRIST
Payer: MEDICAID

## 2024-01-16 VITALS
HEART RATE: 90 BPM | DIASTOLIC BLOOD PRESSURE: 89 MMHG | SYSTOLIC BLOOD PRESSURE: 139 MMHG | RESPIRATION RATE: 18 BRPM | TEMPERATURE: 99 F

## 2024-01-16 DIAGNOSIS — F17.200 SMOKER: ICD-10-CM

## 2024-01-16 DIAGNOSIS — Z79.4 DIABETES MELLITUS DUE TO UNDERLYING CONDITION WITH HYPERGLYCEMIA, WITH LONG-TERM CURRENT USE OF INSULIN: ICD-10-CM

## 2024-01-16 DIAGNOSIS — L08.9 DIABETIC FOOT INFECTION: ICD-10-CM

## 2024-01-16 DIAGNOSIS — E11.628 DIABETIC FOOT INFECTION: ICD-10-CM

## 2024-01-16 DIAGNOSIS — Z87.2 HISTORY OF ULCER OF LOWER EXTREMITY: ICD-10-CM

## 2024-01-16 DIAGNOSIS — Z89.431 HISTORY OF AMPUTATION OF RIGHT FOOT: ICD-10-CM

## 2024-01-16 DIAGNOSIS — L97.513 ULCER OF RIGHT FOOT WITH NECROSIS OF MUSCLE: ICD-10-CM

## 2024-01-16 DIAGNOSIS — E11.42 TYPE 2 DIABETES MELLITUS WITH DIABETIC POLYNEUROPATHY, WITH LONG-TERM CURRENT USE OF INSULIN: ICD-10-CM

## 2024-01-16 DIAGNOSIS — L97.513 RIGHT FOOT ULCER, WITH NECROSIS OF MUSCLE: ICD-10-CM

## 2024-01-16 DIAGNOSIS — L97.512 ULCER OF RIGHT FOOT, WITH FAT LAYER EXPOSED: Primary | ICD-10-CM

## 2024-01-16 DIAGNOSIS — R26.2 DIFFICULTY IN WALKING, NOT ELSEWHERE CLASSIFIED: ICD-10-CM

## 2024-01-16 DIAGNOSIS — E11.65 UNCONTROLLED TYPE 2 DIABETES MELLITUS WITH HYPERGLYCEMIA: ICD-10-CM

## 2024-01-16 DIAGNOSIS — L97.512 ULCER OF RIGHT FOOT WITH FAT LAYER EXPOSED: ICD-10-CM

## 2024-01-16 DIAGNOSIS — Z91.89 AT HIGH RISK FOR INADEQUATE NUTRITIONAL INTAKE: ICD-10-CM

## 2024-01-16 DIAGNOSIS — E11.42 DIABETIC PERIPHERAL NEUROPATHY: ICD-10-CM

## 2024-01-16 DIAGNOSIS — E08.65 DIABETES MELLITUS DUE TO UNDERLYING CONDITION WITH HYPERGLYCEMIA, WITH LONG-TERM CURRENT USE OF INSULIN: ICD-10-CM

## 2024-01-16 DIAGNOSIS — Z79.4 TYPE 2 DIABETES MELLITUS WITH DIABETIC POLYNEUROPATHY, WITH LONG-TERM CURRENT USE OF INSULIN: ICD-10-CM

## 2024-01-16 DIAGNOSIS — L97.514 ULCER OF RIGHT FOOT WITH NECROSIS OF BONE: ICD-10-CM

## 2024-01-16 PROCEDURE — 3075F SYST BP GE 130 - 139MM HG: CPT | Mod: CPTII,,, | Performed by: PODIATRIST

## 2024-01-16 PROCEDURE — 1160F RVW MEDS BY RX/DR IN RCRD: CPT | Mod: CPTII,,, | Performed by: PODIATRIST

## 2024-01-16 PROCEDURE — 3061F NEG MICROALBUMINURIA REV: CPT | Mod: CPTII,,, | Performed by: PODIATRIST

## 2024-01-16 PROCEDURE — 99214 OFFICE O/P EST MOD 30 MIN: CPT | Mod: 25,,, | Performed by: PODIATRIST

## 2024-01-16 PROCEDURE — 1159F MED LIST DOCD IN RCRD: CPT | Mod: CPTII,,, | Performed by: PODIATRIST

## 2024-01-16 PROCEDURE — 3066F NEPHROPATHY DOC TX: CPT | Mod: CPTII,,, | Performed by: PODIATRIST

## 2024-01-16 PROCEDURE — 3046F HEMOGLOBIN A1C LEVEL >9.0%: CPT | Mod: CPTII,,, | Performed by: PODIATRIST

## 2024-01-16 PROCEDURE — 3079F DIAST BP 80-89 MM HG: CPT | Mod: CPTII,,, | Performed by: PODIATRIST

## 2024-01-16 PROCEDURE — 11042 DBRDMT SUBQ TIS 1ST 20SQCM/<: CPT | Mod: PN | Performed by: PODIATRIST

## 2024-01-16 PROCEDURE — 11042 DBRDMT SUBQ TIS 1ST 20SQCM/<: CPT | Mod: ,,, | Performed by: PODIATRIST

## 2024-01-17 NOTE — PROGRESS NOTES
[address] [facility] [address]  [place] [address]  Phone: [phone]   [phone]    Patient's PCP:Alfie Lancaster MD  Referring Provider: Aaareferral Self    Subjective:      Chief Complaint:: Wound Care, Foot Ulcer, Wound Check, Non-healing Wound, Numbness, Peripheral Neuropathy, Diabetes, Diabetic Foot Ulcer, and Difficulty Walking    HPI  Philip Alberts is a 43 y.o. male who presents today with a complaint of multiple new diabetic foot ulcers located on his right foot- right plantar foot, right heel, right great toe, right 4th toe, and right 5th toe diabetic foot ulcers.   See wound docs documentation for full assessment and evaluation of all wounds.            EVALUATION, ASSESSMENT, & PLAN:      1. Debridement of right plantar foot diabetic foot ulcer.  Evaluation and Assessment only of right heel diabetic foot ulcer, right great toe diabetic foot ulcer, right 4th toe diabetic foot ulcer, and right 5th toe diabetic foot ulcer.   See Wound Docs for assessment of wounds and procedure notes  2. Continue taking all medications as prescribed  3. Dressing changes, see Wound docs for dressing change orders  4. RTC one week   5. Counseled patient on increasing protein intake, not getting wound wet, keeping dressing clean dry and intact, following a healthy diet, elevating legs when able, removing pressure from wound     Total time spent for E&M 40  Total time for debridement 35 minutes         Counseling/Education:  I provided patient education verbally regarding:   The aspects of diabetes and how it pertains to the feet. I explained the importance of proper diabetic foot care and how it is essential for the health of their feet.     I discussed the importance of knowing their Hemoglobin A1c and that the level needs to be as close to 6 as possible. I discussed the increase complications of high blood sugar including stroke, blindness, heart attack, kidney failure and loss of limb secondary to neuropathy and  PVD.      With neuropathy, beware of any breaks in the skin or redness. These areas are not recognized early due to the numbness.     I discussed Diabetes, lower back issues, metabolic disorders, systemic causes, chemotherapy, vitamin deficiency, heavy metal exposure, as some of the causes. I also explained that as much as 40% of the time we can not find a cause. I discussed different treatments available to control the symptoms but which may not cure the problem.         Counseled patient on the aspects of diabetes and how it pertains to the feet.  I explained the importance of proper diabetic foot care and how it is essential for the health of their feet.        Shoe inspection. Patient instructed on proper foot hygeine. We discussed wearing proper shoe gear, daily foot inspections, never walking without protective shoe gear, never putting sharp instruments to feet, routine podiatric nail visits every 2-3 months.          Proper ulcer care and the possible need for serial debridements, topical medications, specific dressings and biological engineered skin substitutes if indicated.     Patient should call the office immediately if any signs of infection, such as fever, chills, sweats, increased redness or pain.     Patient was instructed to call the clinic or go to the emergency department if their symptoms do not improve, worsens, or if new symptoms develop.  Patient was advised that if any increased swelling, pain, or numbness arise to go immediately to the ED. Patient knows to call any time if an emergency arises. Shared decision making occurred and patient verbalized understanding in agreement with this plan.         >50% of this > 60 minute visit was spent face to face educating/counseling the patient     I spent a total of 60 minutes on the day of the visit.This includes face to face time and non-face to face time preparing to see the patient (eg, review of tests), obtaining and/or reviewing separately  obtained history, documenting clinical information in the electronic or other health record, independently interpreting results and communicating results to the patient/family/caregiver, or care coordinator.        Much of the documentation for this visit was completed in the Wound Docs system.  Please see the attached documentation for further details about the patient's care. Scanned under the Media tab.         Anat JORDAN Santiago        Vitals:    01/16/24 1310   BP: 139/89   Pulse: 90   Resp: 18   Temp: 98.5 °F (36.9 °C)   PainSc: 0-No pain      Shoe Size:     Past Surgical History:   Procedure Laterality Date    MANDIBLE FRACTURE SURGERY  07/17/2000    MANDIBLE FRACTURE SURGERY      SPLENECTOMY, TOTAL  07/17/2000    TOE AMPUTATION Right 10/9/2023    Procedure: AMPUTATION, TOE;  Surgeon: Ashok Santiago DPM;  Location: [facility];  Service: Podiatry;  Laterality: Right;     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  Tobacco History:  reports that he has been smoking. He has never used smokeless tobacco.  Drug History:  reports no history of drug use.    Review of patient's allergies indicates:   Allergen Reactions    Pcn [penicillins] Anaphylaxis     Tolerates cephalexin       Current Outpatient Medications   Medication Sig Dispense Refill    blood sugar diagnostic (TRUE METRIX GLUCOSE TEST STRIP) Strp USE  STRIP TO CHECK BLOOD GLUCOSE  4 TIMES DAILY 150 each 3    blood-glucose  "meter kit To check BG qid times daily, to use with insurance preferred meter 1 each 0    doxycycline (VIBRAMYCIN) 100 MG Cap Take 1 capsule (100 mg total) by mouth 2 (two) times daily. for 14 days 28 capsule 0    esomeprazole (NEXIUM) 20 MG capsule Take 20 mg by mouth before breakfast.      gabapentin (NEURONTIN) 300 MG capsule Take 2 capsules (600 mg total) by mouth 3 (three) times daily. 180 capsule 5    HYDROcodone-acetaminophen (NORCO) 5-325 mg per tablet Take 1 tablet by mouth every 12 (twelve) hours as needed for Pain. (Patient not taking: Reported on 10/26/2023) 10 tablet 0    insulin aspart U-100 (NOVOLOG FLEXPEN U-100 INSULIN) 100 unit/mL (3 mL) InPn pen Inject 10 Units into the skin 4 (four) times daily. Per sliding scale as instructed 12 mL 5    insulin syringe-needle U-100 0.3 mL 31 gauge x 5/16" Syrg 1 Device by Misc.(Non-Drug; Combo Route) route 4 (four) times daily. 200 each 3    lancets Misc To check BGqid times daily, to use with insurance preferred meter (Patient taking differently: 1 lancet  by Misc.(Non-Drug; Combo Route) route 4 (four) times daily. To check BGqid times daily, to use with insurance preferred meter) 200 each 3    LANTUS SOLOSTAR U-100 INSULIN glargine 100 units/mL SubQ pen INJECT 30 UNITS SUBCUTANEOUSLY EVERY DAY AT BEDTIME 15 mL 0    lisinopriL (PRINIVIL,ZESTRIL) 5 MG tablet Take 1 tablet (5 mg total) by mouth once daily. 90 tablet 3    mupirocin (BACTROBAN) 2 % ointment Apply topically 3 (three) times daily. 22 g 0    pen needle, diabetic (BD ULTRA-FINE DENIS PEN NEEDLE MISC) 1 Needle by Misc.(Non-Drug; Combo Route) route 4 (four) times daily.      pravastatin (PRAVACHOL) 20 MG tablet Take 1 tablet (20 mg total) by mouth every evening. 90 tablet 3    sildenafiL (VIAGRA) 100 MG tablet Take 1 tablet (100 mg total) by mouth daily as needed for Erectile Dysfunction. 20 tablet 5     No current facility-administered medications for this visit.       Review of Systems   Constitutional:  " Negative for chills, fatigue, fever and unexpected weight change.   HENT:  Negative for hearing loss and trouble swallowing.    Eyes:  Negative for photophobia and visual disturbance.   Respiratory:  Negative for cough, shortness of breath and wheezing.    Cardiovascular:  Negative for chest pain, palpitations and leg swelling.   Gastrointestinal:  Negative for abdominal pain and nausea.   Genitourinary:  Negative for dysuria and frequency.   Musculoskeletal:  Negative for arthralgias, back pain, gait problem, joint swelling and myalgias.   Skin:  Positive for wound. Negative for rash.   Neurological:  Positive for numbness. Negative for tremors, seizures, weakness and headaches.   Hematological:  Does not bruise/bleed easily.         Objective:        Physical Exam:   Foot Exam  Physical Exam  Ortho/SPM Exam     Imaging:            Assessment:       1. Ulcer of right foot, with fat layer exposed    2. At high risk for inadequate nutritional intake    3. Smoker    4. History of ulcer of lower extremity    5. History of amputation of right foot    6. Diabetes mellitus due to underlying condition with hyperglycemia, with long-term current use of insulin    7. Uncontrolled type 2 diabetes mellitus with hyperglycemia    8. Diabetic peripheral neuropathy    9. Type 2 diabetes mellitus with diabetic polyneuropathy, with long-term current use of insulin    10. Difficulty in walking, not elsewhere classified    11. Right foot ulcer, with necrosis of muscle    12. Ulcer of right foot with fat layer exposed    13. Diabetic foot infection    14. Ulcer of right foot with necrosis of bone    15. Ulcer of right foot with necrosis of muscle      Plan:   Ulcer of right foot, with fat layer exposed    At high risk for inadequate nutritional intake    Smoker    History of ulcer of lower extremity    History of amputation of right foot    Diabetes mellitus due to underlying condition with hyperglycemia, with long-term current use of  insulin    Uncontrolled type 2 diabetes mellitus with hyperglycemia    Diabetic peripheral neuropathy    Type 2 diabetes mellitus with diabetic polyneuropathy, with long-term current use of insulin    Difficulty in walking, not elsewhere classified    Right foot ulcer, with necrosis of muscle    Ulcer of right foot with fat layer exposed    Diabetic foot infection    Ulcer of right foot with necrosis of bone    Ulcer of right foot with necrosis of muscle      Follow up in about 1 week (around 1/23/2024).    Procedures          Counseling:     I provided patient education verbally regarding:   Patient diagnosis, treatment options, as well as alternatives, risks, and benefits.     This note was created using Dragon voice recognition software that occasionally misinterpreted phrases or words.

## 2024-01-22 ENCOUNTER — OFFICE VISIT (OUTPATIENT)
Dept: WOUND CARE | Facility: HOSPITAL | Age: 44
End: 2024-01-22
Attending: PODIATRIST
Payer: MEDICAID

## 2024-01-22 ENCOUNTER — PATIENT MESSAGE (OUTPATIENT)
Dept: FAMILY MEDICINE | Facility: CLINIC | Age: 44
End: 2024-01-22

## 2024-01-22 ENCOUNTER — OFFICE VISIT (OUTPATIENT)
Dept: FAMILY MEDICINE | Facility: CLINIC | Age: 44
End: 2024-01-22
Payer: MEDICAID

## 2024-01-22 VITALS
BODY MASS INDEX: 23.03 KG/M2 | SYSTOLIC BLOOD PRESSURE: 165 MMHG | DIASTOLIC BLOOD PRESSURE: 92 MMHG | HEIGHT: 72 IN | RESPIRATION RATE: 18 BRPM | TEMPERATURE: 99 F | WEIGHT: 170 LBS | HEART RATE: 87 BPM

## 2024-01-22 VITALS
DIASTOLIC BLOOD PRESSURE: 72 MMHG | HEIGHT: 72 IN | SYSTOLIC BLOOD PRESSURE: 127 MMHG | WEIGHT: 170 LBS | BODY MASS INDEX: 23.03 KG/M2

## 2024-01-22 DIAGNOSIS — E78.2 MIXED HYPERLIPIDEMIA: ICD-10-CM

## 2024-01-22 DIAGNOSIS — E10.42 TYPE 1 DM WITH POLYNEUROPATHY: Primary | ICD-10-CM

## 2024-01-22 DIAGNOSIS — L97.529 ULCER OF LEFT FOOT, UNSPECIFIED ULCER STAGE: ICD-10-CM

## 2024-01-22 DIAGNOSIS — E11.42 TYPE 2 DIABETES MELLITUS WITH DIABETIC POLYNEUROPATHY, WITH LONG-TERM CURRENT USE OF INSULIN: ICD-10-CM

## 2024-01-22 DIAGNOSIS — L08.9 DIABETIC FOOT INFECTION: ICD-10-CM

## 2024-01-22 DIAGNOSIS — T14.8XXA WOUND INFECTION: ICD-10-CM

## 2024-01-22 DIAGNOSIS — I99.9 VASCULOPATHY: ICD-10-CM

## 2024-01-22 DIAGNOSIS — I10 PRIMARY HYPERTENSION: ICD-10-CM

## 2024-01-22 DIAGNOSIS — E08.65 DIABETES MELLITUS DUE TO UNDERLYING CONDITION WITH HYPERGLYCEMIA, WITH LONG-TERM CURRENT USE OF INSULIN: ICD-10-CM

## 2024-01-22 DIAGNOSIS — L97.512 ULCER OF RIGHT FOOT, WITH FAT LAYER EXPOSED: Primary | ICD-10-CM

## 2024-01-22 DIAGNOSIS — Z79.4 DIABETES MELLITUS DUE TO UNDERLYING CONDITION WITH HYPERGLYCEMIA, WITH LONG-TERM CURRENT USE OF INSULIN: ICD-10-CM

## 2024-01-22 DIAGNOSIS — Z89.431 HISTORY OF AMPUTATION OF RIGHT FOOT: ICD-10-CM

## 2024-01-22 DIAGNOSIS — E11.65 UNCONTROLLED TYPE 2 DIABETES MELLITUS WITH HYPERGLYCEMIA: ICD-10-CM

## 2024-01-22 DIAGNOSIS — E11.628 DIABETIC FOOT INFECTION: ICD-10-CM

## 2024-01-22 DIAGNOSIS — L08.9 WOUND INFECTION: ICD-10-CM

## 2024-01-22 DIAGNOSIS — Z79.4 TYPE 2 DIABETES MELLITUS WITH DIABETIC POLYNEUROPATHY, WITH LONG-TERM CURRENT USE OF INSULIN: ICD-10-CM

## 2024-01-22 DIAGNOSIS — Z87.2 HISTORY OF ULCER OF LOWER EXTREMITY: ICD-10-CM

## 2024-01-22 DIAGNOSIS — E11.42 DIABETIC PERIPHERAL NEUROPATHY: ICD-10-CM

## 2024-01-22 DIAGNOSIS — R26.2 DIFFICULTY IN WALKING, NOT ELSEWHERE CLASSIFIED: ICD-10-CM

## 2024-01-22 DIAGNOSIS — Z91.89 AT HIGH RISK FOR INADEQUATE NUTRITIONAL INTAKE: ICD-10-CM

## 2024-01-22 DIAGNOSIS — F17.200 SMOKER: ICD-10-CM

## 2024-01-22 PROCEDURE — 3008F BODY MASS INDEX DOCD: CPT | Mod: CPTII,,, | Performed by: PODIATRIST

## 2024-01-22 PROCEDURE — 99213 OFFICE O/P EST LOW 20 MIN: CPT | Mod: PN | Performed by: PODIATRIST

## 2024-01-22 PROCEDURE — 99214 OFFICE O/P EST MOD 30 MIN: CPT | Mod: ,,, | Performed by: PODIATRIST

## 2024-01-22 PROCEDURE — 3046F HEMOGLOBIN A1C LEVEL >9.0%: CPT | Mod: CPTII,,, | Performed by: INTERNAL MEDICINE

## 2024-01-22 PROCEDURE — 1159F MED LIST DOCD IN RCRD: CPT | Mod: CPTII,,, | Performed by: INTERNAL MEDICINE

## 2024-01-22 PROCEDURE — 99215 OFFICE O/P EST HI 40 MIN: CPT | Mod: S$PBB,,, | Performed by: INTERNAL MEDICINE

## 2024-01-22 PROCEDURE — 3074F SYST BP LT 130 MM HG: CPT | Mod: CPTII,,, | Performed by: INTERNAL MEDICINE

## 2024-01-22 PROCEDURE — 3046F HEMOGLOBIN A1C LEVEL >9.0%: CPT | Mod: CPTII,,, | Performed by: PODIATRIST

## 2024-01-22 PROCEDURE — 3061F NEG MICROALBUMINURIA REV: CPT | Mod: CPTII,,, | Performed by: PODIATRIST

## 2024-01-22 PROCEDURE — 1160F RVW MEDS BY RX/DR IN RCRD: CPT | Mod: CPTII,,, | Performed by: PODIATRIST

## 2024-01-22 PROCEDURE — 3078F DIAST BP <80 MM HG: CPT | Mod: CPTII,,, | Performed by: INTERNAL MEDICINE

## 2024-01-22 PROCEDURE — 3080F DIAST BP >= 90 MM HG: CPT | Mod: CPTII,,, | Performed by: PODIATRIST

## 2024-01-22 PROCEDURE — 3077F SYST BP >= 140 MM HG: CPT | Mod: CPTII,,, | Performed by: PODIATRIST

## 2024-01-22 PROCEDURE — 1160F RVW MEDS BY RX/DR IN RCRD: CPT | Mod: CPTII,,, | Performed by: INTERNAL MEDICINE

## 2024-01-22 PROCEDURE — 3008F BODY MASS INDEX DOCD: CPT | Mod: CPTII,,, | Performed by: INTERNAL MEDICINE

## 2024-01-22 PROCEDURE — 99213 OFFICE O/P EST LOW 20 MIN: CPT | Mod: PBBFAC,27,PN | Performed by: INTERNAL MEDICINE

## 2024-01-22 PROCEDURE — 1159F MED LIST DOCD IN RCRD: CPT | Mod: CPTII,,, | Performed by: PODIATRIST

## 2024-01-22 PROCEDURE — 3061F NEG MICROALBUMINURIA REV: CPT | Mod: CPTII,,, | Performed by: INTERNAL MEDICINE

## 2024-01-22 PROCEDURE — 3066F NEPHROPATHY DOC TX: CPT | Mod: CPTII,,, | Performed by: INTERNAL MEDICINE

## 2024-01-22 PROCEDURE — 3066F NEPHROPATHY DOC TX: CPT | Mod: CPTII,,, | Performed by: PODIATRIST

## 2024-01-22 PROCEDURE — 99999 PR PBB SHADOW E&M-EST. PATIENT-LVL III: CPT | Mod: PBBFAC,,, | Performed by: INTERNAL MEDICINE

## 2024-01-22 RX ORDER — INSULIN GLARGINE 100 [IU]/ML
INJECTION, SOLUTION SUBCUTANEOUS
Qty: 15 ML | Refills: 0
Start: 2024-01-22 | End: 2024-02-12 | Stop reason: SDUPTHER

## 2024-01-22 RX ORDER — BLOOD-GLUCOSE,RECEIVER,CONT
1 EACH MISCELLANEOUS DAILY
Qty: 1 EACH | Refills: 0 | Status: SHIPPED | OUTPATIENT
Start: 2024-01-22 | End: 2025-01-21

## 2024-01-22 RX ORDER — DOXYCYCLINE 100 MG/1
100 CAPSULE ORAL 2 TIMES DAILY
Qty: 28 CAPSULE | Refills: 0 | Status: SHIPPED | OUTPATIENT
Start: 2024-01-22 | End: 2024-02-05

## 2024-01-22 RX ORDER — BLOOD-GLUCOSE SENSOR
1 EACH MISCELLANEOUS
Qty: 3 EACH | Refills: 5 | Status: SHIPPED | OUTPATIENT
Start: 2024-01-22 | End: 2024-06-18 | Stop reason: SDUPTHER

## 2024-01-22 RX ORDER — ASPIRIN 81 MG/1
81 TABLET ORAL DAILY
Qty: 90 TABLET | Refills: 3 | Status: SHIPPED | OUTPATIENT
Start: 2024-01-22 | End: 2025-01-21

## 2024-01-22 NOTE — PROGRESS NOTES
Subjective:       Patient ID: Philip Alberts is a 43 y.o. male.    Chief Complaint: Diabetes, Hyperlipidemia, Erectile Dysfunction, and Hypertension    PATIENT IS A 43-YEAR-OLD MALE WHO COMES FOLLOW-UP.  Diabetes control continues to be challenging.  Recent lab indicates that he is type 1 diabetic with a glutamic acid antibody strongly positive.  Thus far he was treated as type 2 diabetic.    He is currently on insulin combinations.    1.-poorly-controlled diabetes mellitus on long and short-acting insulin.  He was educated on the mechanism of action of short-acting insulin and long-acting insulin at that point.  He was under the impression that he has to take the long-acting insulin 3 times a day.    2.-chronic tobacco dependency was noted also and he was advised to quit smoking.  At that point he had stated that he has no other pleasures left in life and not even eating given lack of teeth and that smoking is his only pleasure.  3.-gastroesophageal reflux   4.-hyperlipidemia  5.-neuropathy.    The biggest issue seems to be nonhealing and recurrent wounds in the legs.  He has necrotic ulcers in the feet.    Not sure as to the reason for this.        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 12/3/2018     Social History     Socioeconomic History    Marital status: Single   Tobacco Use    Smoking status: Every Day    Smokeless tobacco: Never   Substance and Sexual Activity    Alcohol use: No    Drug use: No    Sexual activity: Yes     Social Determinants of Health     Financial Resource Strain: High Risk (8/17/2023)    Overall Financial Resource Strain (CARDIA)     Difficulty  of Paying Living Expenses: Hard   Food Insecurity: No Food Insecurity (8/17/2023)    Hunger Vital Sign     Worried About Running Out of Food in the Last Year: Never true     Ran Out of Food in the Last Year: Never true   Transportation Needs: No Transportation Needs (8/17/2023)    PRAPARE - Transportation     Lack of Transportation (Medical): No     Lack of Transportation (Non-Medical): No   Physical Activity: Inactive (8/17/2023)    Exercise Vital Sign     Days of Exercise per Week: 0 days     Minutes of Exercise per Session: 0 min   Stress: Stress Concern Present (8/17/2023)    [employer] [place] [employer] - Occupational Stress Questionnaire     Feeling of Stress : To some extent   Social Connections: Socially Isolated (8/17/2023)    Social Connection and Isolation Panel [NHANES]     Frequency of Communication with Friends and Family: Twice a week     Frequency of Social Gatherings with Friends and Family: Twice a week     Attends Anabaptism Services: Never     Active Member of Clubs or Organizations: No     Attends Club or Organization Meetings: Never     Marital Status:    Housing Stability: Low Risk  (8/17/2023)    Housing Stability Vital Sign     Unable to Pay for Housing in the Last Year: No     Number of Places Lived in the Last Year: 1     Unstable Housing in the Last Year: No     Past Surgical History:   Procedure Laterality Date    MANDIBLE FRACTURE SURGERY  07/17/2000    MANDIBLE FRACTURE SURGERY      SPLENECTOMY, TOTAL  07/17/2000    TOE AMPUTATION Right 10/9/2023    Procedure: AMPUTATION, TOE;  Surgeon: Ashok Santiago DPM;  Location: [facility];  Service: Podiatry;  Laterality: Right;     Family History   Problem Relation Age of Onset    Diabetes Mother     Cancer Father        Review of Systems      Objective:      Blood pressure 127/72, pulse (P) 71, height 6' (1.829 m), weight 77.1 kg (170 lb). Body mass index is 23.06 kg/m².  Physical Exam  Vitals and nursing note reviewed.    Constitutional:       General: He is not in acute distress.     Appearance: He is well-developed. He is ill-appearing (Somewhat chronically ill). He is not toxic-appearing or diaphoretic.      Comments: BMI is 23.06   HENT:      Head: Normocephalic and atraumatic.      Mouth/Throat:      Pharynx: No oropharyngeal exudate or posterior oropharyngeal erythema.      Comments: Edentulous.  Eyes:      Conjunctiva/sclera: Conjunctivae normal.   Neck:      Thyroid: No thyromegaly.      Vascular: No JVD.      Trachea: No tracheal deviation.   Cardiovascular:      Rate and Rhythm: Normal rate and regular rhythm.      Heart sounds: Normal heart sounds. No murmur heard.     No friction rub. No gallop.   Pulmonary:      Effort: Pulmonary effort is normal.      Breath sounds: Normal breath sounds.   Abdominal:      General: There is no distension.      Palpations: Abdomen is soft.      Tenderness: There is no abdominal tenderness.   Musculoskeletal:         General: No swelling.      Cervical back: Neck supple. No rigidity.      Right lower leg: No edema.      Left lower leg: No edema.      Left foot: Normal range of motion.   Feet:      Left foot:      Skin integrity: No ulcer, blister or erythema.      Toenail Condition: Left toenails are abnormally thick.      Comments: There is a thick dressing on both the legs and feet.  This was not removed.  I have seen the pictures..  Lymphadenopathy:      Cervical: No cervical adenopathy.   Skin:     General: Skin is warm and dry.   Neurological:      Mental Status: He is alert. Mental status is at baseline.      Gait: Gait normal.   Psychiatric:         Mood and Affect: Mood is anxious.      Comments: Verbose                                 Assessment:       No visits with results within 3 Month(s) from this visit.   Latest known visit with results is:   No results displayed because visit has over 200 results.        Component Ref Range & Units 2 wk ago 5 mo ago 9 mo ago 11 mo ago 2  yr ago 3 yr ago 5 yr ago   Hemoglobin A1c 4.8 - 5.6 % 9.9 High  9.4 High  R, CM 10.0 High  R, CM 12.2 High  R, CM 9.0 High  R, CM 13.1 High  R, CM 11.7 High  R       1. Type 1 DM with polyneuropathy  -     blood-glucose sensor (DEXCOM G7 SENSOR) Gosia; 1 Device by Misc.(Non-Drug; Combo Route) route every 10 days.  Dispense: 3 each; Refill: 5  -     blood-glucose meter,continuous (DEXCOM ) Misc; 1 Device by Misc.(Non-Drug; Combo Route) route once daily.  Dispense: 1 each; Refill: 0  -     Ambulatory referral/consult to Diabetes Education; Future; Expected date: 01/29/2024  -     LANTUS SOLOSTAR U-100 INSULIN glargine 100 units/mL SubQ pen; INJECT 20  UNITS SUBCUTANEOUSLY BID  Dispense: 15 mL; Refill: 0    2. Primary hypertension    3. Mixed hyperlipidemia    4. Ulcer of left foot, unspecified ulcer stage    5. Vasculopathy  -     aspirin (ECOTRIN) 81 MG EC tablet; Take 1 tablet (81 mg total) by mouth once daily.  Dispense: 90 tablet; Refill: 3      Component      Latest Ref Rng 8/7/2023   Sodium      134 - 144 mmol/L 140    Potassium      3.5 - 5.2 mmol/L 4.5    Chloride      96 - 106 mmol/L 104    CO2      20 - 29 mmol/L 31 (H)    Glucose      70 - 99 mg/dL 164 (H)    BUN      6 - 24 mg/dL 12    Creatinine      0.76 - 1.27 mg/dL 0.7    Calcium      8.7 - 10.2 mg/dL 8.9    Anion Gap      8 - 16 mmol/L 5 (L)    eGFR      >59 mL/min/1.73 >60.0    BUN/CREAT RATIO      9 - 20     Cholesterol Total      100 - 199 mg/dL    Triglycerides      0 - 149 mg/dL    HDL      >39 mg/dL    VLDL Cholesterol Darell      5 - 40 mg/dL    LDL Calculated      0 - 99 mg/dL    Creatinine, Urine      Not Estab. mg/dL    Microalb, Ur      Not Estab. ug/mL    Microalb/Crt. Ratio      0 - 29 mg/g creat    Hemoglobin A1C External      4.8 - 5.6 % 9.4 (H)    Estimated Avg Glucose      68 - 131 mg/dL 223 (H)    Glutamate decarboxylase 65 Ab      0.0 - 5.0 U/mL       Legend:  (H) High  (L) Low       omponent Ref Range & Units 3 mo  ago  (10/11/23) 3 mo ago  (10/11/23) 3 mo ago  (10/10/23) 3 mo ago  (10/10/23) 3 mo ago  (10/10/23) 3 mo ago  (10/10/23) 3 mo ago  (10/10/23)   POC Glucose 70 - 110 382 High  188 High  186 High  153 High  269 High  405 High  244 High          Plan:   Type 1 DM with polyneuropathy  -     blood-glucose sensor (DEXCOM G7 SENSOR) Gosia; 1 Device by Misc.(Non-Drug; Combo Route) route every 10 days.  Dispense: 3 each; Refill: 5  -     blood-glucose meter,continuous (DEXCOM ) Misc; 1 Device by Misc.(Non-Drug; Combo Route) route once daily.  Dispense: 1 each; Refill: 0  -     Ambulatory referral/consult to Diabetes Education; Future; Expected date: 01/29/2024  -     LANTUS SOLOSTAR U-100 INSULIN glargine 100 units/mL SubQ pen; INJECT 20  UNITS SUBCUTANEOUSLY BID  Dispense: 15 mL; Refill: 0    Primary hypertension    Mixed hyperlipidemia    Ulcer of left foot, unspecified ulcer stage    Vasculopathy  -     aspirin (ECOTRIN) 81 MG EC tablet; Take 1 tablet (81 mg total) by mouth once daily.  Dispense: 90 tablet; Refill: 3      Quit smoking-this advice has been re-emphasized several times.    Patient is vasculopathic features have been noted with frequent necrotic ulcers and breakdown in the skin.    He is needs to take aspirin.      He was not quit smoking and probably will have a difficult time getting to understand why it is important for him to quit smoking.    He has been told that he is good circulation in the legs.    Will check with wound care also and podiatry if they have any thoughts or considerations.      Will sent for Dexcom Device so that he can closely monitor his blood sugars.      He needs to be aggressively treated with diabetes.  There is no endocrinologist here who will take Medicaid and it is limited.  Travel out of the [place] might be difficult for him.    He is very well aware of my limitations as a primary care physician for treating his complex conditions.    Increase Lantus to 20 units twice a  "day and keep on increasing till his blood sugars show some semblance of control.      Follow up in about 4 months (around 5/22/2024), or if symptoms worsen or fail to improve.    Spent ramón 45 minutes with patient which involved review of pts medical conditions, labs, medications and with 50% of time face-to-face discussion about medical problems, management and any applicable changes.        Current Outpatient Medications:     blood sugar diagnostic (TRUE METRIX GLUCOSE TEST STRIP) Strp, USE  STRIP TO CHECK BLOOD GLUCOSE  4 TIMES DAILY, Disp: 150 each, Rfl: 3    doxycycline (VIBRAMYCIN) 100 MG Cap, Take 1 capsule (100 mg total) by mouth 2 (two) times daily. for 14 days, Disp: 28 capsule, Rfl: 0    doxycycline (VIBRAMYCIN) 100 MG Cap, Take 1 capsule (100 mg total) by mouth 2 (two) times daily. for 14 days, Disp: 28 capsule, Rfl: 0    esomeprazole (NEXIUM) 20 MG capsule, Take 20 mg by mouth before breakfast., Disp: , Rfl:     gabapentin (NEURONTIN) 300 MG capsule, Take 2 capsules (600 mg total) by mouth 3 (three) times daily., Disp: 180 capsule, Rfl: 5    insulin aspart U-100 (NOVOLOG FLEXPEN U-100 INSULIN) 100 unit/mL (3 mL) InPn pen, Inject 10 Units into the skin 4 (four) times daily. Per sliding scale as instructed, Disp: 12 mL, Rfl: 5    insulin syringe-needle U-100 0.3 mL 31 gauge x 5/16" Syrg, 1 Device by Misc.(Non-Drug; Combo Route) route 4 (four) times daily., Disp: 200 each, Rfl: 3    lancets Misc, To check BGqid times daily, to use with insurance preferred meter (Patient taking differently: 1 lancet  by Misc.(Non-Drug; Combo Route) route 4 (four) times daily. To check BGqid times daily, to use with insurance preferred meter), Disp: 200 each, Rfl: 3    lisinopriL (PRINIVIL,ZESTRIL) 5 MG tablet, Take 1 tablet (5 mg total) by mouth once daily., Disp: 90 tablet, Rfl: 3    mupirocin (BACTROBAN) 2 % ointment, Apply topically 3 (three) times daily., Disp: 22 g, Rfl: 0    pen needle, diabetic (BD ULTRA-FINE DENIS PEN " NEEDLE MISC), 1 Needle by Misc.(Non-Drug; Combo Route) route 4 (four) times daily., Disp: , Rfl:     pravastatin (PRAVACHOL) 20 MG tablet, Take 1 tablet (20 mg total) by mouth every evening., Disp: 90 tablet, Rfl: 3    sildenafiL (VIAGRA) 100 MG tablet, Take 1 tablet (100 mg total) by mouth daily as needed for Erectile Dysfunction., Disp: 20 tablet, Rfl: 5    aspirin (ECOTRIN) 81 MG EC tablet, Take 1 tablet (81 mg total) by mouth once daily., Disp: 90 tablet, Rfl: 3    blood-glucose meter kit, To check BG qid times daily, to use with insurance preferred meter, Disp: 1 each, Rfl: 0    blood-glucose meter,continuous (DEXCOM ) Misc, 1 Device by Misc.(Non-Drug; Combo Route) route once daily., Disp: 1 each, Rfl: 0    blood-glucose sensor (DEXCOM G7 SENSOR) Gosia, 1 Device by Misc.(Non-Drug; Combo Route) route every 10 days., Disp: 3 each, Rfl: 5    LANTUS SOLOSTAR U-100 INSULIN glargine 100 units/mL SubQ pen, INJECT 20  UNITS SUBCUTANEOUSLY BID, Disp: 15 mL, Rfl: 0    Alfie Lancaster

## 2024-01-22 NOTE — PROGRESS NOTES
[address] [facility] [address]  [place] [address]  Phone: [phone]   [phone]    Patient's PCP:Alfie Lancaster MD  Referring Provider: Aaareferral Self    Subjective:      Chief Complaint:: Wound Care, Diabetic Foot Ulcer, Diabetes, Foot Ulcer, Non-healing Wound, Numbness, Peripheral Neuropathy, Wound Check, and Wound Infection    HPI  Philip Alberts is a 43 y.o. male who presents today with a complaint of multiple new diabetic foot ulcers located on his right foot- right plantar foot, right heel, right great toe, right 4th toe, and right 5th toe diabetic foot ulcers.   See wound docs documentation for full assessment and evaluation of all wounds.      Patient continues to smoke despite recommendations to stop- discussed multiple times the negative impact smoking has on wound healing. Patient is at risk of limb loss if he continues to smoke.     EVALUATION, ASSESSMENT, & PLAN:      1.   See Wound Docs for assessment of wounds and procedure notes  2. Continue taking all medications as prescribed  3. Dressing changes, see Wound docs for dressing change orders  4. RTC one week   5. Counseled patient on increasing protein intake, not getting wound wet, keeping dressing clean dry and intact, following a healthy diet, elevating legs when able, removing pressure from wound     Total time spent for E&M 40  Total time for debridement 35 minutes         Counseling/Education:  I provided patient education verbally regarding:   The aspects of diabetes and how it pertains to the feet. I explained the importance of proper diabetic foot care and how it is essential for the health of their feet.     I discussed the importance of knowing their Hemoglobin A1c and that the level needs to be as close to 6 as possible. I discussed the increase complications of high blood sugar including stroke, blindness, heart attack, kidney failure and loss of limb secondary to neuropathy and PVD.      With neuropathy, beware of any  breaks in the skin or redness. These areas are not recognized early due to the numbness.     I discussed Diabetes, lower back issues, metabolic disorders, systemic causes, chemotherapy, vitamin deficiency, heavy metal exposure, as some of the causes. I also explained that as much as 40% of the time we can not find a cause. I discussed different treatments available to control the symptoms but which may not cure the problem.         Counseled patient on the aspects of diabetes and how it pertains to the feet.  I explained the importance of proper diabetic foot care and how it is essential for the health of their feet.        Shoe inspection. Patient instructed on proper foot hygeine. We discussed wearing proper shoe gear, daily foot inspections, never walking without protective shoe gear, never putting sharp instruments to feet, routine podiatric nail visits every 2-3 months.          Proper ulcer care and the possible need for serial debridements, topical medications, specific dressings and biological engineered skin substitutes if indicated.     Patient should call the office immediately if any signs of infection, such as fever, chills, sweats, increased redness or pain.     Patient was instructed to call the clinic or go to the emergency department if their symptoms do not improve, worsens, or if new symptoms develop.  Patient was advised that if any increased swelling, pain, or numbness arise to go immediately to the ED. Patient knows to call any time if an emergency arises. Shared decision making occurred and patient verbalized understanding in agreement with this plan.         >50% of this > 60 minute visit was spent face to face educating/counseling the patient     I spent a total of 60 minutes on the day of the visit.This includes face to face time and non-face to face time preparing to see the patient (eg, review of tests), obtaining and/or reviewing separately obtained history, documenting clinical  information in the electronic or other health record, independently interpreting results and communicating results to the patient/family/caregiver, or care coordinator.        Much of the documentation for this visit was completed in the Wound Docs system.  Please see the attached documentation for further details about the patient's care. Scanned under the Media tab.         Anat Santiago DPM     Vitals:    01/22/24 0810   BP: (!) 165/92   Pulse: 87   Resp: 18   Temp: 98.9 °F (37.2 °C)   Weight: 77.1 kg (170 lb)   Height: 6' (1.829 m)   PainSc: 0-No pain      Shoe Size:     Past Surgical History:   Procedure Laterality Date    MANDIBLE FRACTURE SURGERY  07/17/2000    MANDIBLE FRACTURE SURGERY      SPLENECTOMY, TOTAL  07/17/2000    TOE AMPUTATION Right 10/9/2023    Procedure: AMPUTATION, TOE;  Surgeon: Ashok Santiago DPM;  Location: [facility];  Service: Podiatry;  Laterality: Right;     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  Tobacco History:  reports that he has been smoking. He has never used smokeless tobacco.  Drug History:  reports no history of drug use.    Review of patient's allergies indicates:   Allergen Reactions    Pcn [penicillins] Anaphylaxis     Tolerates cephalexin       Current Outpatient Medications   Medication Sig Dispense Refill    blood sugar diagnostic (TRUE METRIX GLUCOSE TEST STRIP) Strp USE  STRIP TO CHECK BLOOD GLUCOSE  4 TIMES DAILY 150 each 3     "blood-glucose meter kit To check BG qid times daily, to use with insurance preferred meter 1 each 0    doxycycline (VIBRAMYCIN) 100 MG Cap Take 1 capsule (100 mg total) by mouth 2 (two) times daily. for 14 days 28 capsule 0    doxycycline (VIBRAMYCIN) 100 MG Cap Take 1 capsule (100 mg total) by mouth 2 (two) times daily. for 14 days 28 capsule 0    esomeprazole (NEXIUM) 20 MG capsule Take 20 mg by mouth before breakfast.      gabapentin (NEURONTIN) 300 MG capsule Take 2 capsules (600 mg total) by mouth 3 (three) times daily. 180 capsule 5    HYDROcodone-acetaminophen (NORCO) 5-325 mg per tablet Take 1 tablet by mouth every 12 (twelve) hours as needed for Pain. (Patient not taking: Reported on 10/26/2023) 10 tablet 0    insulin aspart U-100 (NOVOLOG FLEXPEN U-100 INSULIN) 100 unit/mL (3 mL) InPn pen Inject 10 Units into the skin 4 (four) times daily. Per sliding scale as instructed 12 mL 5    insulin syringe-needle U-100 0.3 mL 31 gauge x 5/16" Syrg 1 Device by Misc.(Non-Drug; Combo Route) route 4 (four) times daily. 200 each 3    lancets Misc To check BGqid times daily, to use with insurance preferred meter (Patient taking differently: 1 lancet  by Misc.(Non-Drug; Combo Route) route 4 (four) times daily. To check BGqid times daily, to use with insurance preferred meter) 200 each 3    LANTUS SOLOSTAR U-100 INSULIN glargine 100 units/mL SubQ pen INJECT 30 UNITS SUBCUTANEOUSLY EVERY DAY AT BEDTIME 15 mL 0    lisinopriL (PRINIVIL,ZESTRIL) 5 MG tablet Take 1 tablet (5 mg total) by mouth once daily. 90 tablet 3    mupirocin (BACTROBAN) 2 % ointment Apply topically 3 (three) times daily. 22 g 0    pen needle, diabetic (BD ULTRA-FINE DENIS PEN NEEDLE MISC) 1 Needle by Misc.(Non-Drug; Combo Route) route 4 (four) times daily.      pravastatin (PRAVACHOL) 20 MG tablet Take 1 tablet (20 mg total) by mouth every evening. 90 tablet 3    sildenafiL (VIAGRA) 100 MG tablet Take 1 tablet (100 mg total) by mouth daily as needed for " Erectile Dysfunction. 20 tablet 5     No current facility-administered medications for this visit.       Review of Systems   Constitutional:  Negative for chills, fatigue, fever and unexpected weight change.   HENT:  Negative for hearing loss and trouble swallowing.    Eyes:  Negative for photophobia and visual disturbance.   Respiratory:  Negative for cough, shortness of breath and wheezing.    Cardiovascular:  Negative for chest pain, palpitations and leg swelling.   Gastrointestinal:  Negative for abdominal pain and nausea.   Genitourinary:  Negative for dysuria and frequency.   Musculoskeletal:  Negative for arthralgias, back pain, gait problem, joint swelling and myalgias.   Skin:  Positive for wound. Negative for rash.   Neurological:  Positive for numbness. Negative for tremors, seizures, weakness and headaches.   Hematological:  Does not bruise/bleed easily.         Objective:        Physical Exam:   Foot Exam  Physical Exam  Ortho/SPM Exam     Imaging:            Assessment:       1. Ulcer of right foot, with fat layer exposed    2. At high risk for inadequate nutritional intake    3. Smoker    4. Diabetic peripheral neuropathy    5. Type 2 diabetes mellitus with diabetic polyneuropathy, with long-term current use of insulin    6. Diabetes mellitus due to underlying condition with hyperglycemia, with long-term current use of insulin    7. History of amputation of right foot    8. History of ulcer of lower extremity    9. Uncontrolled type 2 diabetes mellitus with hyperglycemia    10. Difficulty in walking, not elsewhere classified    11. Diabetic foot infection    12. Wound infection      Plan:   Ulcer of right foot, with fat layer exposed    At high risk for inadequate nutritional intake    Smoker    Diabetic peripheral neuropathy    Type 2 diabetes mellitus with diabetic polyneuropathy, with long-term current use of insulin    Diabetes mellitus due to underlying condition with hyperglycemia, with  long-term current use of insulin    History of amputation of right foot    History of ulcer of lower extremity    Uncontrolled type 2 diabetes mellitus with hyperglycemia    Difficulty in walking, not elsewhere classified    Diabetic foot infection    Wound infection  -     doxycycline (VIBRAMYCIN) 100 MG Cap; Take 1 capsule (100 mg total) by mouth 2 (two) times daily. for 14 days  Dispense: 28 capsule; Refill: 0      Follow up in about 1 week (around 1/29/2024).    Procedures          Counseling:     I provided patient education verbally regarding:   Patient diagnosis, treatment options, as well as alternatives, risks, and benefits.     This note was created using Dragon voice recognition software that occasionally misinterpreted phrases or words.

## 2024-01-29 ENCOUNTER — OFFICE VISIT (OUTPATIENT)
Dept: WOUND CARE | Facility: HOSPITAL | Age: 44
End: 2024-01-29
Attending: PODIATRIST
Payer: MEDICAID

## 2024-01-29 VITALS
WEIGHT: 170 LBS | SYSTOLIC BLOOD PRESSURE: 171 MMHG | HEIGHT: 72 IN | HEART RATE: 87 BPM | RESPIRATION RATE: 18 BRPM | DIASTOLIC BLOOD PRESSURE: 90 MMHG | BODY MASS INDEX: 23.03 KG/M2

## 2024-01-29 DIAGNOSIS — E08.65 DIABETES MELLITUS DUE TO UNDERLYING CONDITION WITH HYPERGLYCEMIA, WITH LONG-TERM CURRENT USE OF INSULIN: ICD-10-CM

## 2024-01-29 DIAGNOSIS — E11.65 UNCONTROLLED TYPE 2 DIABETES MELLITUS WITH HYPERGLYCEMIA: ICD-10-CM

## 2024-01-29 DIAGNOSIS — L08.9 DIABETIC FOOT INFECTION: ICD-10-CM

## 2024-01-29 DIAGNOSIS — Z79.4 DIABETES MELLITUS DUE TO UNDERLYING CONDITION WITH HYPERGLYCEMIA, WITH LONG-TERM CURRENT USE OF INSULIN: ICD-10-CM

## 2024-01-29 DIAGNOSIS — L97.512 ULCER OF RIGHT FOOT, WITH FAT LAYER EXPOSED: Primary | ICD-10-CM

## 2024-01-29 DIAGNOSIS — E11.42 TYPE 2 DIABETES MELLITUS WITH DIABETIC POLYNEUROPATHY, WITH LONG-TERM CURRENT USE OF INSULIN: ICD-10-CM

## 2024-01-29 DIAGNOSIS — Z79.4 TYPE 2 DIABETES MELLITUS WITH DIABETIC POLYNEUROPATHY, WITH LONG-TERM CURRENT USE OF INSULIN: ICD-10-CM

## 2024-01-29 DIAGNOSIS — Z87.2 HISTORY OF ULCER OF LOWER EXTREMITY: ICD-10-CM

## 2024-01-29 DIAGNOSIS — Z91.89 AT HIGH RISK FOR INADEQUATE NUTRITIONAL INTAKE: ICD-10-CM

## 2024-01-29 DIAGNOSIS — F17.200 SMOKER: ICD-10-CM

## 2024-01-29 DIAGNOSIS — L08.9 WOUND INFECTION: ICD-10-CM

## 2024-01-29 DIAGNOSIS — E11.42 DIABETIC PERIPHERAL NEUROPATHY: ICD-10-CM

## 2024-01-29 DIAGNOSIS — R26.2 DIFFICULTY IN WALKING, NOT ELSEWHERE CLASSIFIED: ICD-10-CM

## 2024-01-29 DIAGNOSIS — T14.8XXA WOUND INFECTION: ICD-10-CM

## 2024-01-29 DIAGNOSIS — E11.628 DIABETIC FOOT INFECTION: ICD-10-CM

## 2024-01-29 DIAGNOSIS — Z89.431 HISTORY OF AMPUTATION OF RIGHT FOOT: ICD-10-CM

## 2024-01-29 PROCEDURE — 1160F RVW MEDS BY RX/DR IN RCRD: CPT | Mod: CPTII,,, | Performed by: PODIATRIST

## 2024-01-29 PROCEDURE — 3066F NEPHROPATHY DOC TX: CPT | Mod: CPTII,,, | Performed by: PODIATRIST

## 2024-01-29 PROCEDURE — 3046F HEMOGLOBIN A1C LEVEL >9.0%: CPT | Mod: CPTII,,, | Performed by: PODIATRIST

## 2024-01-29 PROCEDURE — 3061F NEG MICROALBUMINURIA REV: CPT | Mod: CPTII,,, | Performed by: PODIATRIST

## 2024-01-29 PROCEDURE — 99213 OFFICE O/P EST LOW 20 MIN: CPT | Mod: PN | Performed by: PODIATRIST

## 2024-01-29 PROCEDURE — 3080F DIAST BP >= 90 MM HG: CPT | Mod: CPTII,,, | Performed by: PODIATRIST

## 2024-01-29 PROCEDURE — 1159F MED LIST DOCD IN RCRD: CPT | Mod: CPTII,,, | Performed by: PODIATRIST

## 2024-01-29 PROCEDURE — 99214 OFFICE O/P EST MOD 30 MIN: CPT | Mod: ,,, | Performed by: PODIATRIST

## 2024-01-29 PROCEDURE — 3077F SYST BP >= 140 MM HG: CPT | Mod: CPTII,,, | Performed by: PODIATRIST

## 2024-01-29 PROCEDURE — 3008F BODY MASS INDEX DOCD: CPT | Mod: CPTII,,, | Performed by: PODIATRIST

## 2024-01-30 RX ORDER — DOXYCYCLINE 100 MG/1
100 CAPSULE ORAL 2 TIMES DAILY
Qty: 28 CAPSULE | Refills: 0 | Status: SHIPPED | OUTPATIENT
Start: 2024-01-30 | End: 2024-02-13

## 2024-01-30 NOTE — PROGRESS NOTES
[address] [facility] [address]  [place] [address]  Phone: [phone]   [phone]    Patient's PCP:Alfie Lancaster MD  Referring Provider: Aaareferral Self    Subjective:      Chief Complaint:: Wound Care, Diabetes, Diabetic Foot Ulcer, Difficulty Walking, Foot Ulcer, Non-healing Wound, Pressure Ulcer, Wound Check, Wound Infection, Peripheral Neuropathy, Numbness, and Poor Circulation    HPI  Philip Alberts is a 43 y.o. male who presents today with a complaint of multiple new diabetic foot ulcers located on his right foot- right plantar foot, right heel, right great toe, right 4th toe, and right 5th toe diabetic foot ulcers.   See wound docs documentation for full assessment and evaluation of all wounds.      Patient continues to smoke despite recommendations to stop- discussed multiple times the negative impact smoking has on wound healing. Patient is at risk of limb loss if he continues to smoke.     EVALUATION, ASSESSMENT, & PLAN:      1.   See Wound Docs for assessment of wounds and procedure notes  2. Continue taking all medications as prescribed  3. Dressing changes, see Wound docs for dressing change orders  4. RTC one week   5. Counseled patient on increasing protein intake, not getting wound wet, keeping dressing clean dry and intact, following a healthy diet, elevating legs when able, removing pressure from wound     Total time spent for E&M 40  Total time for debridement 35 minutes         Counseling/Education:  I provided patient education verbally regarding:   The aspects of diabetes and how it pertains to the feet. I explained the importance of proper diabetic foot care and how it is essential for the health of their feet.     I discussed the importance of knowing their Hemoglobin A1c and that the level needs to be as close to 6 as possible. I discussed the increase complications of high blood sugar including stroke, blindness, heart attack, kidney failure and loss of limb secondary to  neuropathy and PVD.      With neuropathy, beware of any breaks in the skin or redness. These areas are not recognized early due to the numbness.     I discussed Diabetes, lower back issues, metabolic disorders, systemic causes, chemotherapy, vitamin deficiency, heavy metal exposure, as some of the causes. I also explained that as much as 40% of the time we can not find a cause. I discussed different treatments available to control the symptoms but which may not cure the problem.         Counseled patient on the aspects of diabetes and how it pertains to the feet.  I explained the importance of proper diabetic foot care and how it is essential for the health of their feet.        Shoe inspection. Patient instructed on proper foot hygeine. We discussed wearing proper shoe gear, daily foot inspections, never walking without protective shoe gear, never putting sharp instruments to feet, routine podiatric nail visits every 2-3 months.          Proper ulcer care and the possible need for serial debridements, topical medications, specific dressings and biological engineered skin substitutes if indicated.     Patient should call the office immediately if any signs of infection, such as fever, chills, sweats, increased redness or pain.     Patient was instructed to call the clinic or go to the emergency department if their symptoms do not improve, worsens, or if new symptoms develop.  Patient was advised that if any increased swelling, pain, or numbness arise to go immediately to the ED. Patient knows to call any time if an emergency arises. Shared decision making occurred and patient verbalized understanding in agreement with this plan.         >50% of this > 60 minute visit was spent face to face educating/counseling the patient     I spent a total of 60 minutes on the day of the visit.This includes face to face time and non-face to face time preparing to see the patient (eg, review of tests), obtaining and/or reviewing  separately obtained history, documenting clinical information in the electronic or other health record, independently interpreting results and communicating results to the patient/family/caregiver, or care coordinator.        Much of the documentation for this visit was completed in the Wound Docs system.  Please see the attached documentation for further details about the patient's care. Scanned under the Media tab.         Anat JORDAN Santiago        Vitals:    01/29/24 0744   BP: (!) 171/90   Pulse: 87   Resp: 18   Weight: 77.1 kg (170 lb)   Height: 6' (1.829 m)   PainSc: 0-No pain      Shoe Size:     Past Surgical History:   Procedure Laterality Date    MANDIBLE FRACTURE SURGERY  07/17/2000    MANDIBLE FRACTURE SURGERY      SPLENECTOMY, TOTAL  07/17/2000    TOE AMPUTATION Right 10/9/2023    Procedure: AMPUTATION, TOE;  Surgeon: Ashok Santiago DPM;  Location: [facility];  Service: Podiatry;  Laterality: Right;     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  Tobacco History:  reports that he has been smoking. He has never used smokeless tobacco.  Drug History:  reports no history of drug use.    Review of patient's allergies indicates:   Allergen Reactions    Pcn [penicillins] Anaphylaxis     Tolerates cephalexin       Current Outpatient Medications   Medication Sig Dispense Refill    aspirin (ECOTRIN) 81 MG EC tablet Take 1 tablet (81 mg total) by mouth once daily. 90 tablet 3     "blood sugar diagnostic (TRUE METRIX GLUCOSE TEST STRIP) Strp USE  STRIP TO CHECK BLOOD GLUCOSE  4 TIMES DAILY 150 each 3    blood-glucose meter kit To check BG qid times daily, to use with insurance preferred meter 1 each 0    blood-glucose meter,continuous (DEXCOM ) Misc 1 Device by Misc.(Non-Drug; Combo Route) route once daily. 1 each 0    blood-glucose sensor (DEXCOM G7 SENSOR) Gosia 1 Device by Misc.(Non-Drug; Combo Route) route every 10 days. 3 each 5    doxycycline (VIBRAMYCIN) 100 MG Cap Take 1 capsule (100 mg total) by mouth 2 (two) times daily. for 14 days 28 capsule 0    doxycycline (VIBRAMYCIN) 100 MG Cap Take 1 capsule (100 mg total) by mouth 2 (two) times daily. for 14 days 28 capsule 0    esomeprazole (NEXIUM) 20 MG capsule Take 20 mg by mouth before breakfast.      gabapentin (NEURONTIN) 300 MG capsule Take 2 capsules (600 mg total) by mouth 3 (three) times daily. 180 capsule 5    insulin aspart U-100 (NOVOLOG FLEXPEN U-100 INSULIN) 100 unit/mL (3 mL) InPn pen Inject 10 Units into the skin 4 (four) times daily. Per sliding scale as instructed 12 mL 5    insulin syringe-needle U-100 0.3 mL 31 gauge x 5/16" Syrg 1 Device by Misc.(Non-Drug; Combo Route) route 4 (four) times daily. 200 each 3    lancets Misc To check BGqid times daily, to use with insurance preferred meter (Patient taking differently: 1 lancet  by Misc.(Non-Drug; Combo Route) route 4 (four) times daily. To check BGqid times daily, to use with insurance preferred meter) 200 each 3    LANTUS SOLOSTAR U-100 INSULIN glargine 100 units/mL SubQ pen INJECT 20  UNITS SUBCUTANEOUSLY BID 15 mL 0    lisinopriL (PRINIVIL,ZESTRIL) 5 MG tablet Take 1 tablet (5 mg total) by mouth once daily. 90 tablet 3    mupirocin (BACTROBAN) 2 % ointment Apply topically 3 (three) times daily. 22 g 0    pen needle, diabetic (BD ULTRA-FINE DENIS PEN NEEDLE MISC) 1 Needle by Misc.(Non-Drug; Combo Route) route 4 (four) times daily.      pravastatin (PRAVACHOL) 20 " MG tablet Take 1 tablet (20 mg total) by mouth every evening. 90 tablet 3    sildenafiL (VIAGRA) 100 MG tablet Take 1 tablet (100 mg total) by mouth daily as needed for Erectile Dysfunction. 20 tablet 5     No current facility-administered medications for this visit.       Review of Systems   Constitutional:  Negative for chills, fatigue, fever and unexpected weight change.   HENT:  Negative for hearing loss and trouble swallowing.    Eyes:  Negative for photophobia and visual disturbance.   Respiratory:  Negative for cough, shortness of breath and wheezing.    Cardiovascular:  Negative for chest pain, palpitations and leg swelling.   Gastrointestinal:  Negative for abdominal pain and nausea.   Genitourinary:  Negative for dysuria and frequency.   Musculoskeletal:  Negative for arthralgias, back pain, gait problem, joint swelling and myalgias.   Skin:  Positive for wound. Negative for rash.   Neurological:  Positive for numbness. Negative for tremors, seizures, weakness and headaches.   Hematological:  Does not bruise/bleed easily.   Psychiatric/Behavioral:  Negative for hallucinations.          Objective:        Physical Exam:   Foot Exam  Physical Exam  Ortho/SPM Exam     Imaging:            Assessment:       1. Ulcer of right foot, with fat layer exposed    2. At high risk for inadequate nutritional intake    3. Diabetes mellitus due to underlying condition with hyperglycemia, with long-term current use of insulin    4. Smoker    5. Type 2 diabetes mellitus with diabetic polyneuropathy, with long-term current use of insulin    6. Diabetic peripheral neuropathy    7. History of amputation of right foot    8. History of ulcer of lower extremity    9. Uncontrolled type 2 diabetes mellitus with hyperglycemia    10. Wound infection    11. Difficulty in walking, not elsewhere classified    12. Diabetic foot infection      Plan:   Ulcer of right foot, with fat layer exposed    At high risk for inadequate nutritional  intake    Diabetes mellitus due to underlying condition with hyperglycemia, with long-term current use of insulin    Smoker    Type 2 diabetes mellitus with diabetic polyneuropathy, with long-term current use of insulin    Diabetic peripheral neuropathy    History of amputation of right foot    History of ulcer of lower extremity    Uncontrolled type 2 diabetes mellitus with hyperglycemia    Wound infection    Difficulty in walking, not elsewhere classified    Diabetic foot infection  -     doxycycline (VIBRAMYCIN) 100 MG Cap; Take 1 capsule (100 mg total) by mouth 2 (two) times daily. for 14 days  Dispense: 28 capsule; Refill: 0      Follow up in about 1 week (around 2/5/2024).    Procedures          Counseling:     I provided patient education verbally regarding:   Patient diagnosis, treatment options, as well as alternatives, risks, and benefits.     This note was created using Dragon voice recognition software that occasionally misinterpreted phrases or words.

## 2024-02-05 ENCOUNTER — OFFICE VISIT (OUTPATIENT)
Dept: WOUND CARE | Facility: HOSPITAL | Age: 44
End: 2024-02-05
Attending: PODIATRIST
Payer: MEDICAID

## 2024-02-05 VITALS
DIASTOLIC BLOOD PRESSURE: 79 MMHG | TEMPERATURE: 99 F | WEIGHT: 170 LBS | BODY MASS INDEX: 23.03 KG/M2 | RESPIRATION RATE: 18 BRPM | HEART RATE: 83 BPM | SYSTOLIC BLOOD PRESSURE: 137 MMHG | HEIGHT: 72 IN

## 2024-02-05 DIAGNOSIS — L03.119 CELLULITIS AND ABSCESS OF FOOT: ICD-10-CM

## 2024-02-05 DIAGNOSIS — I73.9 PVD (PERIPHERAL VASCULAR DISEASE): ICD-10-CM

## 2024-02-05 DIAGNOSIS — Z79.4 DIABETES MELLITUS DUE TO UNDERLYING CONDITION WITH HYPERGLYCEMIA, WITH LONG-TERM CURRENT USE OF INSULIN: ICD-10-CM

## 2024-02-05 DIAGNOSIS — F17.200 SMOKER: ICD-10-CM

## 2024-02-05 DIAGNOSIS — T14.8XXA WOUND INFECTION: ICD-10-CM

## 2024-02-05 DIAGNOSIS — E11.42 TYPE 2 DIABETES MELLITUS WITH DIABETIC POLYNEUROPATHY, WITH LONG-TERM CURRENT USE OF INSULIN: ICD-10-CM

## 2024-02-05 DIAGNOSIS — Z87.2 HISTORY OF ULCER OF LOWER EXTREMITY: ICD-10-CM

## 2024-02-05 DIAGNOSIS — E08.65 DIABETES MELLITUS DUE TO UNDERLYING CONDITION WITH HYPERGLYCEMIA, WITH LONG-TERM CURRENT USE OF INSULIN: ICD-10-CM

## 2024-02-05 DIAGNOSIS — L97.512 ULCER OF RIGHT FOOT, WITH FAT LAYER EXPOSED: Primary | ICD-10-CM

## 2024-02-05 DIAGNOSIS — Z89.431 HISTORY OF AMPUTATION OF RIGHT FOOT: ICD-10-CM

## 2024-02-05 DIAGNOSIS — R26.2 DIFFICULTY IN WALKING, NOT ELSEWHERE CLASSIFIED: ICD-10-CM

## 2024-02-05 DIAGNOSIS — L02.619 CELLULITIS AND ABSCESS OF FOOT: ICD-10-CM

## 2024-02-05 DIAGNOSIS — L97.514 ULCER OF RIGHT FOOT WITH NECROSIS OF BONE: ICD-10-CM

## 2024-02-05 DIAGNOSIS — L97.513 RIGHT FOOT ULCER, WITH NECROSIS OF MUSCLE: ICD-10-CM

## 2024-02-05 DIAGNOSIS — L97.512 ULCER OF RIGHT FOOT WITH FAT LAYER EXPOSED: ICD-10-CM

## 2024-02-05 DIAGNOSIS — E11.65 UNCONTROLLED TYPE 2 DIABETES MELLITUS WITH HYPERGLYCEMIA: ICD-10-CM

## 2024-02-05 DIAGNOSIS — Z79.4 TYPE 2 DIABETES MELLITUS WITH DIABETIC POLYNEUROPATHY, WITH LONG-TERM CURRENT USE OF INSULIN: ICD-10-CM

## 2024-02-05 DIAGNOSIS — L97.514 ULCER OF TOE OF RIGHT FOOT, WITH NECROSIS OF BONE: ICD-10-CM

## 2024-02-05 DIAGNOSIS — R09.89 DECREASED PEDAL PULSES: ICD-10-CM

## 2024-02-05 DIAGNOSIS — L08.9 WOUND INFECTION: ICD-10-CM

## 2024-02-05 DIAGNOSIS — E11.42 DIABETIC PERIPHERAL NEUROPATHY: ICD-10-CM

## 2024-02-05 DIAGNOSIS — Z91.89 AT HIGH RISK FOR INADEQUATE NUTRITIONAL INTAKE: ICD-10-CM

## 2024-02-05 DIAGNOSIS — E11.628 DIABETIC FOOT INFECTION: ICD-10-CM

## 2024-02-05 DIAGNOSIS — L97.513 ULCER OF RIGHT FOOT WITH NECROSIS OF MUSCLE: ICD-10-CM

## 2024-02-05 DIAGNOSIS — L08.9 DIABETIC FOOT INFECTION: ICD-10-CM

## 2024-02-05 PROCEDURE — 87077 CULTURE AEROBIC IDENTIFY: CPT | Performed by: PODIATRIST

## 2024-02-05 PROCEDURE — 11044 DBRDMT BONE 1ST 20 SQ CM/<: CPT | Mod: PN | Performed by: PODIATRIST

## 2024-02-05 PROCEDURE — 3066F NEPHROPATHY DOC TX: CPT | Mod: CPTII,,, | Performed by: PODIATRIST

## 2024-02-05 PROCEDURE — 11042 DBRDMT SUBQ TIS 1ST 20SQCM/<: CPT | Mod: 59,,, | Performed by: PODIATRIST

## 2024-02-05 PROCEDURE — 87186 SC STD MICRODIL/AGAR DIL: CPT | Performed by: PODIATRIST

## 2024-02-05 PROCEDURE — 99214 OFFICE O/P EST MOD 30 MIN: CPT | Mod: 25,,, | Performed by: PODIATRIST

## 2024-02-05 PROCEDURE — 3061F NEG MICROALBUMINURIA REV: CPT | Mod: CPTII,,, | Performed by: PODIATRIST

## 2024-02-05 PROCEDURE — 1159F MED LIST DOCD IN RCRD: CPT | Mod: CPTII,,, | Performed by: PODIATRIST

## 2024-02-05 PROCEDURE — 11045 DBRDMT SUBQ TISS EACH ADDL: CPT | Mod: 59,,, | Performed by: PODIATRIST

## 2024-02-05 PROCEDURE — 11044 DBRDMT BONE 1ST 20 SQ CM/<: CPT | Mod: ,,, | Performed by: PODIATRIST

## 2024-02-05 PROCEDURE — 3008F BODY MASS INDEX DOCD: CPT | Mod: CPTII,,, | Performed by: PODIATRIST

## 2024-02-05 PROCEDURE — 87075 CULTR BACTERIA EXCEPT BLOOD: CPT | Performed by: PODIATRIST

## 2024-02-05 PROCEDURE — 1160F RVW MEDS BY RX/DR IN RCRD: CPT | Mod: CPTII,,, | Performed by: PODIATRIST

## 2024-02-05 PROCEDURE — 3075F SYST BP GE 130 - 139MM HG: CPT | Mod: CPTII,,, | Performed by: PODIATRIST

## 2024-02-05 PROCEDURE — 87070 CULTURE OTHR SPECIMN AEROBIC: CPT | Performed by: PODIATRIST

## 2024-02-05 PROCEDURE — 3046F HEMOGLOBIN A1C LEVEL >9.0%: CPT | Mod: CPTII,,, | Performed by: PODIATRIST

## 2024-02-05 PROCEDURE — 11045 DBRDMT SUBQ TISS EACH ADDL: CPT | Mod: 59,PN | Performed by: PODIATRIST

## 2024-02-05 PROCEDURE — 11042 DBRDMT SUBQ TIS 1ST 20SQCM/<: CPT | Mod: 59,PN | Performed by: PODIATRIST

## 2024-02-05 PROCEDURE — 3078F DIAST BP <80 MM HG: CPT | Mod: CPTII,,, | Performed by: PODIATRIST

## 2024-02-05 NOTE — PROGRESS NOTES
[address] [facility] [address]  [place] [address]  Phone: [phone]   [phone]    Patient's PCP:Alfie Lancaster MD  Referring Provider: Aaareferral Self    Subjective:      Chief Complaint:: Wound Care, Diabetes, Diabetic Foot Ulcer, Wound Check, Wound Infection, Peripheral Neuropathy, Pressure Ulcer, Non-healing Wound, Osteomyelitis , Numbness, Foot Ulcer, and Difficulty Walking    HPI  Philip Alberts is a 43 y.o. male who presents today with a complaint of multiple new diabetic foot ulcers located on his right foot- right plantar foot, right great toe, right 4th toe, and right 5th toe diabetic foot ulcers.   See wound docs documentation for full assessment and evaluation of all wounds.       Culture taken of wound- bone culture taken of right 4th toe wound.  I will adjust antibiotics accordingly once the results of the culture return      Referral to ID placed due to bone exposed to right 4th toe- MRI ordered and bone culture taken.         Patient continues to smoke despite recommendations to stop- discussed multiple times the negative impact smoking has on wound healing. Patient is at risk of limb loss if he continues to smoke.     EVALUATION, ASSESSMENT, & PLAN:      1. Debridement of right 4th toe with necrotic bone debrided sent for culture analysis. Debridement of right plantar foot ulcer.   Evaluation and Assessment only of right great toe diabetic foot ulcer and right 5th toe diabetic foot ulcer.    See Wound Docs for assessment of wounds and procedure notes  2. Continue taking all medications as prescribed  3. Dressing changes, see Wound docs for dressing change orders  4. RTC one week   5. Counseled patient on increasing protein intake, not getting wound wet, keeping dressing clean dry and intact, following a healthy diet, elevating legs when able, removing pressure from wound     Total time spent for E&M 40  Total time for debridement 35 minutes         Counseling/Education:  I provided  patient education verbally regarding:   The aspects of diabetes and how it pertains to the feet. I explained the importance of proper diabetic foot care and how it is essential for the health of their feet.     I discussed the importance of knowing their Hemoglobin A1c and that the level needs to be as close to 6 as possible. I discussed the increase complications of high blood sugar including stroke, blindness, heart attack, kidney failure and loss of limb secondary to neuropathy and PVD.      With neuropathy, beware of any breaks in the skin or redness. These areas are not recognized early due to the numbness.     I discussed Diabetes, lower back issues, metabolic disorders, systemic causes, chemotherapy, vitamin deficiency, heavy metal exposure, as some of the causes. I also explained that as much as 40% of the time we can not find a cause. I discussed different treatments available to control the symptoms but which may not cure the problem.         Counseled patient on the aspects of diabetes and how it pertains to the feet.  I explained the importance of proper diabetic foot care and how it is essential for the health of their feet.        Shoe inspection. Patient instructed on proper foot hygeine. We discussed wearing proper shoe gear, daily foot inspections, never walking without protective shoe gear, never putting sharp instruments to feet, routine podiatric nail visits every 2-3 months.          Proper ulcer care and the possible need for serial debridements, topical medications, specific dressings and biological engineered skin substitutes if indicated.     Patient should call the office immediately if any signs of infection, such as fever, chills, sweats, increased redness or pain.     Patient was instructed to call the clinic or go to the emergency department if their symptoms do not improve, worsens, or if new symptoms develop.  Patient was advised that if any increased swelling, pain, or numbness arise  to go immediately to the ED. Patient knows to call any time if an emergency arises. Shared decision making occurred and patient verbalized understanding in agreement with this plan.         >50% of this > 60 minute visit was spent face to face educating/counseling the patient     I spent a total of 60 minutes on the day of the visit.This includes face to face time and non-face to face time preparing to see the patient (eg, review of tests), obtaining and/or reviewing separately obtained history, documenting clinical information in the electronic or other health record, independently interpreting results and communicating results to the patient/family/caregiver, or care coordinator.        Much of the documentation for this visit was completed in the Wound Docs system.  Please see the attached documentation for further details about the patient's care. Scanned under the Media tab.         Anat Santiago DPM        Vitals:    02/05/24 0816   BP: 137/79   Pulse: 83   Resp: 18   Temp: 98.5 °F (36.9 °C)   Weight: 77.1 kg (170 lb)   Height: 6' (1.829 m)   PainSc: 0-No pain      Shoe Size:     Past Surgical History:   Procedure Laterality Date    MANDIBLE FRACTURE SURGERY  07/17/2000    MANDIBLE FRACTURE SURGERY      SPLENECTOMY, TOTAL  07/17/2000    TOE AMPUTATION Right 10/9/2023    Procedure: AMPUTATION, TOE;  Surgeon: Ashok Santiago DPM;  Location: [facility];  Service: Podiatry;  Laterality: Right;     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 12/3/2018     Family History   Problem Relation Age of Onset    Diabetes Mother     Cancer Father         Social History:   Marital Status:  "Single  Alcohol History:  reports no history of alcohol use.  Tobacco History:  reports that he has been smoking. He has never used smokeless tobacco.  Drug History:  reports no history of drug use.    Review of patient's allergies indicates:   Allergen Reactions    Pcn [penicillins] Anaphylaxis     Tolerates cephalexin       Current Outpatient Medications   Medication Sig Dispense Refill    aspirin (ECOTRIN) 81 MG EC tablet Take 1 tablet (81 mg total) by mouth once daily. 90 tablet 3    blood sugar diagnostic (TRUE METRIX GLUCOSE TEST STRIP) Strp USE  STRIP TO CHECK BLOOD GLUCOSE  4 TIMES DAILY 150 each 3    blood-glucose meter kit To check BG qid times daily, to use with insurance preferred meter 1 each 0    blood-glucose meter,continuous (DEXCOM ) Misc 1 Device by Misc.(Non-Drug; Combo Route) route once daily. 1 each 0    blood-glucose sensor (DEXCOM G7 SENSOR) Gosia 1 Device by Misc.(Non-Drug; Combo Route) route every 10 days. 3 each 5    doxycycline (VIBRAMYCIN) 100 MG Cap Take 1 capsule (100 mg total) by mouth 2 (two) times daily. for 14 days 28 capsule 0    doxycycline (VIBRAMYCIN) 100 MG Cap Take 1 capsule (100 mg total) by mouth 2 (two) times daily. for 14 days 28 capsule 0    esomeprazole (NEXIUM) 20 MG capsule Take 20 mg by mouth before breakfast.      gabapentin (NEURONTIN) 300 MG capsule Take 2 capsules (600 mg total) by mouth 3 (three) times daily. 180 capsule 5    insulin aspart U-100 (NOVOLOG FLEXPEN U-100 INSULIN) 100 unit/mL (3 mL) InPn pen Inject 10 Units into the skin 4 (four) times daily. Per sliding scale as instructed 12 mL 5    insulin syringe-needle U-100 0.3 mL 31 gauge x 5/16" Syrg 1 Device by Misc.(Non-Drug; Combo Route) route 4 (four) times daily. 200 each 3    lancets Misc To check BGqid times daily, to use with insurance preferred meter (Patient taking differently: 1 lancet  by Misc.(Non-Drug; Combo Route) route 4 (four) times daily. To check BGqid times daily, to use with " insurance preferred meter) 200 each 3    LANTUS SOLOSTAR U-100 INSULIN glargine 100 units/mL SubQ pen INJECT 20  UNITS SUBCUTANEOUSLY BID 15 mL 0    lisinopriL (PRINIVIL,ZESTRIL) 5 MG tablet Take 1 tablet (5 mg total) by mouth once daily. 90 tablet 3    mupirocin (BACTROBAN) 2 % ointment Apply topically 3 (three) times daily. 22 g 0    pen needle, diabetic (BD ULTRA-FINE DENIS PEN NEEDLE MISC) 1 Needle by Misc.(Non-Drug; Combo Route) route 4 (four) times daily.      pravastatin (PRAVACHOL) 20 MG tablet Take 1 tablet (20 mg total) by mouth every evening. 90 tablet 3    sildenafiL (VIAGRA) 100 MG tablet Take 1 tablet (100 mg total) by mouth daily as needed for Erectile Dysfunction. 20 tablet 5     No current facility-administered medications for this visit.       Review of Systems   Constitutional:  Negative for chills, fatigue, fever and unexpected weight change.   HENT:  Negative for hearing loss and trouble swallowing.    Eyes:  Negative for photophobia and visual disturbance.   Respiratory:  Negative for cough, shortness of breath and wheezing.    Cardiovascular:  Negative for chest pain, palpitations and leg swelling.   Gastrointestinal:  Negative for abdominal pain and nausea.   Genitourinary:  Negative for dysuria and frequency.   Musculoskeletal:  Negative for arthralgias, back pain, gait problem, joint swelling and myalgias.   Skin:  Positive for wound. Negative for rash.   Neurological:  Positive for numbness. Negative for tremors, seizures, weakness and headaches.   Hematological:  Does not bruise/bleed easily.   Psychiatric/Behavioral:  Negative for hallucinations.          Objective:        Physical Exam:   Foot Exam  Physical Exam  Ortho/SPM Exam     Imaging:            Assessment:       1. Ulcer of right foot, with fat layer exposed    2. At high risk for inadequate nutritional intake    3. Diabetes mellitus due to underlying condition with hyperglycemia, with long-term current use of insulin    4.  Diabetic peripheral neuropathy    5. Type 2 diabetes mellitus with diabetic polyneuropathy, with long-term current use of insulin    6. Smoker    7. Uncontrolled type 2 diabetes mellitus with hyperglycemia    8. History of amputation of right foot    9. History of ulcer of lower extremity    10. Difficulty in walking, not elsewhere classified    11. Wound infection    12. Diabetic foot infection    13. Ulcer of right foot with fat layer exposed    14. Ulcer of toe of right foot, with necrosis of bone    15. Ulcer of right foot with necrosis of bone    16. Cellulitis and abscess of foot    17. Right foot ulcer, with necrosis of muscle    18. Ulcer of right foot with necrosis of muscle    19. Decreased pedal pulses    20. PVD (peripheral vascular disease)      Plan:   Ulcer of right foot, with fat layer exposed    At high risk for inadequate nutritional intake    Diabetes mellitus due to underlying condition with hyperglycemia, with long-term current use of insulin    Diabetic peripheral neuropathy    Type 2 diabetes mellitus with diabetic polyneuropathy, with long-term current use of insulin    Smoker    Uncontrolled type 2 diabetes mellitus with hyperglycemia    History of amputation of right foot    History of ulcer of lower extremity    Difficulty in walking, not elsewhere classified    Wound infection    Diabetic foot infection    Ulcer of right foot with fat layer exposed    Ulcer of toe of right foot, with necrosis of bone  -     MRI Foot (Forefoot) Right Without Contrast; Future; Expected date: 02/05/2024  -     Ambulatory referral/consult to Infectious Disease; Future; Expected date: 02/12/2024    Ulcer of right foot with necrosis of bone  -     Culture, Anaerobic  -     CULTURE, AEROBIC  (SPECIFY SOURCE)    Cellulitis and abscess of foot    Right foot ulcer, with necrosis of muscle    Ulcer of right foot with necrosis of muscle    Decreased pedal pulses  -     Ambulatory referral/consult to Vascular Surgery;  Future; Expected date: 02/12/2024    PVD (peripheral vascular disease)  -     Ambulatory referral/consult to Vascular Surgery; Future; Expected date: 02/12/2024      Follow up in about 1 week (around 2/12/2024).    Procedures          Counseling:     I provided patient education verbally regarding:   Patient diagnosis, treatment options, as well as alternatives, risks, and benefits.     This note was created using Dragon voice recognition software that occasionally misinterpreted phrases or words.

## 2024-02-06 ENCOUNTER — OFFICE VISIT (OUTPATIENT)
Dept: URGENT CARE | Facility: CLINIC | Age: 44
End: 2024-02-06
Payer: MEDICAID

## 2024-02-06 VITALS
BODY MASS INDEX: 23.03 KG/M2 | HEIGHT: 72 IN | TEMPERATURE: 98 F | WEIGHT: 170 LBS | HEART RATE: 105 BPM | DIASTOLIC BLOOD PRESSURE: 92 MMHG | RESPIRATION RATE: 17 BRPM | SYSTOLIC BLOOD PRESSURE: 161 MMHG

## 2024-02-06 DIAGNOSIS — Z86.39 HISTORY OF TYPE 2 DIABETES MELLITUS: ICD-10-CM

## 2024-02-06 DIAGNOSIS — S90.122A CONTUSION OF FIFTH TOE OF LEFT FOOT, INITIAL ENCOUNTER: ICD-10-CM

## 2024-02-06 DIAGNOSIS — Z86.69 HISTORY OF PERIPHERAL NEUROPATHY: ICD-10-CM

## 2024-02-06 DIAGNOSIS — S99.922A FOOT INJURY, LEFT, INITIAL ENCOUNTER: Primary | ICD-10-CM

## 2024-02-06 PROCEDURE — 99214 OFFICE O/P EST MOD 30 MIN: CPT | Mod: S$GLB,,, | Performed by: NURSE PRACTITIONER

## 2024-02-06 PROCEDURE — 73630 X-RAY EXAM OF FOOT: CPT | Mod: LT,S$GLB,, | Performed by: RADIOLOGY

## 2024-02-06 NOTE — PROGRESS NOTES
Subjective:      Patient ID: Philip Alberts is a 43 y.o. male.    Vitals:  height is 6' (1.829 m) and weight is 77.1 kg (170 lb). His temperature is 98.2 °F (36.8 °C). His blood pressure is 161/92 (abnormal) and his pulse is 105. His respiration is 17.     Chief Complaint: Toe Injury    Pt hit Rt pinky toe on couch; redness and pain radiates up to ankle.  Onset of injury 2 days ago.    Toe Injury  This is a new problem. The current episode started in the past 7 days. Associated symptoms include arthralgias.       Musculoskeletal:  Positive for trauma and joint pain.        Left pinky toe   Skin:  Positive for erythema and bruising. Negative for pale, wound, lesion and skin thickening/induration.        Left pinky toe      Objective:     Physical Exam   Constitutional: He is oriented to person, place, and time.  Non-toxic appearance. He does not appear ill. No distress.   HENT:   Head: Normocephalic and atraumatic.   Nose: Nose normal.   Mouth/Throat: Mucous membranes are moist.   Eyes: Conjunctivae are normal.   Cardiovascular: Normal rate.   Pulmonary/Chest: Effort normal. No respiratory distress.   Abdominal: Normal appearance.   Neurological: no focal deficit. He is alert and oriented to person, place, and time.   Skin: Skin is warm, dry and no rash. Capillary refill takes 2 to 3 seconds. bruising and erythema No lesion         Comments: Left pinky toe.  See attached photos   Psychiatric: His behavior is normal. Mood normal.   Nursing note and vitals reviewed.              Assessment:     1. Foot injury, left, initial encounter    2. History of type 2 diabetes mellitus    3. History of peripheral neuropathy    4. Contusion of fifth toe of left foot, initial encounter      Left foot x-ray: FINDINGS:  Tiny plantar calcaneal spur.  Very slight degenerative change 1st metatarsophalangeal joint.  No acute fracture or dislocation   Impression:   As above  Plan:       Foot injury, left, initial encounter  -     XR  FOOT COMPLETE 3 VIEW LEFT; Future; Expected date: 02/06/2024    History of type 2 diabetes mellitus  -     XR FOOT COMPLETE 3 VIEW LEFT; Future; Expected date: 02/06/2024    History of peripheral neuropathy  -     XR FOOT COMPLETE 3 VIEW LEFT; Future; Expected date: 02/06/2024    Contusion of fifth toe of left foot, initial encounter

## 2024-02-07 NOTE — PATIENT INSTRUCTIONS
Limit activity and ambulation not to aggravate symptoms.    Keep left foot elevated as much as possible.    Inspect the injured area at least twice a day for any worsening or changes in color decreased blood flow to the tip of the pinky any skin breakdown or skin opening, if any of these new symptoms or changes occur please seek medical re-evaluation promptly.    Keep your appointment with Podiatry already scheduled for Monday.

## 2024-02-08 NOTE — PROGRESS NOTES
Patient has been referred to Infectious Disease for IV antibiotics.  Culture reviewed.  Continue current antibiotic regimen until patient able to see infectious disease and until the final results of the culture return.

## 2024-02-10 ENCOUNTER — PATIENT MESSAGE (OUTPATIENT)
Dept: FAMILY MEDICINE | Facility: CLINIC | Age: 44
End: 2024-02-10
Payer: MEDICAID

## 2024-02-10 DIAGNOSIS — E10.42 TYPE 1 DM WITH POLYNEUROPATHY: ICD-10-CM

## 2024-02-11 LAB — BACTERIA SPEC AEROBE CULT: ABNORMAL

## 2024-02-12 ENCOUNTER — OFFICE VISIT (OUTPATIENT)
Dept: WOUND CARE | Facility: HOSPITAL | Age: 44
End: 2024-02-12
Attending: PODIATRIST
Payer: MEDICAID

## 2024-02-12 VITALS
SYSTOLIC BLOOD PRESSURE: 170 MMHG | TEMPERATURE: 99 F | RESPIRATION RATE: 18 BRPM | HEART RATE: 80 BPM | DIASTOLIC BLOOD PRESSURE: 93 MMHG

## 2024-02-12 DIAGNOSIS — E11.65 UNCONTROLLED TYPE 2 DIABETES MELLITUS WITH HYPERGLYCEMIA: ICD-10-CM

## 2024-02-12 DIAGNOSIS — E08.65 DIABETES MELLITUS DUE TO UNDERLYING CONDITION WITH HYPERGLYCEMIA, WITH LONG-TERM CURRENT USE OF INSULIN: ICD-10-CM

## 2024-02-12 DIAGNOSIS — Z91.89 AT HIGH RISK FOR INADEQUATE NUTRITIONAL INTAKE: ICD-10-CM

## 2024-02-12 DIAGNOSIS — L97.513 RIGHT FOOT ULCER, WITH NECROSIS OF MUSCLE: ICD-10-CM

## 2024-02-12 DIAGNOSIS — Z89.431 HISTORY OF AMPUTATION OF RIGHT FOOT: ICD-10-CM

## 2024-02-12 DIAGNOSIS — L97.514 ULCER OF RIGHT FOOT WITH NECROSIS OF BONE: ICD-10-CM

## 2024-02-12 DIAGNOSIS — L08.9 DIABETIC FOOT INFECTION: ICD-10-CM

## 2024-02-12 DIAGNOSIS — E11.42 DIABETIC PERIPHERAL NEUROPATHY: ICD-10-CM

## 2024-02-12 DIAGNOSIS — E11.628 DIABETIC FOOT INFECTION: ICD-10-CM

## 2024-02-12 DIAGNOSIS — Z79.4 DIABETES MELLITUS DUE TO UNDERLYING CONDITION WITH HYPERGLYCEMIA, WITH LONG-TERM CURRENT USE OF INSULIN: ICD-10-CM

## 2024-02-12 DIAGNOSIS — L97.512 ULCER OF RIGHT FOOT, WITH FAT LAYER EXPOSED: Primary | ICD-10-CM

## 2024-02-12 DIAGNOSIS — L97.514 ULCER OF TOE OF RIGHT FOOT, WITH NECROSIS OF BONE: ICD-10-CM

## 2024-02-12 DIAGNOSIS — L97.513 ULCER OF TOE OF RIGHT FOOT, WITH NECROSIS OF MUSCLE: ICD-10-CM

## 2024-02-12 DIAGNOSIS — R26.2 DIFFICULTY IN WALKING, NOT ELSEWHERE CLASSIFIED: ICD-10-CM

## 2024-02-12 DIAGNOSIS — L97.512 ULCER OF RIGHT FOOT WITH FAT LAYER EXPOSED: ICD-10-CM

## 2024-02-12 DIAGNOSIS — T14.8XXA WOUND INFECTION: ICD-10-CM

## 2024-02-12 DIAGNOSIS — E11.42 TYPE 2 DIABETES MELLITUS WITH DIABETIC POLYNEUROPATHY, WITH LONG-TERM CURRENT USE OF INSULIN: ICD-10-CM

## 2024-02-12 DIAGNOSIS — L02.619 CELLULITIS AND ABSCESS OF FOOT: ICD-10-CM

## 2024-02-12 DIAGNOSIS — L97.513 ULCER OF RIGHT FOOT WITH NECROSIS OF MUSCLE: ICD-10-CM

## 2024-02-12 DIAGNOSIS — F17.200 SMOKER: ICD-10-CM

## 2024-02-12 DIAGNOSIS — Z79.4 TYPE 2 DIABETES MELLITUS WITH DIABETIC POLYNEUROPATHY, WITH LONG-TERM CURRENT USE OF INSULIN: ICD-10-CM

## 2024-02-12 DIAGNOSIS — Z87.2 HISTORY OF ULCER OF LOWER EXTREMITY: ICD-10-CM

## 2024-02-12 DIAGNOSIS — L08.9 WOUND INFECTION: ICD-10-CM

## 2024-02-12 DIAGNOSIS — L03.119 CELLULITIS AND ABSCESS OF FOOT: ICD-10-CM

## 2024-02-12 LAB — BACTERIA SPEC ANAEROBE CULT: NORMAL

## 2024-02-12 PROCEDURE — 1160F RVW MEDS BY RX/DR IN RCRD: CPT | Mod: CPTII,,, | Performed by: PODIATRIST

## 2024-02-12 PROCEDURE — 11045 DBRDMT SUBQ TISS EACH ADDL: CPT | Mod: 59,,, | Performed by: PODIATRIST

## 2024-02-12 PROCEDURE — 11045 DBRDMT SUBQ TISS EACH ADDL: CPT | Mod: 59,PN | Performed by: PODIATRIST

## 2024-02-12 PROCEDURE — 11042 DBRDMT SUBQ TIS 1ST 20SQCM/<: CPT | Mod: 59,,, | Performed by: PODIATRIST

## 2024-02-12 PROCEDURE — 3066F NEPHROPATHY DOC TX: CPT | Mod: CPTII,,, | Performed by: PODIATRIST

## 2024-02-12 PROCEDURE — 3061F NEG MICROALBUMINURIA REV: CPT | Mod: CPTII,,, | Performed by: PODIATRIST

## 2024-02-12 PROCEDURE — 11043 DBRDMT MUSC&/FSCA 1ST 20/<: CPT | Mod: PN | Performed by: PODIATRIST

## 2024-02-12 PROCEDURE — 11043 DBRDMT MUSC&/FSCA 1ST 20/<: CPT | Mod: ,,, | Performed by: PODIATRIST

## 2024-02-12 PROCEDURE — 11042 DBRDMT SUBQ TIS 1ST 20SQCM/<: CPT | Mod: 59,PN | Performed by: PODIATRIST

## 2024-02-12 PROCEDURE — 1159F MED LIST DOCD IN RCRD: CPT | Mod: CPTII,,, | Performed by: PODIATRIST

## 2024-02-12 PROCEDURE — 4010F ACE/ARB THERAPY RXD/TAKEN: CPT | Mod: CPTII,,, | Performed by: PODIATRIST

## 2024-02-12 PROCEDURE — 3077F SYST BP >= 140 MM HG: CPT | Mod: CPTII,,, | Performed by: PODIATRIST

## 2024-02-12 PROCEDURE — 3046F HEMOGLOBIN A1C LEVEL >9.0%: CPT | Mod: CPTII,,, | Performed by: PODIATRIST

## 2024-02-12 PROCEDURE — 3080F DIAST BP >= 90 MM HG: CPT | Mod: CPTII,,, | Performed by: PODIATRIST

## 2024-02-12 PROCEDURE — 99214 OFFICE O/P EST MOD 30 MIN: CPT | Mod: 25,,, | Performed by: PODIATRIST

## 2024-02-12 RX ORDER — INSULIN GLARGINE 100 [IU]/ML
INJECTION, SOLUTION SUBCUTANEOUS
Qty: 15 ML | Refills: 0 | Status: SHIPPED | OUTPATIENT
Start: 2024-02-12 | End: 2024-03-25

## 2024-02-12 NOTE — PROGRESS NOTES
[address] [facility] [address]  [place] [address]  Phone: [phone]   [phone]    Patient's PCP:Alfie Lancaster MD  Referring Provider: Aaareferral Self    Subjective:      Chief Complaint:: Wound Care, Diabetes, Diabetic Foot Ulcer, Foot Ulcer, Non-healing Wound, Pressure Ulcer, Peripheral Neuropathy, Numbness, Wound Check, Wound Infection, Poor Circulation, and Osteomyelitis     HPI  Philip Alberts is a 43 y.o. male who presents today with a complaint of multiple new diabetic foot ulcers located on his right foot- right plantar foot, right great toe, right 4th toe, and right 5th toe diabetic foot ulcers.   See wound docs documentation for full assessment and evaluation of all wounds.         Culture taken of wound- bone culture taken of right 4th toe wound at last visit reviewed.   Patient has been referred to infectious disease.      Referral to ID placed due to bone exposed to right 4th toe- MRI ordered and bone culture taken.           Patient continues to smoke despite recommendations to stop- discussed multiple times the negative impact smoking has on wound healing. Patient is at risk of limb loss if he continues to smoke.     EVALUATION, ASSESSMENT, & PLAN:      1. Debridement of right 4th toe with necrotic bone debrided sent for culture analysis. Debridement of right plantar foot ulcer.   Evaluation and Assessment only of right great toe diabetic foot ulcer and right 5th toe diabetic foot ulcer.    See Wound Docs for assessment of wounds and procedure notes  2. Continue taking all medications as prescribed  3. Dressing changes, see Wound docs for dressing change orders  4. RTC one week   5. Counseled patient on increasing protein intake, not getting wound wet, keeping dressing clean dry and intact, following a healthy diet, elevating legs when able, removing pressure from wound     Total time spent for E&M 40  Total time for debridement 35 minutes         Counseling/Education:  I provided  patient education verbally regarding:   The aspects of diabetes and how it pertains to the feet. I explained the importance of proper diabetic foot care and how it is essential for the health of their feet.     I discussed the importance of knowing their Hemoglobin A1c and that the level needs to be as close to 6 as possible. I discussed the increase complications of high blood sugar including stroke, blindness, heart attack, kidney failure and loss of limb secondary to neuropathy and PVD.      With neuropathy, beware of any breaks in the skin or redness. These areas are not recognized early due to the numbness.     I discussed Diabetes, lower back issues, metabolic disorders, systemic causes, chemotherapy, vitamin deficiency, heavy metal exposure, as some of the causes. I also explained that as much as 40% of the time we can not find a cause. I discussed different treatments available to control the symptoms but which may not cure the problem.         Counseled patient on the aspects of diabetes and how it pertains to the feet.  I explained the importance of proper diabetic foot care and how it is essential for the health of their feet.        Shoe inspection. Patient instructed on proper foot hygeine. We discussed wearing proper shoe gear, daily foot inspections, never walking without protective shoe gear, never putting sharp instruments to feet, routine podiatric nail visits every 2-3 months.          Proper ulcer care and the possible need for serial debridements, topical medications, specific dressings and biological engineered skin substitutes if indicated.     Patient should call the office immediately if any signs of infection, such as fever, chills, sweats, increased redness or pain.     Patient was instructed to call the clinic or go to the emergency department if their symptoms do not improve, worsens, or if new symptoms develop.  Patient was advised that if any increased swelling, pain, or numbness arise  to go immediately to the ED. Patient knows to call any time if an emergency arises. Shared decision making occurred and patient verbalized understanding in agreement with this plan.         >50% of this > 60 minute visit was spent face to face educating/counseling the patient     I spent a total of 60 minutes on the day of the visit.This includes face to face time and non-face to face time preparing to see the patient (eg, review of tests), obtaining and/or reviewing separately obtained history, documenting clinical information in the electronic or other health record, independently interpreting results and communicating results to the patient/family/caregiver, or care coordinator.        Much of the documentation for this visit was completed in the Wound Docs system.  Please see the attached documentation for further details about the patient's care. Scanned under the Media tab.         Anat Santiago DPM      Vitals:    02/12/24 0811   BP: (!) 170/93   Pulse: 80   Resp: 18   Temp: 98.7 °F (37.1 °C)   PainSc: 0-No pain      Shoe Size:     Past Surgical History:   Procedure Laterality Date    MANDIBLE FRACTURE SURGERY  07/17/2000    MANDIBLE FRACTURE SURGERY      SPLENECTOMY, TOTAL  07/17/2000    TOE AMPUTATION Right 10/9/2023    Procedure: AMPUTATION, TOE;  Surgeon: Ashok Santiago DPM;  Location: [facility] OR;  Service: Podiatry;  Laterality: Right;     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  Tobacco History:  reports that he has been smoking. He has never used smokeless tobacco.  Drug History:  reports no history of drug use.    Review of patient's allergies indicates:   Allergen Reactions    Pcn [penicillins] Anaphylaxis     Tolerates cephalexin       Current Outpatient Medications   Medication Sig Dispense Refill    aspirin (ECOTRIN) 81 MG EC tablet Take 1 tablet (81 mg total) by mouth once daily. 90 tablet 3    blood sugar diagnostic (TRUE METRIX GLUCOSE TEST STRIP) Strp USE  STRIP TO CHECK BLOOD GLUCOSE  4 TIMES DAILY 150 each 3    blood-glucose meter kit To check BG qid times daily, to use with insurance preferred meter 1 each 0    blood-glucose meter,continuous (DEXCOM ) Misc 1 Device by Misc.(Non-Drug; Combo Route) route once daily. 1 each 0    blood-glucose sensor (DEXCOM G7 SENSOR) Gosia 1 Device by Misc.(Non-Drug; Combo Route) route every 10 days. 3 each 5    doxycycline (VIBRAMYCIN) 100 MG Cap Take 1 capsule (100 mg total) by mouth 2 (two) times daily. for 14 days 28 capsule 0    esomeprazole (NEXIUM) 20 MG capsule Take 20 mg by mouth before breakfast.      gabapentin (NEURONTIN) 300 MG capsule Take 2 capsules (600 mg total) by mouth 3 (three) times daily. 180 capsule 5    insulin aspart U-100 (NOVOLOG FLEXPEN U-100 INSULIN) 100 unit/mL (3 mL) InPn pen Inject 10 Units into the skin 4 (four) times daily. Per sliding scale as instructed 12 mL 5    insulin syringe-needle U-100 0.3 mL 31 gauge x 5/16" Syrg 1 Device by Misc.(Non-Drug; Combo Route) route 4 (four) times daily. 200 each 3    lancets Misc To check BGqid times daily, to use with insurance preferred meter (Patient taking differently: 1 lancet  by Misc.(Non-Drug; Combo Route) route 4 (four) times daily. To check BGqid times daily, to use with insurance preferred meter) 200 each 3    LANTUS SOLOSTAR U-100 INSULIN glargine 100 units/mL SubQ pen INJECT 20  UNITS SUBCUTANEOUSLY BID 15 mL 0    lisinopriL " (PRINIVIL,ZESTRIL) 5 MG tablet Take 1 tablet (5 mg total) by mouth once daily. 90 tablet 3    mupirocin (BACTROBAN) 2 % ointment Apply topically 3 (three) times daily. 22 g 0    pen needle, diabetic (BD ULTRA-FINE DENIS PEN NEEDLE MISC) 1 Needle by Misc.(Non-Drug; Combo Route) route 4 (four) times daily.      pravastatin (PRAVACHOL) 20 MG tablet Take 1 tablet (20 mg total) by mouth every evening. 90 tablet 3    sildenafiL (VIAGRA) 100 MG tablet Take 1 tablet (100 mg total) by mouth daily as needed for Erectile Dysfunction. 20 tablet 5     No current facility-administered medications for this visit.       Review of Systems   Constitutional:  Negative for chills, fatigue, fever and unexpected weight change.   HENT:  Negative for hearing loss and trouble swallowing.    Eyes:  Negative for photophobia and visual disturbance.   Respiratory:  Negative for cough, shortness of breath and wheezing.    Cardiovascular:  Negative for chest pain, palpitations and leg swelling.   Gastrointestinal:  Negative for abdominal pain and nausea.   Genitourinary:  Negative for dysuria and frequency.   Musculoskeletal:  Negative for arthralgias, back pain, gait problem, joint swelling and myalgias.   Skin:  Positive for wound. Negative for rash.   Neurological:  Positive for numbness. Negative for tremors, seizures, weakness and headaches.   Hematological:  Does not bruise/bleed easily.   Psychiatric/Behavioral:  Negative for hallucinations.          Objective:        Physical Exam:   Foot Exam  Physical Exam  Ortho/SPM Exam     Imaging:            Assessment:       1. Ulcer of right foot, with fat layer exposed    2. Ulcer of toe of right foot, with necrosis of muscle    3. At high risk for inadequate nutritional intake    4. Diabetes mellitus due to underlying condition with hyperglycemia, with long-term current use of insulin    5. Smoker    6. Diabetic peripheral neuropathy    7. Type 2 diabetes mellitus with diabetic polyneuropathy,  with long-term current use of insulin    8. History of amputation of right foot    9. Uncontrolled type 2 diabetes mellitus with hyperglycemia    10. Diabetic foot infection    11. History of ulcer of lower extremity    12. Wound infection    13. Difficulty in walking, not elsewhere classified    14. Ulcer of right foot with fat layer exposed    15. Ulcer of toe of right foot, with necrosis of bone    16. Ulcer of right foot with necrosis of bone    17. Right foot ulcer, with necrosis of muscle    18. Ulcer of right foot with necrosis of muscle    19. Cellulitis and abscess of foot      Plan:   Ulcer of right foot, with fat layer exposed    Ulcer of toe of right foot, with necrosis of muscle    At high risk for inadequate nutritional intake    Diabetes mellitus due to underlying condition with hyperglycemia, with long-term current use of insulin    Smoker    Diabetic peripheral neuropathy    Type 2 diabetes mellitus with diabetic polyneuropathy, with long-term current use of insulin    History of amputation of right foot    Uncontrolled type 2 diabetes mellitus with hyperglycemia    Diabetic foot infection    History of ulcer of lower extremity    Wound infection    Difficulty in walking, not elsewhere classified    Ulcer of right foot with fat layer exposed    Ulcer of toe of right foot, with necrosis of bone    Ulcer of right foot with necrosis of bone    Right foot ulcer, with necrosis of muscle    Ulcer of right foot with necrosis of muscle    Cellulitis and abscess of foot      Follow up in about 1 week (around 2/19/2024).    Procedures          Counseling:     I provided patient education verbally regarding:   Patient diagnosis, treatment options, as well as alternatives, risks, and benefits.     This note was created using Dragon voice recognition software that occasionally misinterpreted phrases or words.

## 2024-02-20 ENCOUNTER — OFFICE VISIT (OUTPATIENT)
Dept: WOUND CARE | Facility: HOSPITAL | Age: 44
End: 2024-02-20
Attending: FAMILY MEDICINE
Payer: MEDICAID

## 2024-02-20 VITALS
DIASTOLIC BLOOD PRESSURE: 87 MMHG | SYSTOLIC BLOOD PRESSURE: 142 MMHG | HEIGHT: 72 IN | RESPIRATION RATE: 18 BRPM | BODY MASS INDEX: 23.03 KG/M2 | HEART RATE: 82 BPM | WEIGHT: 170 LBS | TEMPERATURE: 98 F

## 2024-02-20 DIAGNOSIS — L08.9 DIABETIC FOOT INFECTION: ICD-10-CM

## 2024-02-20 DIAGNOSIS — L97.514 ULCER OF RIGHT FOOT WITH NECROSIS OF BONE: ICD-10-CM

## 2024-02-20 DIAGNOSIS — E11.628 DIABETIC FOOT INFECTION: ICD-10-CM

## 2024-02-20 DIAGNOSIS — L97.513 ULCER OF TOE OF RIGHT FOOT, WITH NECROSIS OF MUSCLE: ICD-10-CM

## 2024-02-20 DIAGNOSIS — L97.512 ULCER OF RIGHT FOOT, WITH FAT LAYER EXPOSED: Primary | ICD-10-CM

## 2024-02-20 PROCEDURE — 3008F BODY MASS INDEX DOCD: CPT | Mod: CPTII,,, | Performed by: FAMILY MEDICINE

## 2024-02-20 PROCEDURE — 1160F RVW MEDS BY RX/DR IN RCRD: CPT | Mod: CPTII,,, | Performed by: FAMILY MEDICINE

## 2024-02-20 PROCEDURE — 99213 OFFICE O/P EST LOW 20 MIN: CPT | Mod: 25,,, | Performed by: FAMILY MEDICINE

## 2024-02-20 PROCEDURE — 1159F MED LIST DOCD IN RCRD: CPT | Mod: CPTII,,, | Performed by: FAMILY MEDICINE

## 2024-02-20 PROCEDURE — 11042 DBRDMT SUBQ TIS 1ST 20SQCM/<: CPT | Mod: PN | Performed by: FAMILY MEDICINE

## 2024-02-20 PROCEDURE — 11045 DBRDMT SUBQ TISS EACH ADDL: CPT | Mod: PN | Performed by: FAMILY MEDICINE

## 2024-02-20 PROCEDURE — 11045 DBRDMT SUBQ TISS EACH ADDL: CPT | Mod: ,,, | Performed by: FAMILY MEDICINE

## 2024-02-20 PROCEDURE — 3077F SYST BP >= 140 MM HG: CPT | Mod: CPTII,,, | Performed by: FAMILY MEDICINE

## 2024-02-20 PROCEDURE — 4010F ACE/ARB THERAPY RXD/TAKEN: CPT | Mod: CPTII,,, | Performed by: FAMILY MEDICINE

## 2024-02-20 PROCEDURE — 3079F DIAST BP 80-89 MM HG: CPT | Mod: CPTII,,, | Performed by: FAMILY MEDICINE

## 2024-02-20 PROCEDURE — 3061F NEG MICROALBUMINURIA REV: CPT | Mod: CPTII,,, | Performed by: FAMILY MEDICINE

## 2024-02-20 PROCEDURE — 3066F NEPHROPATHY DOC TX: CPT | Mod: CPTII,,, | Performed by: FAMILY MEDICINE

## 2024-02-20 PROCEDURE — 3046F HEMOGLOBIN A1C LEVEL >9.0%: CPT | Mod: CPTII,,, | Performed by: FAMILY MEDICINE

## 2024-02-20 PROCEDURE — 11042 DBRDMT SUBQ TIS 1ST 20SQCM/<: CPT | Mod: ,,, | Performed by: FAMILY MEDICINE

## 2024-02-20 RX ORDER — DOXYCYCLINE 100 MG/1
100 CAPSULE ORAL 2 TIMES DAILY
Qty: 20 CAPSULE | Refills: 0 | Status: SHIPPED | OUTPATIENT
Start: 2024-02-20 | End: 2024-03-01

## 2024-02-20 NOTE — PROGRESS NOTES
Wound Care Progress Note    Subjective:       Patient ID: Philip Alberts is a 43 y.o. male.    Chief Complaint: Wound Care    HPI  Pt seen in clinic for a FU visit for multiple DM ulcers of the right foot. Wounds are stable, pt is waiting on ID appointment, no new complaints today  Review of Systems   Skin:  Positive for wound.        Right foot DM ulcers multiple   All other systems reviewed and are negative.      Objective:        Physical Exam  Vitals and nursing note reviewed.   Constitutional:       Appearance: Normal appearance. He is diaphoretic.   Skin:     General: Skin is warm.      Findings: Lesion present.      Comments: Multiple DM ulcers of the right foot, see wound care assessment documentation in chart review scanned under the media tab   Neurological:      General: No focal deficit present.      Mental Status: He is alert and oriented to person, place, and time.   Psychiatric:         Mood and Affect: Mood normal.         Judgment: Judgment normal.       Vitals:    02/20/24 0846   BP: (!) 142/87   Pulse: 82   Resp: 18   Temp: 98.4 °F (36.9 °C)       Assessment:           ICD-10-CM ICD-9-CM   1. Ulcer of right foot, with fat layer exposed  L97.512 707.15   2. Ulcer of toe of right foot, with necrosis of muscle  L97.513 707.15     728.89   3. Ulcer of right foot with necrosis of bone  L97.514 707.15     730.17   4. Diabetic foot infection  E11.628 250.80    L08.9 686.9                Plan:                  Philip was seen today for wound care.    Diagnoses and all orders for this visit:    Ulcer of right foot, with fat layer exposed    Ulcer of toe of right foot, with necrosis of muscle    Ulcer of right foot with necrosis of bone    Diabetic foot infection    Other orders  -     doxycycline (VIBRAMYCIN) 100 MG Cap; Take 1 capsule (100 mg total) by mouth 2 (two) times daily. for 10 days      Much of the documentation for this visit was completed in wound docs system. Please see the  attached documentation for further details about the patients care. Scanned under the media tab.

## 2024-02-27 ENCOUNTER — OFFICE VISIT (OUTPATIENT)
Dept: WOUND CARE | Facility: HOSPITAL | Age: 44
End: 2024-02-27
Attending: FAMILY MEDICINE
Payer: MEDICAID

## 2024-02-27 VITALS
BODY MASS INDEX: 23.03 KG/M2 | WEIGHT: 170 LBS | DIASTOLIC BLOOD PRESSURE: 84 MMHG | HEIGHT: 72 IN | TEMPERATURE: 98 F | SYSTOLIC BLOOD PRESSURE: 139 MMHG | RESPIRATION RATE: 18 BRPM | HEART RATE: 86 BPM

## 2024-02-27 DIAGNOSIS — L97.514 ULCER OF RIGHT FOOT WITH NECROSIS OF BONE: ICD-10-CM

## 2024-02-27 DIAGNOSIS — L97.513 ULCER OF TOE OF RIGHT FOOT, WITH NECROSIS OF MUSCLE: ICD-10-CM

## 2024-02-27 DIAGNOSIS — L97.512 ULCER OF RIGHT FOOT, WITH FAT LAYER EXPOSED: Primary | ICD-10-CM

## 2024-02-27 PROCEDURE — 11045 DBRDMT SUBQ TISS EACH ADDL: CPT | Mod: PN | Performed by: FAMILY MEDICINE

## 2024-02-27 PROCEDURE — 11042 DBRDMT SUBQ TIS 1ST 20SQCM/<: CPT | Mod: ,,, | Performed by: FAMILY MEDICINE

## 2024-02-27 PROCEDURE — 11042 DBRDMT SUBQ TIS 1ST 20SQCM/<: CPT | Mod: PN | Performed by: FAMILY MEDICINE

## 2024-02-27 PROCEDURE — 99499 UNLISTED E&M SERVICE: CPT | Mod: ,,, | Performed by: FAMILY MEDICINE

## 2024-02-27 PROCEDURE — 11045 DBRDMT SUBQ TISS EACH ADDL: CPT | Mod: ,,, | Performed by: FAMILY MEDICINE

## 2024-02-27 NOTE — PROGRESS NOTES
Wound Care Progress Note    Subjective:       Patient ID: Philip Alberts is a 43 y.o. male.    Chief Complaint: Wound Care    HPI  Pt seen in clinic for a FU visit for right foot DM ulcers x 4. Wounds are stable, will need debridement. No other complaints today  Review of Systems   Cardiovascular:  Positive for leg swelling.   Skin:  Positive for wound.        Open wound right foot x 4   All other systems reviewed and are negative.        Objective:        Physical Exam  Vitals and nursing note reviewed.   Constitutional:       General: He is not in acute distress.     Appearance: Normal appearance.   Skin:     General: Skin is warm and dry.      Findings: Lesion present.      Comments: Right foot DM ulcer x 4, see wound care assessment documentation in chart review scanned under the media tab   Neurological:      General: No focal deficit present.      Mental Status: He is alert and oriented to person, place, and time.   Psychiatric:         Mood and Affect: Mood normal.         Thought Content: Thought content normal.         Judgment: Judgment normal.       Vitals:    02/27/24 0756   BP: 139/84   Pulse: 86   Resp: 18   Temp: 98.4 °F (36.9 °C)       Assessment:           ICD-10-CM ICD-9-CM   1. Ulcer of right foot, with fat layer exposed  L97.512 707.15   2. Ulcer of toe of right foot, with necrosis of muscle  L97.513 707.15     728.89   3. Ulcer of right foot with necrosis of bone  L97.514 707.15     730.17                Plan:                  Philip was seen today for wound care.    Diagnoses and all orders for this visit:    Ulcer of right foot, with fat layer exposed    Ulcer of toe of right foot, with necrosis of muscle    Ulcer of right foot with necrosis of bone      See attached wound care documentation

## 2024-03-05 ENCOUNTER — OFFICE VISIT (OUTPATIENT)
Dept: WOUND CARE | Facility: HOSPITAL | Age: 44
End: 2024-03-05
Attending: FAMILY MEDICINE
Payer: MEDICAID

## 2024-03-05 VITALS
RESPIRATION RATE: 18 BRPM | SYSTOLIC BLOOD PRESSURE: 184 MMHG | HEART RATE: 92 BPM | TEMPERATURE: 98 F | DIASTOLIC BLOOD PRESSURE: 94 MMHG

## 2024-03-05 DIAGNOSIS — L97.512 ULCER OF RIGHT FOOT, WITH FAT LAYER EXPOSED: Primary | ICD-10-CM

## 2024-03-05 DIAGNOSIS — L97.513 ULCER OF TOE OF RIGHT FOOT, WITH NECROSIS OF MUSCLE: ICD-10-CM

## 2024-03-05 DIAGNOSIS — L97.514 ULCER OF RIGHT FOOT WITH NECROSIS OF BONE: ICD-10-CM

## 2024-03-05 PROCEDURE — 11042 DBRDMT SUBQ TIS 1ST 20SQCM/<: CPT | Mod: PN | Performed by: FAMILY MEDICINE

## 2024-03-05 PROCEDURE — 3061F NEG MICROALBUMINURIA REV: CPT | Mod: CPTII,,, | Performed by: FAMILY MEDICINE

## 2024-03-05 PROCEDURE — 1159F MED LIST DOCD IN RCRD: CPT | Mod: CPTII,,, | Performed by: FAMILY MEDICINE

## 2024-03-05 PROCEDURE — 11042 DBRDMT SUBQ TIS 1ST 20SQCM/<: CPT | Mod: ,,, | Performed by: FAMILY MEDICINE

## 2024-03-05 PROCEDURE — 3080F DIAST BP >= 90 MM HG: CPT | Mod: CPTII,,, | Performed by: FAMILY MEDICINE

## 2024-03-05 PROCEDURE — 4010F ACE/ARB THERAPY RXD/TAKEN: CPT | Mod: CPTII,,, | Performed by: FAMILY MEDICINE

## 2024-03-05 PROCEDURE — 11045 DBRDMT SUBQ TISS EACH ADDL: CPT | Mod: ,,, | Performed by: FAMILY MEDICINE

## 2024-03-05 PROCEDURE — 3046F HEMOGLOBIN A1C LEVEL >9.0%: CPT | Mod: CPTII,,, | Performed by: FAMILY MEDICINE

## 2024-03-05 PROCEDURE — 11045 DBRDMT SUBQ TISS EACH ADDL: CPT | Mod: PN | Performed by: FAMILY MEDICINE

## 2024-03-05 PROCEDURE — 3066F NEPHROPATHY DOC TX: CPT | Mod: CPTII,,, | Performed by: FAMILY MEDICINE

## 2024-03-05 PROCEDURE — 3077F SYST BP >= 140 MM HG: CPT | Mod: CPTII,,, | Performed by: FAMILY MEDICINE

## 2024-03-05 PROCEDURE — 1160F RVW MEDS BY RX/DR IN RCRD: CPT | Mod: CPTII,,, | Performed by: FAMILY MEDICINE

## 2024-03-05 PROCEDURE — 99499 UNLISTED E&M SERVICE: CPT | Mod: ,,, | Performed by: FAMILY MEDICINE

## 2024-03-05 RX ORDER — DOXYCYCLINE 100 MG/1
100 CAPSULE ORAL 2 TIMES DAILY
Qty: 20 CAPSULE | Refills: 0 | Status: SHIPPED | OUTPATIENT
Start: 2024-03-05 | End: 2024-03-15

## 2024-03-05 NOTE — PROGRESS NOTES
Wound Care Progress Note    Subjective:       Patient ID: Philip Alberts is a 43 y.o. male.    Chief Complaint: Wound Care    HPI  Pt seen in clinic for a FU visit for right foot DM ulcers x 4. Wounds are stable. Pt seeing vascular on 3/12, and MRI had to be rescheduled. Pt has no other complaints today. Pt out of Doxycycline and not seeing ID for another month.  Review of Systems   Skin:  Positive for wound.        Right foot multiple DM ulcers   All other systems reviewed and are negative.      Objective:        Physical Exam  Vitals and nursing note reviewed. Exam conducted with a chaperone present.   Constitutional:       General: He is not in acute distress.     Appearance: Normal appearance.   Skin:     General: Skin is warm and dry.      Findings: Lesion present.      Comments: Right foot DM ulcers x 4, see wound care assessment documentation in chart review scanned under the media tab   Neurological:      General: No focal deficit present.      Mental Status: He is alert.   Psychiatric:         Mood and Affect: Mood normal.         Judgment: Judgment normal.       Vitals:    03/05/24 0805   BP: (!) 184/94   Pulse: 92   Resp: 18   Temp: 98.2 °F (36.8 °C)       Assessment:           ICD-10-CM ICD-9-CM   1. Ulcer of right foot, with fat layer exposed  L97.512 707.15   2. Ulcer of toe of right foot, with necrosis of muscle  L97.513 707.15     728.89   3. Ulcer of right foot with necrosis of bone  L97.514 707.15     730.17                Plan:                  Philip was seen today for wound care.    Diagnoses and all orders for this visit:    Ulcer of right foot, with fat layer exposed    Ulcer of toe of right foot, with necrosis of muscle    Ulcer of right foot with necrosis of bone    Other orders  -     doxycycline (VIBRAMYCIN) 100 MG Cap; Take 1 capsule (100 mg total) by mouth 2 (two) times daily. for 10 days        Please see wound care instructions which have been provided separately.

## 2024-03-12 ENCOUNTER — OFFICE VISIT (OUTPATIENT)
Dept: WOUND CARE | Facility: HOSPITAL | Age: 44
End: 2024-03-12
Attending: FAMILY MEDICINE
Payer: MEDICAID

## 2024-03-12 VITALS
HEART RATE: 88 BPM | TEMPERATURE: 98 F | RESPIRATION RATE: 18 BRPM | SYSTOLIC BLOOD PRESSURE: 177 MMHG | DIASTOLIC BLOOD PRESSURE: 92 MMHG

## 2024-03-12 DIAGNOSIS — E11.628 DIABETIC FOOT INFECTION: ICD-10-CM

## 2024-03-12 DIAGNOSIS — L97.512 ULCER OF RIGHT FOOT, WITH FAT LAYER EXPOSED: Primary | ICD-10-CM

## 2024-03-12 DIAGNOSIS — L08.9 DIABETIC FOOT INFECTION: ICD-10-CM

## 2024-03-12 DIAGNOSIS — L97.513 ULCER OF TOE OF RIGHT FOOT, WITH NECROSIS OF MUSCLE: ICD-10-CM

## 2024-03-12 DIAGNOSIS — L97.514 ULCER OF RIGHT FOOT WITH NECROSIS OF BONE: ICD-10-CM

## 2024-03-12 PROCEDURE — 11045 DBRDMT SUBQ TISS EACH ADDL: CPT | Mod: PN | Performed by: FAMILY MEDICINE

## 2024-03-12 PROCEDURE — 1160F RVW MEDS BY RX/DR IN RCRD: CPT | Mod: CPTII,,, | Performed by: FAMILY MEDICINE

## 2024-03-12 PROCEDURE — 1159F MED LIST DOCD IN RCRD: CPT | Mod: CPTII,,, | Performed by: FAMILY MEDICINE

## 2024-03-12 PROCEDURE — 3080F DIAST BP >= 90 MM HG: CPT | Mod: CPTII,,, | Performed by: FAMILY MEDICINE

## 2024-03-12 PROCEDURE — 3061F NEG MICROALBUMINURIA REV: CPT | Mod: CPTII,,, | Performed by: FAMILY MEDICINE

## 2024-03-12 PROCEDURE — 11042 DBRDMT SUBQ TIS 1ST 20SQCM/<: CPT | Mod: ,,, | Performed by: FAMILY MEDICINE

## 2024-03-12 PROCEDURE — 99499 UNLISTED E&M SERVICE: CPT | Mod: ,,, | Performed by: FAMILY MEDICINE

## 2024-03-12 PROCEDURE — 3066F NEPHROPATHY DOC TX: CPT | Mod: CPTII,,, | Performed by: FAMILY MEDICINE

## 2024-03-12 PROCEDURE — 11045 DBRDMT SUBQ TISS EACH ADDL: CPT | Mod: ,,, | Performed by: FAMILY MEDICINE

## 2024-03-12 PROCEDURE — 3077F SYST BP >= 140 MM HG: CPT | Mod: CPTII,,, | Performed by: FAMILY MEDICINE

## 2024-03-12 PROCEDURE — 11042 DBRDMT SUBQ TIS 1ST 20SQCM/<: CPT | Mod: PN,91 | Performed by: FAMILY MEDICINE

## 2024-03-12 PROCEDURE — 3046F HEMOGLOBIN A1C LEVEL >9.0%: CPT | Mod: CPTII,,, | Performed by: FAMILY MEDICINE

## 2024-03-12 PROCEDURE — 4010F ACE/ARB THERAPY RXD/TAKEN: CPT | Mod: CPTII,,, | Performed by: FAMILY MEDICINE

## 2024-03-12 RX ORDER — DOXYCYCLINE 100 MG/1
100 CAPSULE ORAL 2 TIMES DAILY
Qty: 20 CAPSULE | Refills: 0 | Status: SHIPPED | OUTPATIENT
Start: 2024-03-12 | End: 2024-03-22

## 2024-03-12 NOTE — PROGRESS NOTES
Wound Care Progress Note    Subjective:       Patient ID: Philip Alberts is a 43 y.o. male.    Chief Complaint: Wound Care    HPI  Pt seen in clinic for a FU visit for right foot DM ulcers of the toes and midfoot. Pt wounds are stable, some decrease in size. Pt is out of Doxycycline and not scheduled for ID for a couple weeks. Hwe is seeing vascular today. No other complaints today  Review of Systems   Skin:  Positive for wound.        Right foot DM ulcers   All other systems reviewed and are negative.      Objective:        Physical Exam  Vitals and nursing note reviewed.   Constitutional:       General: He is not in acute distress.     Appearance: Normal appearance.   Skin:     General: Skin is warm and dry.      Findings: Lesion present.      Comments: DM ulcers of the right midfoot and toes, see wound care assessment documentation in chart review scanned under the media tab   Neurological:      General: No focal deficit present.      Mental Status: He is alert and oriented to person, place, and time.   Psychiatric:         Mood and Affect: Mood normal.         Thought Content: Thought content normal.         Judgment: Judgment normal.       Vitals:    03/12/24 0752   BP: (!) 177/92   Pulse: 88   Resp: 18   Temp: 98.2 °F (36.8 °C)       Assessment:           ICD-10-CM ICD-9-CM   1. Ulcer of right foot, with fat layer exposed  L97.512 707.15   2. Ulcer of toe of right foot, with necrosis of muscle  L97.513 707.15     728.89   3. Ulcer of right foot with necrosis of bone  L97.514 707.15     730.17   4. Diabetic foot infection  E11.628 250.80    L08.9 686.9                Plan:                  Philip was seen today for wound care.    Diagnoses and all orders for this visit:    Ulcer of right foot, with fat layer exposed    Ulcer of toe of right foot, with necrosis of muscle    Ulcer of right foot with necrosis of bone    Diabetic foot infection    Other orders  -     doxycycline (VIBRAMYCIN) 100 MG  Cap; Take 1 capsule (100 mg total) by mouth 2 (two) times daily. for 10 days          Please see wound care instructions which have been provided separately.

## 2024-03-19 ENCOUNTER — OFFICE VISIT (OUTPATIENT)
Dept: WOUND CARE | Facility: HOSPITAL | Age: 44
End: 2024-03-19
Attending: FAMILY MEDICINE
Payer: MEDICAID

## 2024-03-19 VITALS
RESPIRATION RATE: 18 BRPM | DIASTOLIC BLOOD PRESSURE: 88 MMHG | HEART RATE: 96 BPM | TEMPERATURE: 98 F | SYSTOLIC BLOOD PRESSURE: 184 MMHG

## 2024-03-19 DIAGNOSIS — L97.513 ULCER OF TOE OF RIGHT FOOT, WITH NECROSIS OF MUSCLE: ICD-10-CM

## 2024-03-19 DIAGNOSIS — L97.514 ULCER OF RIGHT FOOT WITH NECROSIS OF BONE: ICD-10-CM

## 2024-03-19 DIAGNOSIS — L97.512 ULCER OF RIGHT FOOT, WITH FAT LAYER EXPOSED: Primary | ICD-10-CM

## 2024-03-19 PROCEDURE — 11042 DBRDMT SUBQ TIS 1ST 20SQCM/<: CPT | Mod: PN | Performed by: FAMILY MEDICINE

## 2024-03-19 PROCEDURE — 11042 DBRDMT SUBQ TIS 1ST 20SQCM/<: CPT | Mod: ,,, | Performed by: FAMILY MEDICINE

## 2024-03-19 PROCEDURE — 11045 DBRDMT SUBQ TISS EACH ADDL: CPT | Mod: ,,, | Performed by: FAMILY MEDICINE

## 2024-03-19 PROCEDURE — 11045 DBRDMT SUBQ TISS EACH ADDL: CPT | Mod: PN | Performed by: FAMILY MEDICINE

## 2024-03-19 PROCEDURE — 99499 UNLISTED E&M SERVICE: CPT | Mod: ,,, | Performed by: FAMILY MEDICINE

## 2024-03-19 NOTE — PROGRESS NOTES
Wound Care Progress Note    Subjective:       Patient ID: Philip Alberts is a 43 y.o. male.    Chief Complaint: Wound Care    HPI  Pt seen in clinic for a FU visit for right foot DM ulcers of the plantar and toes. Wounds are slowly improving. Pt continues to smoke. Pt has no new complaints today.  Review of Systems   Skin:  Positive for wound.        Right foot DM ulcers of the foot and toes   All other systems reviewed and are negative.      Objective:        Physical Exam  Vitals and nursing note reviewed.   Constitutional:       General: He is not in acute distress.     Appearance: Normal appearance.   Skin:     General: Skin is warm and dry.      Findings: Lesion present.      Comments: Right plantar foot and toes DM ulcers, see wound care assessment documentation in chart review scanned under then media tab   Neurological:      General: No focal deficit present.      Mental Status: He is alert.   Psychiatric:         Mood and Affect: Mood normal.         Thought Content: Thought content normal.         Judgment: Judgment normal.       Vitals:    03/19/24 0750   BP: (!) 184/88   Pulse: 96   Resp: 18   Temp: 98.4 °F (36.9 °C)       Assessment:           ICD-10-CM ICD-9-CM   1. Ulcer of right foot, with fat layer exposed  L97.512 707.15   2. Ulcer of toe of right foot, with necrosis of muscle  L97.513 707.15     728.89   3. Ulcer of right foot with necrosis of bone  L97.514 707.15     730.17                Plan:                  Philip was seen today for wound care.    Diagnoses and all orders for this visit:    Ulcer of right foot, with fat layer exposed    Ulcer of toe of right foot, with necrosis of muscle    Ulcer of right foot with necrosis of bone        Please see wound care instructions which have been provided separately.

## 2024-03-23 DIAGNOSIS — E10.42 TYPE 1 DM WITH POLYNEUROPATHY: ICD-10-CM

## 2024-03-25 RX ORDER — INSULIN GLARGINE 100 [IU]/ML
INJECTION, SOLUTION SUBCUTANEOUS
Qty: 15 ML | Refills: 2 | Status: SHIPPED | OUTPATIENT
Start: 2024-03-25 | End: 2024-06-18 | Stop reason: SDUPTHER

## 2024-03-26 ENCOUNTER — OFFICE VISIT (OUTPATIENT)
Dept: WOUND CARE | Facility: HOSPITAL | Age: 44
End: 2024-03-26
Attending: FAMILY MEDICINE
Payer: MEDICAID

## 2024-03-26 VITALS
SYSTOLIC BLOOD PRESSURE: 151 MMHG | RESPIRATION RATE: 18 BRPM | TEMPERATURE: 99 F | DIASTOLIC BLOOD PRESSURE: 85 MMHG | HEART RATE: 84 BPM

## 2024-03-26 DIAGNOSIS — T14.8XXA WOUND INFECTION: ICD-10-CM

## 2024-03-26 DIAGNOSIS — L97.512 ULCER OF RIGHT FOOT, WITH FAT LAYER EXPOSED: Primary | ICD-10-CM

## 2024-03-26 DIAGNOSIS — L97.514 ULCER OF RIGHT FOOT WITH NECROSIS OF BONE: ICD-10-CM

## 2024-03-26 DIAGNOSIS — L97.509 TYPE 2 DIABETES MELLITUS WITH FOOT ULCER, UNSPECIFIED WHETHER LONG TERM INSULIN USE: ICD-10-CM

## 2024-03-26 DIAGNOSIS — L08.9 WOUND INFECTION: ICD-10-CM

## 2024-03-26 DIAGNOSIS — E11.628 DIABETIC FOOT INFECTION: ICD-10-CM

## 2024-03-26 DIAGNOSIS — L97.513 ULCER OF TOE OF RIGHT FOOT, WITH NECROSIS OF MUSCLE: ICD-10-CM

## 2024-03-26 DIAGNOSIS — E11.621 TYPE 2 DIABETES MELLITUS WITH FOOT ULCER, UNSPECIFIED WHETHER LONG TERM INSULIN USE: ICD-10-CM

## 2024-03-26 DIAGNOSIS — L08.9 DIABETIC FOOT INFECTION: ICD-10-CM

## 2024-03-26 PROCEDURE — 11045 DBRDMT SUBQ TISS EACH ADDL: CPT | Mod: ,,, | Performed by: FAMILY MEDICINE

## 2024-03-26 PROCEDURE — 3066F NEPHROPATHY DOC TX: CPT | Mod: CPTII,,, | Performed by: FAMILY MEDICINE

## 2024-03-26 PROCEDURE — 3077F SYST BP >= 140 MM HG: CPT | Mod: CPTII,,, | Performed by: FAMILY MEDICINE

## 2024-03-26 PROCEDURE — 11045 DBRDMT SUBQ TISS EACH ADDL: CPT | Mod: PN | Performed by: FAMILY MEDICINE

## 2024-03-26 PROCEDURE — 1160F RVW MEDS BY RX/DR IN RCRD: CPT | Mod: CPTII,,, | Performed by: FAMILY MEDICINE

## 2024-03-26 PROCEDURE — 1159F MED LIST DOCD IN RCRD: CPT | Mod: CPTII,,, | Performed by: FAMILY MEDICINE

## 2024-03-26 PROCEDURE — 3061F NEG MICROALBUMINURIA REV: CPT | Mod: CPTII,,, | Performed by: FAMILY MEDICINE

## 2024-03-26 PROCEDURE — 4010F ACE/ARB THERAPY RXD/TAKEN: CPT | Mod: CPTII,,, | Performed by: FAMILY MEDICINE

## 2024-03-26 PROCEDURE — 11042 DBRDMT SUBQ TIS 1ST 20SQCM/<: CPT | Mod: ,,, | Performed by: FAMILY MEDICINE

## 2024-03-26 PROCEDURE — 11042 DBRDMT SUBQ TIS 1ST 20SQCM/<: CPT | Mod: PN,91 | Performed by: FAMILY MEDICINE

## 2024-03-26 PROCEDURE — 3079F DIAST BP 80-89 MM HG: CPT | Mod: CPTII,,, | Performed by: FAMILY MEDICINE

## 2024-03-26 PROCEDURE — 99213 OFFICE O/P EST LOW 20 MIN: CPT | Mod: 25,,, | Performed by: FAMILY MEDICINE

## 2024-03-26 PROCEDURE — 3046F HEMOGLOBIN A1C LEVEL >9.0%: CPT | Mod: CPTII,,, | Performed by: FAMILY MEDICINE

## 2024-03-26 RX ORDER — DOXYCYCLINE 100 MG/1
100 CAPSULE ORAL 2 TIMES DAILY
Qty: 20 CAPSULE | Refills: 0 | Status: SHIPPED | OUTPATIENT
Start: 2024-03-26 | End: 2024-04-05

## 2024-03-26 NOTE — PROGRESS NOTES
Wound Care Progress Note    Subjective:       Patient ID: Philip Alberts is a 43 y.o. male.    Chief Complaint: Wound Care    HPI  Pt seen in clinic for a FU visit for multiple right foot DM ulcers. Wounds are improving, wounds have some odor. Pt has no new complaints today  Review of Systems   Skin:  Positive for wound.        Multiple DM ulcers of the right foot   All other systems reviewed and are negative.      Objective:        Physical Exam  Vitals and nursing note reviewed.   Constitutional:       General: He is not in acute distress.     Appearance: Normal appearance.   Skin:     General: Skin is warm and dry.      Findings: Lesion present.      Comments: Right foot, multiple DM ulcers, see wound care assessment documentation in  chart review scanned under the media tab   Neurological:      General: No focal deficit present.      Mental Status: He is alert.   Psychiatric:         Mood and Affect: Mood normal.         Thought Content: Thought content normal.         Judgment: Judgment normal.       Vitals:    03/26/24 0749   BP: (!) 151/85   Pulse: 84   Resp: 18   Temp: 98.5 °F (36.9 °C)       Assessment:           ICD-10-CM ICD-9-CM   1. Ulcer of right foot, with fat layer exposed  L97.512 707.15   2. Ulcer of toe of right foot, with necrosis of muscle  L97.513 707.15     728.89   3. Ulcer of right foot with necrosis of bone  L97.514 707.15     730.17   4. Wound infection  T14.8XXA 958.3    L08.9                 Plan:                  Philip was seen today for wound care.    Diagnoses and all orders for this visit:    Ulcer of right foot, with fat layer exposed    Ulcer of toe of right foot, with necrosis of muscle    Ulcer of right foot with necrosis of bone    Wound infection    Other orders  -     doxycycline (VIBRAMYCIN) 100 MG Cap; Take 1 capsule (100 mg total) by mouth 2 (two) times daily. for 10 days        Please see wound care instructions which have been provided separately.

## 2024-03-28 ENCOUNTER — PATIENT MESSAGE (OUTPATIENT)
Dept: ADMINISTRATIVE | Facility: HOSPITAL | Age: 44
End: 2024-03-28
Payer: MEDICAID

## 2024-04-02 ENCOUNTER — OFFICE VISIT (OUTPATIENT)
Dept: WOUND CARE | Facility: HOSPITAL | Age: 44
End: 2024-04-02
Attending: FAMILY MEDICINE
Payer: MEDICAID

## 2024-04-02 VITALS
HEART RATE: 80 BPM | DIASTOLIC BLOOD PRESSURE: 85 MMHG | RESPIRATION RATE: 18 BRPM | TEMPERATURE: 98 F | SYSTOLIC BLOOD PRESSURE: 148 MMHG

## 2024-04-02 DIAGNOSIS — E11.42 DIABETIC PERIPHERAL NEUROPATHY: ICD-10-CM

## 2024-04-02 DIAGNOSIS — L97.512 ULCER OF RIGHT FOOT, WITH FAT LAYER EXPOSED: Primary | ICD-10-CM

## 2024-04-02 DIAGNOSIS — E11.628 DIABETIC FOOT INFECTION: ICD-10-CM

## 2024-04-02 DIAGNOSIS — L97.514 ULCER OF RIGHT FOOT WITH NECROSIS OF BONE: ICD-10-CM

## 2024-04-02 DIAGNOSIS — L08.9 DIABETIC FOOT INFECTION: ICD-10-CM

## 2024-04-02 DIAGNOSIS — L97.513 ULCER OF TOE OF RIGHT FOOT, WITH NECROSIS OF MUSCLE: ICD-10-CM

## 2024-04-02 PROCEDURE — 99499 UNLISTED E&M SERVICE: CPT | Mod: ,,, | Performed by: FAMILY MEDICINE

## 2024-04-02 PROCEDURE — 11045 DBRDMT SUBQ TISS EACH ADDL: CPT | Mod: ,,, | Performed by: FAMILY MEDICINE

## 2024-04-02 PROCEDURE — 11042 DBRDMT SUBQ TIS 1ST 20SQCM/<: CPT | Mod: ,,, | Performed by: FAMILY MEDICINE

## 2024-04-02 PROCEDURE — 11045 DBRDMT SUBQ TISS EACH ADDL: CPT | Mod: PN | Performed by: FAMILY MEDICINE

## 2024-04-02 PROCEDURE — 11042 DBRDMT SUBQ TIS 1ST 20SQCM/<: CPT | Mod: PN | Performed by: FAMILY MEDICINE

## 2024-04-02 NOTE — PROGRESS NOTES
Wound Care Progress Note    Subjective:       Patient ID: Philip Alberts is a 43 y.o. male.    Chief Complaint: Wound Care    HPI  Pt seen in  clinic for a FU visit for multiple right foot DM ulcers. Wounds are continuing to improve. Pt has no new complaints today  Review of Systems   Skin:  Positive for wound.        Multiple right foot open wounds   All other systems reviewed and are negative.      Objective:        Physical Exam  Vitals and nursing note reviewed.   Constitutional:       Appearance: Normal appearance. He is not ill-appearing.   Skin:     General: Skin is warm and dry.      Findings: Lesion present.      Comments: Multiple right foot DM ulcers, see wound care assessment documentation in cart review scanned under the media tasb   Neurological:      General: No focal deficit present.      Mental Status: He is alert.   Psychiatric:         Mood and Affect: Mood normal.         Thought Content: Thought content normal.         Judgment: Judgment normal.       Vitals:    04/02/24 0808   BP: (!) 148/85   Pulse: 80   Resp: 18   Temp: 98.3 °F (36.8 °C)       Assessment:           ICD-10-CM ICD-9-CM   1. Ulcer of right foot, with fat layer exposed  L97.512 707.15   2. Ulcer of toe of right foot, with necrosis of muscle  L97.513 707.15     728.89   3. Ulcer of right foot with necrosis of bone  L97.514 707.15     730.17                Plan:                  Philip was seen today for wound care.    Diagnoses and all orders for this visit:    Ulcer of right foot, with fat layer exposed    Ulcer of toe of right foot, with necrosis of muscle    Ulcer of right foot with necrosis of bone      Please see wound care instructions which have been provided separately.

## 2024-04-05 ENCOUNTER — PATIENT MESSAGE (OUTPATIENT)
Dept: ADMINISTRATIVE | Facility: OTHER | Age: 44
End: 2024-04-05
Payer: MEDICAID

## 2024-04-09 ENCOUNTER — OFFICE VISIT (OUTPATIENT)
Dept: WOUND CARE | Facility: HOSPITAL | Age: 44
End: 2024-04-09
Attending: FAMILY MEDICINE
Payer: MEDICAID

## 2024-04-09 VITALS
TEMPERATURE: 98 F | DIASTOLIC BLOOD PRESSURE: 68 MMHG | RESPIRATION RATE: 18 BRPM | HEART RATE: 75 BPM | SYSTOLIC BLOOD PRESSURE: 127 MMHG

## 2024-04-09 DIAGNOSIS — L97.514 ULCER OF RIGHT FOOT WITH NECROSIS OF BONE: ICD-10-CM

## 2024-04-09 DIAGNOSIS — L08.9 DIABETIC FOOT INFECTION: ICD-10-CM

## 2024-04-09 DIAGNOSIS — L97.513 ULCER OF TOE OF RIGHT FOOT, WITH NECROSIS OF MUSCLE: ICD-10-CM

## 2024-04-09 DIAGNOSIS — E11.628 DIABETIC FOOT INFECTION: ICD-10-CM

## 2024-04-09 DIAGNOSIS — E11.621 TYPE 2 DIABETES MELLITUS WITH FOOT ULCER, UNSPECIFIED WHETHER LONG TERM INSULIN USE: ICD-10-CM

## 2024-04-09 DIAGNOSIS — L97.512 ULCER OF RIGHT FOOT, WITH FAT LAYER EXPOSED: Primary | ICD-10-CM

## 2024-04-09 DIAGNOSIS — L97.509 TYPE 2 DIABETES MELLITUS WITH FOOT ULCER, UNSPECIFIED WHETHER LONG TERM INSULIN USE: ICD-10-CM

## 2024-04-09 PROCEDURE — 4010F ACE/ARB THERAPY RXD/TAKEN: CPT | Mod: CPTII,,, | Performed by: FAMILY MEDICINE

## 2024-04-09 PROCEDURE — 3061F NEG MICROALBUMINURIA REV: CPT | Mod: CPTII,,, | Performed by: FAMILY MEDICINE

## 2024-04-09 PROCEDURE — 3066F NEPHROPATHY DOC TX: CPT | Mod: CPTII,,, | Performed by: FAMILY MEDICINE

## 2024-04-09 PROCEDURE — 99213 OFFICE O/P EST LOW 20 MIN: CPT | Mod: 25,,, | Performed by: FAMILY MEDICINE

## 2024-04-09 PROCEDURE — 11042 DBRDMT SUBQ TIS 1ST 20SQCM/<: CPT | Mod: ,,, | Performed by: FAMILY MEDICINE

## 2024-04-09 PROCEDURE — 11042 DBRDMT SUBQ TIS 1ST 20SQCM/<: CPT | Mod: PN | Performed by: FAMILY MEDICINE

## 2024-04-09 PROCEDURE — 1160F RVW MEDS BY RX/DR IN RCRD: CPT | Mod: CPTII,,, | Performed by: FAMILY MEDICINE

## 2024-04-09 PROCEDURE — 3046F HEMOGLOBIN A1C LEVEL >9.0%: CPT | Mod: CPTII,,, | Performed by: FAMILY MEDICINE

## 2024-04-09 PROCEDURE — 1159F MED LIST DOCD IN RCRD: CPT | Mod: CPTII,,, | Performed by: FAMILY MEDICINE

## 2024-04-09 RX ORDER — DOXYCYCLINE 100 MG/1
100 CAPSULE ORAL 2 TIMES DAILY
Qty: 20 CAPSULE | Refills: 0 | Status: SHIPPED | OUTPATIENT
Start: 2024-04-09 | End: 2024-04-19

## 2024-04-09 NOTE — PROGRESS NOTES
Wound Care Progress Note    Subjective:       Patient ID: Philip Alberts is a 43 y.o. male.    Chief Complaint: Wound Care    HPI  Pt seen in clinic for a FU visit for right foot DM ulcer x 3. Wounds continue to improve, no new complaints today  Review of Systems   Skin:  Positive for wound.        Right foot DM ulcer x 3   All other systems reviewed and are negative.      Objective:        Physical Exam  Vitals and nursing note reviewed.   Constitutional:       General: He is not in acute distress.     Appearance: Normal appearance.   Skin:     General: Skin is warm and dry.      Findings: Lesion present.      Comments: Right foot DM ulcer x 3, see wound car4e assessment documentation in chart review scanned under the media tab   Neurological:      General: No focal deficit present.      Mental Status: He is alert.   Psychiatric:         Mood and Affect: Mood normal.         Thought Content: Thought content normal.         Judgment: Judgment normal.       There were no vitals filed for this visit.    Assessment:           ICD-10-CM ICD-9-CM   1. Ulcer of right foot, with fat layer exposed  L97.512 707.15   2. Ulcer of toe of right foot, with necrosis of muscle  L97.513 707.15     728.89   3. Ulcer of right foot with necrosis of bone  L97.514 707.15     730.17   4. Diabetic foot infection  E11.628 250.80    L08.9 686.9   5. Type 2 diabetes mellitus with foot ulcer, unspecified whether long term insulin use  E11.621 250.80    L97.509 707.15                Plan:                  Philip was seen today for wound care.    Diagnoses and all orders for this visit:    Ulcer of right foot, with fat layer exposed    Ulcer of toe of right foot, with necrosis of muscle    Ulcer of right foot with necrosis of bone    Diabetic foot infection    Type 2 diabetes mellitus with foot ulcer, unspecified whether long term insulin use    Other orders  -     doxycycline (VIBRAMYCIN) 100 MG Cap; Take 1 capsule (100 mg total)  by mouth 2 (two) times daily. for 10 days        See attached wound care documentation

## 2024-04-17 ENCOUNTER — OFFICE VISIT (OUTPATIENT)
Dept: WOUND CARE | Facility: HOSPITAL | Age: 44
End: 2024-04-17
Attending: FAMILY MEDICINE
Payer: MEDICAID

## 2024-04-17 VITALS
HEIGHT: 72 IN | HEART RATE: 80 BPM | BODY MASS INDEX: 23.03 KG/M2 | DIASTOLIC BLOOD PRESSURE: 87 MMHG | RESPIRATION RATE: 18 BRPM | WEIGHT: 170 LBS | TEMPERATURE: 98 F | SYSTOLIC BLOOD PRESSURE: 159 MMHG

## 2024-04-17 DIAGNOSIS — L97.512 ULCER OF RIGHT FOOT, WITH FAT LAYER EXPOSED: Primary | ICD-10-CM

## 2024-04-17 DIAGNOSIS — L97.513 ULCER OF TOE OF RIGHT FOOT, WITH NECROSIS OF MUSCLE: ICD-10-CM

## 2024-04-17 DIAGNOSIS — E11.42 DIABETIC PERIPHERAL NEUROPATHY: ICD-10-CM

## 2024-04-17 PROCEDURE — 99499 UNLISTED E&M SERVICE: CPT | Mod: ,,, | Performed by: FAMILY MEDICINE

## 2024-04-17 PROCEDURE — 11042 DBRDMT SUBQ TIS 1ST 20SQCM/<: CPT | Mod: ,,, | Performed by: FAMILY MEDICINE

## 2024-04-17 PROCEDURE — 11042 DBRDMT SUBQ TIS 1ST 20SQCM/<: CPT | Mod: PN | Performed by: FAMILY MEDICINE

## 2024-04-17 NOTE — PROGRESS NOTES
Wound Care Progress Note    Subjective:       Patient ID: Philip Alberts is a 43 y.o. male.    Chief Complaint: No chief complaint on file.    HPI  Pt seen in clinic for a FU visit for DM ulcers x 3 of the right foot. One has healed, other 2 improved. No new complaints today  Review of Systems   Skin:  Positive for wound.        Open wounds of the right foot   All other systems reviewed and are negative.      Objective:        Physical Exam  Vitals and nursing note reviewed.   Constitutional:       General: He is not in acute distress.     Appearance: Normal appearance.   Skin:     General: Skin is warm and dry.      Findings: Lesion present.      Comments: DM ulcers of the right foot x 2, see wound care assessment documentation in chart review scanned under the media tab   Neurological:      General: No focal deficit present.      Mental Status: He is alert and oriented to person, place, and time.   Psychiatric:         Mood and Affect: Mood normal.         Thought Content: Thought content normal.         Judgment: Judgment normal.       Vitals:    04/17/24 0905   BP: (!) 159/87   Pulse: 80   Resp: 18   Temp: 98.2 °F (36.8 °C)       Assessment:           ICD-10-CM ICD-9-CM   1. Ulcer of right foot, with fat layer exposed  L97.512 707.15   2. Ulcer of toe of right foot, with necrosis of muscle  L97.513 707.15     728.89                Plan:                  Diagnoses and all orders for this visit:    Ulcer of right foot, with fat layer exposed    Ulcer of toe of right foot, with necrosis of muscle      See attached wound care documentation

## 2024-04-24 ENCOUNTER — OFFICE VISIT (OUTPATIENT)
Dept: WOUND CARE | Facility: HOSPITAL | Age: 44
End: 2024-04-24
Attending: FAMILY MEDICINE
Payer: MEDICAID

## 2024-04-24 VITALS
WEIGHT: 170 LBS | HEART RATE: 82 BPM | DIASTOLIC BLOOD PRESSURE: 82 MMHG | SYSTOLIC BLOOD PRESSURE: 167 MMHG | RESPIRATION RATE: 18 BRPM | HEIGHT: 72 IN | TEMPERATURE: 98 F | BODY MASS INDEX: 23.03 KG/M2

## 2024-04-24 DIAGNOSIS — Z79.4 TYPE 2 DIABETES MELLITUS WITH DIABETIC POLYNEUROPATHY, WITH LONG-TERM CURRENT USE OF INSULIN: ICD-10-CM

## 2024-04-24 DIAGNOSIS — E11.42 TYPE 2 DIABETES MELLITUS WITH DIABETIC POLYNEUROPATHY, WITH LONG-TERM CURRENT USE OF INSULIN: ICD-10-CM

## 2024-04-24 DIAGNOSIS — L08.9 WOUND INFECTION: ICD-10-CM

## 2024-04-24 DIAGNOSIS — T14.8XXA WOUND INFECTION: ICD-10-CM

## 2024-04-24 DIAGNOSIS — L97.513 ULCER OF TOE OF RIGHT FOOT, WITH NECROSIS OF MUSCLE: ICD-10-CM

## 2024-04-24 DIAGNOSIS — L97.512 ULCER OF RIGHT FOOT, WITH FAT LAYER EXPOSED: Primary | ICD-10-CM

## 2024-04-24 PROCEDURE — 3079F DIAST BP 80-89 MM HG: CPT | Mod: CPTII,,, | Performed by: FAMILY MEDICINE

## 2024-04-24 PROCEDURE — 4010F ACE/ARB THERAPY RXD/TAKEN: CPT | Mod: CPTII,,, | Performed by: FAMILY MEDICINE

## 2024-04-24 PROCEDURE — 1160F RVW MEDS BY RX/DR IN RCRD: CPT | Mod: CPTII,,, | Performed by: FAMILY MEDICINE

## 2024-04-24 PROCEDURE — 3046F HEMOGLOBIN A1C LEVEL >9.0%: CPT | Mod: CPTII,,, | Performed by: FAMILY MEDICINE

## 2024-04-24 PROCEDURE — 3061F NEG MICROALBUMINURIA REV: CPT | Mod: CPTII,,, | Performed by: FAMILY MEDICINE

## 2024-04-24 PROCEDURE — 11042 DBRDMT SUBQ TIS 1ST 20SQCM/<: CPT | Mod: PN | Performed by: FAMILY MEDICINE

## 2024-04-24 PROCEDURE — 3066F NEPHROPATHY DOC TX: CPT | Mod: CPTII,,, | Performed by: FAMILY MEDICINE

## 2024-04-24 PROCEDURE — 3077F SYST BP >= 140 MM HG: CPT | Mod: CPTII,,, | Performed by: FAMILY MEDICINE

## 2024-04-24 PROCEDURE — 1159F MED LIST DOCD IN RCRD: CPT | Mod: CPTII,,, | Performed by: FAMILY MEDICINE

## 2024-04-24 PROCEDURE — 99213 OFFICE O/P EST LOW 20 MIN: CPT | Mod: 25,,, | Performed by: FAMILY MEDICINE

## 2024-04-24 PROCEDURE — 3008F BODY MASS INDEX DOCD: CPT | Mod: CPTII,,, | Performed by: FAMILY MEDICINE

## 2024-04-24 PROCEDURE — 11042 DBRDMT SUBQ TIS 1ST 20SQCM/<: CPT | Mod: ,,, | Performed by: FAMILY MEDICINE

## 2024-04-24 RX ORDER — DOXYCYCLINE 100 MG/1
100 CAPSULE ORAL 2 TIMES DAILY
Qty: 20 CAPSULE | Refills: 0 | Status: SHIPPED | OUTPATIENT
Start: 2024-04-24 | End: 2024-05-04

## 2024-04-24 NOTE — PROGRESS NOTES
"          Wound Care Progress Note    Subjective:       Patient ID: Philip Alberts is a 43 y.o. male.    Chief Complaint: Wound Care    HPI  Pt seen in clinic for a FU visit for right foot DM ulcers x 2. Wounds continue to improve, pt has no new complaints today  Review of Systems   Skin:  Positive for wound.        Right foot open wounds x 2   All other systems reviewed and are negative.      Objective:        Physical Exam  Vitals and nursing note reviewed. Exam conducted with a chaperone present.   Constitutional:       General: He is not in acute distress.     Appearance: Normal appearance.   Skin:     General: Skin is warm and dry.      Findings: Lesion present.      Comments: Right foot DM ulcer, see wound care assessment documentation in chart review scanned under the media tab   Neurological:      General: No focal deficit present.      Mental Status: He is alert.   Psychiatric:         Mood and Affect: Mood normal.         Judgment: Judgment normal.       Vitals:    04/24/24 0822   BP: (!) 167/82   Pulse: 82   Resp: 18   Temp: 97.8 °F (36.6 °C)       Assessment:           ICD-10-CM ICD-9-CM   1. Ulcer of right foot, with fat layer exposed  L97.512 707.15   2. Ulcer of toe of right foot, with necrosis of muscle  L97.513 707.15     728.89   3. Wound infection  T14.8XXA 958.3    L08.9                 Plan:                  Philip Henry" was seen today for wound care.    Diagnoses and all orders for this visit:    Ulcer of right foot, with fat layer exposed    Ulcer of toe of right foot, with necrosis of muscle    Wound infection    Other orders  -     doxycycline (VIBRAMYCIN) 100 MG Cap; Take 1 capsule (100 mg total) by mouth 2 (two) times daily. for 10 days      Please see wound care instructions which have been provided separately.             "

## 2024-04-25 RX ORDER — PEN NEEDLE, DIABETIC 31 GX5/16"
NEEDLE, DISPOSABLE MISCELLANEOUS
Qty: 100 EACH | Refills: 3 | Status: SHIPPED | OUTPATIENT
Start: 2024-04-25 | End: 2024-06-18 | Stop reason: SDUPTHER

## 2024-05-01 ENCOUNTER — OFFICE VISIT (OUTPATIENT)
Dept: WOUND CARE | Facility: HOSPITAL | Age: 44
End: 2024-05-01
Attending: FAMILY MEDICINE
Payer: MEDICAID

## 2024-05-01 VITALS
SYSTOLIC BLOOD PRESSURE: 161 MMHG | RESPIRATION RATE: 18 BRPM | DIASTOLIC BLOOD PRESSURE: 87 MMHG | TEMPERATURE: 98 F | HEART RATE: 82 BPM

## 2024-05-01 DIAGNOSIS — L97.513 ULCER OF TOE OF RIGHT FOOT, WITH NECROSIS OF MUSCLE: ICD-10-CM

## 2024-05-01 DIAGNOSIS — E11.42 DIABETIC PERIPHERAL NEUROPATHY: ICD-10-CM

## 2024-05-01 DIAGNOSIS — L97.509 TYPE 2 DIABETES MELLITUS WITH FOOT ULCER, UNSPECIFIED WHETHER LONG TERM INSULIN USE: ICD-10-CM

## 2024-05-01 DIAGNOSIS — E11.621 TYPE 2 DIABETES MELLITUS WITH FOOT ULCER, UNSPECIFIED WHETHER LONG TERM INSULIN USE: ICD-10-CM

## 2024-05-01 DIAGNOSIS — L97.512 ULCER OF RIGHT FOOT, WITH FAT LAYER EXPOSED: Primary | ICD-10-CM

## 2024-05-01 PROCEDURE — 11042 DBRDMT SUBQ TIS 1ST 20SQCM/<: CPT | Mod: ,,, | Performed by: FAMILY MEDICINE

## 2024-05-01 PROCEDURE — 11042 DBRDMT SUBQ TIS 1ST 20SQCM/<: CPT | Mod: PN | Performed by: FAMILY MEDICINE

## 2024-05-01 PROCEDURE — 99499 UNLISTED E&M SERVICE: CPT | Mod: ,,, | Performed by: FAMILY MEDICINE

## 2024-05-01 NOTE — PROGRESS NOTES
"          Wound Care Progress Note    Subjective:       Patient ID: Philip Alberts is a 43 y.o. male.    Chief Complaint: Wound Care    HPI  Pt seen in clinic for a FU visit for a right foot DM ulcerr x 2. Wounds continuie to improve, pt has no new complaints today  Review of Systems   Skin:  Positive for wound.        Right foot DM ulcer x 2   All other systems reviewed and are negative.      Objective:        Physical Exam  Vitals and nursing note reviewed. Exam conducted with a chaperone present.   Constitutional:       General: He is not in acute distress.     Appearance: Normal appearance.   Skin:     General: Skin is warm and dry.      Findings: Lesion present.      Comments: Right foot DM ulcer x 2, see wound care assessment documentation in chart review scanned under the media tab   Neurological:      General: No focal deficit present.      Mental Status: He is alert.   Psychiatric:         Mood and Affect: Mood normal.         Thought Content: Thought content normal.         Judgment: Judgment normal.       Vitals:    05/01/24 0801   BP: (!) 161/87   Pulse: 82   Resp: 18   Temp: 98.2 °F (36.8 °C)       Assessment:           ICD-10-CM ICD-9-CM   1. Ulcer of right foot, with fat layer exposed  L97.512 707.15   2. Ulcer of toe of right foot, with necrosis of muscle  L97.513 707.15     728.89   3. Type 2 diabetes mellitus with foot ulcer, unspecified whether long term insulin use  E11.621 250.80    L97.509 707.15   4. Diabetic peripheral neuropathy  E11.42 250.60     357.2                Plan:                  Philip Henry" was seen today for wound care.    Diagnoses and all orders for this visit:    Ulcer of right foot, with fat layer exposed    Ulcer of toe of right foot, with necrosis of muscle    Type 2 diabetes mellitus with foot ulcer, unspecified whether long term insulin use    Diabetic peripheral neuropathy      See attached wound care documentation             "

## 2024-05-06 DIAGNOSIS — Z79.4 TYPE 2 DIABETES MELLITUS WITH DIABETIC POLYNEUROPATHY, WITH LONG-TERM CURRENT USE OF INSULIN: ICD-10-CM

## 2024-05-06 DIAGNOSIS — E11.42 TYPE 2 DIABETES MELLITUS WITH DIABETIC POLYNEUROPATHY, WITH LONG-TERM CURRENT USE OF INSULIN: ICD-10-CM

## 2024-05-06 RX ORDER — GABAPENTIN 300 MG/1
600 CAPSULE ORAL 3 TIMES DAILY
Qty: 180 CAPSULE | Refills: 0 | Status: SHIPPED | OUTPATIENT
Start: 2024-05-06 | End: 2024-06-18 | Stop reason: SDUPTHER

## 2024-05-08 ENCOUNTER — OFFICE VISIT (OUTPATIENT)
Dept: WOUND CARE | Facility: HOSPITAL | Age: 44
End: 2024-05-08
Attending: FAMILY MEDICINE
Payer: MEDICAID

## 2024-05-08 VITALS
TEMPERATURE: 98 F | DIASTOLIC BLOOD PRESSURE: 96 MMHG | SYSTOLIC BLOOD PRESSURE: 182 MMHG | HEART RATE: 82 BPM | RESPIRATION RATE: 18 BRPM

## 2024-05-08 DIAGNOSIS — L97.514 ULCER OF RIGHT FOOT WITH NECROSIS OF BONE: ICD-10-CM

## 2024-05-08 DIAGNOSIS — L97.512 ULCER OF RIGHT FOOT, WITH FAT LAYER EXPOSED: Primary | ICD-10-CM

## 2024-05-08 DIAGNOSIS — L97.512 DIABETIC ULCER OF TOE OF RIGHT FOOT ASSOCIATED WITH TYPE 2 DIABETES MELLITUS, WITH FAT LAYER EXPOSED: ICD-10-CM

## 2024-05-08 DIAGNOSIS — L97.513 ULCER OF TOE OF RIGHT FOOT, WITH NECROSIS OF MUSCLE: ICD-10-CM

## 2024-05-08 DIAGNOSIS — E11.621 DIABETIC ULCER OF TOE OF RIGHT FOOT ASSOCIATED WITH TYPE 2 DIABETES MELLITUS, WITH FAT LAYER EXPOSED: ICD-10-CM

## 2024-05-08 PROCEDURE — 3077F SYST BP >= 140 MM HG: CPT | Mod: CPTII,,, | Performed by: FAMILY MEDICINE

## 2024-05-08 PROCEDURE — 97602 WOUND(S) CARE NON-SELECTIVE: CPT | Mod: PN | Performed by: FAMILY MEDICINE

## 2024-05-08 PROCEDURE — 1160F RVW MEDS BY RX/DR IN RCRD: CPT | Mod: CPTII,,, | Performed by: FAMILY MEDICINE

## 2024-05-08 PROCEDURE — 3080F DIAST BP >= 90 MM HG: CPT | Mod: CPTII,,, | Performed by: FAMILY MEDICINE

## 2024-05-08 PROCEDURE — 99213 OFFICE O/P EST LOW 20 MIN: CPT | Mod: ,,, | Performed by: FAMILY MEDICINE

## 2024-05-08 PROCEDURE — 3066F NEPHROPATHY DOC TX: CPT | Mod: CPTII,,, | Performed by: FAMILY MEDICINE

## 2024-05-08 PROCEDURE — 3046F HEMOGLOBIN A1C LEVEL >9.0%: CPT | Mod: CPTII,,, | Performed by: FAMILY MEDICINE

## 2024-05-08 PROCEDURE — 4010F ACE/ARB THERAPY RXD/TAKEN: CPT | Mod: CPTII,,, | Performed by: FAMILY MEDICINE

## 2024-05-08 PROCEDURE — 1159F MED LIST DOCD IN RCRD: CPT | Mod: CPTII,,, | Performed by: FAMILY MEDICINE

## 2024-05-08 PROCEDURE — 3061F NEG MICROALBUMINURIA REV: CPT | Mod: CPTII,,, | Performed by: FAMILY MEDICINE

## 2024-05-08 NOTE — PROGRESS NOTES
"          Wound Care Progress Note    Subjective:       Patient ID: Philip Alberts is a 43 y.o. male.    Chief Complaint: Wound Care    HPI  Pt seen in clinic for a FU visit for a right plantar foot and toe DM ulcer. Wounds continue to improve, no new complaints today  Review of Systems   Skin:  Positive for wound.        Right foot DM ulcer x 2   All other systems reviewed and are negative.      Objective:        Physical Exam  Vitals and nursing note reviewed.   Constitutional:       General: He is not in acute distress.     Appearance: Normal appearance.   Skin:     General: Skin is warm and dry.      Findings: Lesion present.      Comments: Right foot DM ulcer x 2, see wound care assessment documentation in chart review scanned under the media tab   Neurological:      General: No focal deficit present.      Mental Status: He is alert.   Psychiatric:         Mood and Affect: Mood normal.         Thought Content: Thought content normal.         Judgment: Judgment normal.       Vitals:    05/08/24 0820   BP: (!) 182/96   Pulse: 82   Resp: 18   Temp: 98.2 °F (36.8 °C)       Assessment:           ICD-10-CM ICD-9-CM   1. Ulcer of right foot, with fat layer exposed  L97.512 707.15   2. Ulcer of toe of right foot, with necrosis of muscle  L97.513 707.15     728.89   3. Ulcer of right foot with necrosis of bone  L97.514 707.15     730.17                Plan:                  Philip Henry" was seen today for wound care.    Diagnoses and all orders for this visit:    Ulcer of right foot, with fat layer exposed    Ulcer of toe of right foot, with necrosis of muscle    Ulcer of right foot with necrosis of bone        Much of the documentation for this visit was completed in wound docs system. Please see the attached documentation for further details about the patients care. Scanned under the media tab.           "

## 2024-05-15 ENCOUNTER — OFFICE VISIT (OUTPATIENT)
Dept: WOUND CARE | Facility: HOSPITAL | Age: 44
End: 2024-05-15
Attending: FAMILY MEDICINE
Payer: MEDICAID

## 2024-05-15 VITALS
DIASTOLIC BLOOD PRESSURE: 85 MMHG | WEIGHT: 170 LBS | HEART RATE: 81 BPM | SYSTOLIC BLOOD PRESSURE: 147 MMHG | BODY MASS INDEX: 23.03 KG/M2 | HEIGHT: 72 IN | RESPIRATION RATE: 18 BRPM

## 2024-05-15 DIAGNOSIS — L08.9 DIABETIC FOOT INFECTION: ICD-10-CM

## 2024-05-15 DIAGNOSIS — E11.628 DIABETIC FOOT INFECTION: ICD-10-CM

## 2024-05-15 DIAGNOSIS — L97.512 ULCER OF RIGHT FOOT, WITH FAT LAYER EXPOSED: Primary | ICD-10-CM

## 2024-05-15 DIAGNOSIS — T14.8XXA WOUND INFECTION: ICD-10-CM

## 2024-05-15 DIAGNOSIS — L97.513 ULCER OF TOE OF RIGHT FOOT, WITH NECROSIS OF MUSCLE: ICD-10-CM

## 2024-05-15 DIAGNOSIS — L08.9 WOUND INFECTION: ICD-10-CM

## 2024-05-15 PROCEDURE — 3077F SYST BP >= 140 MM HG: CPT | Mod: CPTII,,, | Performed by: FAMILY MEDICINE

## 2024-05-15 PROCEDURE — 3066F NEPHROPATHY DOC TX: CPT | Mod: CPTII,,, | Performed by: FAMILY MEDICINE

## 2024-05-15 PROCEDURE — 3079F DIAST BP 80-89 MM HG: CPT | Mod: CPTII,,, | Performed by: FAMILY MEDICINE

## 2024-05-15 PROCEDURE — 3008F BODY MASS INDEX DOCD: CPT | Mod: CPTII,,, | Performed by: FAMILY MEDICINE

## 2024-05-15 PROCEDURE — 97602 WOUND(S) CARE NON-SELECTIVE: CPT | Mod: PN | Performed by: FAMILY MEDICINE

## 2024-05-15 PROCEDURE — 99214 OFFICE O/P EST MOD 30 MIN: CPT | Mod: ,,, | Performed by: FAMILY MEDICINE

## 2024-05-15 PROCEDURE — 97602 WOUND(S) CARE NON-SELECTIVE: CPT | Mod: PN

## 2024-05-15 PROCEDURE — 3046F HEMOGLOBIN A1C LEVEL >9.0%: CPT | Mod: CPTII,,, | Performed by: FAMILY MEDICINE

## 2024-05-15 PROCEDURE — 1159F MED LIST DOCD IN RCRD: CPT | Mod: CPTII,,, | Performed by: FAMILY MEDICINE

## 2024-05-15 PROCEDURE — 3061F NEG MICROALBUMINURIA REV: CPT | Mod: CPTII,,, | Performed by: FAMILY MEDICINE

## 2024-05-15 PROCEDURE — 1160F RVW MEDS BY RX/DR IN RCRD: CPT | Mod: CPTII,,, | Performed by: FAMILY MEDICINE

## 2024-05-15 PROCEDURE — 4010F ACE/ARB THERAPY RXD/TAKEN: CPT | Mod: CPTII,,, | Performed by: FAMILY MEDICINE

## 2024-05-15 RX ORDER — DOXYCYCLINE 100 MG/1
100 CAPSULE ORAL 2 TIMES DAILY
Qty: 20 CAPSULE | Refills: 0 | Status: SHIPPED | OUTPATIENT
Start: 2024-05-15 | End: 2024-05-25

## 2024-05-15 NOTE — PROGRESS NOTES
"          Wound Care Progress Note    Subjective:       Patient ID: Philip Alberts is a 43 y.o. male.    Chief Complaint: Wound Care    HPI  Pt seen in clinic for a FU visit foe right foot DM ulcer. Wounds are doing well. Pt has no new complaints today.  Review of Systems   Skin:  Positive for wound.        Right foot DM ulcer x 2   All other systems reviewed and are negative.      Objective:        Physical Exam  Vitals and nursing note reviewed.   Constitutional:       General: He is not in acute distress.     Appearance: Normal appearance.   Skin:     General: Skin is warm and dry.      Findings: Lesion present.      Comments: Right foot DM ulcer, see wound care assessment documentation in chart review scanned under the media tab   Neurological:      General: No focal deficit present.      Mental Status: He is alert.   Psychiatric:         Mood and Affect: Mood normal.         Thought Content: Thought content normal.         Judgment: Judgment normal.       Vitals:    05/15/24 0752   BP: (!) 147/85   Pulse: 81   Resp: 18       Assessment:           ICD-10-CM ICD-9-CM   1. Ulcer of right foot, with fat layer exposed  L97.512 707.15   2. Ulcer of toe of right foot, with necrosis of muscle  L97.513 707.15     728.89   3. Wound infection  T14.8XXA 958.3    L08.9                 Plan:                  Philip Henry" was seen today for wound care.    Diagnoses and all orders for this visit:    Ulcer of right foot, with fat layer exposed    Ulcer of toe of right foot, with necrosis of muscle    Wound infection    Other orders  -     doxycycline (VIBRAMYCIN) 100 MG Cap; Take 1 capsule (100 mg total) by mouth 2 (two) times daily. for 10 days        See attached wound care documentation           "

## 2024-05-22 ENCOUNTER — CLINICAL SUPPORT (OUTPATIENT)
Dept: WOUND CARE | Facility: HOSPITAL | Age: 44
End: 2024-05-22
Attending: FAMILY MEDICINE
Payer: MEDICAID

## 2024-05-22 VITALS
HEART RATE: 90 BPM | SYSTOLIC BLOOD PRESSURE: 144 MMHG | DIASTOLIC BLOOD PRESSURE: 75 MMHG | RESPIRATION RATE: 16 BRPM | TEMPERATURE: 99 F

## 2024-05-22 DIAGNOSIS — E11.621 DIABETIC ULCER OF TOE OF RIGHT FOOT ASSOCIATED WITH TYPE 2 DIABETES MELLITUS, WITH FAT LAYER EXPOSED: Primary | ICD-10-CM

## 2024-05-22 DIAGNOSIS — L97.512 DIABETIC ULCER OF TOE OF RIGHT FOOT ASSOCIATED WITH TYPE 2 DIABETES MELLITUS, WITH FAT LAYER EXPOSED: Primary | ICD-10-CM

## 2024-05-22 PROCEDURE — 99213 OFFICE O/P EST LOW 20 MIN: CPT | Mod: PN

## 2024-05-29 ENCOUNTER — OFFICE VISIT (OUTPATIENT)
Dept: WOUND CARE | Facility: HOSPITAL | Age: 44
End: 2024-05-29
Attending: FAMILY MEDICINE
Payer: MEDICAID

## 2024-05-29 VITALS
HEART RATE: 86 BPM | DIASTOLIC BLOOD PRESSURE: 79 MMHG | TEMPERATURE: 98 F | RESPIRATION RATE: 18 BRPM | WEIGHT: 170 LBS | BODY MASS INDEX: 23.03 KG/M2 | SYSTOLIC BLOOD PRESSURE: 134 MMHG | HEIGHT: 72 IN

## 2024-05-29 DIAGNOSIS — L97.513 ULCER OF TOE OF RIGHT FOOT, WITH NECROSIS OF MUSCLE: ICD-10-CM

## 2024-05-29 DIAGNOSIS — L97.512 DIABETIC ULCER OF TOE OF RIGHT FOOT ASSOCIATED WITH TYPE 2 DIABETES MELLITUS, WITH FAT LAYER EXPOSED: Primary | ICD-10-CM

## 2024-05-29 DIAGNOSIS — E11.621 DIABETIC ULCER OF TOE OF RIGHT FOOT ASSOCIATED WITH TYPE 2 DIABETES MELLITUS, WITH FAT LAYER EXPOSED: Primary | ICD-10-CM

## 2024-05-29 DIAGNOSIS — L97.512 ULCER OF RIGHT FOOT, WITH FAT LAYER EXPOSED: ICD-10-CM

## 2024-05-29 PROCEDURE — 3008F BODY MASS INDEX DOCD: CPT | Mod: CPTII,,, | Performed by: FAMILY MEDICINE

## 2024-05-29 PROCEDURE — 1159F MED LIST DOCD IN RCRD: CPT | Mod: CPTII,,, | Performed by: FAMILY MEDICINE

## 2024-05-29 PROCEDURE — 1160F RVW MEDS BY RX/DR IN RCRD: CPT | Mod: CPTII,,, | Performed by: FAMILY MEDICINE

## 2024-05-29 PROCEDURE — 99212 OFFICE O/P EST SF 10 MIN: CPT | Mod: ,,, | Performed by: FAMILY MEDICINE

## 2024-05-29 PROCEDURE — 3046F HEMOGLOBIN A1C LEVEL >9.0%: CPT | Mod: CPTII,,, | Performed by: FAMILY MEDICINE

## 2024-05-29 PROCEDURE — 3066F NEPHROPATHY DOC TX: CPT | Mod: CPTII,,, | Performed by: FAMILY MEDICINE

## 2024-05-29 PROCEDURE — 3061F NEG MICROALBUMINURIA REV: CPT | Mod: CPTII,,, | Performed by: FAMILY MEDICINE

## 2024-05-29 PROCEDURE — 3078F DIAST BP <80 MM HG: CPT | Mod: CPTII,,, | Performed by: FAMILY MEDICINE

## 2024-05-29 PROCEDURE — 99213 OFFICE O/P EST LOW 20 MIN: CPT | Mod: PN | Performed by: FAMILY MEDICINE

## 2024-05-29 PROCEDURE — 4010F ACE/ARB THERAPY RXD/TAKEN: CPT | Mod: CPTII,,, | Performed by: FAMILY MEDICINE

## 2024-05-29 PROCEDURE — 3075F SYST BP GE 130 - 139MM HG: CPT | Mod: CPTII,,, | Performed by: FAMILY MEDICINE

## 2024-05-29 NOTE — PROGRESS NOTES
"          Wound Care Progress Note    Subjective:       Patient ID: Philip Alberts is a 43 y.o. male.    Chief Complaint: Wound Care    HPI  Pt seen in clinic for a FU visit for right foot DM ulcer x 2, wounds are healed, pt has no new complaints today  Review of Systems   Skin:  Positive for wound.        Right foot DM ulcer x 2, healed   All other systems reviewed and are negative.      Objective:        Physical Exam  Vitals and nursing note reviewed.   Constitutional:       General: He is not in acute distress.     Appearance: Normal appearance.   Skin:     General: Skin is warm and dry.      Findings: Lesion present.      Comments: Right foot DM ulcer x 2, both healed, see wound care assessment documentation in chart review scanned under the media tab   Neurological:      General: No focal deficit present.      Mental Status: He is alert.   Psychiatric:         Mood and Affect: Mood normal.         Thought Content: Thought content normal.         Judgment: Judgment normal.       Vitals:    05/29/24 0839   BP: 134/79   Pulse: 86   Resp: 18   Temp: 98.2 °F (36.8 °C)       Assessment:           ICD-10-CM ICD-9-CM   1. Diabetic ulcer of toe of right foot associated with type 2 diabetes mellitus, with fat layer exposed  E11.621 250.80    L97.512 707.15   2. Ulcer of right foot, with fat layer exposed  L97.512 707.15   3. Ulcer of toe of right foot, with necrosis of muscle  L97.513 707.15     728.89                Plan:                  Philip Henry" was seen today for wound care.    Diagnoses and all orders for this visit:    Diabetic ulcer of toe of right foot associated with type 2 diabetes mellitus, with fat layer exposed    Ulcer of right foot, with fat layer exposed    Ulcer of toe of right foot, with necrosis of muscle      Please see wound care instructions which have been provided separately.             "

## 2024-06-18 ENCOUNTER — OFFICE VISIT (OUTPATIENT)
Dept: FAMILY MEDICINE | Facility: CLINIC | Age: 44
End: 2024-06-18
Payer: MEDICAID

## 2024-06-18 VITALS
HEART RATE: 74 BPM | DIASTOLIC BLOOD PRESSURE: 84 MMHG | BODY MASS INDEX: 24.52 KG/M2 | HEIGHT: 72 IN | WEIGHT: 181 LBS | SYSTOLIC BLOOD PRESSURE: 139 MMHG

## 2024-06-18 DIAGNOSIS — I10 PRIMARY HYPERTENSION: ICD-10-CM

## 2024-06-18 DIAGNOSIS — I99.9 VASCULOPATHY: ICD-10-CM

## 2024-06-18 DIAGNOSIS — E78.2 MIXED HYPERLIPIDEMIA: ICD-10-CM

## 2024-06-18 DIAGNOSIS — E10.42 TYPE 1 DM WITH POLYNEUROPATHY: Primary | ICD-10-CM

## 2024-06-18 PROCEDURE — 1160F RVW MEDS BY RX/DR IN RCRD: CPT | Mod: CPTII,,, | Performed by: INTERNAL MEDICINE

## 2024-06-18 PROCEDURE — 99213 OFFICE O/P EST LOW 20 MIN: CPT | Mod: PBBFAC,PN | Performed by: INTERNAL MEDICINE

## 2024-06-18 PROCEDURE — 99214 OFFICE O/P EST MOD 30 MIN: CPT | Mod: S$PBB,,, | Performed by: INTERNAL MEDICINE

## 2024-06-18 PROCEDURE — 99999 PR PBB SHADOW E&M-EST. PATIENT-LVL III: CPT | Mod: PBBFAC,,, | Performed by: INTERNAL MEDICINE

## 2024-06-18 PROCEDURE — 3066F NEPHROPATHY DOC TX: CPT | Mod: CPTII,,, | Performed by: INTERNAL MEDICINE

## 2024-06-18 PROCEDURE — 3075F SYST BP GE 130 - 139MM HG: CPT | Mod: CPTII,,, | Performed by: INTERNAL MEDICINE

## 2024-06-18 PROCEDURE — 4010F ACE/ARB THERAPY RXD/TAKEN: CPT | Mod: CPTII,,, | Performed by: INTERNAL MEDICINE

## 2024-06-18 PROCEDURE — 1159F MED LIST DOCD IN RCRD: CPT | Mod: CPTII,,, | Performed by: INTERNAL MEDICINE

## 2024-06-18 PROCEDURE — 3061F NEG MICROALBUMINURIA REV: CPT | Mod: CPTII,,, | Performed by: INTERNAL MEDICINE

## 2024-06-18 PROCEDURE — 3079F DIAST BP 80-89 MM HG: CPT | Mod: CPTII,,, | Performed by: INTERNAL MEDICINE

## 2024-06-18 PROCEDURE — 3008F BODY MASS INDEX DOCD: CPT | Mod: CPTII,,, | Performed by: INTERNAL MEDICINE

## 2024-06-18 PROCEDURE — 3046F HEMOGLOBIN A1C LEVEL >9.0%: CPT | Mod: CPTII,,, | Performed by: INTERNAL MEDICINE

## 2024-06-18 RX ORDER — BLOOD-GLUCOSE SENSOR
1 EACH MISCELLANEOUS
Qty: 3 EACH | Refills: 5 | Status: SHIPPED | OUTPATIENT
Start: 2024-06-18 | End: 2025-06-18

## 2024-06-18 RX ORDER — INSULIN GLARGINE 100 [IU]/ML
INJECTION, SOLUTION SUBCUTANEOUS
Qty: 15 ML | Refills: 2 | Status: SHIPPED | OUTPATIENT
Start: 2024-06-18

## 2024-06-18 RX ORDER — PEN NEEDLE, DIABETIC 30 GX3/16"
1 NEEDLE, DISPOSABLE MISCELLANEOUS
Qty: 450 EACH | Refills: 3 | Status: SHIPPED | OUTPATIENT
Start: 2024-06-18 | End: 2025-06-18

## 2024-06-18 RX ORDER — INSULIN ASPART 100 [IU]/ML
10 INJECTION, SOLUTION INTRAVENOUS; SUBCUTANEOUS 4 TIMES DAILY
Qty: 12 ML | Refills: 5 | Status: SHIPPED | OUTPATIENT
Start: 2024-06-18 | End: 2025-06-18

## 2024-06-18 RX ORDER — GABAPENTIN 300 MG/1
600 CAPSULE ORAL 3 TIMES DAILY
Qty: 180 CAPSULE | Refills: 3 | Status: SHIPPED | OUTPATIENT
Start: 2024-06-18

## 2024-06-18 NOTE — PROGRESS NOTES
Subjective:       Patient ID: Philip Alberts is a 43 y.o. male.    Chief Complaint: Diabetes, Hyperlipidemia, Foot Problems, Peripheral Neuropathy, and Erectile Dysfunction    PATIENT IS A 43-YEAR-OLD MALE WHO COMES FOLLOW-UP after almost 6-8 months.      Diabetes control continues to be challenging.  Recent lab indicates that he is type 1 diabetic with a glutamic acid antibody strongly positive.  Thus far he was treated as type 2 diabetic.    He is currently on insulin combinations.    1.-poorly-controlled diabetes mellitus on long and short-acting insulin.  He was educated on the mechanism of action of short-acting insulin and long-acting insulin at that point.  In past, He was under the impression that he has to take the long-acting insulin 3 times a day.    2.-chronic tobacco dependency was noted also and he was advised to quit smoking.  At that point he had stated that he has no other pleasures left in life and not even eating given lack of teeth and that smoking is his only pleasure.  3.-gastroesophageal reflux   4.-hyperlipidemia  5.-neuropathy.  6.-difficult compliance  7.-difficulty healing of toes on the right leg.  Necrotic ulcers.  Had followed up with wound management and finally healed        Not sure as to the reason for this.    Diabetes  He presents for his follow-up diabetic visit. He has type 1 diabetes mellitus. The initial diagnosis of diabetes was made 10 years ago. His disease course has been fluctuating. Pertinent negatives for hypoglycemia include no confusion, dizziness, headaches, nervousness/anxiousness, pallor, seizures, speech difficulty or tremors. Associated symptoms include fatigue, polydipsia and polyuria. Pertinent negatives for diabetes include no chest pain, no polyphagia and no weakness. Risk factors for coronary artery disease include diabetes mellitus, family history, male sex, sedentary lifestyle and tobacco exposure. Current diabetic treatment includes insulin injections.  He is compliant with treatment some of the time. His weight is fluctuating minimally. He is following a generally unhealthy diet. When asked about meal planning, he reported none. He has not had a previous visit with a dietitian. He rarely participates in exercise. His lunch blood glucose range is generally 140-180 mg/dl. An ACE inhibitor/angiotensin II receptor blocker is being taken. He sees a podiatrist.Eye exam is not current.   Hyperlipidemia  This is a chronic problem. The current episode started more than 1 year ago. Exacerbating diseases include diabetes. He has no history of hypothyroidism, liver disease or obesity. Pertinent negatives include no chest pain. Current antihyperlipidemic treatment includes statins. The current treatment provides moderate improvement of lipids. Compliance problems include psychosocial issues.  Risk factors for coronary artery disease include a sedentary lifestyle, male sex, dyslipidemia, diabetes mellitus and hypertension.   Neurologic Problem  The patient's pertinent negatives include no syncope or weakness. Primary symptoms comment: Neuropathic pain. This is a chronic problem. The current episode started more than 1 year ago. The neurological problem developed insidiously. The problem is unchanged. Associated symptoms include fatigue. Pertinent negatives include no abdominal pain, back pain, chest pain, confusion, diaphoresis, dizziness, fever, headaches, light-headedness or palpitations. There is no history of liver disease.   Erectile Dysfunction  This is a chronic problem. The current episode started more than 1 year ago. The problem has been waxing and waning since onset. The nature of his difficulty is maintaining erection and penetration. He reports no anxiety. Irritative symptoms do not include frequency or urgency. Pertinent negatives include no chills, dysuria or hematuria. The treatment provided moderate relief.       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 12/3/2018     Social History     Socioeconomic History    Marital status: Single   Tobacco Use    Smoking status: Every Day    Smokeless tobacco: Never   Substance and Sexual Activity    Alcohol use: No    Drug use: No    Sexual activity: Yes     Social Determinants of Health     Financial Resource Strain: High Risk (8/17/2023)    Overall Financial Resource Strain (CARDIA)     Difficulty of Paying Living Expenses: Hard   Food Insecurity: No Food Insecurity (8/17/2023)    Hunger Vital Sign     Worried About Running Out of Food in the Last Year: Never true     Ran Out of Food in the Last Year: Never true   Transportation Needs: No Transportation Needs (8/17/2023)    PRAPARE - Transportation     Lack of Transportation (Medical): No     Lack of Transportation (Non-Medical): No   Physical Activity: Inactive (8/17/2023)    Exercise Vital Sign     Days of Exercise per Week: 0 days     Minutes of Exercise per Session: 0 min   Stress: Stress Concern Present (8/17/2023)    [employer] - Occupational Stress Questionnaire     Feeling of Stress : To some extent   Housing Stability: Low Risk  (8/17/2023)    Housing Stability Vital Sign     Unable to Pay for Housing in the Last Year: No     Number of Places Lived in the Last Year: 1     Unstable Housing in the Last Year: No     Past Surgical History:   Procedure Laterality Date    MANDIBLE FRACTURE SURGERY  07/17/2000    MANDIBLE FRACTURE SURGERY      SPLENECTOMY, TOTAL  07/17/2000    TOE AMPUTATION Right 10/9/2023    Procedure: AMPUTATION, TOE;  Surgeon: Ashok Santiago DPM;  Location: [facility];  Service: Podiatry;   Laterality: Right;     Family History   Problem Relation Name Age of Onset    Diabetes Mother      Cancer Father         Review of Systems   Constitutional:  Positive for fatigue. Negative for activity change, chills, diaphoresis and fever. Unexpected weight change: gained 7 lbs.  HENT:  Negative for congestion, facial swelling, nosebleeds, postnasal drip, sore throat and trouble swallowing.    Eyes:  Negative for pain, discharge and visual disturbance.   Respiratory:  Negative for cough, chest tightness and wheezing.    Cardiovascular:  Negative for chest pain, palpitations and leg swelling.   Gastrointestinal:  Negative for abdominal distention, abdominal pain, blood in stool and diarrhea.   Endocrine: Positive for polydipsia and polyuria. Negative for cold intolerance, heat intolerance and polyphagia.   Genitourinary:  Negative for dysuria, flank pain, frequency, hematuria, scrotal swelling and urgency.   Musculoskeletal:  Negative for arthralgias, back pain and joint swelling.        Status post amputation of the right 3rd toe following infection and prolonged treatment   Skin:  Negative for color change, pallor and rash.   Allergic/Immunologic: Negative for environmental allergies, food allergies and immunocompromised state.        History of splenectomy.   Neurological:  Negative for dizziness, tremors, seizures, syncope, speech difficulty, weakness, light-headedness, numbness and headaches.        Neuropathy symptoms and legs and feet   Hematological:  Negative for adenopathy. Does not bruise/bleed easily.   Psychiatric/Behavioral:  Negative for agitation, behavioral problems, confusion, dysphoric mood and sleep disturbance. The patient is not nervous/anxious.          Objective:      Blood pressure 139/84, pulse 74, height 6' (1.829 m), weight 82.1 kg (181 lb). Body mass index is 24.55 kg/m².  Physical Exam  Vitals and nursing note reviewed.   Constitutional:       General: He is not in acute distress.      Appearance: He is well-developed. He is ill-appearing (Somewhat chronically ill). He is not toxic-appearing or diaphoretic.      Comments: BMI is 24- dishevelled   HENT:      Head: Normocephalic and atraumatic.      Mouth/Throat:      Pharynx: No oropharyngeal exudate or posterior oropharyngeal erythema.      Comments: Edentulous.  Eyes:      Conjunctiva/sclera: Conjunctivae normal.   Neck:      Thyroid: No thyromegaly.      Vascular: No JVD.      Trachea: No tracheal deviation.   Cardiovascular:      Rate and Rhythm: Normal rate and regular rhythm.      Pulses:           Dorsalis pedis pulses are 1+ on the right side and 1+ on the left side.      Heart sounds: Normal heart sounds. No murmur heard.     No friction rub. No gallop.   Pulmonary:      Effort: Pulmonary effort is normal.      Breath sounds: Normal breath sounds.   Abdominal:      General: There is no distension.      Palpations: Abdomen is soft.      Tenderness: There is no abdominal tenderness.   Musculoskeletal:         General: No swelling.      Cervical back: Neck supple. No rigidity.      Right lower leg: No edema.      Left lower leg: No edema.      Right foot: Normal range of motion. Deformity present.      Left foot: Normal range of motion. No deformity.        Feet:    Feet:      Right foot:      Protective Sensation: 5 sites tested.  5 sites sensed.      Skin integrity: No ulcer, blister or skin breakdown.      Toenail Condition: Right toenails are abnormally thick.      Left foot:      Protective Sensation: 5 sites tested.  5 sites sensed.      Skin integrity: No ulcer, blister or erythema.      Toenail Condition: Left toenails are abnormally thick.      Comments: Status post amputated 3rd toe on the right side.  Dystrophic and thick nails especially on the great toe.  Skin changes and dystrophy on the dorsum with some calluses and the arch of foot.  Probably some dirt around the lower extremities  Lymphadenopathy:      Cervical: No cervical  adenopathy.   Skin:     General: Skin is warm and dry.   Neurological:      Mental Status: He is alert. Mental status is at baseline.      Gait: Gait normal.   Psychiatric:         Mood and Affect: Mood is anxious.      Comments: Verbose                 Assessment:       No visits with results within 3 Month(s) from this visit.   Latest known visit with results is:   Office Visit on 02/05/2024   Component Date Value Ref Range Status    Anaerobic Culture 02/05/2024 No anaerobes isolated   Final    Aerobic Bacterial Culture 02/05/2024  (A)   Final                    Value:ENTEROCOCCUS FAECALIS  Rare         1. Type 1 DM with polyneuropathy  -     Hemoglobin A1C; Future; Expected date: 06/19/2024  -     Microalbumin/Creatinine Ratio, Urine; Future; Expected date: 06/19/2024  -     Comprehensive Metabolic Panel; Future; Expected date: 06/19/2024  -     gabapentin (NEURONTIN) 300 MG capsule; Take 2 capsules (600 mg total) by mouth 3 (three) times daily.  Dispense: 180 capsule; Refill: 3  -     insulin aspart U-100 (NOVOLOG FLEXPEN U-100 INSULIN) 100 unit/mL (3 mL) InPn pen; Inject 10 Units into the skin 4 (four) times daily. Per sliding scale as instructed  Dispense: 12 mL; Refill: 5  -     blood-glucose sensor (DEXCOM G7 SENSOR) Gosia; 1 Device by Misc.(Non-Drug; Combo Route) route every 10 days.  Dispense: 3 each; Refill: 5  -     LANTUS SOLOSTAR U-100 INSULIN 100 unit/mL (3 mL) InPn pen; INJECT 20 UNITS SUBCUTANEOUSLY TWICE DAILY  Dispense: 15 mL; Refill: 2  -     Ambulatory referral/consult to Ophthalmology; Future; Expected date: 07/18/2024    2. Primary hypertension  -     Microalbumin/Creatinine Ratio, Urine; Future; Expected date: 06/19/2024  -     Comprehensive Metabolic Panel; Future; Expected date: 06/19/2024    3. Mixed hyperlipidemia  -     Lipid Panel; Future; Expected date: 06/19/2024  -     Comprehensive Metabolic Panel; Future; Expected date: 06/19/2024    4. Vasculopathy    Other orders  -     pen  "needle, diabetic (BD ULTRA-FINE DENIS PEN NEEDLE) 32 gauge x 5/32" Ndle; 1 Needle by Misc.(Non-Drug; Combo Route) route 5 (five) times daily.  Dispense: 450 each; Refill: 3         Extreme personal hygiene especially given the feet and, properly disposing of clothing for the day has been discussed.  Any small insult can insight and infection which can cascade a series of events leading to amputation.    Plan:   Type 1 DM with polyneuropathy  -     Hemoglobin A1C; Future; Expected date: 06/19/2024  -     Microalbumin/Creatinine Ratio, Urine; Future; Expected date: 06/19/2024  -     Comprehensive Metabolic Panel; Future; Expected date: 06/19/2024  -     gabapentin (NEURONTIN) 300 MG capsule; Take 2 capsules (600 mg total) by mouth 3 (three) times daily.  Dispense: 180 capsule; Refill: 3  -     insulin aspart U-100 (NOVOLOG FLEXPEN U-100 INSULIN) 100 unit/mL (3 mL) InPn pen; Inject 10 Units into the skin 4 (four) times daily. Per sliding scale as instructed  Dispense: 12 mL; Refill: 5  -     blood-glucose sensor (DEXCOM G7 SENSOR) Gosia; 1 Device by Misc.(Non-Drug; Combo Route) route every 10 days.  Dispense: 3 each; Refill: 5  -     LANTUS SOLOSTAR U-100 INSULIN 100 unit/mL (3 mL) InPn pen; INJECT 20 UNITS SUBCUTANEOUSLY TWICE DAILY  Dispense: 15 mL; Refill: 2  -     Ambulatory referral/consult to Ophthalmology; Future; Expected date: 07/18/2024    Primary hypertension  -     Microalbumin/Creatinine Ratio, Urine; Future; Expected date: 06/19/2024  -     Comprehensive Metabolic Panel; Future; Expected date: 06/19/2024    Mixed hyperlipidemia  -     Lipid Panel; Future; Expected date: 06/19/2024  -     Comprehensive Metabolic Panel; Future; Expected date: 06/19/2024    Vasculopathy    Other orders  -     pen needle, diabetic (BD ULTRA-FINE DENIS PEN NEEDLE) 32 gauge x 5/32" Ndle; 1 Needle by Misc.(Non-Drug; Combo Route) route 5 (five) times daily.  Dispense: 450 each; Refill: 3    Patient's conditions have been reviewed.  " He was gone through a long process with wound care for had to have amputation.  As to what caused the feeling of skin remains unclear and if it was a vasculitis.      Patient Summary:  Patient Information:  Age: 43 years  Gender: Male  Ethnicity:   Medical Conditions:  Type 1 Diabetes  Hypertension  Dyslipidemia  Lifestyle Factors:  Smoker  Preventive Care Recommendations:  Diabetes Management:  Monitor blood glucose levels regularly.  Schedule routine HbA1c testing every 3 months.  Adhere to prescribed insulin regimen.  Follow a balanced diet tailored for diabetes management.  Engage in regular physical activity (at least 150 minutes of moderate-intensity exercise per week).  Hypertension Management:  Monitor blood pressure regularly.  Adhere to antihypertensive medication as prescribed.  Reduce sodium intake.  Maintain a healthy weight.  Limit alcohol consumption.  Dyslipidemia Management:  Take prescribed lipid-lowering medications (pravastatin 20 s).  Follow a heart-healthy diet low in saturated fats and cholesterol.  Increase intake of fiber-rich foods such as fruits, vegetables, and whole grains.  Smoking Cessation:  Strongly advised to quit smoking to reduce cardiovascular and pulmonary risks.  Consider nicotine replacement therapy (patches, gum) or medications such as varenicline or bupropion.  Seek support through counseling or smoking cessation programs.  Foot Care:  Inspect feet daily for cuts, blisters, redness, or swelling.  Keep feet clean and dry; wash daily with lukewarm water and mild soap.  Moisturize feet to prevent dry skin, but avoid applying lotion between toes.  Wear well-fitting, comfortable shoes and clean, dry socks.  Trim toenails straight across and file the edges.  Schedule regular foot exams with a healthcare professional, especially if any foot problems are noticed.  Pay particular attention to general personal hygiene including inferior extremities, genitals to prevent any  spread of infection elsewhere  Compliance:  Emphasized the importance of adhering to all prescribed medications and lifestyle modifications.  Encouraged to attend all scheduled medical appointments for regular monitoring and management of conditions.  Educated on the long-term benefits of compliance in preventing complications and improving overall health.  Next Steps:  Follow up appointment scheduled in 3-6 months to review progress and make any necessary adjustments to the care plan.  Patient provided with educational materials on smoking cessation, diabetes management, and hypertension control.  Referral to a dietitian for personalized dietary advice and to a diabetes educator for additional support.  Patient's Acknowledgment:  The patient understands the recommendations and is motivated to make necessary lifestyle changes for better health outcomes.    Physician's Name: _Alfie Nunes MD Date: __06/18/2024    Note: This summary is intended for internal use and should be reviewed and modified as needed to suit the specific needs and preferences of the patient.  Follow up in about 5 months (around 11/4/2024), or if symptoms worsen or fail to improve, for Diabetes/HTN/Lipids.      Current Outpatient Medications:     aspirin (ECOTRIN) 81 MG EC tablet, Take 1 tablet (81 mg total) by mouth once daily., Disp: 90 tablet, Rfl: 3    blood-glucose meter,continuous (DEXCOM ) Misc, 1 Device by Misc.(Non-Drug; Combo Route) route once daily., Disp: 1 each, Rfl: 0    esomeprazole (NEXIUM) 20 MG capsule, Take 20 mg by mouth before breakfast., Disp: , Rfl:     lisinopriL (PRINIVIL,ZESTRIL) 5 MG tablet, Take 1 tablet (5 mg total) by mouth once daily., Disp: 90 tablet, Rfl: 3    mupirocin (BACTROBAN) 2 % ointment, Apply topically 3 (three) times daily., Disp: 22 g, Rfl: 0    pravastatin (PRAVACHOL) 20 MG tablet, Take 1 tablet (20 mg total) by mouth every evening., Disp: 90 tablet, Rfl: 3    sildenafiL (VIAGRA) 100 MG tablet,  "Take 1 tablet (100 mg total) by mouth daily as needed for Erectile Dysfunction., Disp: 20 tablet, Rfl: 5    blood-glucose meter kit, To check BG qid times daily, to use with insurance preferred meter, Disp: 1 each, Rfl: 0    blood-glucose sensor (DEXCOM G7 SENSOR) Gosia, 1 Device by Misc.(Non-Drug; Combo Route) route every 10 days., Disp: 3 each, Rfl: 5    gabapentin (NEURONTIN) 300 MG capsule, Take 2 capsules (600 mg total) by mouth 3 (three) times daily., Disp: 180 capsule, Rfl: 3    insulin aspart U-100 (NOVOLOG FLEXPEN U-100 INSULIN) 100 unit/mL (3 mL) InPn pen, Inject 10 Units into the skin 4 (four) times daily. Per sliding scale as instructed, Disp: 12 mL, Rfl: 5    LANTUS SOLOSTAR U-100 INSULIN 100 unit/mL (3 mL) InPn pen, INJECT 20 UNITS SUBCUTANEOUSLY TWICE DAILY, Disp: 15 mL, Rfl: 2    pen needle, diabetic (BD ULTRA-FINE DENIS PEN NEEDLE) 32 gauge x 5/32" Ndle, 1 Needle by Misc.(Non-Drug; Combo Route) route 5 (five) times daily., Disp: 450 each, Rfl: 3    Alfie Lancaster      "

## 2024-07-21 DIAGNOSIS — E10.42 TYPE 1 DM WITH POLYNEUROPATHY: ICD-10-CM

## 2024-07-22 RX ORDER — INSULIN GLARGINE 100 [IU]/ML
INJECTION, SOLUTION SUBCUTANEOUS
Qty: 15 ML | Refills: 5 | Status: SHIPPED | OUTPATIENT
Start: 2024-07-22

## 2024-07-26 ENCOUNTER — PATIENT MESSAGE (OUTPATIENT)
Dept: ADMINISTRATIVE | Facility: HOSPITAL | Age: 44
End: 2024-07-26
Payer: MEDICAID

## 2024-07-29 RX ORDER — PEN NEEDLE, DIABETIC 31 GX5/16"
NEEDLE, DISPOSABLE MISCELLANEOUS
Qty: 100 EACH | Refills: 3 | Status: SHIPPED | OUTPATIENT
Start: 2024-07-29

## 2024-07-30 DIAGNOSIS — E10.3293 TYPE 1 DIABETES MELLITUS WITH MILD NONPROLIFERATIVE RETINOPATHY OF BOTH EYES WITHOUT MACULAR EDEMA: ICD-10-CM

## 2024-07-30 DIAGNOSIS — I10 PRIMARY HYPERTENSION: ICD-10-CM

## 2024-07-30 DIAGNOSIS — E78.2 MIXED HYPERLIPIDEMIA: ICD-10-CM

## 2024-07-30 RX ORDER — PRAVASTATIN SODIUM 20 MG/1
20 TABLET ORAL NIGHTLY
Qty: 90 TABLET | Refills: 0 | Status: SHIPPED | OUTPATIENT
Start: 2024-07-30

## 2024-07-30 RX ORDER — LISINOPRIL 5 MG/1
5 TABLET ORAL
Qty: 90 TABLET | Refills: 0 | Status: SHIPPED | OUTPATIENT
Start: 2024-07-30

## 2024-08-19 NOTE — PATIENT INSTRUCTIONS
[facility] Note    Name: Ángel Moscoso    Date: 8/20/2024    Ángel Moscoso is a 22 year old male who presents for evaluation of T1DM management.     Chief complaint: New Patient (NP here to establish care for DM1, per pt no current concerns or sx. Pt states that his BG meter is broken.  Pt states that he also has a pump but has not used in several months, has Dexcom G6 but has ran out of sensors /Last A1c value was 12.7% done 5/2/2024. )       Subjective:   Initial HPI consult in August 2024  DM hx:  -Diagnosed with diabetes Type 1 since age 10  -Family history- yes, strong diabetes family hx    08/19/24: Last office visit for DM mgmt of the pt: 5/2/24  Mgmt by: MARK Arenas  Last time used omnipod was June, 2024 because it keeps on falling.     -Re: potential DM medication contraindication (if positive, checkbox selected):  [] History of pancreatitis- denies   [] Personal/fam hx of medullary thyroid cancer/MEN2 - denies   [] History of recent/frequent UTI/yeast infxn- denie s  [] Previous amputation related to diabetes- denies    -Presence of associated DM complications (if positive, checkbox selected):  [] Macrovascular complications (CAD/CVA/PAD)- denies   [] Neuropathy- denies   [] Retinopathy denies, saw opthal 5 years ago   [] Nephropathy -denies  [] HTN  [] Hyperlipidemia  [] Stroke/TIA- denies   [] Gastroparesis denies  +DKA twice in the past, last time was teenage years, states last year was borderline.     -Lifestyle: works security, play basketball , works in the gym   - Modifying factors: non compliant w/ his treatment, per mom, pt was intermittently using his insulin pump in the last few months, pt does not like the pump as it keeps on falling from his arm w/ physical activities.     DM meds at first office visit:  - CURRENT: Humalog 1u:10g 2-3x a day ( lunch and dinner), 10-20 units mostly twice a day   -->last time he used his omnipod was June, 2024 (omnipod started  in the last year)  See attached wound care documentation    , last time he used lantus was July, states his glucose keeps in going low that's why he stopped taking his lantus; the lowest units of lantus he used was 3 units, highest 7 units)   --> states tslim was not working for him but the longest he used was tslim  --->Per last rec by MARK Arenas -Lantus 7 units, Humalog dosing to ICR 1u:20g and ISF 1u:50 mg/dl for target of 120 mg/dl until insulin pump therapy resumed.     Blood Glucose log reviewed.: not testing glucose regularly, once every other day : lunch   pre-meal lunch:250-400 , episodes of hypoglycemia: Yes, ;lowest 53 after dinner   Feel symptoms at glucose level of: higher 60s  Hx of seizure r/d to hypoglycemia: denies   Have glucagon or Gvoke or Baqsimi: has glucagon       Previously trialed/failed DM meds:   T-slim & Medtronic pumps, Admelog - rash per patient, Humalog 50/50     History/Other:    Allergies, PMH, SocHx and FHx reviewed and updated as appropriate in Epic on    Continuous Glucose Sensor (DEXCOM G6 SENSOR) Does not apply Misc 1 each Every 10 days. 9 each 1    insulin glargine (LANTUS SOLOSTAR) 100 UNIT/ML Subcutaneous Solution Pen-injector Inject  7 units daily,  Max TDD = 30 units/day 30 mL 0    insulin lispro 100 UNIT/ML Injection Solution Max TDD 60 units in the insulin pump 60 mL 1    Urine Glucose-Ketones Test In Vitro Strip Use as needed, with ANY episode of VOMITING, during illness, and/or if 2+ BG >250 despite usual correction. 100 strip 0    Insulin Syringe-Needle U-100 31G X 15/64\" 1 ML Does not apply Misc 1 Syringe in the morning, at noon, in the evening, and at bedtime. 360 each 1    Insulin Lispro, 1 Unit Dial, (HUMALOG KWIKPEN) 100 UNIT/ML Subcutaneous Solution Pen-injector Uses up to 100 units daily via insulin pump 30 mL 2    Insulin Pen Needle (TRUEPLUS 5-BEVEL PEN NEEDLES) 32G X 4 MM Does not apply Misc Uses up to 5 times daily when not using insulin pump 150 each 1    Insulin Disposable Pump (OMNIPOD 5 G6 PODS, GEN 5,) Does not  apply Misc 1 each every 3 (three) days. 10 each 5    OneTouch Delica Lancets 30G Does not apply Misc 1 each 4 (four) times daily. 200 each 11    Glucose Blood (ONETOUCH VERIO) In Vitro Strip 1 each by Finger stick route 4 (four) times daily. Check 3 times daily 200 strip 11    albuterol 108 (90 Base) MCG/ACT Inhalation Aero Soln Inhale 2 puffs into the lungs every 4 (four) hours as needed for Wheezing. 1 each 0    Urine Glucose-Ketones Test In Vitro Strip Use as directed during sick days 10 strip 5    Insulin Pen Needle (EASY TOUCH SAFETY PEN NEEDLES) 29G X 8MM Does not apply Misc QID with insulin injections 100 each 2     Allergies   Allergen Reactions    Admelog [Insulin Lispro, Human] RASH    Amoxicillin RASH     Current Outpatient Medications   Medication Sig Dispense Refill    Continuous Glucose Sensor (DEXCOM G6 SENSOR) Does not apply Misc 1 each Every 10 days. 9 each 1    insulin glargine (LANTUS SOLOSTAR) 100 UNIT/ML Subcutaneous Solution Pen-injector Inject  7 units daily,  Max TDD = 30 units/day 30 mL 0    insulin lispro 100 UNIT/ML Injection Solution Max TDD 60 units in the insulin pump 60 mL 1    Urine Glucose-Ketones Test In Vitro Strip Use as needed, with ANY episode of VOMITING, during illness, and/or if 2+ BG >250 despite usual correction. 100 strip 0    Insulin Syringe-Needle U-100 31G X 15/64\" 1 ML Does not apply Misc 1 Syringe in the morning, at noon, in the evening, and at bedtime. 360 each 1    Insulin Lispro, 1 Unit Dial, (HUMALOG KWIKPEN) 100 UNIT/ML Subcutaneous Solution Pen-injector Uses up to 100 units daily via insulin pump 30 mL 2    Insulin Pen Needle (TRUEPLUS 5-BEVEL PEN NEEDLES) 32G X 4 MM Does not apply Misc Uses up to 5 times daily when not using insulin pump 150 each 1    Insulin Disposable Pump (OMNIPOD 5 G6 PODS, GEN 5,) Does not apply Misc 1 each every 3 (three) days. 10 each 5    OneTouch Delica Lancets 30G Does not apply Misc 1 each 4 (four) times daily. 200 each 11    Glucose  Blood (ONETOUCH VERIO) In Vitro Strip 1 each by Finger stick route 4 (four) times daily. Check 3 times daily 200 strip 11    albuterol 108 (90 Base) MCG/ACT Inhalation Aero Soln Inhale 2 puffs into the lungs every 4 (four) hours as needed for Wheezing. 1 each 0    Urine Glucose-Ketones Test In Vitro Strip Use as directed during sick days 10 strip 5    Insulin Pen Needle (EASY TOUCH SAFETY PEN NEEDLES) 29G X 8MM Does not apply Misc QID with insulin injections 100 each 2    Continuous Glucose Transmitter (DEXCOM G6 TRANSMITTER) Does not apply Misc 1 each every 3 (three) months. (Patient not taking: Reported on 8/19/2024) 1 each 3    glucagon (GVOKE HYPOPEN 2-PACK) 1 MG/0.2ML Subcutaneous SUBQ injection Inject 0.2 mL (1 mg total) into the skin once as needed for Low blood glucose. 0.2 mL 0     Past Medical History:    Acute nasopharyngitis    Allergic rhinitis    Ankle pain    Backache    DKA, type 1 (HCC)    Earache    Fracture    Occult Fx    Rash    Sore throat    Type 1 diabetes mellitus (HCC)     Family History   Problem Relation Age of Onset    Other (htn) Mother     Other (lymphoma) Father     Diabetes Maternal Grandmother         type 2    Diabetes Maternal Grandfather     Brain Cancer Maternal Grandfather     Diabetes Maternal Aunt     Diabetes Maternal Uncle     Diabetes Maternal Cousin Female      Social history: Reviewed.      ROS/Exam    REVIEW OF SYSTEMS: Ten point review of systems has been performed and is otherwise negative and/or non-contributory, except as described above.     VITALS  Vitals:    08/19/24 1306   BP: 128/76   BP Location: Left arm   Patient Position: Sitting   Cuff Size: adult   Pulse: 88   SpO2: 98%   Weight: 135 lb (61.2 kg)   Height: 5' 7\" (1.702 m)       Wt Readings from Last 6 Encounters:   08/19/24 135 lb (61.2 kg)   08/19/24 135 lb (61.2 kg)   05/02/24 137 lb (62.1 kg)   10/03/23 136 lb (61.7 kg)   08/24/23 138 lb 9.6 oz (62.9 kg)   07/20/23 131 lb 6.4 oz (59.6 kg)        PHYSICAL EXAM  CONSTITUTIONAL:  awake, alert, cooperative, no apparent distress, and appears stated age. Vss stable   PSYCH: normal affect  LUNGS: breathing comfortably  CARDIOVASCULAR:  regular rate   NECK:  no palpable thyroid nodules     Labs/Imaging:   Lab Results   Component Value Date    CHOLEST 163 05/02/2024    TRIG 98 05/02/2024    HDL 67 (H) 05/02/2024    LDL 78 05/02/2024     Lab Results   Component Value Date    MICROALBCREA 23.7 05/02/2024    MICROALBCREA 6.2 07/20/2023      Lab Results   Component Value Date    CREATSERUM 0.85 08/19/2024    CREATSERUM 1.04 05/02/2024    EGFRCR 125 08/19/2024    EGFRCR 104 05/02/2024     Lab Results   Component Value Date    AST 44 (H) 08/19/2024    AST 23 05/02/2024    ALT 43 08/19/2024    ALT 34 05/02/2024       No results found for: \"TSH\", \"T4F\"    Overall glucose control:  Lab Results   Component Value Date    A1C 12.5 (A) 08/19/2024    A1C 12.7 (A) 05/02/2024    A1C 14.0 (A) 08/24/2023    A1C 13.7 (A) 05/24/2023    A1C 12.7 (H) 08/22/2022       Supplementary Documentation:   -Surveillance for Diabetes Complications & Risks  Foot exam/neuropathy: Last Foot Exam: 05/02/24    Retinopathy screening: No data recordedNo data recorded  Per pt, he saw opthal about 5 years ago, no DR.    Last Dental appt: More than 5 years.     Assessment & Plan:     ICD-10-CM    1. Type 1 diabetes mellitus with hyperglycemia (HCC)  E10.65 TSH W Reflex To Free T4     POC Hgb A1C     Continuous Glucose Sensor (DEXCOM G6 SENSOR) Does not apply Misc     Comp Metabolic Panel (14)     insulin glargine (LANTUS SOLOSTAR) 100 UNIT/ML Subcutaneous Solution Pen-injector     Urine Glucose-Ketones Test In Vitro Strip     Insulin Syringe-Needle U-100 31G X 15/64\" 1 ML Does not apply Misc      2. Uncontrolled type 1 diabetes mellitus with hyperglycemia (HCC)  E10.65 insulin lispro 100 UNIT/ML Injection Solution          Ángel Moscoso is a pleasant 22 year old male here for evaluation  of:    #Diabetes- PMHx of Type 1 diabetes mellitus diagnosed since age 10. Pt's last basal insulin used was July. Pt admitted that he checked his ketones last week and was positive. He denies nausea and vomiting but admitted he is very lethargic. Last A1c value was 12.5% done 8/19/2024. Recommend to pt to go to ER to r/o DKA. Plan to restart pump. Will send pods to his pharmacy, for now if he does not go to ER, which pt keeps on refusing, plan as below.   Goal <7%. Importance of better glucose control in preventing onset/progression of end-organ damage discussed, as well as goals of therapy and clinical significance of A1C.   Discussed at length the risk of death w/ DKA and also the importance of using basal insulin and its rare to stop in the setting of Type 1 DM if not using insulin pump. Mother is present at the visit and agreed to my recommendations. Pt verbalized  his understanding of the recs and plan. Sent ketone strip for pt. Will send pods for pt.    - med changes:  - - For now start lantus 7 units /day  Check your blood sugar before you eat. Based on the number, give yourself a dose 15-20 minutes before the meal:  Fixed dose for meal (4 units) 3x a day w/ at least 45 grams of carbs + Correction scale:   <140:  0 units  141-180- 1 units  181-220- 2 units  221-260-3 units  261-300- 4 units  301-340- 5 units  341-380- 6 units  381-420- 7 units  421-460- 8 units  - start Dexcom G6 CGM with phone (connected to clinic profile)  - patient is on insulin, and has suboptimal glycemic control including wide glycemic swings, thus would benefit from CGM  - continue to check BG 3x/day using glucometer or CGM   -See above header \"Supplementary Documentation\" for surveillance for diabetes complications & risks  Discussed targeted glucose levels and symptoms and treatment of hypoglycemia.  Meet with Roberta (our diabetes educator) in 1 weeks for: Restarting of omnipod pump  Given list of opthal to see, See the dentist  yearly    #Hyperlipidemia/Lipids: LDL is not to goal, will discuss furhter w/ pt on next visit , agree for pt not to be on statin due to his age.   Lab Results   Component Value Date    LDL 78 05/02/2024    TRIG 98 05/02/2024   Cholesterol Meds:  none, will discuss on next visit.     #Nephropathy screening   Lab Results   Component Value Date    EGFRCR 125 08/19/2024    MICROALBCREA 23.7 05/02/2024      #Blood pressure control: SBP is not to goal <130   BP Readings from Last 1 Encounters:   08/19/24 113/88   BP Meds:            Return in about 6 weeks (around 9/30/2024).  Total time spent  45 minutes today on obtaining history, reviewing pertinent labs, evaluating patient, providing multiple treatment options, reinforcing diet/exercise and compliance, and completing documentation, of which greater than 50% was spent in face to face discussion with the patient       Case discussed with patient and his mother who demonstrated understanding and agreement with plan.     Thank you for allowing me to participate in the care of this patient.  Please feel free to contact me with any questions.    QAMAR Clark, [facility]  Endocrinology, Diabetes & Metabolism   08/19/24      Note to patient: The 21 Century Cures Act makes medical notes like these available to patients in the interest of transparency. However, be advised this is a medical document. It is intended as peer to peer communication. It is written in medical language and may contain abbreviations or verbiage that are unfamiliar. It may appear blunt or direct. Medical documents are intended to carry relevant information, facts as evident, and the clinical opinion of the practitioner.

## 2024-09-03 ENCOUNTER — PATIENT MESSAGE (OUTPATIENT)
Dept: ADMINISTRATIVE | Facility: HOSPITAL | Age: 44
End: 2024-09-03
Payer: MEDICAID

## 2024-09-30 NOTE — TELEPHONE ENCOUNTER
Please verify directions for Lantus   Pharmacy needs clarification  Please send new prescription to pharmacy with the correct dosage.  
Discharged

## 2024-10-07 DIAGNOSIS — E10.42 TYPE 1 DM WITH POLYNEUROPATHY: ICD-10-CM

## 2024-10-07 RX ORDER — GABAPENTIN 300 MG/1
600 CAPSULE ORAL 3 TIMES DAILY
Qty: 180 CAPSULE | Refills: 0 | Status: SHIPPED | OUTPATIENT
Start: 2024-10-07

## 2024-10-15 DIAGNOSIS — E10.3293 TYPE 1 DIABETES MELLITUS WITH MILD NONPROLIFERATIVE RETINOPATHY OF BOTH EYES WITHOUT MACULAR EDEMA: ICD-10-CM

## 2024-10-15 DIAGNOSIS — E78.2 MIXED HYPERLIPIDEMIA: ICD-10-CM

## 2024-10-15 DIAGNOSIS — I10 PRIMARY HYPERTENSION: ICD-10-CM

## 2024-10-16 ENCOUNTER — PATIENT MESSAGE (OUTPATIENT)
Dept: FAMILY MEDICINE | Facility: CLINIC | Age: 44
End: 2024-10-16
Payer: MEDICAID

## 2024-10-16 RX ORDER — LISINOPRIL 5 MG/1
5 TABLET ORAL
Qty: 90 TABLET | Refills: 0 | Status: SHIPPED | OUTPATIENT
Start: 2024-10-16

## 2024-10-16 RX ORDER — PRAVASTATIN SODIUM 20 MG/1
20 TABLET ORAL NIGHTLY
Qty: 90 TABLET | Refills: 0 | Status: SHIPPED | OUTPATIENT
Start: 2024-10-16

## 2024-11-01 DIAGNOSIS — E10.42 TYPE 1 DM WITH POLYNEUROPATHY: ICD-10-CM

## 2024-11-01 RX ORDER — INSULIN GLARGINE 100 [IU]/ML
INJECTION, SOLUTION SUBCUTANEOUS
Qty: 15 ML | Refills: 0 | Status: SHIPPED | OUTPATIENT
Start: 2024-11-01

## 2024-11-06 DIAGNOSIS — E10.42 TYPE 1 DM WITH POLYNEUROPATHY: ICD-10-CM

## 2024-11-06 RX ORDER — GABAPENTIN 300 MG/1
600 CAPSULE ORAL 3 TIMES DAILY
Qty: 180 CAPSULE | Refills: 0 | Status: SHIPPED | OUTPATIENT
Start: 2024-11-06

## 2024-11-15 ENCOUNTER — PATIENT OUTREACH (OUTPATIENT)
Dept: ADMINISTRATIVE | Facility: HOSPITAL | Age: 44
End: 2024-11-15
Payer: MEDICAID

## 2024-11-15 NOTE — PROGRESS NOTES
Population Health Chart Review & Patient Outreach Details      Additional [facility] Health Notes:               Updates Requested / Reviewed:      Updated Care Coordination Note, Care Everywhere, , and Immunizations Reconciliation Completed or Queried: [facility] Topics Overdue:      [facility] ER Score: 3     Colon Cancer Screening  Eye Exam  Hemoglobin A1c                       Health Maintenance Topic(s) Outreach Outcomes & Actions Taken:    Lab(s) - Outreach Outcomes & Actions Taken  : Overdue Lab(s) Ordered

## 2024-11-27 ENCOUNTER — HOSPITAL ENCOUNTER (EMERGENCY)
Facility: HOSPITAL | Age: 44
Discharge: HOME | End: 2024-11-27
Attending: STUDENT IN AN ORGANIZED HEALTH CARE EDUCATION/TRAINING PROGRAM
Payer: MEDICAID

## 2024-11-27 VITALS
BODY MASS INDEX: 24.52 KG/M2 | HEIGHT: 72 IN | OXYGEN SATURATION: 96 % | TEMPERATURE: 99 F | DIASTOLIC BLOOD PRESSURE: 91 MMHG | WEIGHT: 181 LBS | HEART RATE: 95 BPM | RESPIRATION RATE: 18 BRPM | SYSTOLIC BLOOD PRESSURE: 151 MMHG

## 2024-11-27 DIAGNOSIS — S91.301A WOUND OF RIGHT FOOT: ICD-10-CM

## 2024-11-27 DIAGNOSIS — L02.611 ABSCESS OF GREAT TOE OF RIGHT FOOT: Primary | ICD-10-CM

## 2024-11-27 LAB
ALBUMIN SERPL BCP-MCNC: 4.4 G/DL (ref 3.5–5.2)
ALP SERPL-CCNC: 102 U/L (ref 55–135)
ALT SERPL W/O P-5'-P-CCNC: 28 U/L (ref 10–44)
ANION GAP SERPL CALC-SCNC: 8 MMOL/L (ref 8–16)
AST SERPL-CCNC: 39 U/L (ref 10–40)
BASOPHILS # BLD AUTO: 0.11 K/UL (ref 0–0.2)
BASOPHILS NFR BLD: 0.9 % (ref 0–1.9)
BILIRUB SERPL-MCNC: 0.4 MG/DL (ref 0.1–1)
BUN SERPL-MCNC: 18 MG/DL (ref 6–20)
CALCIUM SERPL-MCNC: 9.6 MG/DL (ref 8.7–10.5)
CHLORIDE SERPL-SCNC: 97 MMOL/L (ref 95–110)
CO2 SERPL-SCNC: 28 MMOL/L (ref 23–29)
CREAT SERPL-MCNC: 1.1 MG/DL (ref 0.5–1.4)
DIFFERENTIAL METHOD BLD: ABNORMAL
EOSINOPHIL # BLD AUTO: 0.1 K/UL (ref 0–0.5)
EOSINOPHIL NFR BLD: 1 % (ref 0–8)
ERYTHROCYTE [DISTWIDTH] IN BLOOD BY AUTOMATED COUNT: 12.6 % (ref 11.5–14.5)
EST. GFR  (NO RACE VARIABLE): >60 ML/MIN/1.73 M^2
GLUCOSE SERPL-MCNC: 105 MG/DL (ref 70–110)
HCT VFR BLD AUTO: 41.4 % (ref 40–54)
HGB BLD-MCNC: 13.7 G/DL (ref 14–18)
IMM GRANULOCYTES # BLD AUTO: 0.03 K/UL (ref 0–0.04)
IMM GRANULOCYTES NFR BLD AUTO: 0.3 % (ref 0–0.5)
LDH SERPL L TO P-CCNC: 0.74 MMOL/L (ref 0.5–2.2)
LYMPHOCYTES # BLD AUTO: 3.1 K/UL (ref 1–4.8)
LYMPHOCYTES NFR BLD: 26.7 % (ref 18–48)
MCH RBC QN AUTO: 30.6 PG (ref 27–31)
MCHC RBC AUTO-ENTMCNC: 33.1 G/DL (ref 32–36)
MCV RBC AUTO: 92 FL (ref 82–98)
MONOCYTES # BLD AUTO: 0.9 K/UL (ref 0.3–1)
MONOCYTES NFR BLD: 7.9 % (ref 4–15)
NEUTROPHILS # BLD AUTO: 7.4 K/UL (ref 1.8–7.7)
NEUTROPHILS NFR BLD: 63.2 % (ref 38–73)
NRBC BLD-RTO: 0 /100 WBC
PLATELET # BLD AUTO: 211 K/UL (ref 150–450)
PMV BLD AUTO: 13.9 FL (ref 9.2–12.9)
POCT GLUCOSE: 117 MG/DL (ref 70–110)
POTASSIUM SERPL-SCNC: 4.6 MMOL/L (ref 3.5–5.1)
PROT SERPL-MCNC: 8.7 G/DL (ref 6–8.4)
RBC # BLD AUTO: 4.48 M/UL (ref 4.6–6.2)
SAMPLE: NORMAL
SODIUM SERPL-SCNC: 133 MMOL/L (ref 136–145)
WBC # BLD AUTO: 11.68 K/UL (ref 3.9–12.7)

## 2024-11-27 PROCEDURE — 82962 GLUCOSE BLOOD TEST: CPT

## 2024-11-27 PROCEDURE — 87040 BLOOD CULTURE FOR BACTERIA: CPT | Performed by: PHYSICIAN ASSISTANT

## 2024-11-27 PROCEDURE — 99284 EMERGENCY DEPT VISIT MOD MDM: CPT | Mod: 25

## 2024-11-27 PROCEDURE — 85025 COMPLETE CBC W/AUTO DIFF WBC: CPT | Performed by: PHYSICIAN ASSISTANT

## 2024-11-27 PROCEDURE — 10060 I&D ABSCESS SIMPLE/SINGLE: CPT

## 2024-11-27 PROCEDURE — 36415 COLL VENOUS BLD VENIPUNCTURE: CPT | Performed by: PHYSICIAN ASSISTANT

## 2024-11-27 PROCEDURE — 87389 HIV-1 AG W/HIV-1&-2 AB AG IA: CPT | Performed by: EMERGENCY MEDICINE

## 2024-11-27 PROCEDURE — 63600175 PHARM REV CODE 636 W HCPCS: Mod: JZ,JG | Performed by: STUDENT IN AN ORGANIZED HEALTH CARE EDUCATION/TRAINING PROGRAM

## 2024-11-27 PROCEDURE — 86803 HEPATITIS C AB TEST: CPT | Performed by: EMERGENCY MEDICINE

## 2024-11-27 PROCEDURE — 80053 COMPREHEN METABOLIC PANEL: CPT | Performed by: PHYSICIAN ASSISTANT

## 2024-11-27 PROCEDURE — 25000003 PHARM REV CODE 250: Performed by: STUDENT IN AN ORGANIZED HEALTH CARE EDUCATION/TRAINING PROGRAM

## 2024-11-27 PROCEDURE — 96365 THER/PROPH/DIAG IV INF INIT: CPT

## 2024-11-27 RX ORDER — LIDOCAINE HYDROCHLORIDE 10 MG/ML
5 INJECTION, SOLUTION EPIDURAL; INFILTRATION; INTRACAUDAL; PERINEURAL
Status: DISCONTINUED | OUTPATIENT
Start: 2024-11-27 | End: 2024-11-27 | Stop reason: HOSPADM

## 2024-11-27 RX ADMIN — DALBAVANCIN 1500 MG: 500 INJECTION, POWDER, FOR SOLUTION INTRAVENOUS at 07:11

## 2024-11-28 NOTE — ED PROVIDER NOTES
Encounter Date: 11/27/2024       History     Chief Complaint   Patient presents with    Foot Injury     R foot swelling, had piece of glass in it, has neuropathy, not healing on antibiotics over a week and now has swelling.     HPI    Philip Alberts is a 44 y.o. male with a past medical history of insulin-dependent diabetes, peripheral neuropathy, and previous toe amputation that presents emergency department for evaluation of right-sided foot swelling.  Patient was stepped on a piece of glass approximately 1 week ago and had this removed.  He has been on Keflex and Bactrim since then.  Beginning yesterday, he noticed that his right great toe on the dorsal side became more erythematous with swelling.  There is now an area of fluctuance.  Not having any drainage.  The wound on the bottom of foot appears much better per him.    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 12/3/2018     Past Surgical History:   Procedure Laterality Date    MANDIBLE FRACTURE SURGERY  07/17/2000    MANDIBLE FRACTURE SURGERY      SPLENECTOMY, TOTAL  07/17/2000    TOE AMPUTATION Right 10/9/2023    Procedure: AMPUTATION, TOE;  Surgeon: Ashok Santiago DPM;  Location: [facility];  Service: Podiatry;  Laterality: Right;     Family History   Problem Relation Name Age of Onset    Diabetes Mother      Cancer Father       Social History     Tobacco Use    Smoking status: Every Day    Smokeless tobacco: Never   Substance Use Topics    Alcohol use: No    Drug use: No     Review of Systems    Constitutional:  Negative for fever.   HENT:  Negative for sore throat.    Respiratory:  Negative for shortness of breath.    Cardiovascular:  Negative for chest pain.   Gastrointestinal:  Negative for abdominal pain, diarrhea, nausea and vomiting.   Genitourinary:  Negative for dysuria, frequency and hematuria.   Musculoskeletal:  Negative for back pain.   Skin:  Positive for wound. Negative for rash.   Neurological:  Negative for weakness.   Hematological:  Does not bruise/bleed easily.       Physical Exam     Initial Vitals [11/27/24 1811]   BP Pulse Resp Temp SpO2   (!) 193/95 95 18 98.1 °F (36.7 °C) 96 %      MAP       --         Physical Exam    Nursing note and vitals reviewed.  Constitutional: He appears well-developed and well-nourished.   HENT:   Head: Normocephalic and atraumatic.   Eyes: EOM are normal. Pupils are equal, round, and reactive to light.   Neck:   Normal range of motion.  Cardiovascular:  Normal rate, regular rhythm and normal heart sounds.           Pulmonary/Chest: Breath sounds normal. No respiratory distress.   Abdominal: Abdomen is soft. He exhibits no distension. There is no abdominal tenderness. There is no rebound.   Musculoskeletal:         General: Normal range of motion.      Cervical back: Normal range of motion.     Neurological: He is alert and oriented to person, place, and time. He has normal strength. No cranial nerve deficit or sensory deficit. GCS score is 15. GCS eye subscore is 4. GCS verbal subscore is 5. GCS motor subscore is 6.   Skin: Capillary refill takes less than 2 seconds. Abscess (Right great toe) noted.   Erythema around the base of the right foot with an area of fluctuance.   Psychiatric: He has a normal mood and affect.         ED Course   I & D - Incision and Drainage    Date/Time: 11/27/2024 8:23 PM  Location procedure was performed: [facility] EMERGENCY DEPARTMENT    Performed by: Artur Gray MD  Authorized by: Artur Gray MD   Consent Done: Yes  Consent: Verbal consent obtained.  Consent given by: patient  Patient identity confirmed: verbally with patient  Type: abscess  Body area: lower extremity  Location details: right big toe  Anesthesia: local infiltration    Anesthesia:  Local Anesthetic: lidocaine 1% without epinephrine  Anesthetic total: 3 mL    Patient sedated: no  Scalpel size: 11  Incision type: single straight  Incision depth: dermal  Complexity: simple  Drainage: pus  Drainage amount: moderate  Wound treatment: drainage, incision and expression of material  Packing material: none    Incision depth: dermal        Labs Reviewed   CBC W/ AUTO DIFFERENTIAL - Abnormal       Result Value    WBC 11.68      RBC 4.48 (*)     Hemoglobin 13.7 (*)     Hematocrit 41.4      MCV 92      MCH 30.6      MCHC 33.1      RDW 12.6      Platelets 211      MPV 13.9 (*)     Immature Granulocytes 0.3      Gran # (ANC) 7.4      Immature Grans (Abs) 0.03      Lymph # 3.1      Mono # 0.9      Eos # 0.1      Baso # 0.11      nRBC 0      Gran % 63.2      Lymph % 26.7      Mono % 7.9      Eosinophil % 1.0      Basophil % 0.9      Differential Method Automated     COMPREHENSIVE METABOLIC PANEL - Abnormal    Sodium 133 (*)     Potassium 4.6      Chloride 97      CO2 28      Glucose 105      BUN 18      Creatinine 1.1      Calcium 9.6      Total Protein 8.7 (*)     Albumin 4.4      Total Bilirubin 0.4      Alkaline Phosphatase 102      AST 39      ALT 28      eGFR >60.0      Anion Gap 8     POCT GLUCOSE - Abnormal    POCT Glucose 117 (*)    CULTURE, BLOOD   CULTURE, BLOOD   HEPATITIS C ANTIBODY   HIV 1 / 2 ANTIBODY   ISTAT LACTATE    POC Lactate 0.74      Sample VENOUS     POCT LACTATE          Imaging Results              X-Ray Foot Complete Right (Final result)  Result time 11/27/24 19:36:32      Final result by Jonh Zelaya MD (11/27/24 19:36:32)                   Impression:      Abnormal examination as above.      Electronically signed by: Jonh  MD Garcia  Date:    11/27/2024  Time:    19:36               Narrative:    EXAMINATION:  XR FOOT COMPLETE 3 VIEW RIGHT    CLINICAL HISTORY:  . Unspecified open wound, right foot, initial encounter    TECHNIQUE:  AP, lateral, and oblique views of the right foot were performed.    COMPARISON:  Right foot radiograph series 01/03/2024    FINDINGS:  There is postoperative change of prior partial amputation of the 3rd digit to the level of the metatarsal head.  There is mild chronic appearing remodeling and cystic change of the metatarsal head.  There is nonspecific mild remodeling and erosive change involving the 2nd and 4th distal phalanges, new from prior study of 01/03/2024.  If there is strong clinical concern for acute osteomyelitis, more sensitive evaluation could be performed with MRI.    There is no evidence of acute fracture.  No abnormal widening of the Lisfranc interval appreciated.  There is nonspecific diffuse soft tissue edema.  There is possible bandaging material overlying the calcaneus/hindfoot.  There are scattered radiopaque foci which appear to be on the skin surface, although clinical correlation with physical examination is advised.  There is an additional plantar tissue wound at the level of the forefoot.                                       Medications   LIDOcaine (PF) 10 mg/ml (1%) injection 50 mg (has no administration in time range)   dalbavancin (DALVANCE) 1,500 mg in D5W 325 mL infusion (1,500 mg Intravenous New Bag 11/27/24 1937)     Medical Decision Making  Philip Alberts is a 44 y.o. male with a past medical history of insulin-dependent diabetes, peripheral neuropathy, and previous toe amputation that presents emergency department for evaluation of right-sided foot swelling.  Vitals are stable.  Not septic.  Exam with area of fluctuance on the dorsum of the base of the 1st toe.  Presentation consistent with abscess and cellulitis.  No leukocytosis seen.  X-ray does not show evidence of  osteomyelitis in the area concern which would the 1st toes.  I think it is unlikely that he had developed osteomyelitis this quickly and has overlying cellulitis.  We will treat with dalbavancin at this time.  I and D performed as described above.  Irrigated copiously.  Stable for discharge at this time.  Instructed to follow up with podiatry and wound care.  Return precautions given.  Instructed to continue taking other antibiotics as previously prescribed.    Risk  Prescription drug management.                                      Clinical Impression:  Final diagnoses:  [S91.301A] Wound of right foot  [L02.611] Abscess of great toe of right foot (Primary)          ED Disposition Condition    Discharge Stable          ED Prescriptions    None       Follow-up Information       Follow up With Specialties Details Why Contact Info Additional Information    [facility] - Emergency Dept Emergency Medicine  As needed, If symptoms worsen [address] [id number]  [phone] 1st floor    Alfie Lancaster MD Internal Medicine In 2 days  [address]  [address]  [facility] [address]  [phone]                Artur Gray MD  11/27/24 2032

## 2024-11-28 NOTE — FIRST PROVIDER EVALUATION
Emergency Department TeleTriage Encounter Note      CHIEF COMPLAINT    Chief Complaint   Patient presents with    Foot Injury     R foot swelling, had piece of glass in it, has neuropathy, not healing on antibiotics over a week and now has swelling.       VITAL SIGNS   Initial Vitals [11/27/24 1811]   BP Pulse Resp Temp SpO2   (!) 193/95 95 18 98.1 °F (36.7 °C) 96 %      MAP       --            ALLERGIES    Review of patient's allergies indicates:   Allergen Reactions    Pcn [penicillins] Anaphylaxis     Tolerates cephalexin       PROVIDER TRIAGE NOTE  Patient presents with increased swelling, and erythema to the right foot. Has been antibiotics for >1 week. Initially he had a piece of ceramic in the foot that was removed. He is diabetic and reports blood sugar is well-controlled.       ORDERS  Labs Reviewed   HEPATITIS C ANTIBODY   HIV 1 / 2 ANTIBODY       ED Orders (720h ago, onward)      Start Ordered     Status Ordering Provider    11/27/24 1814 11/27/24 1813  Hepatitis C Antibody  STAT         Ordered MESFIN MOODY    11/27/24 1814 11/27/24 1813  HIV 1/2 Ag/Ab (4th Gen)  STAT         Ordered MESFIN MOODY              Virtual Visit Note: The provider triage portion of this emergency department evaluation and documentation was performed via [employer], a HIPAA-compliant telemedicine application, in concert with a tele-presenter in the room. A face to face patient evaluation with one of my colleagues will occur once the patient is placed in an emergency department room.      DISCLAIMER: This note was prepared with [email or web address] voice recognition transcription software. Garbled syntax, mangled pronouns, and other bizarre constructions may be attributed to that software system.

## 2024-11-28 NOTE — DISCHARGE INSTRUCTIONS
Please return to the emergency department if you have new or worsening symptoms.  Follow up with Podiatry or wound care for further evaluation of your right foot wound.    Future Appointments   Date Time Provider Department Center   12/11/2024  7:40 AM Alfie Lancaster MD STEFANO TOMAS at [facility]

## 2024-11-29 LAB
HCV AB SERPL QL IA: NEGATIVE
HIV 1+2 AB+HIV1 P24 AG SERPL QL IA: NEGATIVE

## 2024-11-29 NOTE — PLAN OF CARE
11/29/24 1211   Discharge Reassessment   Assessment Type Discharge Planning Assessment   Post-Acute Status   Hospital Resources/Appts/Education Provided Appointments scheduled and added to AVS   Discharge Delays None known at this time     Pt refused appointment- Pt has Podiatry appointment scheduled for on 12/2/2024 w/Sheila Blankenship arrival time 2:00 PM. Please bring ER D/C paperwork, proof of income/insurance, ID, and medication list. If you can't keep this appointment please contact [facility] [phone].

## 2024-12-01 DIAGNOSIS — E10.42 TYPE 1 DM WITH POLYNEUROPATHY: ICD-10-CM

## 2024-12-02 LAB
BACTERIA BLD CULT: NORMAL
BACTERIA BLD CULT: NORMAL

## 2024-12-02 RX ORDER — GABAPENTIN 300 MG/1
600 CAPSULE ORAL 3 TIMES DAILY
Qty: 180 CAPSULE | Refills: 1 | Status: SHIPPED | OUTPATIENT
Start: 2024-12-02

## 2024-12-05 ENCOUNTER — LAB VISIT (OUTPATIENT)
Dept: LAB | Facility: HOSPITAL | Age: 44
End: 2024-12-05
Attending: INTERNAL MEDICINE
Payer: MEDICAID

## 2024-12-05 DIAGNOSIS — I10 PRIMARY HYPERTENSION: ICD-10-CM

## 2024-12-05 DIAGNOSIS — E78.2 MIXED HYPERLIPIDEMIA: ICD-10-CM

## 2024-12-05 DIAGNOSIS — E10.42 TYPE 1 DM WITH POLYNEUROPATHY: ICD-10-CM

## 2024-12-05 LAB
ALBUMIN SERPL BCP-MCNC: 3.9 G/DL (ref 3.5–5.2)
ALP SERPL-CCNC: 81 U/L (ref 55–135)
ALT SERPL W/O P-5'-P-CCNC: 27 U/L (ref 10–44)
ANION GAP SERPL CALC-SCNC: 4 MMOL/L (ref 8–16)
AST SERPL-CCNC: 28 U/L (ref 10–40)
BILIRUB SERPL-MCNC: 0.3 MG/DL (ref 0.1–1)
BUN SERPL-MCNC: 18 MG/DL (ref 6–20)
CALCIUM SERPL-MCNC: 9.2 MG/DL (ref 8.7–10.5)
CHLORIDE SERPL-SCNC: 102 MMOL/L (ref 95–110)
CHOLEST SERPL-MCNC: 156 MG/DL (ref 120–199)
CHOLEST/HDLC SERPL: 2.3 {RATIO} (ref 2–5)
CO2 SERPL-SCNC: 33 MMOL/L (ref 23–29)
CREAT SERPL-MCNC: 0.8 MG/DL (ref 0.5–1.4)
EST. GFR  (NO RACE VARIABLE): >60 ML/MIN/1.73 M^2
ESTIMATED AVG GLUCOSE: 197 MG/DL (ref 68–131)
GLUCOSE SERPL-MCNC: 77 MG/DL (ref 70–110)
HBA1C MFR BLD: 8.5 % (ref 4.5–6.2)
HDLC SERPL-MCNC: 67 MG/DL (ref 40–75)
HDLC SERPL: 42.9 % (ref 20–50)
LDLC SERPL CALC-MCNC: 80.4 MG/DL (ref 63–159)
NONHDLC SERPL-MCNC: 89 MG/DL
POTASSIUM SERPL-SCNC: 4.4 MMOL/L (ref 3.5–5.1)
PROT SERPL-MCNC: 7.5 G/DL (ref 6–8.4)
SODIUM SERPL-SCNC: 139 MMOL/L (ref 136–145)
TRIGL SERPL-MCNC: 43 MG/DL (ref 30–150)

## 2024-12-05 PROCEDURE — 80061 LIPID PANEL: CPT | Performed by: INTERNAL MEDICINE

## 2024-12-05 PROCEDURE — 83036 HEMOGLOBIN GLYCOSYLATED A1C: CPT | Performed by: INTERNAL MEDICINE

## 2024-12-05 PROCEDURE — 80053 COMPREHEN METABOLIC PANEL: CPT | Performed by: INTERNAL MEDICINE

## 2024-12-05 PROCEDURE — 36415 COLL VENOUS BLD VENIPUNCTURE: CPT | Performed by: INTERNAL MEDICINE

## 2024-12-08 DIAGNOSIS — E10.42 TYPE 1 DM WITH POLYNEUROPATHY: ICD-10-CM

## 2024-12-09 RX ORDER — BLOOD-GLUCOSE SENSOR
EACH MISCELLANEOUS
Qty: 3 EACH | Refills: 0 | Status: SHIPPED | OUTPATIENT
Start: 2024-12-09

## 2024-12-11 ENCOUNTER — OFFICE VISIT (OUTPATIENT)
Dept: FAMILY MEDICINE | Facility: CLINIC | Age: 44
End: 2024-12-11
Payer: MEDICAID

## 2024-12-11 ENCOUNTER — TELEPHONE (OUTPATIENT)
Dept: FAMILY MEDICINE | Facility: CLINIC | Age: 44
End: 2024-12-11
Payer: MEDICAID

## 2024-12-11 ENCOUNTER — PATIENT MESSAGE (OUTPATIENT)
Dept: FAMILY MEDICINE | Facility: CLINIC | Age: 44
End: 2024-12-11

## 2024-12-11 VITALS
SYSTOLIC BLOOD PRESSURE: 155 MMHG | HEIGHT: 72 IN | BODY MASS INDEX: 25.47 KG/M2 | HEART RATE: 80 BPM | WEIGHT: 188 LBS | DIASTOLIC BLOOD PRESSURE: 83 MMHG

## 2024-12-11 DIAGNOSIS — E10.3293 TYPE 1 DIABETES MELLITUS WITH MILD NONPROLIFERATIVE RETINOPATHY OF BOTH EYES WITHOUT MACULAR EDEMA: ICD-10-CM

## 2024-12-11 DIAGNOSIS — K21.9 GASTROESOPHAGEAL REFLUX DISEASE, UNSPECIFIED WHETHER ESOPHAGITIS PRESENT: ICD-10-CM

## 2024-12-11 DIAGNOSIS — I99.9 VASCULOPATHY: ICD-10-CM

## 2024-12-11 DIAGNOSIS — Z12.11 SCREEN FOR COLON CANCER: ICD-10-CM

## 2024-12-11 DIAGNOSIS — Z23 NEED FOR INFLUENZA VACCINATION: ICD-10-CM

## 2024-12-11 DIAGNOSIS — N52.9 ERECTILE DYSFUNCTION, UNSPECIFIED ERECTILE DYSFUNCTION TYPE: ICD-10-CM

## 2024-12-11 DIAGNOSIS — E78.2 MIXED HYPERLIPIDEMIA: Chronic | ICD-10-CM

## 2024-12-11 DIAGNOSIS — E10.42 TYPE 1 DM WITH POLYNEUROPATHY: Primary | ICD-10-CM

## 2024-12-11 DIAGNOSIS — I10 PRIMARY HYPERTENSION: ICD-10-CM

## 2024-12-11 DIAGNOSIS — L97.519 ULCER OF RIGHT FOOT, UNSPECIFIED ULCER STAGE: ICD-10-CM

## 2024-12-11 PROCEDURE — 3077F SYST BP >= 140 MM HG: CPT | Mod: CPTII,,, | Performed by: INTERNAL MEDICINE

## 2024-12-11 PROCEDURE — 90656 IIV3 VACC NO PRSV 0.5 ML IM: CPT | Mod: PBBFAC,PN

## 2024-12-11 PROCEDURE — 4010F ACE/ARB THERAPY RXD/TAKEN: CPT | Mod: CPTII,,, | Performed by: INTERNAL MEDICINE

## 2024-12-11 PROCEDURE — 3061F NEG MICROALBUMINURIA REV: CPT | Mod: CPTII,,, | Performed by: INTERNAL MEDICINE

## 2024-12-11 PROCEDURE — 99215 OFFICE O/P EST HI 40 MIN: CPT | Mod: S$PBB,,, | Performed by: INTERNAL MEDICINE

## 2024-12-11 PROCEDURE — 3079F DIAST BP 80-89 MM HG: CPT | Mod: CPTII,,, | Performed by: INTERNAL MEDICINE

## 2024-12-11 PROCEDURE — 90471 IMMUNIZATION ADMIN: CPT | Mod: PBBFAC,PN

## 2024-12-11 PROCEDURE — 1160F RVW MEDS BY RX/DR IN RCRD: CPT | Mod: CPTII,,, | Performed by: INTERNAL MEDICINE

## 2024-12-11 PROCEDURE — 99215 OFFICE O/P EST HI 40 MIN: CPT | Mod: PBBFAC,PN | Performed by: INTERNAL MEDICINE

## 2024-12-11 PROCEDURE — 99999 PR PBB SHADOW E&M-EST. PATIENT-LVL V: CPT | Mod: PBBFAC,,, | Performed by: INTERNAL MEDICINE

## 2024-12-11 PROCEDURE — 1159F MED LIST DOCD IN RCRD: CPT | Mod: CPTII,,, | Performed by: INTERNAL MEDICINE

## 2024-12-11 PROCEDURE — 3052F HG A1C>EQUAL 8.0%<EQUAL 9.0%: CPT | Mod: CPTII,,, | Performed by: INTERNAL MEDICINE

## 2024-12-11 PROCEDURE — 3066F NEPHROPATHY DOC TX: CPT | Mod: CPTII,,, | Performed by: INTERNAL MEDICINE

## 2024-12-11 PROCEDURE — 3008F BODY MASS INDEX DOCD: CPT | Mod: CPTII,,, | Performed by: INTERNAL MEDICINE

## 2024-12-11 PROCEDURE — 99999PBSHW PR PBB SHADOW TECHNICAL ONLY FILED TO HB: Mod: PBBFAC,,,

## 2024-12-11 RX ORDER — INSULIN GLARGINE 100 [IU]/ML
INJECTION, SOLUTION SUBCUTANEOUS
Qty: 15 ML | Refills: 0 | Status: SHIPPED | OUTPATIENT
Start: 2024-12-11

## 2024-12-11 RX ORDER — SILDENAFIL 100 MG/1
100 TABLET, FILM COATED ORAL DAILY PRN
Qty: 20 TABLET | Refills: 1 | Status: SHIPPED | OUTPATIENT
Start: 2024-12-11 | End: 2025-12-11

## 2024-12-11 RX ORDER — LISINOPRIL 10 MG/1
10 TABLET ORAL DAILY
Qty: 90 TABLET | Refills: 1 | Status: SHIPPED | OUTPATIENT
Start: 2024-12-11

## 2024-12-11 RX ORDER — ATORVASTATIN CALCIUM 20 MG/1
20 TABLET, FILM COATED ORAL NIGHTLY
Qty: 90 TABLET | Refills: 3 | Status: SHIPPED | OUTPATIENT
Start: 2024-12-11 | End: 2025-12-11

## 2024-12-11 RX ORDER — CEPHALEXIN 500 MG/1
500 CAPSULE ORAL EVERY 8 HOURS
Qty: 21 CAPSULE | Refills: 0 | Status: SHIPPED | OUTPATIENT
Start: 2024-12-11 | End: 2024-12-18

## 2024-12-11 RX ORDER — HYDROGEN PEROXIDE 3 %
20 SOLUTION, NON-ORAL MISCELLANEOUS
Qty: 90 CAPSULE | Refills: 1 | Status: SHIPPED | OUTPATIENT
Start: 2024-12-11

## 2024-12-11 RX ORDER — INSULIN ASPART 100 [IU]/ML
12 INJECTION, SOLUTION INTRAVENOUS; SUBCUTANEOUS 4 TIMES DAILY
Qty: 12 ML | Refills: 5 | Status: SHIPPED | OUTPATIENT
Start: 2024-12-11 | End: 2025-12-11

## 2024-12-11 RX ADMIN — INFLUENZA VIRUS VACCINE 0.5 ML: 15; 15; 15 SUSPENSION INTRAMUSCULAR at 08:12

## 2024-12-11 NOTE — TELEPHONE ENCOUNTER
----- Message from Leda sent at 12/11/2024  8:53 AM CST -----  [facility] pharmacy calling about patient novolog needing the max daily dose  [phone]

## 2024-12-11 NOTE — PROGRESS NOTES
Subjective:       Patient ID: Philip Alberts is a 44 y.o. male.    Chief Complaint: Diabetes, Labs Only, Hypertension, Hyperlipidemia, Foot ulcer, and Erectile Dysfunction    History of Present Illness    CHIEF COMPLAINT:  Philip presents for a follow-up visit to discuss lab results, diabetes management, and a foot ulcer.    HPI:  Philip, diagnosed with type 1 diabetes, reports recent improvements in A1C level, decreasing from 9.9 to 8.5. He is taking Lantus insulin, 20 units twice daily, and Novolog insulin based on a sliding scale. He has challenges with blood sugar control, noting inconsistent effectiveness of his insulin. He feels satiated for approximately 2 hours post-meal, suggesting possible gastroparesis.    Philip has a foot ulcer that developed after a piece of ceramic became lodged in his shoe. He underwent surgical intervention for a toe abscess the day before Thanksgiving. The wound is still healing with some drainage. He reports delayed healing due to poor circulation related to his diabetes and mentions having neuropathy.    Philip has retinopathy and has undergone amputation of his third toe. He last had an eye exam in the current facility but requires a more comprehensive evaluation by an optometrist. His previous appointment with an optometrist was cancelled due to lack of transportation following pupil dilation.    Philip smokes 2-3 cigarettes per day, reduced from 2 packs a day. He has reflux issues for which he takes medication.    Philip denies any issues with his left foot and any muscle aches or cramps from taking Pravastatin.  Diabetes  He presents for his follow-up diabetic visit. He has type 1 diabetes mellitus. The initial diagnosis of diabetes was made 10 years ago. His disease course has been fluctuating. Pertinent negatives for hypoglycemia include no confusion, dizziness, headaches, nervousness/anxiousness, pallor, seizures, speech difficulty or tremors. Associated symptoms  include fatigue, polydipsia and polyuria. Pertinent negatives for diabetes include no chest pain, no polyphagia and no weakness. Risk factors for coronary artery disease include diabetes mellitus, family history, male sex, sedentary lifestyle and tobacco exposure. Current diabetic treatment includes insulin injections. He is compliant with treatment some of the time. His weight is fluctuating minimally. He is following a generally unhealthy diet. When asked about meal planning, he reported none. He has not had a previous visit with a dietitian. He rarely participates in exercise. His lunch blood glucose range is generally 140-180 mg/dl. An ACE inhibitor/angiotensin II receptor blocker is being taken. He sees a podiatrist.Eye exam is not current.   Hyperlipidemia  This is a chronic problem. The current episode started more than 1 year ago. Exacerbating diseases include diabetes. He has no history of hypothyroidism, liver disease or obesity. Pertinent negatives include no chest pain. Current antihyperlipidemic treatment includes statins. The current treatment provides moderate improvement of lipids. Compliance problems include psychosocial issues.  Risk factors for coronary artery disease include a sedentary lifestyle, male sex, dyslipidemia, diabetes mellitus and hypertension.   Erectile dysfunction  This problem has been going on for several years and the potential cause is diabetes with vascular problems.  He does take Viagra with modest relief.  No side effects reported.  This is a chronic problem. The current episode started more than 1 year ago. The problem has been waxing and waning since onset. The nature of his difficulty is maintaining erection and penetration. He reports no anxiety. Irritative symptoms do not include frequency or urgency. Pertinent negatives include no chills, dysuria or hematuria. The treatment provided moderate relief.   MEDICATIONS:  Philip is on Lantus 20 units twice daily (morning and  evening) and Novolog based on sliding scale for type 1 diabetes. He is also on Lisinopril for blood pressure and Prevastatin for cholesterol. Philip takes Esomeprazole (Nexium) for reflux. Viagra is prescribed as needed for erectile dysfunction, though he has not used it in about a year.    MEDICAL HISTORY:  Philip has a history of Type 1 Diabetes, Hypercholesterolemia, Hypertension, GERD, Diabetic retinopathy, Diabetic neuropathy, and Erectile dysfunction. Philip received a flu vaccine during the visit. He also received a tetanus vaccine about a year ago at an urgent care facility.    FAMILY HISTORY:  Family history is significant for the patient's daughter with a history of low blood sugar episodes.    SURGICAL HISTORY:  Philip has undergone a third toe amputation. He also had a foot abscess incision and drainage procedure the day before Thanksgiving.    TEST RESULTS:  Philip's total cholesterol is 156, and LDL cholesterol is 80. His blood sugar is 77. The A1c is 8.5, which has improved from the previous reading of 9.9.    ALLERGIES:  Philip is allergic to Penicillin. He can tolerate Keflex despite this allergy.    SOCIAL HISTORY:  Philip currently smokes 2-3 cigarettes per day, which is a reduction from his previous habit of 2 packs per day.      ROS:  General: -change in weight  Eyes: -vision changes  Gastrointestinal: -change in bowel habits, -heartburn  Musculoskeletal: -muscle pain, -muscle cramps  Neurological: -headache, -numbness  Male Genitourinary: +erectile dysfunction         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 12/3/2018     Social History      Socioeconomic History    Marital status: Single   Tobacco Use    Smoking status: Every Day    Smokeless tobacco: Never   Substance and Sexual Activity    Alcohol use: No    Drug use: No    Sexual activity: Yes     Social Drivers of Health     Financial Resource Strain: High Risk (8/17/2023)    Overall Financial Resource Strain (CARDIA)     Difficulty of Paying Living Expenses: Hard   Food Insecurity: No Food Insecurity (8/17/2023)    Hunger Vital Sign     Worried About Running Out of Food in the Last Year: Never true     Ran Out of Food in the Last Year: Never true   Transportation Needs: No Transportation Needs (8/17/2023)    PRAPARE - Transportation     Lack of Transportation (Medical): No     Lack of Transportation (Non-Medical): No   Physical Activity: Inactive (8/17/2023)    Exercise Vital Sign     Days of Exercise per Week: 0 days     Minutes of Exercise per Session: 0 min   Stress: Stress Concern Present (8/17/2023)    [employer] - Occupational Stress Questionnaire     Feeling of Stress : To some extent   Housing Stability: Low Risk  (8/17/2023)    Housing Stability Vital Sign     Unable to Pay for Housing in the Last Year: No     Number of Places Lived in the Last Year: 1     Unstable Housing in the Last Year: No     Past Surgical History:   Procedure Laterality Date    MANDIBLE FRACTURE SURGERY  07/17/2000    MANDIBLE FRACTURE SURGERY      SPLENECTOMY, TOTAL  07/17/2000    TOE AMPUTATION Right 10/9/2023    Procedure: AMPUTATION, TOE;  Surgeon: Ashok Santiago DPM;  Location: [facility];  Service: Podiatry;  Laterality: Right;     Family History   Problem Relation Name Age of Onset    Diabetes Mother      Cancer Father         Objective:      Physical Exam  Blood pressure (!) 155/83, pulse 80, height 6' (1.829 m), weight 85.3 kg (188 lb). Body mass index is 25.5 kg/m².  Physical Exam    General: No acute distress. Well-developed.  Somewhat Chronically ill   Eyes: EOMI. Sclerae  anicteric.  HENT: Normocephalic. Atraumatic. Nares patent. Moist oral mucosa.    Cardiovascular: Regular rate. Regular rhythm. No murmurs. No rubs. No gallops. Normal S1, S2.  Respiratory: Normal respiratory effort. Clear to auscultation bilaterally. No rales. No rhonchi. No wheezing.  Abdomen: Soft. Non-tender. Non-distended. Normoactive bowel sounds.  Musculoskeletal: No  obvious deformity.  Extremities: No lower extremity edema. Third toe amputated on right foot.  Neurological: Alert & . No slurred speech. Normal gait.  Psychiatric:  Mostly euthymic and upbeat  Skin: Warm. Dry. No rash.       Examination of the feet show ulceration in the right foot under the 1st metatarsophalangeal region.  He has a it covered with dressing and another ulceration is noted            Tried to attach pictures but computers are slow.       Assessment:       Lab Visit on 12/05/2024   Component Date Value Ref Range Status    Microalbumin, Urine 12/05/2024 <7.0  <19.9 ug/mL Final    Creatinine, Urine 12/05/2024 201.1  23.0 - 375.0 mg/dL Final    Microalb/Creat Ratio 12/05/2024 Unable to calculate  0.0 - 30.0 ug/mg Final   Lab Visit on 12/05/2024   Component Date Value Ref Range Status    Hemoglobin A1C 12/05/2024 8.5 (H)  4.5 - 6.2 % Final    Estimated Avg Glucose 12/05/2024 197 (H)  68 - 131 mg/dL Final    Cholesterol 12/05/2024 156  120 - 199 mg/dL Final    Triglycerides 12/05/2024 43  30 - 150 mg/dL Final    HDL 12/05/2024 67  40 - 75 mg/dL Final    LDL Cholesterol 12/05/2024 80.4  63.0 - 159.0 mg/dL Final    HDL/Cholesterol Ratio 12/05/2024 42.9  20.0 - 50.0 % Final    Total Cholesterol/HDL Ratio 12/05/2024 2.3  2.0 - 5.0 Final    Non-HDL Cholesterol 12/05/2024 89  mg/dL Final    Sodium 12/05/2024 139  136 - 145 mmol/L Final    Potassium 12/05/2024 4.4  3.5 - 5.1 mmol/L Final    Chloride 12/05/2024 102  95 - 110 mmol/L Final    CO2 12/05/2024 33 (H)  23 - 29 mmol/L Final    Glucose 12/05/2024 77  70 - 110 mg/dL Final    BUN  12/05/2024 18  6 - 20 mg/dL Final    Creatinine 12/05/2024 0.8  0.5 - 1.4 mg/dL Final    Calcium 12/05/2024 9.2  8.7 - 10.5 mg/dL Final    Total Protein 12/05/2024 7.5  6.0 - 8.4 g/dL Final    Albumin 12/05/2024 3.9  3.5 - 5.2 g/dL Final    Total Bilirubin 12/05/2024 0.3  0.1 - 1.0 mg/dL Final    Alkaline Phosphatase 12/05/2024 81  55 - 135 U/L Final    AST 12/05/2024 28  10 - 40 U/L Final    ALT 12/05/2024 27  10 - 44 U/L Final    eGFR 12/05/2024 >60.0  >60 mL/min/1.73 m^2 Final    Anion Gap 12/05/2024 4 (L)  8 - 16 mmol/L Final   Admission on 11/27/2024, Discharged on 11/27/2024   Component Date Value Ref Range Status    Hepatitis C Ab 11/27/2024 Negative  Negative Final    HIV 1/2 Ag/Ab 11/27/2024 Negative  Negative Final    WBC 11/27/2024 11.68  3.90 - 12.70 K/uL Final    RBC 11/27/2024 4.48 (L)  4.60 - 6.20 M/uL Final    Hemoglobin 11/27/2024 13.7 (L)  14.0 - 18.0 g/dL Final    Hematocrit 11/27/2024 41.4  40.0 - 54.0 % Final    MCV 11/27/2024 92  82 - 98 fL Final    MCH 11/27/2024 30.6  27.0 - 31.0 pg Final    MCHC 11/27/2024 33.1  32.0 - 36.0 g/dL Final    RDW 11/27/2024 12.6  11.5 - 14.5 % Final    Platelets 11/27/2024 211  150 - 450 K/uL Final    MPV 11/27/2024 13.9 (H)  9.2 - 12.9 fL Final    Immature Granulocytes 11/27/2024 0.3  0.0 - 0.5 % Final    Gran # (ANC) 11/27/2024 7.4  1.8 - 7.7 K/uL Final    Immature Grans (Abs) 11/27/2024 0.03  0.00 - 0.04 K/uL Final    Lymph # 11/27/2024 3.1  1.0 - 4.8 K/uL Final    Mono # 11/27/2024 0.9  0.3 - 1.0 K/uL Final    Eos # 11/27/2024 0.1  0.0 - 0.5 K/uL Final    Baso # 11/27/2024 0.11  0.00 - 0.20 K/uL Final    nRBC 11/27/2024 0  0 /100 WBC Final    Gran % 11/27/2024 63.2  38.0 - 73.0 % Final    Lymph % 11/27/2024 26.7  18.0 - 48.0 % Final    Mono % 11/27/2024 7.9  4.0 - 15.0 % Final    Eosinophil % 11/27/2024 1.0  0.0 - 8.0 % Final    Basophil % 11/27/2024 0.9  0.0 - 1.9 % Final    Differential Method 11/27/2024 Automated   Final    Sodium 11/27/2024 133 (L)  136 -  145 mmol/L Final    Potassium 11/27/2024 4.6  3.5 - 5.1 mmol/L Final    Chloride 11/27/2024 97  95 - 110 mmol/L Final    CO2 11/27/2024 28  23 - 29 mmol/L Final    Glucose 11/27/2024 105  70 - 110 mg/dL Final    BUN 11/27/2024 18  6 - 20 mg/dL Final    Creatinine 11/27/2024 1.1  0.5 - 1.4 mg/dL Final    Calcium 11/27/2024 9.6  8.7 - 10.5 mg/dL Final    Total Protein 11/27/2024 8.7 (H)  6.0 - 8.4 g/dL Final    Albumin 11/27/2024 4.4  3.5 - 5.2 g/dL Final    Total Bilirubin 11/27/2024 0.4  0.1 - 1.0 mg/dL Final    Alkaline Phosphatase 11/27/2024 102  55 - 135 U/L Final    AST 11/27/2024 39  10 - 40 U/L Final    ALT 11/27/2024 28  10 - 44 U/L Final    eGFR 11/27/2024 >60.0  >60 mL/min/1.73 m^2 Final    Anion Gap 11/27/2024 8  8 - 16 mmol/L Final    Blood Culture, Routine 11/27/2024 No growth after 5 days.   Final    Blood Culture, Routine 11/27/2024 No growth after 5 days.   Final    POC Lactate 11/27/2024 0.74  0.5 - 2.2 mmol/L Final    Sample 11/27/2024 VENOUS   Final    POCT Glucose 11/27/2024 117 (H)  70 - 110 mg/dL Final     Component Ref Range & Units 5 d ago  (12/5/24) 11 mo ago  (1/8/24) 1 yr ago  (8/7/23) 1 yr ago  (4/26/23) 1 yr ago  (2/10/23) 3 yr ago  (6/28/21) 3 yr ago  (1/6/21)   Hemoglobin A1C 4.5 - 6.2 % 8.5 High  9.9 High  R, CM 9.4 High  CM 10.0 High  CM 12.2 High  CM 9.0 High  CM 13.1 High      Component Ref Range & Units 5 d ago 11 mo ago 1 yr ago 3 yr ago   Cholesterol 120 - 199 mg/dL 156 185 R 224 High   CM   Comment: The National Cholesterol Education Program (NCEP) has set the  following guidelines (reference ranges) for Cholesterol:  Optimal.....................<200 mg/dL  Borderline High.............200-239 mg/dL  High........................> or = 240 mg/dL   Triglycerides 30 - 150 mg/dL 43 57 R 35 CM 68 CM   Comment: The National Cholesterol Education Program (NCEP) has set the  following guidelines (reference values) for triglycerides:  Normal......................<150  mg/dL  Borderline High.............150-199 mg/dL  High........................200-499 mg/dL   HDL 40 - 75 mg/dL 67 74 R 95 High  CM 62 CM   Comment: The National Cholesterol Education Program (NCEP) has set the  following guidelines (reference values) for HDL Cholesterol:  Low...............<40 mg/dL  Optimal...........>60 mg/dL   LDL Cholesterol 63.0 - 159.0 mg/dL 80.4  122.0 .4 CM       Assessment & Plan    IMPRESSION:  - Adjusted diabetes management: increased Lantus to 22 units BID and Novolog by 1 unit per meal to improve glycemic control (A1C 8.5, down from 9.9)  - Switched from Pravastatin to Atorvastatin for better lipid management (LDL 80, goal <70)  - Increased Lisinopril to 10mg for blood pressure control  - Prescribed Keflex for foot ulcer, noting patient tolerates despite penicillin allergy  - Considered gastroparesis as potential cause for variable insulin response  - Discussed potential future consideration of insulin pump therapy    E10.65 TYPE 1 DIABETES MELLITUS WITH HYPERGLYCEMIA:  - Discussed benefits of insulin pump therapy for Type 1 diabetes management.  - Increased Lantus to 22 units twice daily.  - Increased Novolog by 1 unit per meal.    E10.42 TYPE 1 DIABETES MELLITUS WITH DIABETIC POLYNEUROPATHY:  - Referred to Ophthalmology for diabetic retinopathy follow-up.    E78.5 HYPERLIPIDEMIA, UNSPECIFIED:  - Started Atorvastatin (to replace Pravastatin after current supply is finished).    I10 ESSENTIAL (PRIMARY) HYPERTENSION:  - Increased Lisinopril to 10mg daily.    K21.9 GASTRO-ESOPHAGEAL REFLUX DISEASE WITHOUT ESOPHAGITIS:  - Continued Esomeprazole for reflux.    K31.84 GASTROPARESIS:  - Explained gastroparesis and its impact on blood sugar control.    N52.1 ERECTILE DYSFUNCTION DUE TO DISEASES CLASSIFIED ELSEWHERE:  - Refilled Viagra for erectile dysfunction (separate prescription provided).    L97.522 NON-PRESSURE CHRONIC ULCER OF OTHER PART OF LEFT FOOT WITH FAT LAYER EXPOSED:  -  Started Keflex 500mg 3 times daily for 7 days for foot ulcer.  - Referred to Dr. Ashok Dawson for podiatry follow-up.    Z87.965 PERSONAL HISTORY OF NICOTINE DEPENDENCE:  - Philip to continue efforts to reduce smoking (currently 2-3 cigarettes/day, down from 2 packs).    LIFESTYLE CHANGES:  - Philip to split meals into smaller, more frequent portions to manage potential gastroparesis.  - Philip to maintain current walking and exercise routine.  - Recommend reducing coffee intake to manage reflux.  - Educated on ColoGuard test procedure for colon cancer screening.  - ColoGuard test for colon cancer screening ordered.  - Flu shot administered during visit.  - Follow up in 3-4 months.  - Consider tetanus vaccination at next visit.         Plan:   Type 1 DM with polyneuropathy  Comments:  Recent A1c is 8.5.  Increase dosage 22 units twice a day Lantus and short-acting insulin up to 48.  Split meals  Orders:  -     Hemoglobin A1C; Future; Expected date: 03/11/2025  -     Basic Metabolic Panel; Future; Expected date: 03/11/2025  -     Microalbumin/Creatinine Ratio, Urine; Future; Expected date: 03/11/2025  -     insulin aspart U-100 (NOVOLOG FLEXPEN U-100 INSULIN) 100 unit/mL (3 mL) InPn pen; Inject 12 Units into the skin 4 (four) times daily. Per sliding scale as instructed  Dispense: 12 mL; Refill: 5  -     LANTUS SOLOSTAR U-100 INSULIN 100 unit/mL (3 mL) InPn pen; Increase INJECT 22 UNITS INTO THE SKIN TWICE DAILY  Dispense: 15 mL; Refill: 0  -     Ambulatory referral/consult to Ophthalmology; Future; Expected date: 12/18/2024    Mixed hyperlipidemia  Comments:  Change pravastatin to atorvastatin 20 mg.  Orders:  -     atorvastatin (LIPITOR) 20 MG tablet; Take 1 tablet (20 mg total) by mouth every evening.  Dispense: 90 tablet; Refill: 3    Primary hypertension  Comments:  Increase lisinopril to 10 mg.  Continue to monitor blood pressures.  Avoid salt.  Quit smoking.  Orders:  -     lisinopriL 10 MG tablet; Take 1  tablet (10 mg total) by mouth once daily.  Dispense: 90 tablet; Refill: 1    Vasculopathy  Comments:  Develops ulcers and peripheral vascular disease.  Consider trend tall or pentoxifylline in future.  Continue aspirin.    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  Referral given.  Orders:  -     lisinopriL 10 MG tablet; Take 1 tablet (10 mg total) by mouth once daily.  Dispense: 90 tablet; Refill: 1    Erectile dysfunction, unspecified erectile dysfunction type  Comments:  Viagra as needed.  Denies any side effects.  Not in any active relationship at this point.  Orders:  -     sildenafiL (VIAGRA) 100 MG tablet; Take 1 tablet (100 mg total) by mouth daily as needed for Erectile Dysfunction.  Dispense: 20 tablet; Refill: 1    Gastroesophageal reflux disease, unspecified whether esophagitis present  -     esomeprazole (NEXIUM) 20 MG capsule; Take 1 capsule (20 mg total) by mouth before breakfast.  Dispense: 90 capsule; Refill: 1    Ulcer of right foot, unspecified ulcer stage  Comments:  Referral to wound management  Orders:  -     cephALEXin (KEFLEX) 500 MG capsule; Take 1 capsule (500 mg total) by mouth every 8 (eight) hours. for 7 days  Dispense: 21 capsule; Refill: 0  -     Ambulatory referral/consult to Wound Clinic; Future; Expected date: 12/18/2024    Need for influenza vaccination  -     influenza (Flulaval, Fluzone, Fluarix) 45 mcg/0.5 mL IM vaccine (> or = 6 mo) 0.5 mL    Screen for colon cancer  -     Cologuard Screening (Multitarget Stool DNA); Future; Expected date: 12/24/2024  -     Cologuard Screening (Multitarget Stool DNA); Future; Expected date: 12/25/2024    Medical problems has been noted  Control of diabetes continues to be sub par and given his type 1 diabetes I do think he will do well with insulin pump but lack of specialist in this side of town and patient will find a difficult to travel out of [place]  "and difficult to get an appointment  Increase Lantus to 22 units twice a day and increase the dosage of short-acting insulin to up to 48 units based upon sliding scale  1 meal gives a significant search in his blood sugars and to break the meals into 2  halfs.  In other words smaller meals may cause less of sugar excursion then larger meals.  Patient requests antibiotics for infected looking ulcer and I have urged him to follow up with the wound management and podiatry also.  Prescription for ED has been left which he has used rarely.  Blood pressures are not under control and compliance and rigor of follow-up is also difficult.  Increase lisinopril to 10 mg.  Change pravastatin to atorvastatin for better efficacy and control of lipids.  Discussed about colon cancer screening and at this point the most viable method would be Cologuard given lack of GI specialist who will take his Medicaid.    Follow up in about 4 months (around 4/15/2025), or if symptoms worsen or fail to improve, for Diabetes/HTN/Lipids.  Spent ramón 45 minutes with patient which involved review of pts medical conditions, labs, medications and with 50% of time face-to-face discussion about medical problems, management and any applicable changes.      Current Outpatient Medications:     aspirin (ECOTRIN) 81 MG EC tablet, Take 1 tablet (81 mg total) by mouth once daily., Disp: 90 tablet, Rfl: 3    blood-glucose meter,continuous (DEXCOM ) Misc, 1 Device by Misc.(Non-Drug; Combo Route) route once daily., Disp: 1 each, Rfl: 0    DEXCOM G7 SENSOR Gosia, APPLY 1 SENSOR TO SKIN AND CHANGE OUT EVERY 10 DAYS, Disp: 3 each, Rfl: 0    gabapentin (NEURONTIN) 300 MG capsule, TAKE 2 CAPSULES BY MOUTH THREE TIMES DAILY, Disp: 180 capsule, Rfl: 1    mupirocin (BACTROBAN) 2 % ointment, Apply topically 3 (three) times daily., Disp: 22 g, Rfl: 0    pen needle, diabetic (BD ULTRA-FINE MINI PEN NEEDLE) 31 gauge x 3/16" Ndle, USE 1  4 TIMES DAILY BEFORE MEAL(S) AND " NIGHTLY, Disp: 100 each, Rfl: 3    atorvastatin (LIPITOR) 20 MG tablet, Take 1 tablet (20 mg total) by mouth every evening., Disp: 90 tablet, Rfl: 3    blood-glucose meter kit, To check BG qid times daily, to use with insurance preferred meter, Disp: 1 each, Rfl: 0    cephALEXin (KEFLEX) 500 MG capsule, Take 1 capsule (500 mg total) by mouth every 8 (eight) hours. for 7 days, Disp: 21 capsule, Rfl: 0    esomeprazole (NEXIUM) 20 MG capsule, Take 1 capsule (20 mg total) by mouth before breakfast., Disp: 90 capsule, Rfl: 1    insulin aspart U-100 (NOVOLOG FLEXPEN U-100 INSULIN) 100 unit/mL (3 mL) InPn pen, Inject 12 Units into the skin 4 (four) times daily. Per sliding scale as instructed, Disp: 12 mL, Rfl: 5    LANTUS SOLOSTAR U-100 INSULIN 100 unit/mL (3 mL) InPn pen, Increase INJECT 22 UNITS INTO THE SKIN TWICE DAILY, Disp: 15 mL, Rfl: 0    lisinopriL 10 MG tablet, Take 1 tablet (10 mg total) by mouth once daily., Disp: 90 tablet, Rfl: 1    sildenafiL (VIAGRA) 100 MG tablet, Take 1 tablet (100 mg total) by mouth daily as needed for Erectile Dysfunction., Disp: 20 tablet, Rfl: 1  No current facility-administered medications for this visit.    This note was generated with the assistance of ambient listening technology. Verbal consent was obtained by the patient and accompanying visitor(s) for the recording of patient appointment to facilitate this note. I attest to having reviewed and edited the generated note for accuracy, though some syntax or spelling errors may persist. Please contact the author of this note for any clarification.      Alfie Lancaster

## 2024-12-18 ENCOUNTER — OFFICE VISIT (OUTPATIENT)
Dept: WOUND CARE | Facility: HOSPITAL | Age: 44
End: 2024-12-18
Attending: FAMILY MEDICINE
Payer: MEDICAID

## 2024-12-18 VITALS
RESPIRATION RATE: 18 BRPM | DIASTOLIC BLOOD PRESSURE: 90 MMHG | SYSTOLIC BLOOD PRESSURE: 176 MMHG | HEART RATE: 86 BPM | TEMPERATURE: 98 F

## 2024-12-18 DIAGNOSIS — L97.512 ULCER OF RIGHT FOOT, WITH FAT LAYER EXPOSED: ICD-10-CM

## 2024-12-18 DIAGNOSIS — E11.621 DIABETIC ULCER OF TOE OF RIGHT FOOT ASSOCIATED WITH TYPE 2 DIABETES MELLITUS, WITH FAT LAYER EXPOSED: ICD-10-CM

## 2024-12-18 DIAGNOSIS — E11.42 DIABETIC PERIPHERAL NEUROPATHY: ICD-10-CM

## 2024-12-18 DIAGNOSIS — T14.8XXA WOUND INFECTION: ICD-10-CM

## 2024-12-18 DIAGNOSIS — L97.512 DIABETIC ULCER OF TOE OF RIGHT FOOT ASSOCIATED WITH TYPE 2 DIABETES MELLITUS, WITH FAT LAYER EXPOSED: ICD-10-CM

## 2024-12-18 DIAGNOSIS — L97.519 ULCER OF RIGHT FOOT, UNSPECIFIED ULCER STAGE: Primary | ICD-10-CM

## 2024-12-18 DIAGNOSIS — L97.513 ULCER OF TOE OF RIGHT FOOT, WITH NECROSIS OF MUSCLE: ICD-10-CM

## 2024-12-18 DIAGNOSIS — L08.9 DIABETIC FOOT INFECTION: ICD-10-CM

## 2024-12-18 DIAGNOSIS — E11.628 DIABETIC FOOT INFECTION: ICD-10-CM

## 2024-12-18 DIAGNOSIS — L08.9 WOUND INFECTION: ICD-10-CM

## 2024-12-18 PROCEDURE — 87070 CULTURE OTHR SPECIMN AEROBIC: CPT | Performed by: FAMILY MEDICINE

## 2024-12-18 PROCEDURE — 11042 DBRDMT SUBQ TIS 1ST 20SQCM/<: CPT | Mod: 59,PN | Performed by: FAMILY MEDICINE

## 2024-12-18 PROCEDURE — 87186 SC STD MICRODIL/AGAR DIL: CPT | Performed by: FAMILY MEDICINE

## 2024-12-18 PROCEDURE — 87075 CULTR BACTERIA EXCEPT BLOOD: CPT | Performed by: FAMILY MEDICINE

## 2024-12-18 PROCEDURE — 11043 DBRDMT MUSC&/FSCA 1ST 20/<: CPT | Mod: PN | Performed by: FAMILY MEDICINE

## 2024-12-18 PROCEDURE — 99213 OFFICE O/P EST LOW 20 MIN: CPT | Mod: 25,PN | Performed by: FAMILY MEDICINE

## 2024-12-18 RX ORDER — DOXYCYCLINE 100 MG/1
100 CAPSULE ORAL 2 TIMES DAILY
Qty: 20 CAPSULE | Refills: 0 | Status: SHIPPED | OUTPATIENT
Start: 2024-12-18 | End: 2024-12-28

## 2024-12-18 NOTE — PROGRESS NOTES
Wound Care Progress Note    Subjective:       Patient ID: Philip Alberts is a 44 y.o. male.    Chief Complaint: Wound Care    HPI  Pt seen in clinic today known to me for open DM ulcers of the right foot x 3. Pt has a plantar foot open wound, a right great toe DM ulcer and a right heel DM ulcer. Pt had a piece of a broken coffee cup in his foot before thanksgiving, and saw an urgent care and subsequently the ED. His foot was x-rayed and suspicion for osteomyelitis was noted. Pt has been trying to get an appointment here for three weeks with no success. On evaluation the foot has some mild erythema, and three open wounds. No othr complaints today  Review of Systems   Skin:  Positive for wound.        Right foot open wounds x 3   All other systems reviewed and are negative.      Objective:        Physical Exam  Vitals and nursing note reviewed.   Constitutional:       Appearance: Normal appearance.   Skin:     General: Skin is warm and dry.      Findings: Erythema and lesion present.      Comments: Right foot DM ulcers x 3, see wound care assessment documentation in chart review scanned under the media tab   Neurological:      General: No focal deficit present.      Mental Status: He is alert.   Psychiatric:         Mood and Affect: Mood normal.         Judgment: Judgment normal.       Vitals:    12/18/24 0834   BP: (!) 176/90   Pulse: 86   Resp: 18   Temp: 98.2 °F (36.8 °C)       Assessment:           ICD-10-CM ICD-9-CM   1. Ulcer of right foot, unspecified ulcer stage  L97.519 707.15   2. Diabetic ulcer of toe of right foot associated with type 2 diabetes mellitus, with fat layer exposed  E11.621 250.80    L97.512 707.15   3. Ulcer of right foot, with fat layer exposed  L97.512 707.15   4. Ulcer of toe of right foot, with necrosis of muscle  L97.513 707.15     728.89   5. Diabetic foot infection  E11.628 250.80    L08.9 686.9   6. Diabetic peripheral neuropathy  E11.42 250.60     357.2   7. Wound  "infection  T14.8XXA 958.3    L08.9                 Plan:                  Philip Henry" was seen today for wound care.    Diagnoses and all orders for this visit:    Ulcer of right foot, unspecified ulcer stage  Comments:  Referral to wound management  Orders:  -     Ambulatory referral/consult to Wound Clinic    Diabetic ulcer of toe of right foot associated with type 2 diabetes mellitus, with fat layer exposed    Ulcer of right foot, with fat layer exposed  -     Aerobic culture  -     Culture, Anaerobic    Ulcer of toe of right foot, with necrosis of muscle    Diabetic foot infection  -     MRI Foot (Midfoot) Right W W/O Contrast; Future    Diabetic peripheral neuropathy    Wound infection    Other orders  -     doxycycline (VIBRAMYCIN) 100 MG Cap; Take 1 capsule (100 mg total) by mouth 2 (two) times daily. for 10 days      Please see wound care instructions which have been provided separately.             "

## 2024-12-20 LAB — BACTERIA SPEC ANAEROBE CULT: NORMAL

## 2024-12-21 LAB — BACTERIA SPEC AEROBE CULT: ABNORMAL

## 2024-12-27 ENCOUNTER — HOSPITAL ENCOUNTER (OUTPATIENT)
Dept: RADIOLOGY | Facility: HOSPITAL | Age: 44
Discharge: HOME | End: 2024-12-27
Attending: FAMILY MEDICINE
Payer: MEDICAID

## 2024-12-27 DIAGNOSIS — L08.9 DIABETIC FOOT INFECTION: ICD-10-CM

## 2024-12-27 DIAGNOSIS — E11.628 DIABETIC FOOT INFECTION: ICD-10-CM

## 2024-12-27 PROCEDURE — A9585 GADOBUTROL INJECTION: HCPCS | Mod: PO | Performed by: FAMILY MEDICINE

## 2024-12-27 PROCEDURE — 73720 MRI LWR EXTREMITY W/O&W/DYE: CPT | Mod: TC,PO,RT

## 2024-12-27 PROCEDURE — 25500020 PHARM REV CODE 255: Mod: PO | Performed by: FAMILY MEDICINE

## 2024-12-27 PROCEDURE — 73720 MRI LWR EXTREMITY W/O&W/DYE: CPT | Mod: 26,RT,, | Performed by: RADIOLOGY

## 2024-12-27 RX ORDER — GADOBUTROL 604.72 MG/ML
8.5 INJECTION INTRAVENOUS
Status: COMPLETED | OUTPATIENT
Start: 2024-12-27 | End: 2024-12-27

## 2024-12-27 RX ADMIN — GADOBUTROL 8.5 ML: 604.72 INJECTION INTRAVENOUS at 04:12

## 2025-01-02 ENCOUNTER — OFFICE VISIT (OUTPATIENT)
Dept: WOUND CARE | Facility: HOSPITAL | Age: 45
End: 2025-01-02
Attending: FAMILY MEDICINE
Payer: MEDICAID

## 2025-01-02 ENCOUNTER — PATIENT MESSAGE (OUTPATIENT)
Dept: ADMINISTRATIVE | Facility: HOSPITAL | Age: 45
End: 2025-01-02
Payer: MEDICAID

## 2025-01-02 VITALS
RESPIRATION RATE: 18 BRPM | TEMPERATURE: 98 F | DIASTOLIC BLOOD PRESSURE: 87 MMHG | HEART RATE: 86 BPM | SYSTOLIC BLOOD PRESSURE: 189 MMHG

## 2025-01-02 DIAGNOSIS — L08.9 WOUND INFECTION: Primary | ICD-10-CM

## 2025-01-02 DIAGNOSIS — T14.8XXA WOUND INFECTION: Primary | ICD-10-CM

## 2025-01-02 DIAGNOSIS — E11.621 DIABETIC ULCER OF TOE OF RIGHT FOOT ASSOCIATED WITH TYPE 2 DIABETES MELLITUS, WITH FAT LAYER EXPOSED: ICD-10-CM

## 2025-01-02 DIAGNOSIS — L97.512 ULCER OF RIGHT FOOT, WITH FAT LAYER EXPOSED: ICD-10-CM

## 2025-01-02 DIAGNOSIS — L97.513 ULCER OF TOE OF RIGHT FOOT, WITH NECROSIS OF MUSCLE: ICD-10-CM

## 2025-01-02 DIAGNOSIS — L08.9 DIABETIC FOOT INFECTION: ICD-10-CM

## 2025-01-02 DIAGNOSIS — L97.512 DIABETIC ULCER OF TOE OF RIGHT FOOT ASSOCIATED WITH TYPE 2 DIABETES MELLITUS, WITH FAT LAYER EXPOSED: ICD-10-CM

## 2025-01-02 DIAGNOSIS — L97.519 ULCER OF RIGHT FOOT, UNSPECIFIED ULCER STAGE: ICD-10-CM

## 2025-01-02 DIAGNOSIS — E11.628 DIABETIC FOOT INFECTION: ICD-10-CM

## 2025-01-02 PROCEDURE — 11042 DBRDMT SUBQ TIS 1ST 20SQCM/<: CPT | Mod: PN | Performed by: FAMILY MEDICINE

## 2025-01-02 PROCEDURE — 11043 DBRDMT MUSC&/FSCA 1ST 20/<: CPT | Mod: PN | Performed by: FAMILY MEDICINE

## 2025-01-02 RX ORDER — DOXYCYCLINE 100 MG/1
100 CAPSULE ORAL 2 TIMES DAILY
Qty: 20 CAPSULE | Refills: 0 | Status: SHIPPED | OUTPATIENT
Start: 2025-01-02 | End: 2025-01-12

## 2025-01-02 NOTE — PROGRESS NOTES
Wound Care Progress Note    Subjective:       Patient ID: Philip Alberts is a 44 y.o. male.    Chief Complaint: No chief complaint on file.    HPI  Pt seen in clinic for a FU visit for right foot DM ulcers x 2. One is on the heel and one on the foot plantar surface. MRI was negative for osteomyelitis but wound has drainage still. No other complaints today  Review of Systems   Skin:  Positive for wound.        Right foot open wound   All other systems reviewed and are negative.        Objective:        Physical Exam  Vitals and nursing note reviewed.   Constitutional:       General: He is not in acute distress.     Appearance: Normal appearance.   Skin:     General: Skin is warm and dry.      Findings: Lesion present.      Comments: Right foot DM ulcer x 2, see wound care assessment documentation in chart review scanned under the media tab   Neurological:      General: No focal deficit present.      Mental Status: He is alert.   Psychiatric:         Mood and Affect: Mood normal.         Judgment: Judgment normal.         There were no vitals filed for this visit.    Assessment:           ICD-10-CM ICD-9-CM   1. Wound infection  T14.8XXA 958.3    L08.9    2. Ulcer of right foot, unspecified ulcer stage  L97.519 707.15   3. Diabetic ulcer of toe of right foot associated with type 2 diabetes mellitus, with fat layer exposed  E11.621 250.80    L97.512 707.15   4. Ulcer of right foot, with fat layer exposed  L97.512 707.15   5. Ulcer of toe of right foot, with necrosis of muscle  L97.513 707.15     728.89   6. Diabetic foot infection  E11.628 250.80    L08.9 686.9                Plan:                  Diagnoses and all orders for this visit:    Wound infection    Ulcer of right foot, unspecified ulcer stage    Diabetic ulcer of toe of right foot associated with type 2 diabetes mellitus, with fat layer exposed    Ulcer of right foot, with fat layer exposed    Ulcer of toe of right foot, with necrosis of  muscle    Diabetic foot infection    Other orders  -     doxycycline (VIBRAMYCIN) 100 MG Cap; Take 1 capsule (100 mg total) by mouth 2 (two) times daily. for 10 days      See wound care instructions provided separately

## 2025-01-04 DIAGNOSIS — E10.42 TYPE 1 DM WITH POLYNEUROPATHY: ICD-10-CM

## 2025-01-06 RX ORDER — BLOOD-GLUCOSE SENSOR
EACH MISCELLANEOUS
Qty: 3 EACH | Refills: 5 | Status: SHIPPED | OUTPATIENT
Start: 2025-01-06

## 2025-01-08 DIAGNOSIS — E10.42 TYPE 1 DM WITH POLYNEUROPATHY: ICD-10-CM

## 2025-01-08 RX ORDER — INSULIN ASPART 100 [IU]/ML
INJECTION, SOLUTION INTRAVENOUS; SUBCUTANEOUS
Qty: 15 ML | Refills: 5 | Status: SHIPPED | OUTPATIENT
Start: 2025-01-08

## 2025-01-09 ENCOUNTER — OFFICE VISIT (OUTPATIENT)
Dept: WOUND CARE | Facility: HOSPITAL | Age: 45
End: 2025-01-09
Attending: FAMILY MEDICINE
Payer: MEDICAID

## 2025-01-09 VITALS
RESPIRATION RATE: 18 BRPM | DIASTOLIC BLOOD PRESSURE: 90 MMHG | SYSTOLIC BLOOD PRESSURE: 199 MMHG | TEMPERATURE: 99 F | HEART RATE: 95 BPM

## 2025-01-09 DIAGNOSIS — L97.513 ULCER OF TOE OF RIGHT FOOT, WITH NECROSIS OF MUSCLE: ICD-10-CM

## 2025-01-09 DIAGNOSIS — E11.42 TYPE 2 DIABETES MELLITUS WITH DIABETIC POLYNEUROPATHY, WITH LONG-TERM CURRENT USE OF INSULIN: ICD-10-CM

## 2025-01-09 DIAGNOSIS — L97.519 ULCER OF RIGHT FOOT, UNSPECIFIED ULCER STAGE: Primary | ICD-10-CM

## 2025-01-09 DIAGNOSIS — L97.512 ULCER OF RIGHT FOOT, WITH FAT LAYER EXPOSED: ICD-10-CM

## 2025-01-09 DIAGNOSIS — E11.42 DIABETIC PERIPHERAL NEUROPATHY: ICD-10-CM

## 2025-01-09 DIAGNOSIS — Z79.4 TYPE 2 DIABETES MELLITUS WITH DIABETIC POLYNEUROPATHY, WITH LONG-TERM CURRENT USE OF INSULIN: ICD-10-CM

## 2025-01-09 PROCEDURE — 11043 DBRDMT MUSC&/FSCA 1ST 20/<: CPT | Mod: PN | Performed by: FAMILY MEDICINE

## 2025-01-09 PROCEDURE — 11042 DBRDMT SUBQ TIS 1ST 20SQCM/<: CPT | Mod: PN | Performed by: FAMILY MEDICINE

## 2025-01-09 NOTE — PROGRESS NOTES
Wound Care Progress Note    Subjective:       Patient ID: Philip Alberts is a 44 y.o. male.    Chief Complaint: No chief complaint on file.    HPI  Pt seen in clinic for a FU visit for right foot DM ulcers x 2. Wounds are stable, starting to show improvement. No new complaints today  Review of Systems   Skin:  Positive for wound.        Open wound x 2 right foot   All other systems reviewed and are negative.      Objective:        Physical Exam  Vitals and nursing note reviewed.   Constitutional:       General: He is not in acute distress.     Appearance: Normal appearance.   Skin:     General: Skin is warm and dry.      Findings: Lesion present.      Comments: Right foot DM ulcer x 2, see wound care assessment documentation in chart review scanned unde the media tab   Neurological:      General: No focal deficit present.      Mental Status: He is alert.   Psychiatric:         Mood and Affect: Mood normal.         Judgment: Judgment normal.       There were no vitals filed for this visit.    Assessment:           ICD-10-CM ICD-9-CM   1. Ulcer of right foot, unspecified ulcer stage  L97.519 707.15   2. Ulcer of toe of right foot, with necrosis of muscle  L97.513 707.15     728.89   3. Diabetic peripheral neuropathy  E11.42 250.60     357.2   4. Type 2 diabetes mellitus with diabetic polyneuropathy, with long-term current use of insulin  E11.42 250.60    Z79.4 357.2     V58.67   5. Ulcer of right foot, with fat layer exposed  L97.512 707.15                Plan:                  Diagnoses and all orders for this visit:    Ulcer of right foot, unspecified ulcer stage    Ulcer of toe of right foot, with necrosis of muscle    Diabetic peripheral neuropathy    Type 2 diabetes mellitus with diabetic polyneuropathy, with long-term current use of insulin    Ulcer of right foot, with fat layer exposed        See wound care instructions provided separately

## 2025-01-16 ENCOUNTER — OFFICE VISIT (OUTPATIENT)
Dept: WOUND CARE | Facility: HOSPITAL | Age: 45
End: 2025-01-16
Attending: FAMILY MEDICINE
Payer: MEDICAID

## 2025-01-16 VITALS
DIASTOLIC BLOOD PRESSURE: 92 MMHG | RESPIRATION RATE: 18 BRPM | SYSTOLIC BLOOD PRESSURE: 188 MMHG | TEMPERATURE: 99 F | HEART RATE: 82 BPM

## 2025-01-16 DIAGNOSIS — L97.519 ULCER OF RIGHT FOOT, UNSPECIFIED ULCER STAGE: Primary | ICD-10-CM

## 2025-01-16 DIAGNOSIS — E11.42 DIABETIC PERIPHERAL NEUROPATHY: ICD-10-CM

## 2025-01-16 DIAGNOSIS — L97.513 ULCER OF TOE OF RIGHT FOOT, WITH NECROSIS OF MUSCLE: ICD-10-CM

## 2025-01-16 DIAGNOSIS — T14.8XXA WOUND INFECTION: ICD-10-CM

## 2025-01-16 DIAGNOSIS — L08.9 WOUND INFECTION: ICD-10-CM

## 2025-01-16 DIAGNOSIS — L97.512 ULCER OF RIGHT FOOT, WITH FAT LAYER EXPOSED: ICD-10-CM

## 2025-01-16 DIAGNOSIS — Z79.4 TYPE 2 DIABETES MELLITUS WITH DIABETIC POLYNEUROPATHY, WITH LONG-TERM CURRENT USE OF INSULIN: ICD-10-CM

## 2025-01-16 DIAGNOSIS — E11.42 TYPE 2 DIABETES MELLITUS WITH DIABETIC POLYNEUROPATHY, WITH LONG-TERM CURRENT USE OF INSULIN: ICD-10-CM

## 2025-01-16 PROCEDURE — 4010F ACE/ARB THERAPY RXD/TAKEN: CPT | Mod: CPTII,,, | Performed by: FAMILY MEDICINE

## 2025-01-16 PROCEDURE — 11042 DBRDMT SUBQ TIS 1ST 20SQCM/<: CPT | Mod: PN | Performed by: FAMILY MEDICINE

## 2025-01-16 PROCEDURE — 1160F RVW MEDS BY RX/DR IN RCRD: CPT | Mod: CPTII,,, | Performed by: FAMILY MEDICINE

## 2025-01-16 PROCEDURE — 11042 DBRDMT SUBQ TIS 1ST 20SQCM/<: CPT | Mod: ,,, | Performed by: FAMILY MEDICINE

## 2025-01-16 PROCEDURE — 99213 OFFICE O/P EST LOW 20 MIN: CPT | Mod: 25,,, | Performed by: FAMILY MEDICINE

## 2025-01-16 PROCEDURE — 1159F MED LIST DOCD IN RCRD: CPT | Mod: CPTII,,, | Performed by: FAMILY MEDICINE

## 2025-01-16 RX ORDER — DOXYCYCLINE 100 MG/1
100 CAPSULE ORAL 2 TIMES DAILY
Qty: 20 CAPSULE | Refills: 0 | Status: SHIPPED | OUTPATIENT
Start: 2025-01-16 | End: 2025-01-26

## 2025-01-16 NOTE — PROGRESS NOTES
Wound Care Progress Note    Subjective:       Patient ID: Philip Alberts is a 44 y.o. male.    Chief Complaint: No chief complaint on file.    HPI  Pt seen in clinic for a FU visit for right foot DM ulcers x 2. Wounds are stable, slight improvement but some erythema. No other complaints today  Review of Systems   Skin:  Positive for wound.        Right foot DM ulcer x 2   All other systems reviewed and are negative.      Objective:        Physical Exam  Vitals and nursing note reviewed.   Constitutional:       General: He is not in acute distress.     Appearance: Normal appearance.   Skin:     General: Skin is warm and dry.      Findings: Lesion present.      Comments: Right foot DM ulcer x 2, see wound care assessment documentation in chart review scanned under the media tab   Neurological:      General: No focal deficit present.      Mental Status: He is alert.   Psychiatric:         Mood and Affect: Mood normal.         Judgment: Judgment normal.       There were no vitals filed for this visit.    Assessment:           ICD-10-CM ICD-9-CM   1. Ulcer of right foot, unspecified ulcer stage  L97.519 707.15   2. Ulcer of toe of right foot, with necrosis of muscle  L97.513 707.15     728.89   3. Diabetic peripheral neuropathy  E11.42 250.60     357.2   4. Type 2 diabetes mellitus with diabetic polyneuropathy, with long-term current use of insulin  E11.42 250.60    Z79.4 357.2     V58.67   5. Ulcer of right foot, with fat layer exposed  L97.512 707.15   6. Wound infection  T14.8XXA 958.3    L08.9                 Plan:                  Diagnoses and all orders for this visit:    Ulcer of right foot, unspecified ulcer stage    Ulcer of toe of right foot, with necrosis of muscle    Diabetic peripheral neuropathy    Type 2 diabetes mellitus with diabetic polyneuropathy, with long-term current use of insulin    Ulcer of right foot, with fat layer exposed    Wound infection    Other orders  -     doxycycline  (VIBRAMYCIN) 100 MG Cap; Take 1 capsule (100 mg total) by mouth 2 (two) times daily. for 10 days      See wound care instructions provided separately

## 2025-01-30 ENCOUNTER — OFFICE VISIT (OUTPATIENT)
Dept: WOUND CARE | Facility: HOSPITAL | Age: 45
End: 2025-01-30
Attending: FAMILY MEDICINE
Payer: MEDICAID

## 2025-01-30 VITALS
RESPIRATION RATE: 20 BRPM | SYSTOLIC BLOOD PRESSURE: 171 MMHG | HEART RATE: 82 BPM | DIASTOLIC BLOOD PRESSURE: 97 MMHG | TEMPERATURE: 99 F

## 2025-01-30 DIAGNOSIS — E11.42 DIABETIC PERIPHERAL NEUROPATHY: ICD-10-CM

## 2025-01-30 DIAGNOSIS — L97.519 ULCER OF RIGHT FOOT, UNSPECIFIED ULCER STAGE: Primary | ICD-10-CM

## 2025-01-30 DIAGNOSIS — L97.512 ULCER OF RIGHT FOOT, WITH FAT LAYER EXPOSED: ICD-10-CM

## 2025-01-30 DIAGNOSIS — L97.513 ULCER OF RIGHT FOOT WITH NECROSIS OF MUSCLE: ICD-10-CM

## 2025-01-30 DIAGNOSIS — Z79.4 TYPE 2 DIABETES MELLITUS WITH DIABETIC POLYNEUROPATHY, WITH LONG-TERM CURRENT USE OF INSULIN: ICD-10-CM

## 2025-01-30 DIAGNOSIS — L97.513 ULCER OF TOE OF RIGHT FOOT, WITH NECROSIS OF MUSCLE: ICD-10-CM

## 2025-01-30 DIAGNOSIS — E11.42 TYPE 2 DIABETES MELLITUS WITH DIABETIC POLYNEUROPATHY, WITH LONG-TERM CURRENT USE OF INSULIN: ICD-10-CM

## 2025-01-30 DIAGNOSIS — T14.8XXA WOUND INFECTION: ICD-10-CM

## 2025-01-30 DIAGNOSIS — L08.9 WOUND INFECTION: ICD-10-CM

## 2025-01-30 PROCEDURE — 11043 DBRDMT MUSC&/FSCA 1ST 20/<: CPT | Mod: PN | Performed by: FAMILY MEDICINE

## 2025-01-30 PROCEDURE — 11042 DBRDMT SUBQ TIS 1ST 20SQCM/<: CPT | Mod: PN | Performed by: FAMILY MEDICINE

## 2025-01-30 RX ORDER — DOXYCYCLINE 100 MG/1
100 CAPSULE ORAL 2 TIMES DAILY
Qty: 20 CAPSULE | Refills: 0 | Status: SHIPPED | OUTPATIENT
Start: 2025-01-30 | End: 2025-02-09

## 2025-01-30 NOTE — PROGRESS NOTES
Wound Care Progress Note    Subjective:       Patient ID: Philip Alberts is a 44 y.o. male.    Chief Complaint: No chief complaint on file.    HPI  Pt seen in clinic for a FU visit for a right foot DM ulcer x 2. Wounds continue to improve, pt has no new complaints today, wound still slightly red, pt concerned for infection. No other complaints today  Review of Systems   Gastrointestinal:  Negative for vomiting.   Skin:  Positive for wound.        Right foot DM ulcer x 2   All other systems reviewed and are negative.      Objective:        Physical Exam  Vitals and nursing note reviewed.   Constitutional:       General: He is not in acute distress.     Appearance: Normal appearance.   Skin:     General: Skin is warm and dry.      Findings: Lesion present.      Comments: Right foot DM ulcer x 2, see wound care assessment documentation in chart review scanned under the media tab   Neurological:      General: No focal deficit present.      Mental Status: He is alert.   Psychiatric:         Judgment: Judgment normal.       There were no vitals filed for this visit.    Assessment:           ICD-10-CM ICD-9-CM   1. Ulcer of right foot, unspecified ulcer stage  L97.519 707.15   2. Ulcer of toe of right foot, with necrosis of muscle  L97.513 707.15     728.89   3. Diabetic peripheral neuropathy  E11.42 250.60     357.2   4. Type 2 diabetes mellitus with diabetic polyneuropathy, with long-term current use of insulin  E11.42 250.60    Z79.4 357.2     V58.67   5. Ulcer of right foot, with fat layer exposed  L97.512 707.15   6. Wound infection  T14.8XXA 958.3    L08.9                 Plan:                  Diagnoses and all orders for this visit:    Ulcer of right foot, unspecified ulcer stage    Ulcer of toe of right foot, with necrosis of muscle    Diabetic peripheral neuropathy    Type 2 diabetes mellitus with diabetic polyneuropathy, with long-term current use of insulin    Ulcer of right foot, with fat layer  exposed    Wound infection    Other orders  -     doxycycline (VIBRAMYCIN) 100 MG Cap; Take 1 capsule (100 mg total) by mouth 2 (two) times daily. for 10 days        See wound care instructions provided separately

## 2025-02-01 DIAGNOSIS — E10.42 TYPE 1 DM WITH POLYNEUROPATHY: ICD-10-CM

## 2025-02-03 RX ORDER — GABAPENTIN 300 MG/1
600 CAPSULE ORAL 3 TIMES DAILY
Qty: 180 CAPSULE | Refills: 5 | Status: SHIPPED | OUTPATIENT
Start: 2025-02-03

## 2025-02-06 ENCOUNTER — OFFICE VISIT (OUTPATIENT)
Dept: WOUND CARE | Facility: HOSPITAL | Age: 45
End: 2025-02-06
Attending: FAMILY MEDICINE
Payer: MEDICAID

## 2025-02-06 VITALS
SYSTOLIC BLOOD PRESSURE: 162 MMHG | TEMPERATURE: 99 F | HEART RATE: 77 BPM | RESPIRATION RATE: 18 BRPM | DIASTOLIC BLOOD PRESSURE: 86 MMHG

## 2025-02-06 DIAGNOSIS — L97.519 ULCER OF RIGHT FOOT, UNSPECIFIED ULCER STAGE: Primary | ICD-10-CM

## 2025-02-06 DIAGNOSIS — E11.42 DIABETIC PERIPHERAL NEUROPATHY: ICD-10-CM

## 2025-02-06 DIAGNOSIS — E11.42 TYPE 2 DIABETES MELLITUS WITH DIABETIC POLYNEUROPATHY, WITH LONG-TERM CURRENT USE OF INSULIN: ICD-10-CM

## 2025-02-06 DIAGNOSIS — L97.513 ULCER OF TOE OF RIGHT FOOT, WITH NECROSIS OF MUSCLE: ICD-10-CM

## 2025-02-06 DIAGNOSIS — T14.8XXA WOUND INFECTION: ICD-10-CM

## 2025-02-06 DIAGNOSIS — L08.9 WOUND INFECTION: ICD-10-CM

## 2025-02-06 DIAGNOSIS — Z79.4 TYPE 2 DIABETES MELLITUS WITH DIABETIC POLYNEUROPATHY, WITH LONG-TERM CURRENT USE OF INSULIN: ICD-10-CM

## 2025-02-06 PROCEDURE — 4010F ACE/ARB THERAPY RXD/TAKEN: CPT | Mod: CPTII,,, | Performed by: FAMILY MEDICINE

## 2025-02-06 PROCEDURE — 1160F RVW MEDS BY RX/DR IN RCRD: CPT | Mod: CPTII,,, | Performed by: FAMILY MEDICINE

## 2025-02-06 PROCEDURE — 99213 OFFICE O/P EST LOW 20 MIN: CPT | Mod: 25,,, | Performed by: FAMILY MEDICINE

## 2025-02-06 PROCEDURE — 11042 DBRDMT SUBQ TIS 1ST 20SQCM/<: CPT | Mod: PN | Performed by: FAMILY MEDICINE

## 2025-02-06 PROCEDURE — 11042 DBRDMT SUBQ TIS 1ST 20SQCM/<: CPT | Mod: ,,, | Performed by: FAMILY MEDICINE

## 2025-02-06 PROCEDURE — 1159F MED LIST DOCD IN RCRD: CPT | Mod: CPTII,,, | Performed by: FAMILY MEDICINE

## 2025-02-06 RX ORDER — DOXYCYCLINE 100 MG/1
100 CAPSULE ORAL 2 TIMES DAILY
Qty: 20 CAPSULE | Refills: 0 | Status: SHIPPED | OUTPATIENT
Start: 2025-02-06 | End: 2025-02-20 | Stop reason: SDUPTHER

## 2025-02-06 NOTE — PROGRESS NOTES
Wound Care Progress Note    Subjective:       Patient ID: Philip Alberts is a 44 y.o. male.    Chief Complaint: No chief complaint on file.    HPI  Pt seen in clinic for a FU visit for a right foot DM ulcer x 2. Wounds continue to improve, some mild erythema, may still have some infection remaining. No other complaints today  Review of Systems   Skin:  Positive for wound.        Open wound right foot x 2   All other systems reviewed and are negative.        Objective:        Physical Exam  Vitals and nursing note reviewed.   Constitutional:       General: He is not in acute distress.     Appearance: Normal appearance.   Skin:     General: Skin is warm and dry.      Findings: Lesion present.      Comments: DM ulcer of the right foot x 2, see wound care assessment documentation in chart review scanned under the media tab   Neurological:      General: No focal deficit present.      Mental Status: He is alert.   Psychiatric:         Mood and Affect: Mood normal.         Judgment: Judgment normal.         There were no vitals filed for this visit.    Assessment:           ICD-10-CM ICD-9-CM   1. Ulcer of right foot, unspecified ulcer stage  L97.519 707.15   2. Ulcer of toe of right foot, with necrosis of muscle  L97.513 707.15     728.89   3. Diabetic peripheral neuropathy  E11.42 250.60     357.2   4. Type 2 diabetes mellitus with diabetic polyneuropathy, with long-term current use of insulin  E11.42 250.60    Z79.4 357.2     V58.67   5. Wound infection  T14.8XXA 958.3    L08.9                 Plan:                  Diagnoses and all orders for this visit:    Ulcer of right foot, unspecified ulcer stage    Ulcer of toe of right foot, with necrosis of muscle    Diabetic peripheral neuropathy    Type 2 diabetes mellitus with diabetic polyneuropathy, with long-term current use of insulin    Wound infection    Other orders  -     doxycycline (VIBRAMYCIN) 100 MG Cap; Take 1 capsule (100 mg total) by mouth 2 (two)  times daily. for 10 days      See wound care instructions provided separately

## 2025-02-06 NOTE — PATIENT INSTRUCTIONS
Much of the documentation for this visit was completed in the [facility] system. Please see the attached documentation for further details about the patient's care. Scanned under Media tab.   
no

## 2025-02-13 ENCOUNTER — OFFICE VISIT (OUTPATIENT)
Dept: WOUND CARE | Facility: HOSPITAL | Age: 45
End: 2025-02-13
Attending: FAMILY MEDICINE
Payer: MEDICAID

## 2025-02-13 VITALS — HEART RATE: 88 BPM | RESPIRATION RATE: 18 BRPM | TEMPERATURE: 98 F

## 2025-02-13 DIAGNOSIS — E11.42 DIABETIC PERIPHERAL NEUROPATHY: ICD-10-CM

## 2025-02-13 DIAGNOSIS — Z79.4 TYPE 2 DIABETES MELLITUS WITH DIABETIC POLYNEUROPATHY, WITH LONG-TERM CURRENT USE OF INSULIN: ICD-10-CM

## 2025-02-13 DIAGNOSIS — E11.42 TYPE 2 DIABETES MELLITUS WITH DIABETIC POLYNEUROPATHY, WITH LONG-TERM CURRENT USE OF INSULIN: ICD-10-CM

## 2025-02-13 DIAGNOSIS — L97.513 ULCER OF TOE OF RIGHT FOOT, WITH NECROSIS OF MUSCLE: ICD-10-CM

## 2025-02-13 DIAGNOSIS — L97.519 ULCER OF RIGHT FOOT, UNSPECIFIED ULCER STAGE: Primary | ICD-10-CM

## 2025-02-13 DIAGNOSIS — L97.512 ULCER OF RIGHT FOOT, WITH FAT LAYER EXPOSED: ICD-10-CM

## 2025-02-13 PROCEDURE — 11042 DBRDMT SUBQ TIS 1ST 20SQCM/<: CPT | Mod: ,,, | Performed by: FAMILY MEDICINE

## 2025-02-13 PROCEDURE — 99499 UNLISTED E&M SERVICE: CPT | Mod: ,,, | Performed by: FAMILY MEDICINE

## 2025-02-13 PROCEDURE — 11042 DBRDMT SUBQ TIS 1ST 20SQCM/<: CPT | Mod: PN | Performed by: FAMILY MEDICINE

## 2025-02-13 NOTE — PROGRESS NOTES
Wound Care Progress Note    Subjective:       Patient ID: Philip Alberts is a 44 y.o. male.    Chief Complaint: No chief complaint on file.    HPI  Pt seen in clinic for a FU visit for DM ulcer of the right plantar foot and heel. Wounds continue to improve, pt has no new complaints today  Review of Systems   Skin:  Positive for wound.        Right foot DM ulcer x 2   All other systems reviewed and are negative.      Objective:        Physical Exam  Vitals and nursing note reviewed.   Constitutional:       General: He is not in acute distress.     Appearance: Normal appearance.   Skin:     General: Skin is dry.      Findings: Lesion present.      Comments: Right foot DM ulcer x 2, see wound care assessment documentation in chart review scanned under the media tab   Neurological:      General: No focal deficit present.      Mental Status: He is alert.   Psychiatric:         Mood and Affect: Mood normal.         Judgment: Judgment normal.       There were no vitals filed for this visit.    Assessment:           ICD-10-CM ICD-9-CM   1. Ulcer of right foot, unspecified ulcer stage  L97.519 707.15   2. Ulcer of toe of right foot, with necrosis of muscle  L97.513 707.15     728.89   3. Diabetic peripheral neuropathy  E11.42 250.60     357.2   4. Type 2 diabetes mellitus with diabetic polyneuropathy, with long-term current use of insulin  E11.42 250.60    Z79.4 357.2     V58.67   5. Ulcer of right foot, with fat layer exposed  L97.512 707.15                Plan:                  Diagnoses and all orders for this visit:    Ulcer of right foot, unspecified ulcer stage    Ulcer of toe of right foot, with necrosis of muscle    Diabetic peripheral neuropathy    Type 2 diabetes mellitus with diabetic polyneuropathy, with long-term current use of insulin    Ulcer of right foot, with fat layer exposed        Much of the documentation for this visit was completed in the [facility] system. Please see the attached  documentation for further details about the patient's care. Scanned under Media tab.

## 2025-02-20 ENCOUNTER — OFFICE VISIT (OUTPATIENT)
Dept: WOUND CARE | Facility: HOSPITAL | Age: 45
End: 2025-02-20
Attending: FAMILY MEDICINE
Payer: MEDICAID

## 2025-02-20 VITALS
TEMPERATURE: 99 F | DIASTOLIC BLOOD PRESSURE: 96 MMHG | RESPIRATION RATE: 18 BRPM | HEART RATE: 84 BPM | SYSTOLIC BLOOD PRESSURE: 172 MMHG

## 2025-02-20 DIAGNOSIS — L97.519 ULCER OF RIGHT FOOT, UNSPECIFIED ULCER STAGE: Primary | ICD-10-CM

## 2025-02-20 DIAGNOSIS — Z79.4 TYPE 2 DIABETES MELLITUS WITH DIABETIC POLYNEUROPATHY, WITH LONG-TERM CURRENT USE OF INSULIN: ICD-10-CM

## 2025-02-20 DIAGNOSIS — E11.42 DIABETIC PERIPHERAL NEUROPATHY: ICD-10-CM

## 2025-02-20 DIAGNOSIS — L08.9 WOUND INFECTION: ICD-10-CM

## 2025-02-20 DIAGNOSIS — T14.8XXA WOUND INFECTION: ICD-10-CM

## 2025-02-20 DIAGNOSIS — E11.42 TYPE 2 DIABETES MELLITUS WITH DIABETIC POLYNEUROPATHY, WITH LONG-TERM CURRENT USE OF INSULIN: ICD-10-CM

## 2025-02-20 DIAGNOSIS — L97.513 ULCER OF TOE OF RIGHT FOOT, WITH NECROSIS OF MUSCLE: ICD-10-CM

## 2025-02-20 PROCEDURE — 11042 DBRDMT SUBQ TIS 1ST 20SQCM/<: CPT | Mod: PN | Performed by: FAMILY MEDICINE

## 2025-02-20 RX ORDER — DOXYCYCLINE 100 MG/1
100 CAPSULE ORAL 2 TIMES DAILY
Qty: 20 CAPSULE | Refills: 0 | Status: SHIPPED | OUTPATIENT
Start: 2025-02-20 | End: 2025-03-02

## 2025-02-20 NOTE — PROGRESS NOTES
Wound Care Progress Note    Subjective:       Patient ID: Philip Alberts is a 44 y.o. male.    Chief Complaint: No chief complaint on file.    HPI  Pt seen in clinic for a FU visit for right foot DM ulcers x 2. Wounds continue to improve, some erythema. No new complaints today  Review of Systems   Skin:  Positive for wound.        Open wound right foot x 2   All other systems reviewed and are negative.      Objective:        Physical Exam  Vitals and nursing note reviewed.   Constitutional:       General: He is not in acute distress.     Appearance: Normal appearance.   Skin:     General: Skin is warm and dry.      Findings: Lesion present.      Comments: Right foot DM ulcer x 2, see wound care assessment  documentation in chart review scanned under the media tab   Neurological:      General: No focal deficit present.      Mental Status: He is alert.   Psychiatric:         Mood and Affect: Mood normal.         Judgment: Judgment normal.       There were no vitals filed for this visit.    Assessment:           ICD-10-CM ICD-9-CM   1. Ulcer of right foot, unspecified ulcer stage  L97.519 707.15   2. Ulcer of toe of right foot, with necrosis of muscle  L97.513 707.15     728.89   3. Diabetic peripheral neuropathy  E11.42 250.60     357.2   4. Type 2 diabetes mellitus with diabetic polyneuropathy, with long-term current use of insulin  E11.42 250.60    Z79.4 357.2     V58.67   5. Wound infection  T14.8XXA 958.3    L08.9                 Plan:                  Diagnoses and all orders for this visit:    Ulcer of right foot, unspecified ulcer stage    Ulcer of toe of right foot, with necrosis of muscle    Diabetic peripheral neuropathy    Type 2 diabetes mellitus with diabetic polyneuropathy, with long-term current use of insulin    Wound infection    Other orders  -     doxycycline (VIBRAMYCIN) 100 MG Cap; Take 1 capsule (100 mg total) by mouth 2 (two) times daily. for 10 days        See wound care  instructions provided separately

## 2025-02-27 ENCOUNTER — OFFICE VISIT (OUTPATIENT)
Dept: WOUND CARE | Facility: HOSPITAL | Age: 45
End: 2025-02-27
Attending: FAMILY MEDICINE
Payer: MEDICAID

## 2025-02-27 VITALS
RESPIRATION RATE: 18 BRPM | DIASTOLIC BLOOD PRESSURE: 72 MMHG | TEMPERATURE: 98 F | SYSTOLIC BLOOD PRESSURE: 135 MMHG | HEART RATE: 89 BPM

## 2025-02-27 DIAGNOSIS — T14.8XXA WOUND INFECTION: ICD-10-CM

## 2025-02-27 DIAGNOSIS — L97.512 ULCER OF RIGHT FOOT, WITH FAT LAYER EXPOSED: ICD-10-CM

## 2025-02-27 DIAGNOSIS — L08.9 WOUND INFECTION: ICD-10-CM

## 2025-02-27 DIAGNOSIS — Z79.4 TYPE 2 DIABETES MELLITUS WITH DIABETIC POLYNEUROPATHY, WITH LONG-TERM CURRENT USE OF INSULIN: ICD-10-CM

## 2025-02-27 DIAGNOSIS — E11.42 TYPE 2 DIABETES MELLITUS WITH DIABETIC POLYNEUROPATHY, WITH LONG-TERM CURRENT USE OF INSULIN: ICD-10-CM

## 2025-02-27 DIAGNOSIS — E11.42 DIABETIC PERIPHERAL NEUROPATHY: ICD-10-CM

## 2025-02-27 DIAGNOSIS — L97.513 ULCER OF TOE OF RIGHT FOOT, WITH NECROSIS OF MUSCLE: ICD-10-CM

## 2025-02-27 DIAGNOSIS — L97.519 ULCER OF RIGHT FOOT, UNSPECIFIED ULCER STAGE: Primary | ICD-10-CM

## 2025-02-27 PROCEDURE — 11042 DBRDMT SUBQ TIS 1ST 20SQCM/<: CPT | Mod: PN | Performed by: FAMILY MEDICINE

## 2025-02-27 RX ORDER — DOXYCYCLINE 100 MG/1
100 CAPSULE ORAL 2 TIMES DAILY
Qty: 20 CAPSULE | Refills: 0 | Status: SHIPPED | OUTPATIENT
Start: 2025-02-27 | End: 2025-03-09

## 2025-02-27 NOTE — PROGRESS NOTES
Wound Care Progress Note    Subjective:       Patient ID: Philip Alberts is a 44 y.o. male.    Chief Complaint: No chief complaint on file.    HPI  Pt seen in clinic for a FU visit for right foot DM ulcers with chronic wound infection. Wounds are improving, heel still slower. No new complaints today  Review of Systems   Skin:  Positive for wound.        Right foot DM ulcer x 2   All other systems reviewed and are negative.      Objective:        Physical Exam  Vitals and nursing note reviewed.   Constitutional:       General: He is not in acute distress.     Appearance: Normal appearance.   Skin:     General: Skin is warm and dry.      Findings: Lesion present.      Comments: Right foot DM ulcer x 2, see wound care assessment documentation in chart review scanned under the media tab   Neurological:      General: No focal deficit present.      Mental Status: He is alert.   Psychiatric:         Mood and Affect: Mood normal.         Judgment: Judgment normal.       There were no vitals filed for this visit.    Assessment:           ICD-10-CM ICD-9-CM   1. Ulcer of right foot, unspecified ulcer stage  L97.519 707.15   2. Ulcer of toe of right foot, with necrosis of muscle  L97.513 707.15     728.89   3. Diabetic peripheral neuropathy  E11.42 250.60     357.2   4. Type 2 diabetes mellitus with diabetic polyneuropathy, with long-term current use of insulin  E11.42 250.60    Z79.4 357.2     V58.67   5. Ulcer of right foot, with fat layer exposed  L97.512 707.15   6. Wound infection  T14.8XXA 958.3    L08.9                 Plan:                  Diagnoses and all orders for this visit:    Ulcer of right foot, unspecified ulcer stage    Ulcer of toe of right foot, with necrosis of muscle    Diabetic peripheral neuropathy    Type 2 diabetes mellitus with diabetic polyneuropathy, with long-term current use of insulin    Ulcer of right foot, with fat layer exposed    Wound infection    Other orders  -      doxycycline (VIBRAMYCIN) 100 MG Cap; Take 1 capsule (100 mg total) by mouth 2 (two) times daily. for 10 days        See wound care instructions provided separately

## 2025-03-06 ENCOUNTER — OFFICE VISIT (OUTPATIENT)
Dept: WOUND CARE | Facility: HOSPITAL | Age: 45
End: 2025-03-06
Attending: FAMILY MEDICINE
Payer: MEDICAID

## 2025-03-06 VITALS
HEART RATE: 89 BPM | SYSTOLIC BLOOD PRESSURE: 129 MMHG | RESPIRATION RATE: 20 BRPM | TEMPERATURE: 98 F | DIASTOLIC BLOOD PRESSURE: 85 MMHG

## 2025-03-06 DIAGNOSIS — L97.513 ULCER OF TOE OF RIGHT FOOT, WITH NECROSIS OF MUSCLE: ICD-10-CM

## 2025-03-06 DIAGNOSIS — L97.519 ULCER OF RIGHT FOOT, UNSPECIFIED ULCER STAGE: Primary | ICD-10-CM

## 2025-03-06 DIAGNOSIS — Z79.4 TYPE 2 DIABETES MELLITUS WITH DIABETIC POLYNEUROPATHY, WITH LONG-TERM CURRENT USE OF INSULIN: ICD-10-CM

## 2025-03-06 DIAGNOSIS — E11.42 TYPE 2 DIABETES MELLITUS WITH DIABETIC POLYNEUROPATHY, WITH LONG-TERM CURRENT USE OF INSULIN: ICD-10-CM

## 2025-03-06 DIAGNOSIS — E11.42 DIABETIC PERIPHERAL NEUROPATHY: ICD-10-CM

## 2025-03-06 PROCEDURE — 11042 DBRDMT SUBQ TIS 1ST 20SQCM/<: CPT | Mod: ,,, | Performed by: FAMILY MEDICINE

## 2025-03-06 PROCEDURE — 99499 UNLISTED E&M SERVICE: CPT | Mod: ,,, | Performed by: FAMILY MEDICINE

## 2025-03-06 NOTE — PROGRESS NOTES
"          Wound Care Progress Note    Subjective:       Patient ID: Philip Alberts is a 44 y.o. male.    Chief Complaint: Diabetic Foot Ulcer, Wound Infection, Non-healing Wound Follow Up, and Peripheral Neuropathy    HPI  Pt seen in clinic for a FU visit for right foot DM ulcers x 2. Wounds continue to improve, pt has no new complaints today  Review of Systems   Skin:  Positive for wound.        Open wound x 2 right foot   All other systems reviewed and are negative.      Objective:        Physical Exam  Vitals and nursing note reviewed.   Constitutional:       General: He is not in acute distress.     Appearance: Normal appearance.   Skin:     General: Skin is warm and dry.      Findings: Lesion present.      Comments: Right foot DM ulcer x 2, see wound care assessment documentation in chart review scanned under  the media tab   Neurological:      General: No focal deficit present.      Mental Status: He is alert.   Psychiatric:         Mood and Affect: Mood normal.         Judgment: Judgment normal.       Vitals:    03/06/25 0813   BP: 129/85   Pulse: 89   Resp: 20   Temp: 98.4 °F (36.9 °C)       Assessment:           ICD-10-CM ICD-9-CM   1. Ulcer of right foot, unspecified ulcer stage  L97.519 707.15   2. Ulcer of toe of right foot, with necrosis of muscle  L97.513 707.15     728.89   3. Diabetic peripheral neuropathy  E11.42 250.60     357.2   4. Type 2 diabetes mellitus with diabetic polyneuropathy, with long-term current use of insulin  E11.42 250.60    Z79.4 357.2     V58.67                Plan:                  Philip Henry" was seen today for diabetic foot ulcer, wound infection, non-healing wound follow up and peripheral neuropathy.    Diagnoses and all orders for this visit:    Ulcer of right foot, unspecified ulcer stage    Ulcer of toe of right foot, with necrosis of muscle    Diabetic peripheral neuropathy    Type 2 diabetes mellitus with diabetic polyneuropathy, with long-term current use of " insulin      See wound care instructions provided separately

## 2025-03-06 NOTE — PATIENT INSTRUCTIONS
Much of the documentation for this visit was completed in the [facility] system. Please see the attached documentation for further details about this patient's care.

## 2025-03-10 ENCOUNTER — PATIENT MESSAGE (OUTPATIENT)
Dept: ADMINISTRATIVE | Facility: HOSPITAL | Age: 45
End: 2025-03-10
Payer: MEDICAID

## 2025-03-13 ENCOUNTER — OFFICE VISIT (OUTPATIENT)
Dept: WOUND CARE | Facility: HOSPITAL | Age: 45
End: 2025-03-13
Attending: FAMILY MEDICINE
Payer: MEDICAID

## 2025-03-13 VITALS
SYSTOLIC BLOOD PRESSURE: 164 MMHG | DIASTOLIC BLOOD PRESSURE: 89 MMHG | RESPIRATION RATE: 18 BRPM | HEART RATE: 89 BPM | TEMPERATURE: 98 F

## 2025-03-13 DIAGNOSIS — Z79.4 TYPE 2 DIABETES MELLITUS WITH DIABETIC POLYNEUROPATHY, WITH LONG-TERM CURRENT USE OF INSULIN: ICD-10-CM

## 2025-03-13 DIAGNOSIS — L97.519 ULCER OF RIGHT FOOT, UNSPECIFIED ULCER STAGE: Primary | ICD-10-CM

## 2025-03-13 DIAGNOSIS — T14.8XXA WOUND INFECTION: ICD-10-CM

## 2025-03-13 DIAGNOSIS — E11.42 TYPE 2 DIABETES MELLITUS WITH DIABETIC POLYNEUROPATHY, WITH LONG-TERM CURRENT USE OF INSULIN: ICD-10-CM

## 2025-03-13 DIAGNOSIS — L08.9 WOUND INFECTION: ICD-10-CM

## 2025-03-13 DIAGNOSIS — E11.42 DIABETIC PERIPHERAL NEUROPATHY: ICD-10-CM

## 2025-03-13 PROCEDURE — 11042 DBRDMT SUBQ TIS 1ST 20SQCM/<: CPT | Mod: PN | Performed by: FAMILY MEDICINE

## 2025-03-13 RX ORDER — DOXYCYCLINE 100 MG/1
100 CAPSULE ORAL 2 TIMES DAILY
Qty: 20 CAPSULE | Refills: 0 | Status: SHIPPED | OUTPATIENT
Start: 2025-03-13 | End: 2025-03-23

## 2025-03-13 NOTE — PROGRESS NOTES
Wound Care Progress Note    Subjective:       Patient ID: Philip Alberts is a 44 y.o. male.    Chief Complaint: No chief complaint on file.    HPI  Pt seen in clinic for a FU visit for a right heel DM ulcer. Wound continues to improve, some erythema, no other complaints today  Review of Systems   Skin:  Positive for wound.        Right heel DM ulcer   All other systems reviewed and are negative.      Objective:        Physical Exam  Vitals and nursing note reviewed.   Constitutional:       General: He is not in acute distress.     Appearance: Normal appearance.   Skin:     General: Skin is warm and dry.      Findings: Lesion present.      Comments: Right heel DM ulcer, see wound care assessment documentation in chart review scanned udner the media tab   Neurological:      General: No focal deficit present.      Mental Status: He is alert.   Psychiatric:         Mood and Affect: Mood normal.         Judgment: Judgment normal.       There were no vitals filed for this visit.    Assessment:           ICD-10-CM ICD-9-CM   1. Ulcer of right foot, unspecified ulcer stage  L97.519 707.15   2. Diabetic peripheral neuropathy  E11.42 250.60     357.2   3. Type 2 diabetes mellitus with diabetic polyneuropathy, with long-term current use of insulin  E11.42 250.60    Z79.4 357.2     V58.67   4. Wound infection  T14.8XXA 958.3    L08.9                 Plan:                  Diagnoses and all orders for this visit:    Ulcer of right foot, unspecified ulcer stage    Diabetic peripheral neuropathy    Type 2 diabetes mellitus with diabetic polyneuropathy, with long-term current use of insulin    Wound infection    Other orders  -     doxycycline (VIBRAMYCIN) 100 MG Cap; Take 1 capsule (100 mg total) by mouth 2 (two) times daily. for 10 days      Please see wound care instructions which have been provided separately.

## 2025-03-20 ENCOUNTER — OFFICE VISIT (OUTPATIENT)
Dept: WOUND CARE | Facility: HOSPITAL | Age: 45
End: 2025-03-20
Attending: FAMILY MEDICINE
Payer: MEDICAID

## 2025-03-20 VITALS
TEMPERATURE: 98 F | DIASTOLIC BLOOD PRESSURE: 52 MMHG | RESPIRATION RATE: 16 BRPM | HEART RATE: 72 BPM | SYSTOLIC BLOOD PRESSURE: 99 MMHG

## 2025-03-20 DIAGNOSIS — L08.9 WOUND INFECTION: ICD-10-CM

## 2025-03-20 DIAGNOSIS — T14.8XXA WOUND INFECTION: ICD-10-CM

## 2025-03-20 DIAGNOSIS — L97.519 ULCER OF RIGHT FOOT, UNSPECIFIED ULCER STAGE: Primary | ICD-10-CM

## 2025-03-20 DIAGNOSIS — E11.42 TYPE 2 DIABETES MELLITUS WITH DIABETIC POLYNEUROPATHY, WITH LONG-TERM CURRENT USE OF INSULIN: ICD-10-CM

## 2025-03-20 DIAGNOSIS — Z79.4 TYPE 2 DIABETES MELLITUS WITH DIABETIC POLYNEUROPATHY, WITH LONG-TERM CURRENT USE OF INSULIN: ICD-10-CM

## 2025-03-20 DIAGNOSIS — E11.42 DIABETIC PERIPHERAL NEUROPATHY: ICD-10-CM

## 2025-03-20 RX ORDER — DOXYCYCLINE 100 MG/1
100 CAPSULE ORAL 2 TIMES DAILY
Qty: 20 CAPSULE | Refills: 0 | Status: SHIPPED | OUTPATIENT
Start: 2025-03-20 | End: 2025-03-30

## 2025-03-20 NOTE — PROGRESS NOTES
Wound Care Progress Note    Subjective:       Patient ID: Philip Alberts is a 44 y.o. male.    Chief Complaint: No chief complaint on file.    HPI  Pt seen in clinic for a FU visit for right foot DM ulcers. Wounds are stable, pt has no new complaints today. Wound has some redness, no new complaints today  Review of Systems   Skin:  Positive for wound.        Right foot DM ulcer   All other systems reviewed and are negative.      Objective:        Physical Exam  Vitals and nursing note reviewed.   Constitutional:       General: He is not in acute distress.     Appearance: Normal appearance.   Skin:     General: Skin is warm.      Comments: Right foot DM ulcer, see wound care assessment documentation in chart review scanned under the media tab   Neurological:      General: No focal deficit present.      Mental Status: He is alert.   Psychiatric:         Mood and Affect: Mood normal.         Judgment: Judgment normal.       There were no vitals filed for this visit.    Assessment:           ICD-10-CM ICD-9-CM   1. Ulcer of right foot, unspecified ulcer stage  L97.519 707.15   2. Diabetic peripheral neuropathy  E11.42 250.60     357.2   3. Type 2 diabetes mellitus with diabetic polyneuropathy, with long-term current use of insulin  E11.42 250.60    Z79.4 357.2     V58.67   4. Wound infection  T14.8XXA 958.3    L08.9                 Plan:                  Diagnoses and all orders for this visit:    Ulcer of right foot, unspecified ulcer stage    Diabetic peripheral neuropathy    Type 2 diabetes mellitus with diabetic polyneuropathy, with long-term current use of insulin    Wound infection    Other orders  -     doxycycline (VIBRAMYCIN) 100 MG Cap; Take 1 capsule (100 mg total) by mouth 2 (two) times daily. for 10 days        See wound care instructions provided separately

## 2025-03-20 NOTE — PATIENT INSTRUCTIONS
Much of the documentation for this visit was completed in the [employer] system. Please see the attached documentation for further details about this patient's care.

## 2025-03-27 ENCOUNTER — OFFICE VISIT (OUTPATIENT)
Dept: WOUND CARE | Facility: HOSPITAL | Age: 45
End: 2025-03-27
Attending: FAMILY MEDICINE
Payer: MEDICAID

## 2025-03-27 VITALS
HEART RATE: 82 BPM | TEMPERATURE: 98 F | RESPIRATION RATE: 17 BRPM | DIASTOLIC BLOOD PRESSURE: 82 MMHG | SYSTOLIC BLOOD PRESSURE: 147 MMHG

## 2025-03-27 DIAGNOSIS — Z79.4 TYPE 2 DIABETES MELLITUS WITH DIABETIC POLYNEUROPATHY, WITH LONG-TERM CURRENT USE OF INSULIN: ICD-10-CM

## 2025-03-27 DIAGNOSIS — L97.512 ULCER OF RIGHT FOOT, WITH FAT LAYER EXPOSED: ICD-10-CM

## 2025-03-27 DIAGNOSIS — L97.519 ULCER OF RIGHT FOOT, UNSPECIFIED ULCER STAGE: Primary | ICD-10-CM

## 2025-03-27 DIAGNOSIS — E11.42 TYPE 2 DIABETES MELLITUS WITH DIABETIC POLYNEUROPATHY, WITH LONG-TERM CURRENT USE OF INSULIN: ICD-10-CM

## 2025-03-27 DIAGNOSIS — E11.42 DIABETIC PERIPHERAL NEUROPATHY: ICD-10-CM

## 2025-03-27 PROCEDURE — 11042 DBRDMT SUBQ TIS 1ST 20SQCM/<: CPT | Mod: PN | Performed by: FAMILY MEDICINE

## 2025-03-27 PROCEDURE — 11042 DBRDMT SUBQ TIS 1ST 20SQCM/<: CPT | Mod: ,,, | Performed by: FAMILY MEDICINE

## 2025-03-27 PROCEDURE — 99499 UNLISTED E&M SERVICE: CPT | Mod: ,,, | Performed by: FAMILY MEDICINE

## 2025-03-27 NOTE — PROGRESS NOTES
Wound Care Progress Note    Subjective:       Patient ID: Philip Alberts is a 44 y.o. male.    Chief Complaint: No chief complaint on file.    HPI  Pt seen in clinic for a FU visit for DM ulcers of the right plantar foot and heel. Plantar foot wound healed. Pt has no new complaints today. Wound on heel also improved.  Review of Systems   Skin:  Positive for wound.        Right heel open wound   All other systems reviewed and are negative.      Objective:        Physical Exam  Vitals and nursing note reviewed.   Constitutional:       General: He is not in acute distress.     Appearance: Normal appearance.   Skin:     General: Skin is warm and dry.      Findings: Lesion present.      Comments: Right heel open DM ulcer, see wound care assessment documentation in chart review scanned unde r the media tab   Neurological:      General: No focal deficit present.      Mental Status: He is alert.   Psychiatric:         Judgment: Judgment normal.       There were no vitals filed for this visit.    Assessment:           ICD-10-CM ICD-9-CM   1. Ulcer of right foot, unspecified ulcer stage  L97.519 707.15   2. Diabetic peripheral neuropathy  E11.42 250.60     357.2   3. Type 2 diabetes mellitus with diabetic polyneuropathy, with long-term current use of insulin  E11.42 250.60    Z79.4 357.2     V58.67   4. Ulcer of right foot, with fat layer exposed  L97.512 707.15                Plan:                  Diagnoses and all orders for this visit:    Ulcer of right foot, unspecified ulcer stage    Diabetic peripheral neuropathy    Type 2 diabetes mellitus with diabetic polyneuropathy, with long-term current use of insulin    Ulcer of right foot, with fat layer exposed    Much of the documentation for this visit was completed in the [employer] system. Please see the attached documentation for further details about the patient's care. Scanned under Media tab.

## 2025-04-03 ENCOUNTER — OFFICE VISIT (OUTPATIENT)
Dept: WOUND CARE | Facility: HOSPITAL | Age: 45
End: 2025-04-03
Attending: FAMILY MEDICINE
Payer: MEDICAID

## 2025-04-03 VITALS
HEART RATE: 76 BPM | TEMPERATURE: 99 F | SYSTOLIC BLOOD PRESSURE: 150 MMHG | DIASTOLIC BLOOD PRESSURE: 83 MMHG | RESPIRATION RATE: 17 BRPM

## 2025-04-03 DIAGNOSIS — T14.8XXA WOUND INFECTION: ICD-10-CM

## 2025-04-03 DIAGNOSIS — Z79.4 TYPE 2 DIABETES MELLITUS WITH DIABETIC POLYNEUROPATHY, WITH LONG-TERM CURRENT USE OF INSULIN: ICD-10-CM

## 2025-04-03 DIAGNOSIS — E11.42 TYPE 2 DIABETES MELLITUS WITH DIABETIC POLYNEUROPATHY, WITH LONG-TERM CURRENT USE OF INSULIN: ICD-10-CM

## 2025-04-03 DIAGNOSIS — L97.519 ULCER OF RIGHT FOOT, UNSPECIFIED ULCER STAGE: ICD-10-CM

## 2025-04-03 DIAGNOSIS — L97.512 ULCER OF RIGHT FOOT, WITH FAT LAYER EXPOSED: ICD-10-CM

## 2025-04-03 DIAGNOSIS — L08.9 WOUND INFECTION: ICD-10-CM

## 2025-04-03 DIAGNOSIS — E11.42 DIABETIC PERIPHERAL NEUROPATHY: Primary | ICD-10-CM

## 2025-04-03 PROCEDURE — 11042 DBRDMT SUBQ TIS 1ST 20SQCM/<: CPT | Mod: PN | Performed by: FAMILY MEDICINE

## 2025-04-03 RX ORDER — DOXYCYCLINE 100 MG/1
100 CAPSULE ORAL 2 TIMES DAILY
Qty: 20 CAPSULE | Refills: 0 | Status: SHIPPED | OUTPATIENT
Start: 2025-04-03 | End: 2025-04-13

## 2025-04-03 NOTE — PROGRESS NOTES
Wound Care Progress Note    Subjective:       Patient ID: Philip Alberts is a 44 y.o. male.    Chief Complaint: No chief complaint on file.    HPI  Pt seen in clinic for a FU visit for a right heel DM ulcer. Plantar wound is still healed. Pt has no new complaints today. Wound has some forest-wound erythema.  Review of Systems   Skin:  Positive for color change and wound.        Right heel open wound   All other systems reviewed and are negative.      Objective:        Physical Exam  Vitals and nursing note reviewed.   Constitutional:       Appearance: Normal appearance.   Skin:     General: Skin is warm and dry.      Findings: Erythema and lesion present.      Comments: Right heel DM ulcer, see wound care assessment documentation in chart review scanned under the media tab   Neurological:      General: No focal deficit present.      Mental Status: He is alert.   Psychiatric:         Mood and Affect: Mood normal.       There were no vitals filed for this visit.    Assessment:           ICD-10-CM ICD-9-CM   1. Diabetic peripheral neuropathy  E11.42 250.60     357.2   2. Ulcer of right foot, unspecified ulcer stage  L97.519 707.15   3. Type 2 diabetes mellitus with diabetic polyneuropathy, with long-term current use of insulin  E11.42 250.60    Z79.4 357.2     V58.67   4. Ulcer of right foot, with fat layer exposed  L97.512 707.15   5. Wound infection  T14.8XXA 958.3    L08.9                 Plan:                  Diagnoses and all orders for this visit:    Diabetic peripheral neuropathy    Ulcer of right foot, unspecified ulcer stage    Type 2 diabetes mellitus with diabetic polyneuropathy, with long-term current use of insulin    Ulcer of right foot, with fat layer exposed    Wound infection    Other orders  -     doxycycline (VIBRAMYCIN) 100 MG Cap; Take 1 capsule (100 mg total) by mouth 2 (two) times daily. for 10 days        See wound care instructions provided separately

## 2025-04-08 ENCOUNTER — LAB VISIT (OUTPATIENT)
Dept: LAB | Facility: HOSPITAL | Age: 45
End: 2025-04-08
Attending: INTERNAL MEDICINE
Payer: MEDICAID

## 2025-04-08 DIAGNOSIS — E10.42 TYPE 1 DM WITH POLYNEUROPATHY: ICD-10-CM

## 2025-04-08 LAB
ALBUMIN/CREAT UR: NORMAL
ANION GAP (SMH): 7 MMOL/L (ref 8–16)
BUN SERPL-MCNC: 12 MG/DL (ref 6–20)
CALCIUM SERPL-MCNC: 9.2 MG/DL (ref 8.7–10.5)
CHLORIDE SERPL-SCNC: 103 MMOL/L (ref 95–110)
CO2 SERPL-SCNC: 30 MMOL/L (ref 23–29)
CREAT SERPL-MCNC: 0.8 MG/DL (ref 0.5–1.4)
CREAT UR-MCNC: 195.1 MG/DL (ref 23–375)
EAG (SMH): 203 MG/DL (ref 68–131)
GFR SERPLBLD CREATININE-BSD FMLA CKD-EPI: >60 ML/MIN/1.73/M2
GLUCOSE SERPL-MCNC: 101 MG/DL (ref 70–110)
HBA1C MFR BLD: 8.7 % (ref 4.5–6.2)
MICROALBUMIN UR-MCNC: <7 UG/ML
POTASSIUM SERPL-SCNC: 4.4 MMOL/L (ref 3.5–5.1)
SODIUM SERPL-SCNC: 140 MMOL/L (ref 136–145)

## 2025-04-08 PROCEDURE — 36415 COLL VENOUS BLD VENIPUNCTURE: CPT

## 2025-04-08 PROCEDURE — 80048 BASIC METABOLIC PNL TOTAL CA: CPT

## 2025-04-08 PROCEDURE — 83036 HEMOGLOBIN GLYCOSYLATED A1C: CPT

## 2025-04-08 PROCEDURE — 82570 ASSAY OF URINE CREATININE: CPT

## 2025-04-09 ENCOUNTER — RESULTS FOLLOW-UP (OUTPATIENT)
Dept: FAMILY MEDICINE | Facility: CLINIC | Age: 45
End: 2025-04-09

## 2025-04-10 ENCOUNTER — OFFICE VISIT (OUTPATIENT)
Dept: WOUND CARE | Facility: HOSPITAL | Age: 45
End: 2025-04-10
Attending: FAMILY MEDICINE
Payer: MEDICAID

## 2025-04-10 VITALS — RESPIRATION RATE: 16 BRPM | SYSTOLIC BLOOD PRESSURE: 180 MMHG | DIASTOLIC BLOOD PRESSURE: 99 MMHG | TEMPERATURE: 99 F

## 2025-04-10 DIAGNOSIS — L97.519 ULCER OF RIGHT FOOT, UNSPECIFIED ULCER STAGE: ICD-10-CM

## 2025-04-10 DIAGNOSIS — E11.42 DIABETIC PERIPHERAL NEUROPATHY: Primary | ICD-10-CM

## 2025-04-10 DIAGNOSIS — L97.512 ULCER OF RIGHT FOOT, WITH FAT LAYER EXPOSED: ICD-10-CM

## 2025-04-10 DIAGNOSIS — Z79.4 TYPE 2 DIABETES MELLITUS WITH DIABETIC POLYNEUROPATHY, WITH LONG-TERM CURRENT USE OF INSULIN: ICD-10-CM

## 2025-04-10 DIAGNOSIS — E11.42 TYPE 2 DIABETES MELLITUS WITH DIABETIC POLYNEUROPATHY, WITH LONG-TERM CURRENT USE OF INSULIN: ICD-10-CM

## 2025-04-10 PROCEDURE — 99214 OFFICE O/P EST MOD 30 MIN: CPT | Mod: PN | Performed by: FAMILY MEDICINE

## 2025-04-10 NOTE — PROGRESS NOTES
Wound Care Progress Note    Subjective:       Patient ID: Philip Alberts is a 44 y.o. male.    Chief Complaint: No chief complaint on file.    HPI  Pt seen in clinic for a FU visit for a right foot DM ulcer. Wound continues to progress, no new complaints today  Review of Systems   Skin:  Positive for wound.        Right foot open wound   All other systems reviewed and are negative.      Objective:        Physical Exam  Vitals and nursing note reviewed.   Constitutional:       General: He is not in acute distress.     Appearance: Normal appearance.   Skin:     General: Skin is warm and dry.      Findings: Lesion present.      Comments: Right foot DM ulcer, see wound care assessment documentation in chart review scanned under the media tab   Neurological:      General: No focal deficit present.      Mental Status: He is alert.   Psychiatric:         Mood and Affect: Mood normal.         Judgment: Judgment normal.       There were no vitals filed for this visit.    Assessment:           ICD-10-CM ICD-9-CM   1. Diabetic peripheral neuropathy  E11.42 250.60     357.2   2. Ulcer of right foot, unspecified ulcer stage  L97.519 707.15   3. Type 2 diabetes mellitus with diabetic polyneuropathy, with long-term current use of insulin  E11.42 250.60    Z79.4 357.2     V58.67   4. Ulcer of right foot, with fat layer exposed  L97.512 707.15                Plan:                  Diagnoses and all orders for this visit:    Diabetic peripheral neuropathy    Ulcer of right foot, unspecified ulcer stage    Type 2 diabetes mellitus with diabetic polyneuropathy, with long-term current use of insulin    Ulcer of right foot, with fat layer exposed      See wound care instructions provided separately

## 2025-04-10 NOTE — PATIENT INSTRUCTIONS
Much of the documentation for this visit was completed in the [employer] system. Please see the attached documentation for further details about this patient's care.

## 2025-04-15 ENCOUNTER — OFFICE VISIT (OUTPATIENT)
Dept: FAMILY MEDICINE | Facility: CLINIC | Age: 45
End: 2025-04-15
Payer: MEDICAID

## 2025-04-15 ENCOUNTER — PATIENT MESSAGE (OUTPATIENT)
Dept: FAMILY MEDICINE | Facility: CLINIC | Age: 45
End: 2025-04-15

## 2025-04-15 VITALS
SYSTOLIC BLOOD PRESSURE: 138 MMHG | HEART RATE: 87 BPM | BODY MASS INDEX: 26.28 KG/M2 | WEIGHT: 194 LBS | DIASTOLIC BLOOD PRESSURE: 81 MMHG | HEIGHT: 72 IN

## 2025-04-15 DIAGNOSIS — S91.301A WOUND OF RIGHT FOOT: ICD-10-CM

## 2025-04-15 DIAGNOSIS — E10.42 TYPE 1 DM WITH POLYNEUROPATHY: Primary | ICD-10-CM

## 2025-04-15 DIAGNOSIS — E78.2 MIXED HYPERLIPIDEMIA: ICD-10-CM

## 2025-04-15 DIAGNOSIS — I10 PRIMARY HYPERTENSION: ICD-10-CM

## 2025-04-15 DIAGNOSIS — K21.9 GASTROESOPHAGEAL REFLUX DISEASE, UNSPECIFIED WHETHER ESOPHAGITIS PRESENT: ICD-10-CM

## 2025-04-15 DIAGNOSIS — Z91.89 AT INCREASED RISK FOR SOCIAL ISOLATION: ICD-10-CM

## 2025-04-15 DIAGNOSIS — N52.9 ERECTILE DYSFUNCTION, UNSPECIFIED ERECTILE DYSFUNCTION TYPE: ICD-10-CM

## 2025-04-15 PROCEDURE — 1160F RVW MEDS BY RX/DR IN RCRD: CPT | Mod: CPTII,,, | Performed by: INTERNAL MEDICINE

## 2025-04-15 PROCEDURE — 3061F NEG MICROALBUMINURIA REV: CPT | Mod: CPTII,,, | Performed by: INTERNAL MEDICINE

## 2025-04-15 PROCEDURE — G2211 COMPLEX E/M VISIT ADD ON: HCPCS | Mod: S$PBB,,, | Performed by: INTERNAL MEDICINE

## 2025-04-15 PROCEDURE — 3008F BODY MASS INDEX DOCD: CPT | Mod: CPTII,,, | Performed by: INTERNAL MEDICINE

## 2025-04-15 PROCEDURE — 3066F NEPHROPATHY DOC TX: CPT | Mod: CPTII,,, | Performed by: INTERNAL MEDICINE

## 2025-04-15 PROCEDURE — 4010F ACE/ARB THERAPY RXD/TAKEN: CPT | Mod: CPTII,,, | Performed by: INTERNAL MEDICINE

## 2025-04-15 PROCEDURE — 1159F MED LIST DOCD IN RCRD: CPT | Mod: CPTII,,, | Performed by: INTERNAL MEDICINE

## 2025-04-15 PROCEDURE — 3052F HG A1C>EQUAL 8.0%<EQUAL 9.0%: CPT | Mod: CPTII,,, | Performed by: INTERNAL MEDICINE

## 2025-04-15 PROCEDURE — 99213 OFFICE O/P EST LOW 20 MIN: CPT | Mod: PBBFAC,PN | Performed by: INTERNAL MEDICINE

## 2025-04-15 PROCEDURE — 99999 PR PBB SHADOW E&M-EST. PATIENT-LVL III: CPT | Mod: PBBFAC,,, | Performed by: INTERNAL MEDICINE

## 2025-04-15 PROCEDURE — 99214 OFFICE O/P EST MOD 30 MIN: CPT | Mod: S$PBB,,, | Performed by: INTERNAL MEDICINE

## 2025-04-15 PROCEDURE — 3075F SYST BP GE 130 - 139MM HG: CPT | Mod: CPTII,,, | Performed by: INTERNAL MEDICINE

## 2025-04-15 PROCEDURE — 3079F DIAST BP 80-89 MM HG: CPT | Mod: CPTII,,, | Performed by: INTERNAL MEDICINE

## 2025-04-15 NOTE — PROGRESS NOTES
Subjective:       Patient ID: Philip Alberts is a 44 y.o. male.    Chief Complaint: Diabetes, Labs Only, Hyperlipidemia, and Gastroesophageal Reflux    Patient is a 44-year-old  male who comes for follow-up.  Underlying medical issues are as below:-        1.-poorly-controlled diabetes mellitus on long and short-acting insulin.    2.-chronic tobacco dependency was noted also and he was advised to quit smoking.  At that point he had stated that he has no other pleasures left in life and not even eating given lack of teeth and that smoking is his only pleasure.  3.-gastroesophageal reflux -taking esomeprazole 20 mg  4.-hyperlipidemia  5.-neuropathy.  6.-difficult compliance  7.-difficulty healing of toes on the right leg.  Necrotic ulcers.  Had followed up with wound management and finally healed  History of Present Illness    CHIEF COMPLAINT:  Philip presents for follow-up of diabetes management and to discuss recent lab results.    HPI:  Philip reports ongoing challenges with managing blood sugar. He describes blood sugar spikes approximately 2 hours post-prandial, with delayed response to insulin administration. Even 2 hours after insulin administration, his sugar levels remain elevated, necessitating additional insulin doses. Morning blood sugar is often around 350 after taking slow-acting insulin the previous night, dropping to around 70 by 6 AM. He expresses difficulty finding an optimal balance despite his efforts.    His current A1C is under 9, which he acknowledges is higher than desired. He aims to achieve blood sugar under 180 if possible. Under this doctor's care for about 1.5 years, his A1C has improved from around 10.9 to under 9.    He reports a mostly healed foot ulcer with a small residual area. He is evaluated by Dr. Daron Begum every Thursday for foot care. He has an amputated right middle toe.    He has nearly quit smoking, reducing from 2 packs daily to about 2 cigarettes daily. He  occasionally has sweating for about 20-30 minutes when his blood sugar is elevated and he takes insulin to lower it.    He denies chest pain, complicated mucus, abdominal pain, nausea, and issues with his other foot.    He does mention social challenges of travel, driving and also to worry about his 18-year-old daughter who is a college going student and suffers from episodes of hypoglycemia.  He asks me if I can take her as a patient also.  (I have advised him to check with the office with availability and other issues and the next available provider. ).    The some diabetes which runs in the family.      Hyperlipidemia:-this has been going on for several years and he is currently taking atorvastatin 20 mg without any major issues.    Erectile dysfunction:-he takes Viagra as needed though his not in any active relationship.  No recent use of Viagra.    Hypertension:-his blood pressures had recently been elevated and his lisinopril was increased from 5 mg which was use mostly for renal protection to hypertensive dose.  Blood pressures seem to be okay.  Tolerating medication    Tobacco dependency:-he has come a long way from quitting smoking from 2 packs to 2 cigarettes per day.  He is extremely concerned about the slightly elevated CO2 level noticed on his blood and I explained him the correlation of longstanding tobacco dependency and CO2.  The fact that tobacco can do a lot of damage on his lungs was not as much mentally detrimental to him as much as the number of CO2 being elevated.  I have offered him referral to tobacco cessation program but he feels he can do it him    I have advised him that while there will be improvement after quitting smoking, some of the damage has already been done and can not be reversed after several decades of smoking    Gastroesophageal reflux     He continues with esomeprazole for the same    Ophthalmology exam:-indicates lack of a suitable car to take him for ophthalmology  appointment.    MEDICATIONS:  Philip is on Lantus insulin, taking 22 units in the morning and 22 units in the evening for diabetes management. He is also on Atorvastatin for lowering cholesterol.    MEDICAL HISTORY:  Philip has a history of Type 1 diabetes, foot ulcer, and right middle toe amputation.    FAMILY HISTORY:  Family history is significant for mother with diabetes, diagnosed in her 50s. His father has COPD. Philip's daughter experiences episodes of low blood sugar but has not been officially diagnosed with diabetes.    SURGICAL HISTORY:  Philip underwent a right middle toe amputation several years ago.    TEST RESULTS:  Philip's recent A1C is under 9, showing an improvement from the previous result of 10.9. His recent urine microalbumin is 7 mg/dL, which is within the normal range of less than 19 mg/dL. A recent basic chemistry panel revealed sodium at 140 mEq/L, potassium at 4.4 mEq/L, BUN at 12 mg/dL, creatinine at 0.8 mg/dL, and GFR >60 mL/min/1.73m². Philip's CO2 level is slightly elevated at 30 mmol/L, with normal being 29 or less.    SOCIAL HISTORY:  Smokin cigarettes per day  No significant alcohol use  Chronically disabled.      ROS:  General: -fever, -chills, -fatigue, -weight gain, -weight loss, +excessive sweating  Cardiovascular: -chest pain, -palpitations, -lower extremity edema  Respiratory: -cough, -shortness of breath  Gastrointestinal: -abdominal pain, -nausea, -vomiting, -diarrhea, -constipation, -blood in stool  Skin: -rash, -lesion, +wound  Neurological: -headache, -dizziness, -numbness, -tingling       Previous visit, we had noticed poorly-controlled diabetes with a hemoglobin A1c of 8 and above.  I had increased his insulin regimen with the hope for better control but recent A1c level remains worse.      It is difficult to find endocrinologist and will make a referral if he agrees.  It is difficult for him to travel outside this town and may consider transportation also if  needed perhaps an initial visit miss subsequently followed by online consultation may bring his sugars better control.    Historically he has not shown much interest either in tighter control of diabetes.      He had a foot ulcer last time and had made a referral to chronic wound management with Dr. Bob Rosas also.    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        Feet:    Feet:      Right foot:      Toenail Condition: Right toenails are abnormally thick.      Left foot:      Toenail Condition: Left toenails are abnormally thick.      Comments: Status post amputation of the right 3rd toe.  Multiple calluses noted underneath and a healing ulcer in the left heel.  Please see the pictures  Neurological:      Mental Status: He is alert.   Psychiatric:         Mood and Affect: Mood is anxious.      Comments: Chronic low-level of anxiety.       Blood pressure 138/81, pulse 87, height 6' (1.829 m), weight 88 kg (194 lb 0.1 oz). Body mass index is 26.31 kg/m².  Physical Exam    General: No acute distress. Well-developed. Well-nourished.  Somewhat chronically  unwell.  Eyes: EOMI. Sclerae anicteric.  Cardiovascular: Regular rate. Regular rhythm. No murmurs. No rubs. No gallops. Normal S1, S2. Regular rate and rhythm. Normal heart sounds.  Respiratory: Normal respiratory effort. Clear to auscultation bilaterally. No rales. No rhonchi. No wheezing.  Abdomen soft no rebound  Musculoskeletal: No  obvious deformity.  Extremities: No lower extremity edema.  Neurological: Alert & oriented x3.  Psychiatric: Normal mood. Normal affect. Good insight. Good judgment.  Skin: Warm. Dry.                    Assessment:       Lab Visit on 04/08/2025   Component Date Value Ref Range Status    Hemoglobin A1c 04/08/2025 8.7 (H)  4.5 - 6.2 % Final    Estimated Average Glucose 04/08/2025 203 (H)  68 - 131 mg/dL Final    Sodium 04/08/2025 140  136 - 145 mmol/L Final    Potassium 04/08/2025 4.4  3.5 - 5.1 mmol/L Final    Chloride 04/08/2025 103  95 - 110 mmol/L Final    CO2 04/08/2025 30 (H)  23 - 29 mmol/L Final    Glucose 04/08/2025 101  70 - 110 mg/dL Final    BUN 04/08/2025 12  6 - 20 mg/dL Final    Creatinine 04/08/2025 0.8  0.5 - 1.4 mg/dL Final    Calcium 04/08/2025 9.2  8.7 - 10.5 mg/dL Final    Anion Gap 04/08/2025 7 (L)  8 - 16 mmol/L Final    eGFR 04/08/2025 >60  >60 mL/min/1.73/m2 Final    Urine Microalbumin 04/08/2025 <7.0  <=19.8 ug/mL Final    Urine Creatinine 04/08/2025 195.1  23.0 - 375.0 mg/dL Final    Microalbumin/Creatinine Ratio Urine 04/08/2025    Final     omponent  Ref Range & Units (hover) 6 d ago  (4/8/25) 4 mo ago  (12/5/24) 1 yr ago  (1/8/24) 1 yr ago  (8/7/23) 1 yr ago  (4/26/23) 2 yr ago  (2/10/23) 3 yr ago  (6/28/21)   Hemoglobin A1c 8.7 High  8.5 High  CM 9.9 High  R, CM 9.4 High  CM 10.0 High  CM 12.2 High  CM 9.0 High    View Follow-Up Encounter       1 HM Topic           Component  Ref Range & Units (hover) 6 d ago  (4/8/25) 4 mo ago  (12/5/24) 1 yr ago  (1/8/24) 1 yr ago  (4/26/23) 4 yr ago  (1/6/21) 6 yr ago  (1/21/19)   Urine Microalbumin <7.0 <7.0 R  4.2 R  <2.0 R <2.0 R   Urine Creatinine 195.1 201.1 .4 R 267.0 CM 58.0 CM 92.0        Assessment & Plan    E10.9 Type 1 diabetes mellitus without complications  L97.512 Non-pressure chronic ulcer of other part of right foot with fat layer exposed  Z89.421 Acquired absence of other right toe(s)  F17.210 Nicotine dependence, cigarettes, uncomplicated  Z83.3 Family history of diabetes mellitus  Z83.6 Family history of other diseases of the respiratory system  Z79.4 Long term (current) use of insulin    IMPRESSION:  - Assessed diabetes management, noting difficulty controlling glucose levels post-meals and with insulin administration.  - Increased Lantus insulin from 22 units to 25 units twice daily from April 15th to April 30th. Authorized increase to 28 units twice daily from May 1st to May 15th, pending review of glucose levels, aiming for A1C closer to 8%.  - Evaluated foot ulcer healing progress, noting improvement with weekly podiatry follow-ups.  - Reviewed recent lab results, including urine microalbumin and basic chemistry panel.  - Noted slightly elevated CO2 levels, likely related to smoking history.  - Considered insulin pump therapy for better glucose management, but acknowledged insurance limitations.  - Considered referral to endocrinologist or diabetic NP for specialized diabetes management, but recognized access issues due to insurance constraints.    E10.9 TYPE 1 DIABETES MELLITUS WITHOUT COMPLICATIONS:  - Explained the evolution of insulin types, their mechanisms of action, and challenges of mimicking natural pancreatic function with current insulin therapies.  - Noted patient's difficulty managing blood sugar, with spikes occurring 2 hours post-prandial and persistent hyperglycemia despite insulin administration.  - Acknowledged improvement in A1C from previous levels of 10.5-10.9 to current levels under 9, but emphasized the need for better glycemic control.  - Set a goal to bring A1C closer to  7.5.  - Planned to increase Lantus insulin dosage gradually from 22 units to 25 units, then to 28 units, with regular monitoring.  - Discussed the possibility of using Trulicity, a long-acting insulin, noting potential insurance coverage issues.  - Mentioned the option of an insulin pump for better diabetes management.  - Considered referral to endocrinologist or nurse practitioner specializing in diabetes management.  - Instructed patient to communicate glucose levels (highest, lowest, and average) every 2 weeks.  - Scheduled follow up in 4-6 months.    L97.512 NON-PRESSURE CHRONIC ULCER OF OTHER PART OF RIGHT FOOT WITH FAT LAYER EXPOSED:  - Provided information on the impact of carbon dioxide retention on healing processes.  - Reviewed pictures of the patient's foot ulcers, noting the healing progress and the exposed areas.  - Acknowledged the healing progress but noted that diabetes complicates the healing process.  - Confirmed that the main ulcer is almost closed with only a small spot remaining.  - Noted that the patient is receiving regular treatment from Dr. Daron Begum every Thursday for the foot ulcer.    Z89.421 ACQUIRED ABSENCE OF OTHER RIGHT TOE(S):  - Noted the patient has an amputated right middle toe.    F17.210 NICOTINE DEPENDENCE, CIGARETTES, UNCOMPLICATED:  - Provided information on the long-term effects of smoking on lung function, potential future need for oxygen therapy, and impact of carbon dioxide retention on overall health.  - Noted significant reduction in smoking, from 2 packs per day to about 2 cigarettes   per day.  - Observed slightly elevated CO2 level at 30 (normal range is 29 or less), indicating potential long-term effects on CO2 retention and lung function due to smoking history.  - Discussed the importance of smoking cessation for faster healing and better overall health, especially considering the patient's diabetes and foot ulcers.  - Offered to enroll the patient in a smoking  cessation program if needed.  - Instructed the patient to continue efforts to quit smoking.    Z83.3 FAMILY HISTORY OF DIABETES MELLITUS:  - Noted family history of diabetes, with mother diagnosed in her 50s and daughter potentially developing symptoms at 19.  - Advised the patient to have his daughter consult a physician for proper diagnosis and management of her potential diabetes.    Z83.6 FAMILY HISTORY OF OTHER DISEASES OF THE RESPIRATORY SYSTEM:  - Noted that father has COPD.    Z79.4 LONG TERM (CURRENT) USE OF INSULIN:  - Increased Lantus insulin from 22 units to 25 units twice daily from April 15th to April 30th.  - Authorized increase to 28 units twice daily from May 1st to May 15th, pending review of glucose levels, aiming for A1C closer to 8%.  - Instructed patient to monitor and communicate glucose levels every 2 weeks.         Plan:   Type 1 DM with polyneuropathy  -     Hemoglobin A1C; Future; Expected date: 07/15/2025  -     Basic Metabolic Panel; Future; Expected date: 07/15/2025    Primary hypertension    Mixed hyperlipidemia  -     Lipid Panel; Future; Expected date: 07/15/2025    Erectile dysfunction, unspecified erectile dysfunction type    Gastroesophageal reflux disease, unspecified whether esophagitis present    At increased risk for social isolation    Wound of right foot      Control of diabetes continues to remain poor.  Increase Lantus unit to 25 units and by 3 units every  2 week his sugars reliably are less than 180.  Continue with wound care.  Last time I had increased the lisinopril to 10 mg and also changed pravastatin to atorvastatin for better efficacy.  He is also progressing on quitting or cutting down on smoking.  It has been a long way for cutting down from 2 packs per day to about 2 cigarettes per day.  His CO2 level is slightly elevated indicating probably some long-term effects on CO2 retention and effects on the lungs.  Quitting smoking will also be important for his body to  heal faster as he struggles with the ulcers and calluses in his feet.  The forefoot ulcer callus under his feet has healed now but there is a hindfoot ulcer which is in the process of healing.  He has a amputated right middle toe for years.  I will see him back in 3-4 months time but will keep in communication perhaps every 2 weeks with the blood sugars and how is he doing with increments of Lantus insulin every 3 weeks.  Driving long distances is not a very good option for him at this point but if it does become, will make referral to endocrine specialists and nurse practitioners perhaps in the other side of [facility].  I will see him back in 4-6 months time otherwise.    Follow up in about 4 months (around 8/15/2025), or if symptoms worsen or fail to improve, for Diabetes/HTN/Lipids.    Current Medications[2]    This note was generated with the assistance of ambient listening technology. Verbal consent was obtained by the patient and accompanying visitor(s) for the recording of patient appointment to facilitate this note. I attest to having reviewed and edited the generated note for accuracy, though some syntax or spelling errors may persist. Please contact the author of this note for any clarification.      Alfie Anisha    Visit today included increased complexity associated with the care of the episodic problem diabetes mellitus/hyperlipidemia addressed and managing the longitudinal care of the patient due to the serious and/or complex managed problem(s) diabetes mellitus/hyperlipidemia.          [1]   Social History  Socioeconomic History    Marital status: Single   Tobacco Use    Smoking status: Every Day    Smokeless tobacco: Never   Substance and Sexual Activity    Alcohol use: No    Drug use: No    Sexual activity: Yes     Social Drivers of Health     Financial Resource Strain: High Risk (8/17/2023)    Overall Financial Resource Strain (CARDIA)     Difficulty of Paying Living Expenses: Hard   Food Insecurity: No  Food Insecurity (8/17/2023)    Hunger Vital Sign     Worried About Running Out of Food in the Last Year: Never true     Ran Out of Food in the Last Year: Never true   Transportation Needs: No Transportation Needs (8/17/2023)    PRAPARE - Transportation     Lack of Transportation (Medical): No     Lack of Transportation (Non-Medical): No   Physical Activity: Inactive (8/17/2023)    Exercise Vital Sign     Days of Exercise per Week: 0 days     Minutes of Exercise per Session: 0 min   Stress: Stress Concern Present (8/17/2023)    [employer] - Occupational Stress Questionnaire     Feeling of Stress : To some extent   Housing Stability: Low Risk  (8/17/2023)    Housing Stability Vital Sign     Unable to Pay for Housing in the Last Year: No     Number of Places Lived in the Last Year: 1     Unstable Housing in the Last Year: No   [2]   Current Outpatient Medications:     atorvastatin (LIPITOR) 20 MG tablet, Take 1 tablet (20 mg total) by mouth every evening., Disp: 90 tablet, Rfl: 3    blood-glucose meter kit, To check BG qid times daily, to use with insurance preferred meter, Disp: 1 each, Rfl: 0    blood-glucose sensor (DEXCOM G7 SENSOR) [place]ia, APPLY 1 SENSOR TO SKIN AND CHANGE OUT EVERY 10 DAYS, Disp: 3 each, Rfl: 5    esomeprazole (NEXIUM) 20 MG capsule, Take 1 capsule (20 mg total) by mouth before breakfast., Disp: 90 capsule, Rfl: 1    gabapentin (NEURONTIN) 300 MG capsule, TAKE 2 CAPSULES BY MOUTH THREE TIMES DAILY, Disp: 180 capsule, Rfl: 5    insulin aspart U-100 (NOVOLOG FLEXPEN U-100 INSULIN) 100 unit/mL (3 mL) InPn pen, INJECT 10 UNITS SUBCUTANEOUSLY 4 TIMES DAILY PER  SLIDING  SCALE  AS  INSTRUCTED, Disp: 15 mL, Rfl: 5    LANTUS SOLOSTAR U-100 INSULIN 100 unit/mL (3 mL) InPn pen, Increase INJECT 22 UNITS INTO THE SKIN TWICE DAILY, Disp: 15 mL, Rfl: 0    lisinopriL 10 MG tablet, Take 1 tablet (10 mg total) by mouth once daily., Disp: 90 tablet, Rfl: 1    mupirocin (BACTROBAN) 2 %  "ointment, Apply topically 3 (three) times daily., Disp: 22 g, Rfl: 0    pen needle, diabetic (BD ULTRA-FINE MINI PEN NEEDLE) 31 gauge x 3/16" Ndle, USE 1  4 TIMES DAILY BEFORE MEAL(S) AND NIGHTLY, Disp: 100 each, Rfl: 3    sildenafiL (VIAGRA) 100 MG tablet, Take 1 tablet (100 mg total) by mouth daily as needed for Erectile Dysfunction., Disp: 20 tablet, Rfl: 1    aspirin (ECOTRIN) 81 MG EC tablet, Take 1 tablet (81 mg total) by mouth once daily., Disp: 90 tablet, Rfl: 3    blood-glucose meter,continuous (DEXCOM ) Misc, 1 Device by Misc.(Non-Drug; Combo Route) route once daily., Disp: 1 each, Rfl: 0    "

## 2025-04-17 ENCOUNTER — OFFICE VISIT (OUTPATIENT)
Dept: WOUND CARE | Facility: HOSPITAL | Age: 45
End: 2025-04-17
Attending: FAMILY MEDICINE
Payer: MEDICAID

## 2025-04-17 VITALS
HEART RATE: 90 BPM | SYSTOLIC BLOOD PRESSURE: 176 MMHG | TEMPERATURE: 98 F | RESPIRATION RATE: 12 BRPM | DIASTOLIC BLOOD PRESSURE: 86 MMHG

## 2025-04-17 DIAGNOSIS — E11.42 DIABETIC PERIPHERAL NEUROPATHY: Primary | ICD-10-CM

## 2025-04-17 DIAGNOSIS — Z79.4 TYPE 2 DIABETES MELLITUS WITH DIABETIC POLYNEUROPATHY, WITH LONG-TERM CURRENT USE OF INSULIN: ICD-10-CM

## 2025-04-17 DIAGNOSIS — L08.9 WOUND INFECTION: ICD-10-CM

## 2025-04-17 DIAGNOSIS — T14.8XXA WOUND INFECTION: ICD-10-CM

## 2025-04-17 DIAGNOSIS — L97.512 ULCER OF RIGHT FOOT, WITH FAT LAYER EXPOSED: ICD-10-CM

## 2025-04-17 DIAGNOSIS — E11.42 TYPE 2 DIABETES MELLITUS WITH DIABETIC POLYNEUROPATHY, WITH LONG-TERM CURRENT USE OF INSULIN: ICD-10-CM

## 2025-04-17 DIAGNOSIS — L97.519 ULCER OF RIGHT FOOT, UNSPECIFIED ULCER STAGE: ICD-10-CM

## 2025-04-17 PROCEDURE — 11042 DBRDMT SUBQ TIS 1ST 20SQCM/<: CPT | Mod: PN

## 2025-04-17 PROCEDURE — 11042 DBRDMT SUBQ TIS 1ST 20SQCM/<: CPT | Mod: PN | Performed by: FAMILY MEDICINE

## 2025-04-17 RX ORDER — DOXYCYCLINE 100 MG/1
100 CAPSULE ORAL 2 TIMES DAILY
Qty: 20 CAPSULE | Refills: 0 | Status: SHIPPED | OUTPATIENT
Start: 2025-04-17 | End: 2025-04-27

## 2025-04-17 NOTE — PATIENT INSTRUCTIONS
Much of the documentation for this visit was completed in the [employer] system. Please see the attached documentation for further details about this patient's care.

## 2025-04-17 NOTE — PROGRESS NOTES
Wound Care Progress Note    Subjective:       Patient ID: Philip Alberts is a 44 y.o. male.    Chief Complaint: No chief complaint on file.    HPI  Pt seen in clinic for a FU visit for right foot DM ulcer. Pt wound stable, some erythema, no other complaint today  Review of Systems   Skin:  Positive for wound.        Right heel open wound   All other systems reviewed and are negative.      Objective:        Physical Exam  Vitals and nursing note reviewed.   Constitutional:       Appearance: Normal appearance.   Skin:     General: Skin is warm and dry.      Findings: Lesion present.      Comments: Right foot DM ulcer, see wound care assessment documentation in chart review scanned under the media tab   Neurological:      General: No focal deficit present.   Psychiatric:         Thought Content: Thought content normal.       There were no vitals filed for this visit.    Assessment:           ICD-10-CM ICD-9-CM   1. Diabetic peripheral neuropathy  E11.42 250.60     357.2   2. Ulcer of right foot, unspecified ulcer stage  L97.519 707.15   3. Type 2 diabetes mellitus with diabetic polyneuropathy, with long-term current use of insulin  E11.42 250.60    Z79.4 357.2     V58.67   4. Ulcer of right foot, with fat layer exposed  L97.512 707.15   5. Wound infection  T14.8XXA 958.3    L08.9                 Plan:                  Diagnoses and all orders for this visit:    Diabetic peripheral neuropathy    Ulcer of right foot, unspecified ulcer stage    Type 2 diabetes mellitus with diabetic polyneuropathy, with long-term current use of insulin    Ulcer of right foot, with fat layer exposed    Wound infection    Other orders  -     doxycycline (VIBRAMYCIN) 100 MG Cap; Take 1 capsule (100 mg total) by mouth 2 (two) times daily. for 10 days        See wound care instructions provided separately

## 2025-04-24 ENCOUNTER — OFFICE VISIT (OUTPATIENT)
Dept: WOUND CARE | Facility: HOSPITAL | Age: 45
End: 2025-04-24
Attending: FAMILY MEDICINE
Payer: MEDICAID

## 2025-04-24 VITALS
SYSTOLIC BLOOD PRESSURE: 166 MMHG | DIASTOLIC BLOOD PRESSURE: 97 MMHG | RESPIRATION RATE: 20 BRPM | HEART RATE: 92 BPM | TEMPERATURE: 98 F

## 2025-04-24 DIAGNOSIS — L97.519 ULCER OF RIGHT FOOT, UNSPECIFIED ULCER STAGE: ICD-10-CM

## 2025-04-24 DIAGNOSIS — L97.513 ULCER OF RIGHT FOOT WITH NECROSIS OF MUSCLE: ICD-10-CM

## 2025-04-24 DIAGNOSIS — E11.42 DIABETIC PERIPHERAL NEUROPATHY: Primary | ICD-10-CM

## 2025-04-24 DIAGNOSIS — Z79.4 TYPE 2 DIABETES MELLITUS WITH DIABETIC POLYNEUROPATHY, WITH LONG-TERM CURRENT USE OF INSULIN: ICD-10-CM

## 2025-04-24 DIAGNOSIS — E11.42 TYPE 2 DIABETES MELLITUS WITH DIABETIC POLYNEUROPATHY, WITH LONG-TERM CURRENT USE OF INSULIN: ICD-10-CM

## 2025-04-24 PROCEDURE — 99499 UNLISTED E&M SERVICE: CPT | Mod: ,,, | Performed by: FAMILY MEDICINE

## 2025-04-24 PROCEDURE — 11042 DBRDMT SUBQ TIS 1ST 20SQCM/<: CPT | Mod: ,,, | Performed by: FAMILY MEDICINE

## 2025-04-24 PROCEDURE — 11042 DBRDMT SUBQ TIS 1ST 20SQCM/<: CPT | Mod: PN | Performed by: FAMILY MEDICINE

## 2025-04-24 NOTE — PROGRESS NOTES
Wound Care Progress Note    Subjective:       Patient ID: Philip Alberts is a 44 y.o. male.    Chief Complaint: No chief complaint on file.    HPI  Pt seen in clinic for a FU visit for a right heel DM ulcer. Wound continues to improve, no new complaints today.  Review of Systems   Skin:  Positive for wound.        Right heel DM ucler   All other systems reviewed and are negative.      Objective:        Physical Exam  Vitals and nursing note reviewed.   Constitutional:       Appearance: Normal appearance.   Skin:     General: Skin is warm and dry.      Findings: Lesion present.      Comments: Right heel DM ulcer, see wound care assessment documentation in chart review scanned unde the media tab   Neurological:      General: No focal deficit present.   Psychiatric:         Mood and Affect: Mood normal.         Judgment: Judgment normal.       There were no vitals filed for this visit.    Assessment:           ICD-10-CM ICD-9-CM   1. Diabetic peripheral neuropathy  E11.42 250.60     357.2   2. Ulcer of right foot, unspecified ulcer stage  L97.519 707.15   3. Type 2 diabetes mellitus with diabetic polyneuropathy, with long-term current use of insulin  E11.42 250.60    Z79.4 357.2     V58.67   4. Ulcer of right foot with necrosis of muscle  L97.513 707.15     728.89                Plan:                  Diagnoses and all orders for this visit:    Diabetic peripheral neuropathy    Ulcer of right foot, unspecified ulcer stage    Type 2 diabetes mellitus with diabetic polyneuropathy, with long-term current use of insulin    Ulcer of right foot with necrosis of muscle        Much of the documentation for this visit was completed in the W[facility] Docs system. Please see the attached documentation for further details about the patient's care. Scanned under Media tab.

## 2025-05-01 ENCOUNTER — OFFICE VISIT (OUTPATIENT)
Dept: WOUND CARE | Facility: HOSPITAL | Age: 45
End: 2025-05-01
Attending: FAMILY MEDICINE
Payer: MEDICAID

## 2025-05-01 VITALS
SYSTOLIC BLOOD PRESSURE: 170 MMHG | HEART RATE: 84 BPM | DIASTOLIC BLOOD PRESSURE: 87 MMHG | RESPIRATION RATE: 18 BRPM | TEMPERATURE: 99 F

## 2025-05-01 DIAGNOSIS — T14.8XXA WOUND INFECTION: ICD-10-CM

## 2025-05-01 DIAGNOSIS — L08.9 WOUND INFECTION: ICD-10-CM

## 2025-05-01 DIAGNOSIS — L97.512 ULCER OF RIGHT FOOT, WITH FAT LAYER EXPOSED: ICD-10-CM

## 2025-05-01 DIAGNOSIS — E11.42 DIABETIC PERIPHERAL NEUROPATHY: Primary | ICD-10-CM

## 2025-05-01 DIAGNOSIS — L97.519 ULCER OF RIGHT FOOT, UNSPECIFIED ULCER STAGE: ICD-10-CM

## 2025-05-01 DIAGNOSIS — Z79.4 TYPE 2 DIABETES MELLITUS WITH DIABETIC POLYNEUROPATHY, WITH LONG-TERM CURRENT USE OF INSULIN: ICD-10-CM

## 2025-05-01 DIAGNOSIS — E11.42 TYPE 2 DIABETES MELLITUS WITH DIABETIC POLYNEUROPATHY, WITH LONG-TERM CURRENT USE OF INSULIN: ICD-10-CM

## 2025-05-01 PROCEDURE — 11042 DBRDMT SUBQ TIS 1ST 20SQCM/<: CPT | Mod: PN | Performed by: FAMILY MEDICINE

## 2025-05-01 RX ORDER — DOXYCYCLINE 100 MG/1
100 CAPSULE ORAL 2 TIMES DAILY
Qty: 20 CAPSULE | Refills: 0 | Status: SHIPPED | OUTPATIENT
Start: 2025-05-01 | End: 2025-05-11

## 2025-05-01 NOTE — PATIENT INSTRUCTIONS
Much of the documentation for this visit was completed in the W[facility]s system.  Please see the attached documentation for further details about the patient's care. Scanned under the Media tab.

## 2025-05-01 NOTE — PROGRESS NOTES
Wound Care Progress Note    Subjective:       Patient ID: Philip Alberts is a 44 y.o. male.    Chief Complaint: No chief complaint on file.    HPI  Pt seen in clinic for a FU visit for a right foot DM ulcer. Pt has some erythema around the wound, responds well to doxycycline. No other complaints today  Review of Systems   Skin:  Positive for wound.        Right foot DM ulcer   All other systems reviewed and are negative.      Objective:        Physical Exam  Vitals and nursing note reviewed.   Constitutional:       General: He is not in acute distress.     Appearance: Normal appearance.   Skin:     General: Skin is warm and dry.      Findings: Lesion present.      Comments: Right foot DM ulcer, see wound care assessment documentation in chart review scanned under the media tab   Neurological:      General: No focal deficit present.      Mental Status: He is alert.   Psychiatric:         Judgment: Judgment normal.       There were no vitals filed for this visit.    Assessment:           ICD-10-CM ICD-9-CM   1. Diabetic peripheral neuropathy  E11.42 250.60     357.2   2. Type 2 diabetes mellitus with diabetic polyneuropathy, with long-term current use of insulin  E11.42 250.60    Z79.4 357.2     V58.67   3. Ulcer of right foot, unspecified ulcer stage  L97.519 707.15   4. Ulcer of right foot, with fat layer exposed  L97.512 707.15   5. Wound infection  T14.8XXA 958.3    L08.9                 Plan:                  Diagnoses and all orders for this visit:    Diabetic peripheral neuropathy    Type 2 diabetes mellitus with diabetic polyneuropathy, with long-term current use of insulin    Ulcer of right foot, unspecified ulcer stage    Ulcer of right foot, with fat layer exposed    Wound infection    Other orders  -     doxycycline (VIBRAMYCIN) 100 MG Cap; Take 1 capsule (100 mg total) by mouth 2 (two) times daily. for 10 days        See wound care instructions provided separately

## 2025-05-08 ENCOUNTER — OFFICE VISIT (OUTPATIENT)
Dept: WOUND CARE | Facility: HOSPITAL | Age: 45
End: 2025-05-08
Attending: FAMILY MEDICINE
Payer: MEDICAID

## 2025-05-08 VITALS
HEART RATE: 85 BPM | RESPIRATION RATE: 16 BRPM | DIASTOLIC BLOOD PRESSURE: 65 MMHG | TEMPERATURE: 98 F | SYSTOLIC BLOOD PRESSURE: 125 MMHG

## 2025-05-08 DIAGNOSIS — T14.8XXA WOUND INFECTION: ICD-10-CM

## 2025-05-08 DIAGNOSIS — L97.512 ULCER OF RIGHT FOOT, WITH FAT LAYER EXPOSED: ICD-10-CM

## 2025-05-08 DIAGNOSIS — E11.42 DIABETIC PERIPHERAL NEUROPATHY: Primary | ICD-10-CM

## 2025-05-08 DIAGNOSIS — Z79.4 TYPE 2 DIABETES MELLITUS WITH DIABETIC POLYNEUROPATHY, WITH LONG-TERM CURRENT USE OF INSULIN: ICD-10-CM

## 2025-05-08 DIAGNOSIS — E11.42 TYPE 2 DIABETES MELLITUS WITH DIABETIC POLYNEUROPATHY, WITH LONG-TERM CURRENT USE OF INSULIN: ICD-10-CM

## 2025-05-08 DIAGNOSIS — L08.9 WOUND INFECTION: ICD-10-CM

## 2025-05-08 DIAGNOSIS — L97.519 ULCER OF RIGHT FOOT, UNSPECIFIED ULCER STAGE: ICD-10-CM

## 2025-05-08 PROCEDURE — 99214 OFFICE O/P EST MOD 30 MIN: CPT | Mod: PN | Performed by: FAMILY MEDICINE

## 2025-05-08 RX ORDER — DOXYCYCLINE 100 MG/1
100 CAPSULE ORAL 2 TIMES DAILY
Qty: 20 CAPSULE | Refills: 0 | Status: SHIPPED | OUTPATIENT
Start: 2025-05-08 | End: 2025-05-18

## 2025-05-08 NOTE — PROGRESS NOTES
"          Wound Care Progress Note    Subjective:       Patient ID: Philip Alberts is a 44 y.o. male.    Chief Complaint: Wound Care    HPI  Pt seen in clinic for a FU visit for a right foot DM ulcer. Wound is slowly healing, still some drainage, concern for infection. No other complaints today  Review of Systems   Skin:  Positive for wound.        Open wound right foot   All other systems reviewed and are negative.      Objective:        Physical Exam  Vitals and nursing note reviewed.   Constitutional:       General: He is not in acute distress.     Appearance: Normal appearance.   Skin:     General: Skin is warm and dry.      Findings: Lesion present.      Comments: Right foot open wound, see wound care assessment documentation in chart review scanned under the media tab   Neurological:      General: No focal deficit present.      Mental Status: He is alert.   Psychiatric:         Judgment: Judgment normal.       Vitals:    05/08/25 0810   BP: 125/65   Pulse: 85   Resp: 16   Temp: 98.4 °F (36.9 °C)       Assessment:           ICD-10-CM ICD-9-CM   1. Diabetic peripheral neuropathy  E11.42 250.60     357.2   2. Type 2 diabetes mellitus with diabetic polyneuropathy, with long-term current use of insulin  E11.42 250.60    Z79.4 357.2     V58.67   3. Ulcer of right foot, unspecified ulcer stage  L97.519 707.15   4. Ulcer of right foot, with fat layer exposed  L97.512 707.15   5. Wound infection  T14.8XXA 958.3    L08.9                 Plan:                  Philip Henry" was seen today for wound care.    Diagnoses and all orders for this visit:    Diabetic peripheral neuropathy    Type 2 diabetes mellitus with diabetic polyneuropathy, with long-term current use of insulin    Ulcer of right foot, unspecified ulcer stage    Ulcer of right foot, with fat layer exposed    Wound infection    Other orders  -     doxycycline (VIBRAMYCIN) 100 MG Cap; Take 1 capsule (100 mg total) by mouth 2 (two) times daily. for 10 " Detail Level: Zone days      See attached wound care documentation.

## 2025-05-15 ENCOUNTER — OFFICE VISIT (OUTPATIENT)
Dept: WOUND CARE | Facility: HOSPITAL | Age: 45
End: 2025-05-15
Attending: FAMILY MEDICINE
Payer: MEDICAID

## 2025-05-15 VITALS
TEMPERATURE: 98 F | RESPIRATION RATE: 20 BRPM | HEART RATE: 82 BPM | DIASTOLIC BLOOD PRESSURE: 73 MMHG | SYSTOLIC BLOOD PRESSURE: 150 MMHG

## 2025-05-15 DIAGNOSIS — E11.42 TYPE 2 DIABETES MELLITUS WITH DIABETIC POLYNEUROPATHY, WITH LONG-TERM CURRENT USE OF INSULIN: ICD-10-CM

## 2025-05-15 DIAGNOSIS — E11.42 DIABETIC PERIPHERAL NEUROPATHY: Primary | ICD-10-CM

## 2025-05-15 DIAGNOSIS — L08.9 WOUND INFECTION: ICD-10-CM

## 2025-05-15 DIAGNOSIS — T14.8XXA WOUND INFECTION: ICD-10-CM

## 2025-05-15 DIAGNOSIS — L97.519 ULCER OF RIGHT FOOT, UNSPECIFIED ULCER STAGE: ICD-10-CM

## 2025-05-15 DIAGNOSIS — Z79.4 TYPE 2 DIABETES MELLITUS WITH DIABETIC POLYNEUROPATHY, WITH LONG-TERM CURRENT USE OF INSULIN: ICD-10-CM

## 2025-05-15 PROCEDURE — 99214 OFFICE O/P EST MOD 30 MIN: CPT | Mod: PN | Performed by: FAMILY MEDICINE

## 2025-05-15 NOTE — PROGRESS NOTES
"          Wound Care Progress Note    Subjective:       Patient ID: Philip Alberts is a 44 y.o. male.    Chief Complaint: Wound Care    HPI  Pt seen in clinic for a FU visit for a right heel DM ulcer. Wound base is filling in slowly, pt is taking his doxycycline as directed. No other complaints today  Review of Systems   Skin:  Positive for wound.        Open wound right heel   All other systems reviewed and are negative.      Objective:        Physical Exam  Vitals and nursing note reviewed.   Constitutional:       General: He is not in acute distress.     Appearance: Normal appearance.   Skin:     General: Skin is warm and dry.      Findings: Lesion present.      Comments: Right heel DM ulcer, see wound care assessment documentation in chart review scanned under the media tab   Neurological:      General: No focal deficit present.      Mental Status: He is alert.   Psychiatric:         Mood and Affect: Mood normal.         Judgment: Judgment normal.       Vitals:    05/15/25 0822   BP: (!) 150/73   Pulse: 82   Resp: 20   Temp: 98 °F (36.7 °C)       Assessment:           ICD-10-CM ICD-9-CM   1. Diabetic peripheral neuropathy  E11.42 250.60     357.2   2. Type 2 diabetes mellitus with diabetic polyneuropathy, with long-term current use of insulin  E11.42 250.60    Z79.4 357.2     V58.67   3. Ulcer of right foot, unspecified ulcer stage  L97.519 707.15   4. Wound infection  T14.8XXA 958.3    L08.9                 Plan:                  Philip Henry" was seen today for wound care.    Diagnoses and all orders for this visit:    Diabetic peripheral neuropathy    Type 2 diabetes mellitus with diabetic polyneuropathy, with long-term current use of insulin    Ulcer of right foot, unspecified ulcer stage    Wound infection      Continue the Doxycycline as directed  See wound care instructions provided separately            "

## 2025-05-18 DIAGNOSIS — I10 PRIMARY HYPERTENSION: ICD-10-CM

## 2025-05-18 DIAGNOSIS — E10.3293 TYPE 1 DIABETES MELLITUS WITH MILD NONPROLIFERATIVE RETINOPATHY OF BOTH EYES WITHOUT MACULAR EDEMA: ICD-10-CM

## 2025-05-18 NOTE — TELEPHONE ENCOUNTER
No care due was identified.  [facility] Embedded Care Due Messages. Reference number: [id number].   5/18/2025 10:17:32 AM CDT

## 2025-05-19 RX ORDER — LISINOPRIL 10 MG/1
10 TABLET ORAL
Qty: 90 TABLET | Refills: 3 | Status: SHIPPED | OUTPATIENT
Start: 2025-05-19

## 2025-05-19 NOTE — TELEPHONE ENCOUNTER
Refill Routing Note   Medication(s) are not appropriate for processing by [facility] for the following reason(s):        Required vitals abnormal    [facility] action(s):  Defer             Appointments  past 12m or future 3m with PCP    Date Provider   Last Visit   4/15/2025 Alfie Lancaster MD   Next Visit   9/16/2025 Alfie Lancaster MD   ED visits in past 90 days: 0        Note composed:7:02 AM 05/19/2025

## 2025-05-22 ENCOUNTER — OFFICE VISIT (OUTPATIENT)
Dept: WOUND CARE | Facility: HOSPITAL | Age: 45
End: 2025-05-22
Attending: FAMILY MEDICINE
Payer: MEDICAID

## 2025-05-22 VITALS
RESPIRATION RATE: 18 BRPM | SYSTOLIC BLOOD PRESSURE: 129 MMHG | HEART RATE: 61 BPM | TEMPERATURE: 98 F | DIASTOLIC BLOOD PRESSURE: 72 MMHG

## 2025-05-22 DIAGNOSIS — L08.9 WOUND INFECTION: ICD-10-CM

## 2025-05-22 DIAGNOSIS — Z79.4 TYPE 2 DIABETES MELLITUS WITH DIABETIC POLYNEUROPATHY, WITH LONG-TERM CURRENT USE OF INSULIN: ICD-10-CM

## 2025-05-22 DIAGNOSIS — E11.42 DIABETIC PERIPHERAL NEUROPATHY: ICD-10-CM

## 2025-05-22 DIAGNOSIS — L97.512 ULCER OF RIGHT FOOT, WITH FAT LAYER EXPOSED: Primary | ICD-10-CM

## 2025-05-22 DIAGNOSIS — T14.8XXA WOUND INFECTION: ICD-10-CM

## 2025-05-22 DIAGNOSIS — E11.42 TYPE 2 DIABETES MELLITUS WITH DIABETIC POLYNEUROPATHY, WITH LONG-TERM CURRENT USE OF INSULIN: ICD-10-CM

## 2025-05-22 PROCEDURE — 99213 OFFICE O/P EST LOW 20 MIN: CPT | Mod: PN | Performed by: FAMILY MEDICINE

## 2025-05-22 PROCEDURE — 99214 OFFICE O/P EST MOD 30 MIN: CPT | Mod: ,,, | Performed by: FAMILY MEDICINE

## 2025-05-22 PROCEDURE — 3078F DIAST BP <80 MM HG: CPT | Mod: CPTII,,, | Performed by: FAMILY MEDICINE

## 2025-05-22 PROCEDURE — 3074F SYST BP LT 130 MM HG: CPT | Mod: CPTII,,, | Performed by: FAMILY MEDICINE

## 2025-05-22 PROCEDURE — 3061F NEG MICROALBUMINURIA REV: CPT | Mod: CPTII,,, | Performed by: FAMILY MEDICINE

## 2025-05-22 PROCEDURE — 1160F RVW MEDS BY RX/DR IN RCRD: CPT | Mod: CPTII,,, | Performed by: FAMILY MEDICINE

## 2025-05-22 PROCEDURE — 3066F NEPHROPATHY DOC TX: CPT | Mod: CPTII,,, | Performed by: FAMILY MEDICINE

## 2025-05-22 PROCEDURE — 3052F HG A1C>EQUAL 8.0%<EQUAL 9.0%: CPT | Mod: CPTII,,, | Performed by: FAMILY MEDICINE

## 2025-05-22 PROCEDURE — 4010F ACE/ARB THERAPY RXD/TAKEN: CPT | Mod: CPTII,,, | Performed by: FAMILY MEDICINE

## 2025-05-22 PROCEDURE — 1159F MED LIST DOCD IN RCRD: CPT | Mod: CPTII,,, | Performed by: FAMILY MEDICINE

## 2025-05-22 RX ORDER — DOXYCYCLINE 100 MG/1
100 CAPSULE ORAL 2 TIMES DAILY
Qty: 20 CAPSULE | Refills: 0 | Status: SHIPPED | OUTPATIENT
Start: 2025-05-22 | End: 2025-06-01

## 2025-05-22 NOTE — PROGRESS NOTES
"          Wound Care Progress Note    Subjective:       Patient ID: Philip Alberts is a 44 y.o. male.    Chief Complaint: Wound Care    HPI  Pt seen in clinic for a FU visit for a right foot DM ulcer. Wound continues to improve, no new complaints today. Wound has some erythema, doxycycline usually clears it up.  Review of Systems   Skin:  Positive for wound.        Open wound right foot   All other systems reviewed and are negative.      Objective:        Physical Exam  Vitals and nursing note reviewed.   Constitutional:       General: He is not in acute distress.     Appearance: Normal appearance.   Skin:     General: Skin is warm and dry.      Findings: Lesion present.      Comments: Right foot DM ulcer, se wound care assessment documentation in chart review scanned under the media tab   Neurological:      General: No focal deficit present.      Mental Status: He is alert.   Psychiatric:         Mood and Affect: Mood normal.         Judgment: Judgment normal.       Vitals:    05/22/25 0808   BP: 129/72   Pulse: 61   Resp: 18   Temp: 98.4 °F (36.9 °C)       Assessment:           ICD-10-CM ICD-9-CM   1. Ulcer of right foot, with fat layer exposed  L97.512 707.15   2. Diabetic peripheral neuropathy  E11.42 250.60     357.2   3. Type 2 diabetes mellitus with diabetic polyneuropathy, with long-term current use of insulin  E11.42 250.60    Z79.4 357.2     V58.67   4. Wound infection  T14.8XXA 958.3    L08.9                 Plan:                  Philip Henry" was seen today for wound care.    Diagnoses and all orders for this visit:    Ulcer of right foot, with fat layer exposed    Diabetic peripheral neuropathy    Type 2 diabetes mellitus with diabetic polyneuropathy, with long-term current use of insulin    Wound infection    Other orders  -     doxycycline (VIBRAMYCIN) 100 MG Cap; Take 1 capsule (100 mg total) by mouth 2 (two) times daily. for 10 days        See attached wound care documentation.           "

## 2025-05-29 ENCOUNTER — PATIENT MESSAGE (OUTPATIENT)
Dept: ADMINISTRATIVE | Facility: HOSPITAL | Age: 45
End: 2025-05-29
Payer: MEDICAID

## 2025-06-04 ENCOUNTER — OFFICE VISIT (OUTPATIENT)
Dept: WOUND CARE | Facility: HOSPITAL | Age: 45
End: 2025-06-04
Attending: FAMILY MEDICINE
Payer: MEDICAID

## 2025-06-04 VITALS
RESPIRATION RATE: 18 BRPM | TEMPERATURE: 99 F | HEART RATE: 77 BPM | SYSTOLIC BLOOD PRESSURE: 148 MMHG | DIASTOLIC BLOOD PRESSURE: 81 MMHG

## 2025-06-04 DIAGNOSIS — E11.628 DIABETIC FOOT INFECTION: ICD-10-CM

## 2025-06-04 DIAGNOSIS — Z89.431 HISTORY OF AMPUTATION OF RIGHT FOOT: ICD-10-CM

## 2025-06-04 DIAGNOSIS — L97.512 ULCER OF RIGHT FOOT, WITH FAT LAYER EXPOSED: Primary | ICD-10-CM

## 2025-06-04 DIAGNOSIS — F17.200 SMOKER: ICD-10-CM

## 2025-06-04 DIAGNOSIS — L08.9 DIABETIC FOOT INFECTION: ICD-10-CM

## 2025-06-04 DIAGNOSIS — E11.42 DIABETIC PERIPHERAL NEUROPATHY: ICD-10-CM

## 2025-06-04 PROCEDURE — 87186 SC STD MICRODIL/AGAR DIL: CPT | Performed by: FAMILY MEDICINE

## 2025-06-04 PROCEDURE — 11042 DBRDMT SUBQ TIS 1ST 20SQCM/<: CPT | Mod: PN | Performed by: FAMILY MEDICINE

## 2025-06-04 PROCEDURE — 87075 CULTR BACTERIA EXCEPT BLOOD: CPT | Performed by: FAMILY MEDICINE

## 2025-06-04 RX ORDER — DOXYCYCLINE 100 MG/1
100 CAPSULE ORAL 2 TIMES DAILY
Qty: 60 CAPSULE | Refills: 1 | Status: SHIPPED | OUTPATIENT
Start: 2025-06-04 | End: 2025-08-03

## 2025-06-06 LAB — BACTERIA SPEC ANAEROBE CULT: NORMAL

## 2025-06-07 LAB — BACTERIA SPEC AEROBE CULT: ABNORMAL

## 2025-06-11 ENCOUNTER — OFFICE VISIT (OUTPATIENT)
Dept: WOUND CARE | Facility: HOSPITAL | Age: 45
End: 2025-06-11
Attending: FAMILY MEDICINE
Payer: MEDICAID

## 2025-06-11 VITALS
SYSTOLIC BLOOD PRESSURE: 163 MMHG | TEMPERATURE: 98 F | RESPIRATION RATE: 18 BRPM | HEART RATE: 84 BPM | DIASTOLIC BLOOD PRESSURE: 92 MMHG

## 2025-06-11 DIAGNOSIS — F17.200 SMOKER: ICD-10-CM

## 2025-06-11 DIAGNOSIS — E11.42 DIABETIC PERIPHERAL NEUROPATHY: ICD-10-CM

## 2025-06-11 DIAGNOSIS — L97.512 ULCER OF RIGHT FOOT, WITH FAT LAYER EXPOSED: Primary | ICD-10-CM

## 2025-06-11 DIAGNOSIS — T14.8XXA WOUND INFECTION: ICD-10-CM

## 2025-06-11 DIAGNOSIS — L08.9 WOUND INFECTION: ICD-10-CM

## 2025-06-11 DIAGNOSIS — L97.513 ULCER OF RIGHT FOOT WITH NECROSIS OF MUSCLE: ICD-10-CM

## 2025-06-11 PROCEDURE — 11042 DBRDMT SUBQ TIS 1ST 20SQCM/<: CPT | Mod: PN | Performed by: FAMILY MEDICINE

## 2025-06-11 RX ORDER — DOXYCYCLINE 100 MG/1
100 CAPSULE ORAL 2 TIMES DAILY
Qty: 60 CAPSULE | Refills: 0 | Status: SHIPPED | OUTPATIENT
Start: 2025-06-11 | End: 2025-07-11

## 2025-06-11 NOTE — PROGRESS NOTES
"          Wound Care Progress Note    Subjective:       Patient ID: Philip Alberts is a 44 y.o. male.    Chief Complaint: Wound Care    HPI  Pt seen in clinic for a FU visit for a right foot DM ulcer. Wound is stable, macerated, + culture, no other complaints today  Review of Systems   Skin:  Positive for wound.        Open wound right foot   All other systems reviewed and are negative.      Objective:        Physical Exam  Vitals and nursing note reviewed.   Constitutional:       General: He is not in acute distress.     Appearance: Normal appearance.   Skin:     General: Skin is warm and dry.      Findings: Lesion present.      Comments: Right foot DM ulcer, see wound care assessment documentation in chart review scanned under the media tab   Neurological:      General: No focal deficit present.      Mental Status: He is alert.   Psychiatric:         Judgment: Judgment normal.       Vitals:    06/11/25 0811   BP: (!) 163/92   Pulse: 84   Resp: 18   Temp: 98.2 °F (36.8 °C)       Assessment:           ICD-10-CM ICD-9-CM   1. Ulcer of right foot, with fat layer exposed  L97.512 707.15   2. Diabetic peripheral neuropathy  E11.42 250.60     357.2   3. Smoker  F17.200 305.1   4. Ulcer of right foot with necrosis of muscle  L97.513 707.15     728.89   5. Wound infection  T14.8XXA 958.3    L08.9                 Plan:                  Philip Henry" was seen today for wound care.    Diagnoses and all orders for this visit:    Ulcer of right foot, with fat layer exposed    Diabetic peripheral neuropathy    Smoker    Ulcer of right foot with necrosis of muscle    Wound infection    Other orders  -     doxycycline (VIBRAMYCIN) 100 MG Cap; Take 1 capsule (100 mg total) by mouth 2 (two) times daily.        See attached wound care documentation.           "

## 2025-06-18 ENCOUNTER — OFFICE VISIT (OUTPATIENT)
Dept: WOUND CARE | Facility: HOSPITAL | Age: 45
End: 2025-06-18
Attending: SURGERY
Payer: MEDICAID

## 2025-06-18 VITALS
HEART RATE: 78 BPM | RESPIRATION RATE: 18 BRPM | DIASTOLIC BLOOD PRESSURE: 78 MMHG | TEMPERATURE: 98 F | SYSTOLIC BLOOD PRESSURE: 139 MMHG

## 2025-06-18 DIAGNOSIS — Z79.4 TYPE 2 DIABETES MELLITUS WITH DIABETIC POLYNEUROPATHY, WITH LONG-TERM CURRENT USE OF INSULIN: ICD-10-CM

## 2025-06-18 DIAGNOSIS — E11.42 TYPE 2 DIABETES MELLITUS WITH DIABETIC POLYNEUROPATHY, WITH LONG-TERM CURRENT USE OF INSULIN: ICD-10-CM

## 2025-06-18 DIAGNOSIS — E11.42 DIABETIC PERIPHERAL NEUROPATHY: ICD-10-CM

## 2025-06-18 DIAGNOSIS — L97.512 ULCER OF RIGHT FOOT, WITH FAT LAYER EXPOSED: Primary | ICD-10-CM

## 2025-06-18 PROCEDURE — 11042 DBRDMT SUBQ TIS 1ST 20SQCM/<: CPT | Mod: PN | Performed by: SURGERY

## 2025-06-18 NOTE — PROGRESS NOTES
GENERAL SURGERY  OUTPATIENT H&P    REASON FOR VISIT/CC: wound check - right heel    HPI: Philip Alberts is a 44 y.o. male with poorly controled DM, chronic right heal wound. Does not off weight. Uses alginate every other day. Reports wound is mike.    I have reviewed the patient's chart including prior progress notes, procedures and testing.     ROS:   Review of Systems    PROBLEM LIST:  Problem List[1]      HISTORY  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 12/3/2018       Past Surgical History:   Procedure Laterality Date    MANDIBLE FRACTURE SURGERY  07/17/2000    MANDIBLE FRACTURE SURGERY      SPLENECTOMY, TOTAL  07/17/2000    TOE AMPUTATION Right 10/9/2023    Procedure: AMPUTATION, TOE;  Surgeon: Ashok Santiago DPM;  Location: [facility];  Service: Podiatry;  Laterality: Right;       Social History[2]    Family History   Problem Relation Name Age of Onset    Diabetes Mother      Cancer Father           MEDS:  Medications Ordered Prior to Encounter[3]    ALLERGIES:  Review of patient's allergies indicates:   Allergen Reactions    Pcn [penicillins] Anaphylaxis     Tolerates cephalexin         VITALS:  There were no vitals filed for this visit.      PHYSICAL EXAM:  Physical Exam  Mildly macerated surrounding skin with callous and central open wound, see wound doc for measurements, some fibrinous exudate in the middle    LABS:  Lab Results   Component Value Date    WBC 11.68 11/27/2024    RBC 4.48 (L) 11/27/2024    HGB 13.7 (L) 11/27/2024    HCT 41.4 11/27/2024    HCT 36 10/08/2023     11/27/2024     Lab Results   Component Value Date     04/08/2025     (H)  01/21/2019     04/08/2025     01/21/2019    K 4.4 04/08/2025     04/08/2025    CL 98 01/21/2019    CO2 30 (H) 04/08/2025    BUN 12 04/08/2025    CREATININE 0.8 04/08/2025    CREATININE 0.84 01/21/2019    CALCIUM 9.2 04/08/2025     Lab Results   Component Value Date    ALT 27 12/05/2024    AST 28 12/05/2024    ALKPHOS 81 12/05/2024    BILITOT 0.3 12/05/2024     Lab Results   Component Value Date    MG 1.8 10/09/2023    PHOS 3.4 09/29/2021         ASSESSMENT & PLAN:  44 y.o. male with chronic right heal wound  Debrided callous and subcutaneous tissue <20cm  Needs daily bandage changes with silver alginate  Reverse football for off weighting  Stressed wound off weighting and optimal wound environment  Needs better diabetic control  RTC 1 week                         [1]   Patient Active Problem List  Diagnosis    Diabetes mellitus, type 2    GERD (gastroesophageal reflux disease)    Post-traumatic osteoarthritis of multiple joints    Type 2 diabetes mellitus with mild nonproliferative retinopathy    Uncontrolled type 2 diabetes mellitus with hyperglycemia    Hyperlipidemia    Hypertension    Smoker    Sciatica of right side    Chronic rhinitis    Sebaceous cyst    Diabetic peripheral neuropathy    Chronic allergic rhinitis    Mild nonproliferative diabetic retinopathy of both eyes without macular edema associated with type 2 diabetes mellitus    Cellulitis    Ulcer of toe of right foot, with necrosis of muscle    Diabetes mellitus due to underlying condition with hyperglycemia, with long-term current use of insulin    Diabetic foot infection    Wound infection    Ulcer of right foot, with fat layer exposed    Ulcer of right foot    Diabetic ulcer of toe of right foot associated with type 2 diabetes mellitus, with fat layer exposed    History of amputation of right foot   [2]   Social History  Tobacco Use    Smoking status: Every Day    Smokeless tobacco: Never   Substance Use Topics    Alcohol use: No  "   Drug use: No   [3]   Current Outpatient Medications on File Prior to Visit   Medication Sig Dispense Refill    aspirin (ECOTRIN) 81 MG EC tablet Take 1 tablet (81 mg total) by mouth once daily. 90 tablet 3    atorvastatin (LIPITOR) 20 MG tablet Take 1 tablet (20 mg total) by mouth every evening. 90 tablet 3    blood-glucose meter kit To check BG qid times daily, to use with insurance preferred meter 1 each 0    blood-glucose meter,continuous (DEXCOM ) Misc 1 Device by Misc.(Non-Drug; Combo Route) route once daily. 1 each 0    blood-glucose sensor (DEXCOM G7 SENSOR) Gosia APPLY 1 SENSOR TO SKIN AND CHANGE OUT EVERY 10 DAYS 3 each 5    doxycycline (VIBRAMYCIN) 100 MG Cap Take 1 capsule (100 mg total) by mouth 2 (two) times daily. 60 capsule 1    doxycycline (VIBRAMYCIN) 100 MG Cap Take 1 capsule (100 mg total) by mouth 2 (two) times daily. 60 capsule 0    esomeprazole (NEXIUM) 20 MG capsule Take 1 capsule (20 mg total) by mouth before breakfast. 90 capsule 1    gabapentin (NEURONTIN) 300 MG capsule TAKE 2 CAPSULES BY MOUTH THREE TIMES DAILY 180 capsule 5    insulin aspart U-100 (NOVOLOG FLEXPEN U-100 INSULIN) 100 unit/mL (3 mL) InPn pen INJECT 10 UNITS SUBCUTANEOUSLY 4 TIMES DAILY PER  SLIDING  SCALE  AS  INSTRUCTED 15 mL 5    LANTUS SOLOSTAR U-100 INSULIN 100 unit/mL (3 mL) InPn pen Increase INJECT 22 UNITS INTO THE SKIN TWICE DAILY 15 mL 0    lisinopriL 10 MG tablet Take 1 tablet by mouth once daily 90 tablet 3    mupirocin (BACTROBAN) 2 % ointment Apply topically 3 (three) times daily. 22 g 0    pen needle, diabetic (BD ULTRA-FINE MINI PEN NEEDLE) 31 gauge x 3/16" Ndle USE 1  4 TIMES DAILY BEFORE MEAL(S) AND NIGHTLY 100 each 3    sildenafiL (VIAGRA) 100 MG tablet Take 1 tablet (100 mg total) by mouth daily as needed for Erectile Dysfunction. 20 tablet 1     No current facility-administered medications on file prior to visit.     "

## 2025-06-25 ENCOUNTER — OFFICE VISIT (OUTPATIENT)
Dept: WOUND CARE | Facility: HOSPITAL | Age: 45
End: 2025-06-25
Attending: SURGERY
Payer: MEDICAID

## 2025-06-25 VITALS
DIASTOLIC BLOOD PRESSURE: 78 MMHG | SYSTOLIC BLOOD PRESSURE: 139 MMHG | RESPIRATION RATE: 16 BRPM | HEART RATE: 78 BPM | TEMPERATURE: 99 F

## 2025-06-25 DIAGNOSIS — L97.512 ULCER OF RIGHT FOOT, WITH FAT LAYER EXPOSED: Primary | ICD-10-CM

## 2025-06-25 PROCEDURE — 99215 OFFICE O/P EST HI 40 MIN: CPT | Mod: 25,,, | Performed by: SURGERY

## 2025-06-25 PROCEDURE — 3066F NEPHROPATHY DOC TX: CPT | Mod: CPTII,,, | Performed by: SURGERY

## 2025-06-25 PROCEDURE — 11042 DBRDMT SUBQ TIS 1ST 20SQCM/<: CPT | Mod: ,,, | Performed by: SURGERY

## 2025-06-25 PROCEDURE — 4010F ACE/ARB THERAPY RXD/TAKEN: CPT | Mod: CPTII,,, | Performed by: SURGERY

## 2025-06-25 PROCEDURE — 3052F HG A1C>EQUAL 8.0%<EQUAL 9.0%: CPT | Mod: CPTII,,, | Performed by: SURGERY

## 2025-06-25 PROCEDURE — 3061F NEG MICROALBUMINURIA REV: CPT | Mod: CPTII,,, | Performed by: SURGERY

## 2025-06-25 PROCEDURE — 11042 DBRDMT SUBQ TIS 1ST 20SQCM/<: CPT | Mod: PN | Performed by: SURGERY

## 2025-06-25 NOTE — PROGRESS NOTES
GENERAL SURGERY  OUTPATIENT H&P    REASON FOR VISIT/CC: wound check - right heel    HPI: Philip Alberts is a 44 y.o. male with poorly controled DM, chronic right heal wound. This has using calcium alginate every other day. Initially was not off waiting.     Last visit callus around the wound was debrided sharply in the wound bed itself was debrided with a curette. Calcium alginate and off weighting boot or recommended which the patient has been compliant with. Does report he is having increased drainage and has been changing it daily. This has currently on doxycycline for previous cultures.    I have reviewed the patient's chart including prior progress notes, procedures and testing.     ROS:   Review of Systems    PROBLEM LIST:  Problem List[1]      HISTORY  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 12/3/2018       Past Surgical History:   Procedure Laterality Date    MANDIBLE FRACTURE SURGERY  07/17/2000    MANDIBLE FRACTURE SURGERY      SPLENECTOMY, TOTAL  07/17/2000    TOE AMPUTATION Right 10/9/2023    Procedure: AMPUTATION, TOE;  Surgeon: Ashok Santiago DPM;  Location: [facility];  Service: Podiatry;  Laterality: Right;       Social History[2]    Family History   Problem Relation Name Age of Onset    Diabetes Mother      Cancer Father           MEDS:  Medications Ordered Prior to Encounter[3]    ALLERGIES:  Review of patient's allergies indicates:   Allergen Reactions    Pcn [penicillins] Anaphylaxis     Tolerates cephalexin         VITALS:  There were no vitals filed for this visit.      PHYSICAL EXAM:    Mildly macerated skin surrounding open wound, see wound doc for  measurements, some fibrinous exudate in the middle          LABS:  Lab Results   Component Value Date    WBC 11.68 11/27/2024    RBC 4.48 (L) 11/27/2024    HGB 13.7 (L) 11/27/2024    HCT 41.4 11/27/2024    HCT 36 10/08/2023     11/27/2024     Lab Results   Component Value Date     04/08/2025     (H) 01/21/2019     04/08/2025     01/21/2019    K 4.4 04/08/2025     04/08/2025    CL 98 01/21/2019    CO2 30 (H) 04/08/2025    BUN 12 04/08/2025    CREATININE 0.8 04/08/2025    CREATININE 0.84 01/21/2019    CALCIUM 9.2 04/08/2025     Lab Results   Component Value Date    ALT 27 12/05/2024    AST 28 12/05/2024    ALKPHOS 81 12/05/2024    BILITOT 0.3 12/05/2024     Lab Results   Component Value Date    MG 1.8 10/09/2023    PHOS 3.4 09/29/2021         ASSESSMENT & PLAN:  44 y.o. male with chronic right heal wound  -central portion of the wound debrided to include subcutaneous tissue less than 20 cm with a curette  -recommend continued daily dressing changes, will continue silver nitrate to the center portion, border foam and gel into sent violate around the edges to help prevent maceration and pressure, reverse football for off voiding  -okay to continue doxycycline  -return to clinic in one-week for follow-up  -continue tight diabetes control and medications      Proper wound care and the possible need for serial debridements, topical medications, specific dressings and biological engineered skin substitutes if indicated  Discussed general issues surrounding recurrent/ nonhealing ulcers, along with the advantages & disadvantages of various treatment strategies.      Patient should call the office immediately if any signs of infection, such as fever, chills, sweats, increased redness or pain.    Patient was instructed to call the clinic or go to the emergency department if their symptoms do not improve, worsens, or if new symptoms develop.  Patient was advised that if any increased  swelling, pain, or numbness arise to go immediately to the ED. Patient knows to call any time if an emergency arises. Shared decision making occurred and patient verbalized understanding in agreement with this plan.       I spent a total of 45 minutes on the day of the visit.This includes face to face time and non-face to face time preparing to see the patient (eg, review of tests), obtaining and/or reviewing separately obtained history, documenting clinical information in the electronic or other health record, independently interpreting results and communicating results to the patient/family/caregiver, or care coordinator.       Much of the documentation for this visit was completed in the W[facility] system.  Please see the attached documentation for further details about the patient's care. Scanned under the Media tab.                                          [1]   Patient Active Problem List  Diagnosis    Diabetes mellitus, type 2    GERD (gastroesophageal reflux disease)    Post-traumatic osteoarthritis of multiple joints    Type 2 diabetes mellitus with mild nonproliferative retinopathy    Uncontrolled type 2 diabetes mellitus with hyperglycemia    Hyperlipidemia    Hypertension    Smoker    Sciatica of right side    Chronic rhinitis    Sebaceous cyst    Diabetic peripheral neuropathy    Chronic allergic rhinitis    Mild nonproliferative diabetic retinopathy of both eyes without macular edema associated with type 2 diabetes mellitus    Cellulitis    Ulcer of toe of right foot, with necrosis of muscle    Diabetes mellitus due to underlying condition with hyperglycemia, with long-term current use of insulin    Diabetic foot infection    Wound infection    Ulcer of right foot, with fat layer exposed    Ulcer of right foot    Diabetic ulcer of toe of right foot associated with type 2 diabetes mellitus, with fat layer exposed    History of amputation of right foot   [2]   Social History  Tobacco Use    Smoking  "status: Every Day    Smokeless tobacco: Never   Substance Use Topics    Alcohol use: No    Drug use: No   [3]   Current Outpatient Medications on File Prior to Visit   Medication Sig Dispense Refill    aspirin (ECOTRIN) 81 MG EC tablet Take 1 tablet (81 mg total) by mouth once daily. 90 tablet 3    atorvastatin (LIPITOR) 20 MG tablet Take 1 tablet (20 mg total) by mouth every evening. 90 tablet 3    blood-glucose meter kit To check BG qid times daily, to use with insurance preferred meter 1 each 0    blood-glucose meter,continuous (DEXCOM ) Misc 1 Device by Misc.(Non-Drug; Combo Route) route once daily. 1 each 0    blood-glucose sensor (DEXCOM G7 SENSOR) Gosia APPLY 1 SENSOR TO SKIN AND CHANGE OUT EVERY 10 DAYS 3 each 5    doxycycline (VIBRAMYCIN) 100 MG Cap Take 1 capsule (100 mg total) by mouth 2 (two) times daily. 60 capsule 1    doxycycline (VIBRAMYCIN) 100 MG Cap Take 1 capsule (100 mg total) by mouth 2 (two) times daily. 60 capsule 0    esomeprazole (NEXIUM) 20 MG capsule Take 1 capsule (20 mg total) by mouth before breakfast. 90 capsule 1    gabapentin (NEURONTIN) 300 MG capsule TAKE 2 CAPSULES BY MOUTH THREE TIMES DAILY 180 capsule 5    insulin aspart U-100 (NOVOLOG FLEXPEN U-100 INSULIN) 100 unit/mL (3 mL) InPn pen INJECT 10 UNITS SUBCUTANEOUSLY 4 TIMES DAILY PER  SLIDING  SCALE  AS  INSTRUCTED 15 mL 5    LANTUS SOLOSTAR U-100 INSULIN 100 unit/mL (3 mL) InPn pen Increase INJECT 22 UNITS INTO THE SKIN TWICE DAILY 15 mL 0    lisinopriL 10 MG tablet Take 1 tablet by mouth once daily 90 tablet 3    mupirocin (BACTROBAN) 2 % ointment Apply topically 3 (three) times daily. 22 g 0    pen needle, diabetic (BD ULTRA-FINE MINI PEN NEEDLE) 31 gauge x 3/16" Ndle USE 1  4 TIMES DAILY BEFORE MEAL(S) AND NIGHTLY 100 each 3    sildenafiL (VIAGRA) 100 MG tablet Take 1 tablet (100 mg total) by mouth daily as needed for Erectile Dysfunction. 20 tablet 1     No current facility-administered medications on file prior to " visit.

## 2025-06-28 DIAGNOSIS — E10.42 TYPE 1 DM WITH POLYNEUROPATHY: ICD-10-CM

## 2025-06-28 NOTE — TELEPHONE ENCOUNTER
No care due was identified.  [facility] Embedded Care Due Messages. Reference number: [id number].   6/28/2025 9:14:06 AM CDT

## 2025-06-29 RX ORDER — BLOOD-GLUCOSE SENSOR
EACH MISCELLANEOUS
Qty: 9 EACH | Refills: 3 | Status: SHIPPED | OUTPATIENT
Start: 2025-06-29

## 2025-06-29 NOTE — TELEPHONE ENCOUNTER
Refill Decision Note   Philip Alberts  is requesting a refill authorization.  Brief Assessment and Rationale for Refill:  Approve     Medication Therapy Plan:        Comments:     Note composed:2:32 PM 06/29/2025

## 2025-07-02 ENCOUNTER — OFFICE VISIT (OUTPATIENT)
Dept: WOUND CARE | Facility: HOSPITAL | Age: 45
End: 2025-07-02
Attending: SURGERY
Payer: MEDICAID

## 2025-07-02 VITALS
DIASTOLIC BLOOD PRESSURE: 86 MMHG | TEMPERATURE: 98 F | HEART RATE: 86 BPM | SYSTOLIC BLOOD PRESSURE: 148 MMHG | RESPIRATION RATE: 18 BRPM

## 2025-07-02 DIAGNOSIS — E11.65 UNCONTROLLED TYPE 2 DIABETES MELLITUS WITH HYPERGLYCEMIA: ICD-10-CM

## 2025-07-02 DIAGNOSIS — L97.512 ULCER OF RIGHT FOOT, WITH FAT LAYER EXPOSED: Primary | ICD-10-CM

## 2025-07-02 DIAGNOSIS — F17.200 SMOKER: ICD-10-CM

## 2025-07-02 DIAGNOSIS — E11.42 DIABETIC PERIPHERAL NEUROPATHY: ICD-10-CM

## 2025-07-02 PROCEDURE — 3079F DIAST BP 80-89 MM HG: CPT | Mod: CPTII,,, | Performed by: SURGERY

## 2025-07-02 PROCEDURE — 99212 OFFICE O/P EST SF 10 MIN: CPT | Mod: ,,, | Performed by: SURGERY

## 2025-07-02 PROCEDURE — 4010F ACE/ARB THERAPY RXD/TAKEN: CPT | Mod: CPTII,,, | Performed by: SURGERY

## 2025-07-02 PROCEDURE — 11042 DBRDMT SUBQ TIS 1ST 20SQCM/<: CPT | Mod: PN | Performed by: SURGERY

## 2025-07-02 PROCEDURE — 1159F MED LIST DOCD IN RCRD: CPT | Mod: CPTII,,, | Performed by: SURGERY

## 2025-07-02 PROCEDURE — 3061F NEG MICROALBUMINURIA REV: CPT | Mod: CPTII,,, | Performed by: SURGERY

## 2025-07-02 PROCEDURE — 3077F SYST BP >= 140 MM HG: CPT | Mod: CPTII,,, | Performed by: SURGERY

## 2025-07-02 PROCEDURE — 3066F NEPHROPATHY DOC TX: CPT | Mod: CPTII,,, | Performed by: SURGERY

## 2025-07-02 PROCEDURE — 3052F HG A1C>EQUAL 8.0%<EQUAL 9.0%: CPT | Mod: CPTII,,, | Performed by: SURGERY

## 2025-07-02 NOTE — PROGRESS NOTES
GENERAL SURGERY  OUTPATIENT H&P    REASON FOR VISIT/CC: wound check - right heel    HPI: Philip Alberts is a 44 y.o. male with poorly controled DM, chronic right heal wound. Wound edges were debrided as well as the wound bed itself.  Off weighting boot and calcium alginate and has been being utilized. Has completed doxycycline.      Feels drainage has slightly decreased.     I have reviewed the patient's chart including prior progress notes, procedures and testing.     ROS:   Review of Systems    PROBLEM LIST:  Problem List[1]      HISTORY  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 12/3/2018       Past Surgical History:   Procedure Laterality Date    MANDIBLE FRACTURE SURGERY  07/17/2000    MANDIBLE FRACTURE SURGERY      SPLENECTOMY, TOTAL  07/17/2000    TOE AMPUTATION Right 10/9/2023    Procedure: AMPUTATION, TOE;  Surgeon: Ashok Santiago DPM;  Location: [facility];  Service: Podiatry;  Laterality: Right;       Social History[2]    Family History   Problem Relation Name Age of Onset    Diabetes Mother      Cancer Father           MEDS:  Medications Ordered Prior to Encounter[3]    ALLERGIES:  Review of patient's allergies indicates:   Allergen Reactions    Pcn [penicillins] Anaphylaxis     Tolerates cephalexin         VITALS:  Vitals:    07/02/25 1426   BP: (!) 148/86   Pulse: 86   Resp: 18   Temp: 98.2 °F (36.8 °C)         PHYSICAL EXAM:    Mildly macerated skin surrounding open wound with slight increase of callus, see wound doc for measurements, some fibrinous exudate in the middle though starting to get some epithelialization possibly, especially in the medial  aspect                LABS:  Lab Results   Component Value Date    WBC 11.68 11/27/2024    RBC 4.48 (L) 11/27/2024    HGB 13.7 (L) 11/27/2024    HCT 41.4 11/27/2024    HCT 36 10/08/2023     11/27/2024     Lab Results   Component Value Date     04/08/2025     (H) 01/21/2019     04/08/2025     01/21/2019    K 4.4 04/08/2025     04/08/2025    CL 98 01/21/2019    CO2 30 (H) 04/08/2025    BUN 12 04/08/2025    CREATININE 0.8 04/08/2025    CREATININE 0.84 01/21/2019    CALCIUM 9.2 04/08/2025     Lab Results   Component Value Date    ALT 27 12/05/2024    AST 28 12/05/2024    ALKPHOS 81 12/05/2024    BILITOT 0.3 12/05/2024     Lab Results   Component Value Date    MG 1.8 10/09/2023    PHOS 3.4 09/29/2021         ASSESSMENT & PLAN:  44 y.o. male with chronic right heal wound  -central portion of the wound debrided to include subcutaneous tissue less than 20 cm with a curette, prominent skin edges/callus debrided with 15 blade  -recommend continued daily dressing changes, will continue silver nitrate to the center portion, border foam and gentian violet around the edges to help prevent maceration and pressure, reverse football for off voiding  -return to clinic in one-week for follow-up  -continue tight diabetes control and medications      Proper wound care and the possible need for serial debridements, topical medications, specific dressings and biological engineered skin substitutes if indicated  Discussed general issues surrounding recurrent/ nonhealing ulcers, along with the advantages & disadvantages of various treatment strategies.      Patient should call the office immediately if any signs of infection, such as fever, chills, sweats, increased redness or pain.    Patient was instructed to call the clinic or go to the emergency department if their symptoms do not improve, worsens, or if new symptoms develop.  Patient was advised that if any increased swelling, pain, or numbness  arise to go immediately to the ED. Patient knows to call any time if an emergency arises. Shared decision making occurred and patient verbalized understanding in agreement with this plan.       I spent a total of 45 minutes on the day of the visit.This includes face to face time and non-face to face time preparing to see the patient (eg, review of tests), obtaining and/or reviewing separately obtained history, documenting clinical information in the electronic or other health record, independently interpreting results and communicating results to the patient/family/caregiver, or care coordinator.       Much of the documentation for this visit was completed in the Wound Docs system.  Please see the attached documentation for further details about the patient's care. Scanned under the Media tab.                                            [1]   Patient Active Problem List  Diagnosis    Diabetes mellitus, type 2    GERD (gastroesophageal reflux disease)    Post-traumatic osteoarthritis of multiple joints    Type 2 diabetes mellitus with mild nonproliferative retinopathy    Uncontrolled type 2 diabetes mellitus with hyperglycemia    Hyperlipidemia    Hypertension    Smoker    Sciatica of right side    Chronic rhinitis    Sebaceous cyst    Diabetic peripheral neuropathy    Chronic allergic rhinitis    Mild nonproliferative diabetic retinopathy of both eyes without macular edema associated with type 2 diabetes mellitus    Cellulitis    Ulcer of toe of right foot, with necrosis of muscle    Diabetes mellitus due to underlying condition with hyperglycemia, with long-term current use of insulin    Diabetic foot infection    Wound infection    Ulcer of right foot, with fat layer exposed    Ulcer of right foot    Diabetic ulcer of toe of right foot associated with type 2 diabetes mellitus, with fat layer exposed    History of amputation of right foot   [2]   Social History  Tobacco Use    Smoking status: Every Day    Smokeless  "tobacco: Never   Substance Use Topics    Alcohol use: No    Drug use: No   [3]   Current Outpatient Medications on File Prior to Visit   Medication Sig Dispense Refill    aspirin (ECOTRIN) 81 MG EC tablet Take 1 tablet (81 mg total) by mouth once daily. 90 tablet 3    atorvastatin (LIPITOR) 20 MG tablet Take 1 tablet (20 mg total) by mouth every evening. 90 tablet 3    blood-glucose meter kit To check BG qid times daily, to use with insurance preferred meter 1 each 0    blood-glucose meter,continuous (DEXCOM ) Misc 1 Device by Misc.(Non-Drug; Combo Route) route once daily. 1 each 0    DEXCOM G7 SENSOR Gosia APPLY 1 SENSOR TO SKIN AND CHANGE OUT EVERY 10 DAYS 9 each 3    doxycycline (VIBRAMYCIN) 100 MG Cap Take 1 capsule (100 mg total) by mouth 2 (two) times daily. 60 capsule 1    doxycycline (VIBRAMYCIN) 100 MG Cap Take 1 capsule (100 mg total) by mouth 2 (two) times daily. 60 capsule 0    esomeprazole (NEXIUM) 20 MG capsule Take 1 capsule (20 mg total) by mouth before breakfast. 90 capsule 1    gabapentin (NEURONTIN) 300 MG capsule TAKE 2 CAPSULES BY MOUTH THREE TIMES DAILY 180 capsule 5    insulin aspart U-100 (NOVOLOG FLEXPEN U-100 INSULIN) 100 unit/mL (3 mL) InPn pen INJECT 10 UNITS SUBCUTANEOUSLY 4 TIMES DAILY PER  SLIDING  SCALE  AS  INSTRUCTED 15 mL 5    LANTUS SOLOSTAR U-100 INSULIN 100 unit/mL (3 mL) InPn pen Increase INJECT 22 UNITS INTO THE SKIN TWICE DAILY 15 mL 0    lisinopriL 10 MG tablet Take 1 tablet by mouth once daily 90 tablet 3    mupirocin (BACTROBAN) 2 % ointment Apply topically 3 (three) times daily. 22 g 0    pen needle, diabetic (BD ULTRA-FINE MINI PEN NEEDLE) 31 gauge x 3/16" Ndle USE 1  4 TIMES DAILY BEFORE MEAL(S) AND NIGHTLY 100 each 3    sildenafiL (VIAGRA) 100 MG tablet Take 1 tablet (100 mg total) by mouth daily as needed for Erectile Dysfunction. 20 tablet 1     No current facility-administered medications on file prior to visit.     "

## 2025-07-09 ENCOUNTER — OFFICE VISIT (OUTPATIENT)
Dept: WOUND CARE | Facility: HOSPITAL | Age: 45
End: 2025-07-09
Attending: SURGERY
Payer: MEDICAID

## 2025-07-09 VITALS
SYSTOLIC BLOOD PRESSURE: 179 MMHG | DIASTOLIC BLOOD PRESSURE: 85 MMHG | TEMPERATURE: 98 F | RESPIRATION RATE: 18 BRPM | HEART RATE: 79 BPM

## 2025-07-09 DIAGNOSIS — Z79.4 TYPE 2 DIABETES MELLITUS WITH DIABETIC POLYNEUROPATHY, WITH LONG-TERM CURRENT USE OF INSULIN: ICD-10-CM

## 2025-07-09 DIAGNOSIS — E08.65 DIABETES MELLITUS DUE TO UNDERLYING CONDITION WITH HYPERGLYCEMIA, WITH LONG-TERM CURRENT USE OF INSULIN: ICD-10-CM

## 2025-07-09 DIAGNOSIS — E11.621 TYPE 2 DIABETES MELLITUS WITH FOOT ULCER, UNSPECIFIED WHETHER LONG TERM INSULIN USE: ICD-10-CM

## 2025-07-09 DIAGNOSIS — Z79.4 DIABETES MELLITUS DUE TO UNDERLYING CONDITION WITH HYPERGLYCEMIA, WITH LONG-TERM CURRENT USE OF INSULIN: ICD-10-CM

## 2025-07-09 DIAGNOSIS — L97.509 TYPE 2 DIABETES MELLITUS WITH FOOT ULCER, UNSPECIFIED WHETHER LONG TERM INSULIN USE: ICD-10-CM

## 2025-07-09 DIAGNOSIS — E11.42 TYPE 2 DIABETES MELLITUS WITH DIABETIC POLYNEUROPATHY, WITH LONG-TERM CURRENT USE OF INSULIN: ICD-10-CM

## 2025-07-09 DIAGNOSIS — L97.512 ULCER OF RIGHT FOOT, WITH FAT LAYER EXPOSED: Primary | ICD-10-CM

## 2025-07-09 PROCEDURE — 11042 DBRDMT SUBQ TIS 1ST 20SQCM/<: CPT | Mod: PN | Performed by: SURGERY

## 2025-07-09 NOTE — PROGRESS NOTES
GENERAL SURGERY  OUTPATIENT H&P    REASON FOR VISIT/CC: wound check - right heel    HPI: Philip Alberts is a 44 y.o. male with poorly controled DM, chronic right heal wound. Wound edges were debrided as well as the wound bed itself.  Off weighting boot and calcium alginate and has been being utilized. Has completed doxycycline.      Feels drainage has slightly decreased.     I have reviewed the patient's chart including prior progress notes, procedures and testing.     ROS:   Review of Systems    PROBLEM LIST:  Problem List[1]      HISTORY  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 12/3/2018       Past Surgical History:   Procedure Laterality Date    MANDIBLE FRACTURE SURGERY  07/17/2000    MANDIBLE FRACTURE SURGERY      SPLENECTOMY, TOTAL  07/17/2000    TOE AMPUTATION Right 10/9/2023    Procedure: AMPUTATION, TOE;  Surgeon: Ashok Santiago DPM;  Location: [facility];  Service: Podiatry;  Laterality: Right;       Social History[2]    Family History   Problem Relation Name Age of Onset    Diabetes Mother      Cancer Father           MEDS:  Medications Ordered Prior to Encounter[3]    ALLERGIES:  Review of patient's allergies indicates:   Allergen Reactions    Pcn [penicillins] Anaphylaxis     Tolerates cephalexin         VITALS:  There were no vitals filed for this visit.        PHYSICAL EXAM:    Wound does appear to be slowly improving especially in the superior aspect, in the inferior aspect this has a small amount of undermining from overlying callus in his small amount of exudate in the middle of the wound, the inferior edge was debrided using a scalpel removing skin and then  the open portion of the wound was debrided using a scalpel to remove the exudate from the subcutaneous tissue      LABS:  Lab Results   Component Value Date    WBC 11.68 11/27/2024    RBC 4.48 (L) 11/27/2024    HGB 13.7 (L) 11/27/2024    HCT 41.4 11/27/2024    HCT 36 10/08/2023     11/27/2024     Lab Results   Component Value Date     04/08/2025     (H) 01/21/2019     04/08/2025     01/21/2019    K 4.4 04/08/2025     04/08/2025    CL 98 01/21/2019    CO2 30 (H) 04/08/2025    BUN 12 04/08/2025    CREATININE 0.8 04/08/2025    CREATININE 0.84 01/21/2019    CALCIUM 9.2 04/08/2025     Lab Results   Component Value Date    ALT 27 12/05/2024    AST 28 12/05/2024    ALKPHOS 81 12/05/2024    BILITOT 0.3 12/05/2024     Lab Results   Component Value Date    MG 1.8 10/09/2023    PHOS 3.4 09/29/2021         ASSESSMENT & PLAN:  44 y.o. male with chronic right heal wound  -starting to have some closure from the superior aspect however the inferior aspect had some undermining from callus which was cut back using a scalpel  -central portion of the wound debrided to conclude subcutaneous tissue less than 20 cm with a scalpel, this has used to remove exudate and biofilm  -recommend continued daily dressing changes, will continue silver alginate to the center portion, border foam and gentian violet around the edges to help prevent maceration and pressure, reverse football for off voiding  -return to clinic in one-week for follow-up, may be able to extend out to every 2 weeks  -continue tight diabetes control and medications      Proper wound care and the possible need for serial debridements, topical medications, specific dressings and biological engineered skin substitutes if indicated  Discussed general issues surrounding recurrent/ nonhealing ulcers, along with the advantages & disadvantages of various treatment strategies.      Patient should call the office immediately if any signs of  infection, such as fever, chills, sweats, increased redness or pain.    Patient was instructed to call the clinic or go to the emergency department if their symptoms do not improve, worsens, or if new symptoms develop.  Patient was advised that if any increased swelling, pain, or numbness arise to go immediately to the ED. Patient knows to call any time if an emergency arises. Shared decision making occurred and patient verbalized understanding in agreement with this plan.       I spent a total of 45 minutes on the day of the visit.This includes face to face time and non-face to face time preparing to see the patient (eg, review of tests), obtaining and/or reviewing separately obtained history, documenting clinical information in the electronic or other health record, independently interpreting results and communicating results to the patient/family/caregiver, or care coordinator.       Much of the documentation for this visit was completed in the Wound Docs system.  Please see the attached documentation for further details about the patient's care. Scanned under the Media tab.                                              [1]   Patient Active Problem List  Diagnosis    Diabetes mellitus, type 2    GERD (gastroesophageal reflux disease)    Post-traumatic osteoarthritis of multiple joints    Type 2 diabetes mellitus with mild nonproliferative retinopathy    Uncontrolled type 2 diabetes mellitus with hyperglycemia    Hyperlipidemia    Hypertension    Smoker    Sciatica of right side    Chronic rhinitis    Sebaceous cyst    Diabetic peripheral neuropathy    Chronic allergic rhinitis    Mild nonproliferative diabetic retinopathy of both eyes without macular edema associated with type 2 diabetes mellitus    Cellulitis    Ulcer of toe of right foot, with necrosis of muscle    Diabetes mellitus due to underlying condition with hyperglycemia, with long-term current use of insulin    Diabetic foot infection    Wound infection  "   Ulcer of right foot, with fat layer exposed    Ulcer of right foot    Diabetic ulcer of toe of right foot associated with type 2 diabetes mellitus, with fat layer exposed    History of amputation of right foot   [2]   Social History  Tobacco Use    Smoking status: Every Day    Smokeless tobacco: Never   Substance Use Topics    Alcohol use: No    Drug use: No   [3]   Current Outpatient Medications on File Prior to Visit   Medication Sig Dispense Refill    aspirin (ECOTRIN) 81 MG EC tablet Take 1 tablet (81 mg total) by mouth once daily. 90 tablet 3    atorvastatin (LIPITOR) 20 MG tablet Take 1 tablet (20 mg total) by mouth every evening. 90 tablet 3    blood-glucose meter kit To check BG qid times daily, to use with insurance preferred meter 1 each 0    blood-glucose meter,continuous (DEXCOM ) Misc 1 Device by Misc.(Non-Drug; Combo Route) route once daily. 1 each 0    DEXCOM G7 SENSOR Gosia APPLY 1 SENSOR TO SKIN AND CHANGE OUT EVERY 10 DAYS 9 each 3    doxycycline (VIBRAMYCIN) 100 MG Cap Take 1 capsule (100 mg total) by mouth 2 (two) times daily. 60 capsule 1    doxycycline (VIBRAMYCIN) 100 MG Cap Take 1 capsule (100 mg total) by mouth 2 (two) times daily. 60 capsule 0    esomeprazole (NEXIUM) 20 MG capsule Take 1 capsule (20 mg total) by mouth before breakfast. 90 capsule 1    gabapentin (NEURONTIN) 300 MG capsule TAKE 2 CAPSULES BY MOUTH THREE TIMES DAILY 180 capsule 5    insulin aspart U-100 (NOVOLOG FLEXPEN U-100 INSULIN) 100 unit/mL (3 mL) InPn pen INJECT 10 UNITS SUBCUTANEOUSLY 4 TIMES DAILY PER  SLIDING  SCALE  AS  INSTRUCTED 15 mL 5    LANTUS SOLOSTAR U-100 INSULIN 100 unit/mL (3 mL) InPn pen Increase INJECT 22 UNITS INTO THE SKIN TWICE DAILY 15 mL 0    lisinopriL 10 MG tablet Take 1 tablet by mouth once daily 90 tablet 3    mupirocin (BACTROBAN) 2 % ointment Apply topically 3 (three) times daily. 22 g 0    pen needle, diabetic (BD ULTRA-FINE MINI PEN NEEDLE) 31 gauge x 3/16" Ndle USE 1  4 TIMES " DAILY BEFORE MEAL(S) AND NIGHTLY 100 each 3    sildenafiL (VIAGRA) 100 MG tablet Take 1 tablet (100 mg total) by mouth daily as needed for Erectile Dysfunction. 20 tablet 1     No current facility-administered medications on file prior to visit.

## 2025-07-16 ENCOUNTER — OFFICE VISIT (OUTPATIENT)
Dept: WOUND CARE | Facility: HOSPITAL | Age: 45
End: 2025-07-16
Attending: SURGERY
Payer: MEDICAID

## 2025-07-16 VITALS
SYSTOLIC BLOOD PRESSURE: 174 MMHG | DIASTOLIC BLOOD PRESSURE: 81 MMHG | RESPIRATION RATE: 18 BRPM | HEART RATE: 77 BPM | TEMPERATURE: 98 F

## 2025-07-16 DIAGNOSIS — L97.512 ULCER OF RIGHT FOOT, WITH FAT LAYER EXPOSED: Primary | ICD-10-CM

## 2025-07-16 PROCEDURE — 11042 DBRDMT SUBQ TIS 1ST 20SQCM/<: CPT | Mod: PN | Performed by: SURGERY

## 2025-07-16 NOTE — PROGRESS NOTES
GENERAL SURGERY  OUTPATIENT H&P    REASON FOR VISIT/CC: wound check - right heel    HPI: Philip Alberts is a 44 y.o. male with poorly controled DM, chronic right heal wound. Wound edges were debrided as well as the wound bed itself.  Off weighting boot and calcium alginate and has been being utilized. Has completed doxycycline.      Feels drainage has slightly decreased.     I have reviewed the patient's chart including prior progress notes, procedures and testing.     ROS:   Review of Systems    PROBLEM LIST:  Problem List[1]      HISTORY  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 12/3/2018       Past Surgical History:   Procedure Laterality Date    MANDIBLE FRACTURE SURGERY  07/17/2000    MANDIBLE FRACTURE SURGERY      SPLENECTOMY, TOTAL  07/17/2000    TOE AMPUTATION Right 10/9/2023    Procedure: AMPUTATION, TOE;  Surgeon: Ashok Santiago DPM;  Location: [facility];  Service: Podiatry;  Laterality: Right;       Social History[2]    Family History   Problem Relation Name Age of Onset    Diabetes Mother      Cancer Father           MEDS:  Medications Ordered Prior to Encounter[3]    ALLERGIES:  Review of patient's allergies indicates:   Allergen Reactions    Pcn [penicillins] Anaphylaxis     Tolerates cephalexin         VITALS:  There were no vitals filed for this visit.        PHYSICAL EXAM:    This has superior aspect appears to be having re-epithelialization however the inferior there is callus 2 positive over the wound bed itself which had to be debrided.  This was some fibrinous tissue in the wound bed itself which was also sharply debrided. There was good blood supply to  the area with mild oozing after debridement     LABS:  Lab Results   Component Value Date    WBC 11.68 11/27/2024    RBC 4.48 (L) 11/27/2024    HGB 13.7 (L) 11/27/2024    HCT 41.4 11/27/2024    HCT 36 10/08/2023     11/27/2024     Lab Results   Component Value Date     04/08/2025     (H) 01/21/2019     04/08/2025     01/21/2019    K 4.4 04/08/2025     04/08/2025    CL 98 01/21/2019    CO2 30 (H) 04/08/2025    BUN 12 04/08/2025    CREATININE 0.8 04/08/2025    CREATININE 0.84 01/21/2019    CALCIUM 9.2 04/08/2025     Lab Results   Component Value Date    ALT 27 12/05/2024    AST 28 12/05/2024    ALKPHOS 81 12/05/2024    BILITOT 0.3 12/05/2024     Lab Results   Component Value Date    MG 1.8 10/09/2023    PHOS 3.4 09/29/2021         ASSESSMENT & PLAN:  44 y.o. male with chronic right heal wound  -superior aspect continues to have some closure however inferior has callus overlying the wound bed which was sharply debrided with a scalpel back to healthy bleeding tissue  -central portion was also slowly improving, this has a small amount of fibrinous exudate in the center which was debrided with a scalpel less than 20 cm   -overall appears to have improvement and will recommend continued daily dressing changes with silver alginate to the center portion, border foam and gentian violet around the edges to help prevent maceration and pressure, reverse football for off voiding  -return to clinic in one-week for follow-up, may be able to extend out to every 2 weeks  -continue tight diabetes control and medications      Proper wound care and the possible need for serial debridements, topical medications, specific dressings and biological engineered skin substitutes if indicated  Discussed general issues surrounding recurrent/ nonhealing ulcers, along with the advantages & disadvantages of various treatment strategies.      Patient should call the office immediately if any signs of infection,  such as fever, chills, sweats, increased redness or pain.    Patient was instructed to call the clinic or go to the emergency department if their symptoms do not improve, worsens, or if new symptoms develop.  Patient was advised that if any increased swelling, pain, or numbness arise to go immediately to the ED. Patient knows to call any time if an emergency arises. Shared decision making occurred and patient verbalized understanding in agreement with this plan.       I spent a total of 45 minutes on the day of the visit.This includes face to face time and non-face to face time preparing to see the patient (eg, review of tests), obtaining and/or reviewing separately obtained history, documenting clinical information in the electronic or other health record, independently interpreting results and communicating results to the patient/family/caregiver, or care coordinator.       Much of the documentation for this visit was completed in the Wound Docs system.  Please see the attached documentation for further details about the patient's care. Scanned under the Media tab.                                              [1]   Patient Active Problem List  Diagnosis    Diabetes mellitus, type 2    GERD (gastroesophageal reflux disease)    Post-traumatic osteoarthritis of multiple joints    Type 2 diabetes mellitus with mild nonproliferative retinopathy    Uncontrolled type 2 diabetes mellitus with hyperglycemia    Hyperlipidemia    Hypertension    Smoker    Sciatica of right side    Chronic rhinitis    Sebaceous cyst    Diabetic peripheral neuropathy    Chronic allergic rhinitis    Mild nonproliferative diabetic retinopathy of both eyes without macular edema associated with type 2 diabetes mellitus    Cellulitis    Ulcer of toe of right foot, with necrosis of muscle    Diabetes mellitus due to underlying condition with hyperglycemia, with long-term current use of insulin    Diabetic foot infection    Wound infection    Ulcer  "of right foot, with fat layer exposed    Ulcer of right foot    Diabetic ulcer of toe of right foot associated with type 2 diabetes mellitus, with fat layer exposed    History of amputation of right foot   [2]   Social History  Tobacco Use    Smoking status: Every Day    Smokeless tobacco: Never   Substance Use Topics    Alcohol use: No    Drug use: No   [3]   Current Outpatient Medications on File Prior to Visit   Medication Sig Dispense Refill    aspirin (ECOTRIN) 81 MG EC tablet Take 1 tablet (81 mg total) by mouth once daily. 90 tablet 3    atorvastatin (LIPITOR) 20 MG tablet Take 1 tablet (20 mg total) by mouth every evening. 90 tablet 3    blood-glucose meter kit To check BG qid times daily, to use with insurance preferred meter 1 each 0    blood-glucose meter,continuous (DEXCOM ) Misc 1 Device by Misc.(Non-Drug; Combo Route) route once daily. 1 each 0    DEXCOM G7 SENSOR Gosia APPLY 1 SENSOR TO SKIN AND CHANGE OUT EVERY 10 DAYS 9 each 3    doxycycline (VIBRAMYCIN) 100 MG Cap Take 1 capsule (100 mg total) by mouth 2 (two) times daily. 60 capsule 1    esomeprazole (NEXIUM) 20 MG capsule Take 1 capsule (20 mg total) by mouth before breakfast. 90 capsule 1    gabapentin (NEURONTIN) 300 MG capsule TAKE 2 CAPSULES BY MOUTH THREE TIMES DAILY 180 capsule 5    insulin aspart U-100 (NOVOLOG FLEXPEN U-100 INSULIN) 100 unit/mL (3 mL) InPn pen INJECT 10 UNITS SUBCUTANEOUSLY 4 TIMES DAILY PER  SLIDING  SCALE  AS  INSTRUCTED 15 mL 5    LANTUS SOLOSTAR U-100 INSULIN 100 unit/mL (3 mL) InPn pen Increase INJECT 22 UNITS INTO THE SKIN TWICE DAILY 15 mL 0    lisinopriL 10 MG tablet Take 1 tablet by mouth once daily 90 tablet 3    mupirocin (BACTROBAN) 2 % ointment Apply topically 3 (three) times daily. 22 g 0    pen needle, diabetic (BD ULTRA-FINE MINI PEN NEEDLE) 31 gauge x 3/16" Ndle USE 1  4 TIMES DAILY BEFORE MEAL(S) AND NIGHTLY 100 each 3    sildenafiL (VIAGRA) 100 MG tablet Take 1 tablet (100 mg total) by mouth " daily as needed for Erectile Dysfunction. 20 tablet 1     No current facility-administered medications on file prior to visit.

## 2025-07-23 ENCOUNTER — OFFICE VISIT (OUTPATIENT)
Dept: WOUND CARE | Facility: HOSPITAL | Age: 45
End: 2025-07-23
Attending: SURGERY
Payer: MEDICAID

## 2025-07-23 VITALS
DIASTOLIC BLOOD PRESSURE: 91 MMHG | TEMPERATURE: 98 F | HEART RATE: 73 BPM | RESPIRATION RATE: 18 BRPM | SYSTOLIC BLOOD PRESSURE: 162 MMHG

## 2025-07-23 DIAGNOSIS — L97.509 TYPE 2 DIABETES MELLITUS WITH FOOT ULCER, UNSPECIFIED WHETHER LONG TERM INSULIN USE: ICD-10-CM

## 2025-07-23 DIAGNOSIS — E11.65 UNCONTROLLED TYPE 2 DIABETES MELLITUS WITH HYPERGLYCEMIA: ICD-10-CM

## 2025-07-23 DIAGNOSIS — E11.621 TYPE 2 DIABETES MELLITUS WITH FOOT ULCER, UNSPECIFIED WHETHER LONG TERM INSULIN USE: ICD-10-CM

## 2025-07-23 DIAGNOSIS — E11.42 DIABETIC PERIPHERAL NEUROPATHY: ICD-10-CM

## 2025-07-23 DIAGNOSIS — I73.9 PVD (PERIPHERAL VASCULAR DISEASE): ICD-10-CM

## 2025-07-23 DIAGNOSIS — L97.512 ULCER OF RIGHT FOOT, WITH FAT LAYER EXPOSED: Primary | ICD-10-CM

## 2025-07-23 PROCEDURE — 99212 OFFICE O/P EST SF 10 MIN: CPT | Mod: ,,, | Performed by: SURGERY

## 2025-07-23 PROCEDURE — 3052F HG A1C>EQUAL 8.0%<EQUAL 9.0%: CPT | Mod: CPTII,,, | Performed by: SURGERY

## 2025-07-23 PROCEDURE — 3066F NEPHROPATHY DOC TX: CPT | Mod: CPTII,,, | Performed by: SURGERY

## 2025-07-23 PROCEDURE — 3061F NEG MICROALBUMINURIA REV: CPT | Mod: CPTII,,, | Performed by: SURGERY

## 2025-07-23 PROCEDURE — 11042 DBRDMT SUBQ TIS 1ST 20SQCM/<: CPT | Mod: PN | Performed by: SURGERY

## 2025-07-23 PROCEDURE — 4010F ACE/ARB THERAPY RXD/TAKEN: CPT | Mod: CPTII,,, | Performed by: SURGERY

## 2025-07-23 PROCEDURE — 3080F DIAST BP >= 90 MM HG: CPT | Mod: CPTII,,, | Performed by: SURGERY

## 2025-07-23 PROCEDURE — 1159F MED LIST DOCD IN RCRD: CPT | Mod: CPTII,,, | Performed by: SURGERY

## 2025-07-23 PROCEDURE — 3077F SYST BP >= 140 MM HG: CPT | Mod: CPTII,,, | Performed by: SURGERY

## 2025-07-30 ENCOUNTER — OFFICE VISIT (OUTPATIENT)
Dept: WOUND CARE | Facility: HOSPITAL | Age: 45
End: 2025-07-30
Attending: SURGERY
Payer: MEDICAID

## 2025-07-30 VITALS
SYSTOLIC BLOOD PRESSURE: 175 MMHG | HEART RATE: 87 BPM | TEMPERATURE: 98 F | RESPIRATION RATE: 18 BRPM | DIASTOLIC BLOOD PRESSURE: 86 MMHG

## 2025-07-30 DIAGNOSIS — L97.512 ULCER OF RIGHT FOOT, WITH FAT LAYER EXPOSED: Primary | ICD-10-CM

## 2025-07-30 DIAGNOSIS — L97.509 TYPE 2 DIABETES MELLITUS WITH FOOT ULCER, UNSPECIFIED WHETHER LONG TERM INSULIN USE: ICD-10-CM

## 2025-07-30 DIAGNOSIS — E11.621 TYPE 2 DIABETES MELLITUS WITH FOOT ULCER, UNSPECIFIED WHETHER LONG TERM INSULIN USE: ICD-10-CM

## 2025-07-30 PROCEDURE — 11042 DBRDMT SUBQ TIS 1ST 20SQCM/<: CPT | Mod: PN | Performed by: SURGERY

## 2025-07-30 RX ORDER — DOXYCYCLINE 100 MG/1
100 CAPSULE ORAL 2 TIMES DAILY
Qty: 60 CAPSULE | Refills: 1 | Status: SHIPPED | OUTPATIENT
Start: 2025-07-30 | End: 2025-09-28

## 2025-07-30 NOTE — PROGRESS NOTES
GENERAL SURGERY  OUTPATIENT H&P    REASON FOR VISIT/CC: wound check - right heel    HPI: Philip Alberts is a 44 y.o. male with poorly controled DM, chronic right heal wound. Wound edges and wound bed itself has been debrided to promote healing. Off weighting boot and calcium alginate and has been being utilized. Has completed doxycycline. Does feel wound this has healing and drainage has improved.     I have reviewed the patient's chart including prior progress notes, procedures and testing.     ROS:   Review of Systems    PROBLEM LIST:  Problem List[1]      HISTORY  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 12/3/2018       Past Surgical History:   Procedure Laterality Date    MANDIBLE FRACTURE SURGERY  07/17/2000    MANDIBLE FRACTURE SURGERY      SPLENECTOMY, TOTAL  07/17/2000    TOE AMPUTATION Right 10/9/2023    Procedure: AMPUTATION, TOE;  Surgeon: Ashok Santiago DPM;  Location: [facility];  Service: Podiatry;  Laterality: Right;       Social History[2]    Family History   Problem Relation Name Age of Onset    Diabetes Mother      Cancer Father           MEDS:  Medications Ordered Prior to Encounter[3]    ALLERGIES:  Review of patient's allergies indicates:   Allergen Reactions    Pcn [penicillins] Anaphylaxis     Tolerates cephalexin         VITALS:  Vitals:    07/23/25 1509   BP: (!) 162/91   Pulse: 73   Resp: 18   Temp: 98.2 °F (36.8 °C)           PHYSICAL EXAM    Right heel wound appears to be slowly improving incise. Previous callus development creating a lip over the wound has improved, this has still some biofilm and fibrinous exudate in the wound bed though this has  this has improving, minimal drainage    LABS:  Lab Results   Component Value Date    WBC 11.68 11/27/2024    RBC 4.48 (L) 11/27/2024    HGB 13.7 (L) 11/27/2024    HCT 41.4 11/27/2024    HCT 36 10/08/2023     11/27/2024     Lab Results   Component Value Date     04/08/2025     (H) 01/21/2019     04/08/2025     01/21/2019    K 4.4 04/08/2025     04/08/2025    CL 98 01/21/2019    CO2 30 (H) 04/08/2025    BUN 12 04/08/2025    CREATININE 0.8 04/08/2025    CREATININE 0.84 01/21/2019    CALCIUM 9.2 04/08/2025     Lab Results   Component Value Date    ALT 27 12/05/2024    AST 28 12/05/2024    ALKPHOS 81 12/05/2024    BILITOT 0.3 12/05/2024     Lab Results   Component Value Date    MG 1.8 10/09/2023    PHOS 3.4 09/29/2021         ASSESSMENT & PLAN:  44 y.o. male with chronic right heal wound  -callus improved, edges still debrided with a curette  -central portion was also slowly improving, this has a small amount of fibrinous exudate in the center which was debrided with a scalpel less than 20 cm   -overall appears to have improvement and will recommend continued daily dressing changes with silver alginate to the center portion, border foam and gentian violet around the edges to help prevent maceration and pressure, reverse football for off voiding  -return to clinic in one-week for follow-up, may be able to extend out to every 1-2 weeks  -continue tight diabetes control and medications      Proper wound care and the possible need for serial debridements, topical medications, specific dressings and biological engineered skin substitutes if indicated  Discussed general issues surrounding recurrent/ nonhealing ulcers, along with the advantages & disadvantages of various treatment strategies.      Patient should call the office immediately if any signs of infection, such as fever, chills, sweats, increased redness or pain.    Patient was instructed to call the clinic or go to the emergency  department if their symptoms do not improve, worsens, or if new symptoms develop.  Patient was advised that if any increased swelling, pain, or numbness arise to go immediately to the ED. Patient knows to call any time if an emergency arises. Shared decision making occurred and patient verbalized understanding in agreement with this plan.       I spent a total of 45 minutes on the day of the visit.This includes face to face time and non-face to face time preparing to see the patient (eg, review of tests), obtaining and/or reviewing separately obtained history, documenting clinical information in the electronic or other health record, independently interpreting results and communicating results to the patient/family/caregiver, or care coordinator.       Much of the documentation for this visit was completed in the Wound Docs system.  Please see the attached documentation for further details about the patient's care. Scanned under the Media tab.                                                [1]   Patient Active Problem List  Diagnosis    Diabetes mellitus, type 2    GERD (gastroesophageal reflux disease)    Post-traumatic osteoarthritis of multiple joints    Type 2 diabetes mellitus with mild nonproliferative retinopathy    Uncontrolled type 2 diabetes mellitus with hyperglycemia    Hyperlipidemia    Hypertension    Smoker    Sciatica of right side    Chronic rhinitis    Sebaceous cyst    Diabetic peripheral neuropathy    Chronic allergic rhinitis    Mild nonproliferative diabetic retinopathy of both eyes without macular edema associated with type 2 diabetes mellitus    Cellulitis    Ulcer of toe of right foot, with necrosis of muscle    Diabetes mellitus due to underlying condition with hyperglycemia, with long-term current use of insulin    Diabetic foot infection    Wound infection    Ulcer of right foot, with fat layer exposed    Ulcer of right foot    Diabetic ulcer of toe of right foot associated with type 2  "diabetes mellitus, with fat layer exposed    History of amputation of right foot   [2]   Social History  Tobacco Use    Smoking status: Every Day    Smokeless tobacco: Never   Substance Use Topics    Alcohol use: No    Drug use: No   [3]   Current Outpatient Medications on File Prior to Visit   Medication Sig Dispense Refill    aspirin (ECOTRIN) 81 MG EC tablet Take 1 tablet (81 mg total) by mouth once daily. 90 tablet 3    atorvastatin (LIPITOR) 20 MG tablet Take 1 tablet (20 mg total) by mouth every evening. 90 tablet 3    blood-glucose meter kit To check BG qid times daily, to use with insurance preferred meter 1 each 0    blood-glucose meter,continuous (DEXCOM ) Misc 1 Device by Misc.(Non-Drug; Combo Route) route once daily. 1 each 0    DEXCOM G7 SENSOR Gosia APPLY 1 SENSOR TO SKIN AND CHANGE OUT EVERY 10 DAYS 9 each 3    doxycycline (VIBRAMYCIN) 100 MG Cap Take 1 capsule (100 mg total) by mouth 2 (two) times daily. 60 capsule 1    esomeprazole (NEXIUM) 20 MG capsule Take 1 capsule (20 mg total) by mouth before breakfast. 90 capsule 1    gabapentin (NEURONTIN) 300 MG capsule TAKE 2 CAPSULES BY MOUTH THREE TIMES DAILY 180 capsule 5    insulin aspart U-100 (NOVOLOG FLEXPEN U-100 INSULIN) 100 unit/mL (3 mL) InPn pen INJECT 10 UNITS SUBCUTANEOUSLY 4 TIMES DAILY PER  SLIDING  SCALE  AS  INSTRUCTED 15 mL 5    LANTUS SOLOSTAR U-100 INSULIN 100 unit/mL (3 mL) InPn pen Increase INJECT 22 UNITS INTO THE SKIN TWICE DAILY 15 mL 0    lisinopriL 10 MG tablet Take 1 tablet by mouth once daily 90 tablet 3    mupirocin (BACTROBAN) 2 % ointment Apply topically 3 (three) times daily. 22 g 0    pen needle, diabetic (BD ULTRA-FINE MINI PEN NEEDLE) 31 gauge x 3/16" Ndle USE 1  4 TIMES DAILY BEFORE MEAL(S) AND NIGHTLY 100 each 3    sildenafiL (VIAGRA) 100 MG tablet Take 1 tablet (100 mg total) by mouth daily as needed for Erectile Dysfunction. 20 tablet 1     No current facility-administered medications on file prior to visit. "

## 2025-07-30 NOTE — PROGRESS NOTES
GENERAL SURGERY  OUTPATIENT H&P    REASON FOR VISIT/CC: wound check - right heel    HPI: Philip Alberts is a 44 y.o. male with poorly controled DM, chronic right heal wound. Wound edges and wound bed itself has been debrided to promote healing. Off weighting boot and calcium alginate and has been being utilized. Has completed doxycycline. Does feel wound this has healing and drainage has improved.     No significant changes this has visit.  Requesting refill doxycycline.    I have reviewed the patient's chart including prior progress notes, procedures and testing.     ROS:   Review of Systems    PROBLEM LIST:  Problem List[1]      HISTORY  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 12/3/2018       Past Surgical History:   Procedure Laterality Date    MANDIBLE FRACTURE SURGERY  07/17/2000    MANDIBLE FRACTURE SURGERY      SPLENECTOMY, TOTAL  07/17/2000    TOE AMPUTATION Right 10/9/2023    Procedure: AMPUTATION, TOE;  Surgeon: Ashok Santiago DPM;  Location: [facility];  Service: Podiatry;  Laterality: Right;       Social History[2]    Family History   Problem Relation Name Age of Onset    Diabetes Mother      Cancer Father           MEDS:  Medications Ordered Prior to Encounter[3]    ALLERGIES:  Review of patient's allergies indicates:   Allergen Reactions    Pcn [penicillins] Anaphylaxis     Tolerates cephalexin         VITALS:  Vitals:    07/30/25 1347   BP: (!) 175/86   Pulse: 87   Resp: 18   Temp: 98.3 °F (36.8 °C)           PHYSICAL EXAM    Right heel wound continues to slowly improve. Having closure mostly from the superior aspect down.  Inferior aspect still with somewhat  of a lip created by callus. Biofilm and fibrinous exudate and wound bed though overall appears healthy.  Before and after debridement photos from today              LABS:  Lab Results   Component Value Date    WBC 11.68 11/27/2024    RBC 4.48 (L) 11/27/2024    HGB 13.7 (L) 11/27/2024    HCT 41.4 11/27/2024    HCT 36 10/08/2023     11/27/2024     Lab Results   Component Value Date     04/08/2025     (H) 01/21/2019     04/08/2025     01/21/2019    K 4.4 04/08/2025     04/08/2025    CL 98 01/21/2019    CO2 30 (H) 04/08/2025    BUN 12 04/08/2025    CREATININE 0.8 04/08/2025    CREATININE 0.84 01/21/2019    CALCIUM 9.2 04/08/2025     Lab Results   Component Value Date    ALT 27 12/05/2024    AST 28 12/05/2024    ALKPHOS 81 12/05/2024    BILITOT 0.3 12/05/2024     Lab Results   Component Value Date    MG 1.8 10/09/2023    PHOS 3.4 09/29/2021         ASSESSMENT & PLAN:  44 y.o. male with chronic right heal wound  -wound does appear to be improving especially from the superior aspect down  -callus removed from skin edges, wound bed itself debrided with a curette to remove fibrinous this has exudate and biofilm  -continue current wound regimen with silver alginate, border foam, gentian violet around edges and then reverse for ball pressure bandage  -will refill doxycycline  -return to clinic in one-week  -continue tight diabetes control and medications      Proper wound care and the possible need for serial debridements, topical medications, specific dressings and biological engineered skin substitutes if indicated  Discussed general issues surrounding recurrent/ nonhealing ulcers, along with the advantages & disadvantages of various treatment strategies.      Patient should call the office immediately if any signs of infection, such as fever, chills, sweats, increased redness or pain.    Patient was instructed to call the clinic or go to the emergency department if their symptoms do  not improve, worsens, or if new symptoms develop.  Patient was advised that if any increased swelling, pain, or numbness arise to go immediately to the ED. Patient knows to call any time if an emergency arises. Shared decision making occurred and patient verbalized understanding in agreement with this plan.       I spent a total of 45 minutes on the day of the visit.This includes face to face time and non-face to face time preparing to see the patient (eg, review of tests), obtaining and/or reviewing separately obtained history, documenting clinical information in the electronic or other health record, independently interpreting results and communicating results to the patient/family/caregiver, or care coordinator.       Much of the documentation for this visit was completed in the [facility] system.  Please see the attached documentation for further details about the patient's care. Scanned under the Media tab.                                                  [1]   Patient Active Problem List  Diagnosis    Diabetes mellitus, type 2    GERD (gastroesophageal reflux disease)    Post-traumatic osteoarthritis of multiple joints    Type 2 diabetes mellitus with mild nonproliferative retinopathy    Uncontrolled type 2 diabetes mellitus with hyperglycemia    Hyperlipidemia    Hypertension    Smoker    Sciatica of right side    Chronic rhinitis    Sebaceous cyst    Diabetic peripheral neuropathy    Chronic allergic rhinitis    Mild nonproliferative diabetic retinopathy of both eyes without macular edema associated with type 2 diabetes mellitus    Cellulitis    Ulcer of toe of right foot, with necrosis of muscle    Diabetes mellitus due to underlying condition with hyperglycemia, with long-term current use of insulin    Diabetic foot infection    Wound infection    Ulcer of right foot, with fat layer exposed    Ulcer of right foot    Diabetic ulcer of toe of right foot associated with type 2 diabetes mellitus, with fat  "layer exposed    History of amputation of right foot   [2]   Social History  Tobacco Use    Smoking status: Every Day    Smokeless tobacco: Never   Substance Use Topics    Alcohol use: No    Drug use: No   [3]   Current Outpatient Medications on File Prior to Visit   Medication Sig Dispense Refill    aspirin (ECOTRIN) 81 MG EC tablet Take 1 tablet (81 mg total) by mouth once daily. 90 tablet 3    atorvastatin (LIPITOR) 20 MG tablet Take 1 tablet (20 mg total) by mouth every evening. 90 tablet 3    blood-glucose meter kit To check BG qid times daily, to use with insurance preferred meter 1 each 0    blood-glucose meter,continuous (DEXCOM ) Misc 1 Device by Misc.(Non-Drug; Combo Route) route once daily. 1 each 0    DEXCOM G7 SENSOR Gosia APPLY 1 SENSOR TO SKIN AND CHANGE OUT EVERY 10 DAYS 9 each 3    doxycycline (VIBRAMYCIN) 100 MG Cap Take 1 capsule (100 mg total) by mouth 2 (two) times daily. 60 capsule 1    esomeprazole (NEXIUM) 20 MG capsule Take 1 capsule (20 mg total) by mouth before breakfast. 90 capsule 1    gabapentin (NEURONTIN) 300 MG capsule TAKE 2 CAPSULES BY MOUTH THREE TIMES DAILY 180 capsule 5    insulin aspart U-100 (NOVOLOG FLEXPEN U-100 INSULIN) 100 unit/mL (3 mL) InPn pen INJECT 10 UNITS SUBCUTANEOUSLY 4 TIMES DAILY PER  SLIDING  SCALE  AS  INSTRUCTED 15 mL 5    LANTUS SOLOSTAR U-100 INSULIN 100 unit/mL (3 mL) InPn pen Increase INJECT 22 UNITS INTO THE SKIN TWICE DAILY 15 mL 0    lisinopriL 10 MG tablet Take 1 tablet by mouth once daily 90 tablet 3    mupirocin (BACTROBAN) 2 % ointment Apply topically 3 (three) times daily. 22 g 0    pen needle, diabetic (BD ULTRA-FINE MINI PEN NEEDLE) 31 gauge x 3/16" Ndle USE 1  4 TIMES DAILY BEFORE MEAL(S) AND NIGHTLY 100 each 3    sildenafiL (VIAGRA) 100 MG tablet Take 1 tablet (100 mg total) by mouth daily as needed for Erectile Dysfunction. 20 tablet 1     No current facility-administered medications on file prior to visit.     "

## 2025-08-06 ENCOUNTER — OFFICE VISIT (OUTPATIENT)
Dept: WOUND CARE | Facility: HOSPITAL | Age: 45
End: 2025-08-06
Attending: SURGERY
Payer: MEDICAID

## 2025-08-06 VITALS
HEART RATE: 71 BPM | TEMPERATURE: 98 F | DIASTOLIC BLOOD PRESSURE: 88 MMHG | RESPIRATION RATE: 18 BRPM | SYSTOLIC BLOOD PRESSURE: 148 MMHG

## 2025-08-06 DIAGNOSIS — Z79.4 DIABETES MELLITUS DUE TO UNDERLYING CONDITION WITH HYPERGLYCEMIA, WITH LONG-TERM CURRENT USE OF INSULIN: ICD-10-CM

## 2025-08-06 DIAGNOSIS — F17.200 SMOKER: ICD-10-CM

## 2025-08-06 DIAGNOSIS — L97.512 ULCER OF RIGHT FOOT, WITH FAT LAYER EXPOSED: Primary | ICD-10-CM

## 2025-08-06 DIAGNOSIS — E11.42 TYPE 2 DIABETES MELLITUS WITH DIABETIC POLYNEUROPATHY, WITH LONG-TERM CURRENT USE OF INSULIN: ICD-10-CM

## 2025-08-06 DIAGNOSIS — I73.9 PVD (PERIPHERAL VASCULAR DISEASE): ICD-10-CM

## 2025-08-06 DIAGNOSIS — E11.42 DIABETIC PERIPHERAL NEUROPATHY: ICD-10-CM

## 2025-08-06 DIAGNOSIS — L97.513 ULCER OF RIGHT FOOT WITH NECROSIS OF MUSCLE: ICD-10-CM

## 2025-08-06 DIAGNOSIS — E08.65 DIABETES MELLITUS DUE TO UNDERLYING CONDITION WITH HYPERGLYCEMIA, WITH LONG-TERM CURRENT USE OF INSULIN: ICD-10-CM

## 2025-08-06 DIAGNOSIS — E11.621 TYPE 2 DIABETES MELLITUS WITH FOOT ULCER, UNSPECIFIED WHETHER LONG TERM INSULIN USE: ICD-10-CM

## 2025-08-06 DIAGNOSIS — Z79.4 TYPE 2 DIABETES MELLITUS WITH DIABETIC POLYNEUROPATHY, WITH LONG-TERM CURRENT USE OF INSULIN: ICD-10-CM

## 2025-08-06 DIAGNOSIS — L97.509 TYPE 2 DIABETES MELLITUS WITH FOOT ULCER, UNSPECIFIED WHETHER LONG TERM INSULIN USE: ICD-10-CM

## 2025-08-06 DIAGNOSIS — E11.65 UNCONTROLLED TYPE 2 DIABETES MELLITUS WITH HYPERGLYCEMIA: ICD-10-CM

## 2025-08-06 PROCEDURE — 11042 DBRDMT SUBQ TIS 1ST 20SQCM/<: CPT | Mod: PN | Performed by: SURGERY

## 2025-08-10 DIAGNOSIS — E10.42 TYPE 1 DM WITH POLYNEUROPATHY: ICD-10-CM

## 2025-08-11 RX ORDER — INSULIN GLARGINE 100 [IU]/ML
22 INJECTION, SOLUTION SUBCUTANEOUS 2 TIMES DAILY
Qty: 45 ML | Refills: 0 | Status: SHIPPED | OUTPATIENT
Start: 2025-08-11

## 2025-08-13 ENCOUNTER — OFFICE VISIT (OUTPATIENT)
Dept: WOUND CARE | Facility: HOSPITAL | Age: 45
End: 2025-08-13
Attending: SURGERY
Payer: MEDICAID

## 2025-08-13 VITALS
HEART RATE: 94 BPM | DIASTOLIC BLOOD PRESSURE: 90 MMHG | RESPIRATION RATE: 18 BRPM | TEMPERATURE: 99 F | SYSTOLIC BLOOD PRESSURE: 194 MMHG

## 2025-08-13 DIAGNOSIS — E11.65 UNCONTROLLED TYPE 2 DIABETES MELLITUS WITH HYPERGLYCEMIA: ICD-10-CM

## 2025-08-13 DIAGNOSIS — L08.9 DIABETIC FOOT INFECTION: ICD-10-CM

## 2025-08-13 DIAGNOSIS — L97.519 ULCER OF RIGHT FOOT, UNSPECIFIED ULCER STAGE: ICD-10-CM

## 2025-08-13 DIAGNOSIS — T14.8XXA WOUND INFECTION: ICD-10-CM

## 2025-08-13 DIAGNOSIS — L97.512 ULCER OF RIGHT FOOT, WITH FAT LAYER EXPOSED: Primary | ICD-10-CM

## 2025-08-13 DIAGNOSIS — L97.513 ULCER OF RIGHT FOOT WITH NECROSIS OF MUSCLE: ICD-10-CM

## 2025-08-13 DIAGNOSIS — L97.512 DIABETIC ULCER OF TOE OF RIGHT FOOT ASSOCIATED WITH TYPE 2 DIABETES MELLITUS, WITH FAT LAYER EXPOSED: ICD-10-CM

## 2025-08-13 DIAGNOSIS — L97.509 TYPE 2 DIABETES MELLITUS WITH FOOT ULCER, UNSPECIFIED WHETHER LONG TERM INSULIN USE: ICD-10-CM

## 2025-08-13 DIAGNOSIS — R26.2 DIFFICULTY IN WALKING, NOT ELSEWHERE CLASSIFIED: ICD-10-CM

## 2025-08-13 DIAGNOSIS — L97.514 ULCER OF RIGHT FOOT WITH NECROSIS OF BONE: ICD-10-CM

## 2025-08-13 DIAGNOSIS — Z87.2 HISTORY OF ULCER OF LOWER EXTREMITY: ICD-10-CM

## 2025-08-13 DIAGNOSIS — F17.200 SMOKER: ICD-10-CM

## 2025-08-13 DIAGNOSIS — E08.65 DIABETES MELLITUS DUE TO UNDERLYING CONDITION WITH HYPERGLYCEMIA, WITH LONG-TERM CURRENT USE OF INSULIN: ICD-10-CM

## 2025-08-13 DIAGNOSIS — L97.513 ULCER OF TOE OF RIGHT FOOT, WITH NECROSIS OF MUSCLE: ICD-10-CM

## 2025-08-13 DIAGNOSIS — E11.628 DIABETIC FOOT INFECTION: ICD-10-CM

## 2025-08-13 DIAGNOSIS — Z79.4 TYPE 2 DIABETES MELLITUS WITH DIABETIC POLYNEUROPATHY, WITH LONG-TERM CURRENT USE OF INSULIN: ICD-10-CM

## 2025-08-13 DIAGNOSIS — Z91.89 AT HIGH RISK FOR INADEQUATE NUTRITIONAL INTAKE: ICD-10-CM

## 2025-08-13 DIAGNOSIS — E11.621 DIABETIC ULCER OF TOE OF RIGHT FOOT ASSOCIATED WITH TYPE 2 DIABETES MELLITUS, WITH FAT LAYER EXPOSED: ICD-10-CM

## 2025-08-13 DIAGNOSIS — Z89.431 HISTORY OF AMPUTATION OF RIGHT FOOT: ICD-10-CM

## 2025-08-13 DIAGNOSIS — E11.42 DIABETIC PERIPHERAL NEUROPATHY: ICD-10-CM

## 2025-08-13 DIAGNOSIS — Z79.4 DIABETES MELLITUS DUE TO UNDERLYING CONDITION WITH HYPERGLYCEMIA, WITH LONG-TERM CURRENT USE OF INSULIN: ICD-10-CM

## 2025-08-13 DIAGNOSIS — I73.9 PVD (PERIPHERAL VASCULAR DISEASE): ICD-10-CM

## 2025-08-13 DIAGNOSIS — E11.621 TYPE 2 DIABETES MELLITUS WITH FOOT ULCER, UNSPECIFIED WHETHER LONG TERM INSULIN USE: ICD-10-CM

## 2025-08-13 DIAGNOSIS — L08.9 WOUND INFECTION: ICD-10-CM

## 2025-08-13 DIAGNOSIS — E11.42 TYPE 2 DIABETES MELLITUS WITH DIABETIC POLYNEUROPATHY, WITH LONG-TERM CURRENT USE OF INSULIN: ICD-10-CM

## 2025-08-13 PROCEDURE — 11042 DBRDMT SUBQ TIS 1ST 20SQCM/<: CPT | Mod: PN | Performed by: SURGERY

## 2025-08-20 ENCOUNTER — OFFICE VISIT (OUTPATIENT)
Dept: WOUND CARE | Facility: HOSPITAL | Age: 45
End: 2025-08-20
Attending: SURGERY
Payer: MEDICAID

## 2025-08-20 DIAGNOSIS — E11.42 DIABETIC PERIPHERAL NEUROPATHY: ICD-10-CM

## 2025-08-20 DIAGNOSIS — E11.621 TYPE 2 DIABETES MELLITUS WITH FOOT ULCER, UNSPECIFIED WHETHER LONG TERM INSULIN USE: ICD-10-CM

## 2025-08-20 DIAGNOSIS — L97.512 ULCER OF RIGHT FOOT, WITH FAT LAYER EXPOSED: Primary | ICD-10-CM

## 2025-08-20 DIAGNOSIS — L97.509 TYPE 2 DIABETES MELLITUS WITH FOOT ULCER, UNSPECIFIED WHETHER LONG TERM INSULIN USE: ICD-10-CM

## 2025-08-20 PROCEDURE — 99213 OFFICE O/P EST LOW 20 MIN: CPT | Mod: PN | Performed by: SURGERY

## 2025-08-27 ENCOUNTER — OFFICE VISIT (OUTPATIENT)
Dept: WOUND CARE | Facility: HOSPITAL | Age: 45
End: 2025-08-27
Attending: SURGERY
Payer: MEDICAID

## 2025-08-27 VITALS
HEART RATE: 89 BPM | SYSTOLIC BLOOD PRESSURE: 154 MMHG | RESPIRATION RATE: 18 BRPM | DIASTOLIC BLOOD PRESSURE: 90 MMHG | TEMPERATURE: 98 F

## 2025-08-27 DIAGNOSIS — L97.512 ULCER OF RIGHT FOOT, WITH FAT LAYER EXPOSED: Primary | ICD-10-CM

## 2025-08-27 DIAGNOSIS — Z79.4 DIABETES MELLITUS DUE TO UNDERLYING CONDITION WITH HYPERGLYCEMIA, WITH LONG-TERM CURRENT USE OF INSULIN: ICD-10-CM

## 2025-08-27 DIAGNOSIS — E08.65 DIABETES MELLITUS DUE TO UNDERLYING CONDITION WITH HYPERGLYCEMIA, WITH LONG-TERM CURRENT USE OF INSULIN: ICD-10-CM

## 2025-08-27 PROCEDURE — 4010F ACE/ARB THERAPY RXD/TAKEN: CPT | Mod: CPTII,,, | Performed by: SURGERY

## 2025-08-27 PROCEDURE — 99213 OFFICE O/P EST LOW 20 MIN: CPT | Mod: PN | Performed by: SURGERY

## 2025-08-27 PROCEDURE — 3052F HG A1C>EQUAL 8.0%<EQUAL 9.0%: CPT | Mod: CPTII,,, | Performed by: SURGERY

## 2025-08-27 PROCEDURE — 3061F NEG MICROALBUMINURIA REV: CPT | Mod: CPTII,,, | Performed by: SURGERY

## 2025-08-27 PROCEDURE — 99212 OFFICE O/P EST SF 10 MIN: CPT | Mod: ,,, | Performed by: SURGERY

## 2025-08-27 PROCEDURE — 3066F NEPHROPATHY DOC TX: CPT | Mod: CPTII,,, | Performed by: SURGERY

## 2025-09-03 ENCOUNTER — OFFICE VISIT (OUTPATIENT)
Dept: WOUND CARE | Facility: HOSPITAL | Age: 45
End: 2025-09-03
Attending: SURGERY
Payer: MEDICAID

## 2025-09-03 VITALS — TEMPERATURE: 98 F | DIASTOLIC BLOOD PRESSURE: 84 MMHG | HEART RATE: 89 BPM | SYSTOLIC BLOOD PRESSURE: 180 MMHG

## 2025-09-03 DIAGNOSIS — Z79.4 TYPE 2 DIABETES MELLITUS WITH DIABETIC POLYNEUROPATHY, WITH LONG-TERM CURRENT USE OF INSULIN: ICD-10-CM

## 2025-09-03 DIAGNOSIS — E11.42 TYPE 2 DIABETES MELLITUS WITH DIABETIC POLYNEUROPATHY, WITH LONG-TERM CURRENT USE OF INSULIN: ICD-10-CM

## 2025-09-03 DIAGNOSIS — E11.65 UNCONTROLLED TYPE 2 DIABETES MELLITUS WITH HYPERGLYCEMIA: ICD-10-CM

## 2025-09-03 DIAGNOSIS — I73.9 PVD (PERIPHERAL VASCULAR DISEASE): ICD-10-CM

## 2025-09-03 DIAGNOSIS — L97.512 ULCER OF RIGHT FOOT, WITH FAT LAYER EXPOSED: Primary | ICD-10-CM

## 2025-09-03 PROCEDURE — 3079F DIAST BP 80-89 MM HG: CPT | Mod: CPTII,,, | Performed by: SURGERY

## 2025-09-03 PROCEDURE — 99212 OFFICE O/P EST SF 10 MIN: CPT | Mod: ,,, | Performed by: SURGERY

## 2025-09-03 PROCEDURE — 1159F MED LIST DOCD IN RCRD: CPT | Mod: CPTII,,, | Performed by: SURGERY

## 2025-09-03 PROCEDURE — 3066F NEPHROPATHY DOC TX: CPT | Mod: CPTII,,, | Performed by: SURGERY

## 2025-09-03 PROCEDURE — 3061F NEG MICROALBUMINURIA REV: CPT | Mod: CPTII,,, | Performed by: SURGERY

## 2025-09-03 PROCEDURE — 11042 DBRDMT SUBQ TIS 1ST 20SQCM/<: CPT | Mod: PN | Performed by: SURGERY

## 2025-09-03 PROCEDURE — 3077F SYST BP >= 140 MM HG: CPT | Mod: CPTII,,, | Performed by: SURGERY

## 2025-09-03 PROCEDURE — 4010F ACE/ARB THERAPY RXD/TAKEN: CPT | Mod: CPTII,,, | Performed by: SURGERY

## 2025-09-03 PROCEDURE — 3052F HG A1C>EQUAL 8.0%<EQUAL 9.0%: CPT | Mod: CPTII,,, | Performed by: SURGERY

## (undated) DEVICE — BANDAGE KERLIX   441106

## (undated) DEVICE — TRAY LOWER EXTREMITY  SMHS029-05

## (undated) DEVICE — SOLUTION IRRI NS BOTTLE 1000ML R5200-01

## (undated) DEVICE — GAUZE VASELINE XEROFORM 5X9 8884433605

## (undated) DEVICE — SHOE POST OP MALE LARGE

## (undated) DEVICE — BANDAGE ACE STERILE 4 REB3114

## (undated) DEVICE — BANDAGE ESMARK 4X9 55514

## (undated) DEVICE — PAD BOVIE ADULT

## (undated) DEVICE — SPONGE GAUZE 10S 4X4  442214

## (undated) DEVICE — BLADE SCALPEL #15 371115

## (undated) DEVICE — GLOVE BIOGEL PI ULTRA TOUCH GRAY SZ7.5